# Patient Record
Sex: FEMALE | Race: WHITE | Employment: OTHER | URBAN - METROPOLITAN AREA
[De-identification: names, ages, dates, MRNs, and addresses within clinical notes are randomized per-mention and may not be internally consistent; named-entity substitution may affect disease eponyms.]

---

## 2018-02-27 ENCOUNTER — TRANSFERRED RECORDS (OUTPATIENT)
Dept: HEALTH INFORMATION MANAGEMENT | Facility: CLINIC | Age: 68
End: 2018-02-27

## 2018-03-19 ENCOUNTER — HOSPITAL ENCOUNTER (INPATIENT)
Facility: CLINIC | Age: 68
LOS: 2 days | Discharge: HOME | DRG: 394 | End: 2018-03-21
Attending: EMERGENCY MEDICINE | Admitting: INTERNAL MEDICINE
Payer: MEDICARE

## 2018-03-19 ENCOUNTER — APPOINTMENT (OUTPATIENT)
Dept: CT IMAGING | Facility: CLINIC | Age: 68
DRG: 394 | End: 2018-03-19
Attending: EMERGENCY MEDICINE
Payer: MEDICARE

## 2018-03-19 DIAGNOSIS — I10 BENIGN ESSENTIAL HYPERTENSION: Primary | ICD-10-CM

## 2018-03-19 DIAGNOSIS — K62.5 BRIGHT RED BLOOD PER RECTUM: ICD-10-CM

## 2018-03-19 DIAGNOSIS — K52.9 COLITIS: ICD-10-CM

## 2018-03-19 PROBLEM — K92.1 HEMATOCHEZIA: Status: ACTIVE | Noted: 2018-03-19

## 2018-03-19 LAB
ABO + RH BLD: NORMAL
ABO + RH BLD: NORMAL
ALBUMIN SERPL-MCNC: 4.1 G/DL (ref 3.4–5)
ALP SERPL-CCNC: 146 U/L (ref 40–150)
ALT SERPL W P-5'-P-CCNC: 48 U/L (ref 0–50)
ANION GAP SERPL CALCULATED.3IONS-SCNC: 12 MMOL/L (ref 3–14)
AST SERPL W P-5'-P-CCNC: 44 U/L (ref 0–45)
BASOPHILS # BLD AUTO: 0 10E9/L (ref 0–0.2)
BASOPHILS NFR BLD AUTO: 0.1 %
BILIRUB SERPL-MCNC: 0.7 MG/DL (ref 0.2–1.3)
BLD GP AB SCN SERPL QL: NORMAL
BLOOD BANK CMNT PATIENT-IMP: NORMAL
BUN SERPL-MCNC: 28 MG/DL (ref 7–30)
C COLI+JEJUNI+LARI FUSA STL QL NAA+PROBE: NOT DETECTED
C DIFF TOX B STL QL: NEGATIVE
CALCIUM SERPL-MCNC: 9 MG/DL (ref 8.5–10.1)
CHLORIDE SERPL-SCNC: 100 MMOL/L (ref 94–109)
CO2 SERPL-SCNC: 26 MMOL/L (ref 20–32)
CREAT SERPL-MCNC: 0.9 MG/DL (ref 0.52–1.04)
DIFFERENTIAL METHOD BLD: ABNORMAL
EC STX1 GENE STL QL NAA+PROBE: NOT DETECTED
EC STX2 GENE STL QL NAA+PROBE: NOT DETECTED
ENTERIC PATHOGEN COMMENT: NORMAL
EOSINOPHIL # BLD AUTO: 0 10E9/L (ref 0–0.7)
EOSINOPHIL NFR BLD AUTO: 0 %
ERYTHROCYTE [DISTWIDTH] IN BLOOD BY AUTOMATED COUNT: 12.4 % (ref 10–15)
GFR SERPL CREATININE-BSD FRML MDRD: 62 ML/MIN/1.7M2
GLUCOSE SERPL-MCNC: 159 MG/DL (ref 70–99)
HCT VFR BLD AUTO: 44.3 % (ref 35–47)
HGB BLD-MCNC: 12.9 G/DL (ref 11.7–15.7)
HGB BLD-MCNC: 13.7 G/DL (ref 11.7–15.7)
HGB BLD-MCNC: 15.1 G/DL (ref 11.7–15.7)
IMM GRANULOCYTES # BLD: 0.1 10E9/L (ref 0–0.4)
IMM GRANULOCYTES NFR BLD: 0.4 %
LACTATE BLD-SCNC: 0.8 MMOL/L (ref 0.7–2)
LACTATE BLD-SCNC: 2.2 MMOL/L (ref 0.7–2)
LIPASE SERPL-CCNC: 133 U/L (ref 73–393)
LYMPHOCYTES # BLD AUTO: 1.6 10E9/L (ref 0.8–5.3)
LYMPHOCYTES NFR BLD AUTO: 7.2 %
MCH RBC QN AUTO: 30.7 PG (ref 26.5–33)
MCHC RBC AUTO-ENTMCNC: 34.1 G/DL (ref 31.5–36.5)
MCV RBC AUTO: 90 FL (ref 78–100)
MONOCYTES # BLD AUTO: 1.5 10E9/L (ref 0–1.3)
MONOCYTES NFR BLD AUTO: 6.6 %
NEUTROPHILS # BLD AUTO: 19.5 10E9/L (ref 1.6–8.3)
NEUTROPHILS NFR BLD AUTO: 85.7 %
NOROV GI+II ORF1-ORF2 JNC STL QL NAA+PR: NOT DETECTED
NRBC # BLD AUTO: 0 10*3/UL
NRBC BLD AUTO-RTO: 0 /100
PLATELET # BLD AUTO: 260 10E9/L (ref 150–450)
POTASSIUM SERPL-SCNC: 3.2 MMOL/L (ref 3.4–5.3)
POTASSIUM SERPL-SCNC: 4 MMOL/L (ref 3.4–5.3)
PROT SERPL-MCNC: 7.3 G/DL (ref 6.8–8.8)
RBC # BLD AUTO: 4.92 10E12/L (ref 3.8–5.2)
RVA NSP5 STL QL NAA+PROBE: NOT DETECTED
SALMONELLA SP RPOD STL QL NAA+PROBE: NOT DETECTED
SHIGELLA SP+EIEC IPAH STL QL NAA+PROBE: NOT DETECTED
SODIUM SERPL-SCNC: 138 MMOL/L (ref 133–144)
SPECIMEN EXP DATE BLD: NORMAL
SPECIMEN SOURCE: NORMAL
V CHOL+PARA RFBL+TRKH+TNAA STL QL NAA+PR: NOT DETECTED
WBC # BLD AUTO: 22.7 10E9/L (ref 4–11)
Y ENTERO RECN STL QL NAA+PROBE: NOT DETECTED

## 2018-03-19 PROCEDURE — 74176 CT ABD & PELVIS W/O CONTRAST: CPT

## 2018-03-19 PROCEDURE — 12000000 ZZH R&B MED SURG/OB

## 2018-03-19 PROCEDURE — 96361 HYDRATE IV INFUSION ADD-ON: CPT

## 2018-03-19 PROCEDURE — 36415 COLL VENOUS BLD VENIPUNCTURE: CPT | Performed by: PHYSICIAN ASSISTANT

## 2018-03-19 PROCEDURE — 25000128 H RX IP 250 OP 636: Performed by: EMERGENCY MEDICINE

## 2018-03-19 PROCEDURE — 86901 BLOOD TYPING SEROLOGIC RH(D): CPT | Performed by: EMERGENCY MEDICINE

## 2018-03-19 PROCEDURE — 25000132 ZZH RX MED GY IP 250 OP 250 PS 637: Mod: GY | Performed by: EMERGENCY MEDICINE

## 2018-03-19 PROCEDURE — 25000132 ZZH RX MED GY IP 250 OP 250 PS 637: Mod: GY | Performed by: PHYSICIAN ASSISTANT

## 2018-03-19 PROCEDURE — A9270 NON-COVERED ITEM OR SERVICE: HCPCS | Mod: GY | Performed by: EMERGENCY MEDICINE

## 2018-03-19 PROCEDURE — 85018 HEMOGLOBIN: CPT | Performed by: PHYSICIAN ASSISTANT

## 2018-03-19 PROCEDURE — 25000125 ZZHC RX 250: Performed by: EMERGENCY MEDICINE

## 2018-03-19 PROCEDURE — 84132 ASSAY OF SERUM POTASSIUM: CPT | Performed by: PHYSICIAN ASSISTANT

## 2018-03-19 PROCEDURE — 86900 BLOOD TYPING SEROLOGIC ABO: CPT | Performed by: EMERGENCY MEDICINE

## 2018-03-19 PROCEDURE — 83690 ASSAY OF LIPASE: CPT | Performed by: EMERGENCY MEDICINE

## 2018-03-19 PROCEDURE — 87506 IADNA-DNA/RNA PROBE TQ 6-11: CPT | Performed by: PHYSICIAN ASSISTANT

## 2018-03-19 PROCEDURE — 83605 ASSAY OF LACTIC ACID: CPT | Performed by: PHYSICIAN ASSISTANT

## 2018-03-19 PROCEDURE — 80053 COMPREHEN METABOLIC PANEL: CPT | Performed by: EMERGENCY MEDICINE

## 2018-03-19 PROCEDURE — 25000128 H RX IP 250 OP 636: Performed by: PHYSICIAN ASSISTANT

## 2018-03-19 PROCEDURE — 25000132 ZZH RX MED GY IP 250 OP 250 PS 637: Mod: GY | Performed by: INTERNAL MEDICINE

## 2018-03-19 PROCEDURE — 86850 RBC ANTIBODY SCREEN: CPT | Performed by: EMERGENCY MEDICINE

## 2018-03-19 PROCEDURE — 87493 C DIFF AMPLIFIED PROBE: CPT | Performed by: PHYSICIAN ASSISTANT

## 2018-03-19 PROCEDURE — 99223 1ST HOSP IP/OBS HIGH 75: CPT | Mod: AI | Performed by: INTERNAL MEDICINE

## 2018-03-19 PROCEDURE — 85025 COMPLETE CBC W/AUTO DIFF WBC: CPT | Performed by: EMERGENCY MEDICINE

## 2018-03-19 PROCEDURE — A9270 NON-COVERED ITEM OR SERVICE: HCPCS | Mod: GY | Performed by: PHYSICIAN ASSISTANT

## 2018-03-19 PROCEDURE — 99207 ZZC APP CREDIT; MD BILLING SHARED VISIT: CPT | Performed by: PHYSICIAN ASSISTANT

## 2018-03-19 PROCEDURE — 25000125 ZZHC RX 250: Performed by: PHYSICIAN ASSISTANT

## 2018-03-19 PROCEDURE — 99285 EMERGENCY DEPT VISIT HI MDM: CPT | Mod: 25

## 2018-03-19 PROCEDURE — 96365 THER/PROPH/DIAG IV INF INIT: CPT

## 2018-03-19 PROCEDURE — 83605 ASSAY OF LACTIC ACID: CPT | Performed by: EMERGENCY MEDICINE

## 2018-03-19 RX ORDER — NALOXONE HYDROCHLORIDE 0.4 MG/ML
.1-.4 INJECTION, SOLUTION INTRAMUSCULAR; INTRAVENOUS; SUBCUTANEOUS
Status: DISCONTINUED | OUTPATIENT
Start: 2018-03-19 | End: 2018-03-21 | Stop reason: HOSPADM

## 2018-03-19 RX ORDER — ACETAMINOPHEN 650 MG/1
650 SUPPOSITORY RECTAL EVERY 4 HOURS PRN
Status: DISCONTINUED | OUTPATIENT
Start: 2018-03-19 | End: 2018-03-21 | Stop reason: HOSPADM

## 2018-03-19 RX ORDER — OXYCODONE AND ACETAMINOPHEN 5; 325 MG/1; MG/1
1-2 TABLET ORAL EVERY 4 HOURS PRN
Status: DISCONTINUED | OUTPATIENT
Start: 2018-03-19 | End: 2018-03-21 | Stop reason: HOSPADM

## 2018-03-19 RX ORDER — POTASSIUM CHLORIDE 1.5 G/1.58G
20-40 POWDER, FOR SOLUTION ORAL
Status: DISCONTINUED | OUTPATIENT
Start: 2018-03-19 | End: 2018-03-21 | Stop reason: HOSPADM

## 2018-03-19 RX ORDER — LISINOPRIL 10 MG/1
10 TABLET ORAL DAILY
Status: DISCONTINUED | OUTPATIENT
Start: 2018-03-19 | End: 2018-03-21 | Stop reason: HOSPADM

## 2018-03-19 RX ORDER — CYCLOBENZAPRINE HCL 5 MG
10 TABLET ORAL AT BEDTIME
Status: DISCONTINUED | OUTPATIENT
Start: 2018-03-19 | End: 2018-03-21 | Stop reason: HOSPADM

## 2018-03-19 RX ORDER — LOPERAMIDE HCL 2 MG
2 CAPSULE ORAL 4 TIMES DAILY PRN
COMMUNITY

## 2018-03-19 RX ORDER — ONDANSETRON 4 MG/1
4 TABLET, ORALLY DISINTEGRATING ORAL EVERY 6 HOURS PRN
Status: DISCONTINUED | OUTPATIENT
Start: 2018-03-19 | End: 2018-03-21 | Stop reason: HOSPADM

## 2018-03-19 RX ORDER — ONDANSETRON 2 MG/ML
4 INJECTION INTRAMUSCULAR; INTRAVENOUS EVERY 6 HOURS PRN
Status: DISCONTINUED | OUTPATIENT
Start: 2018-03-19 | End: 2018-03-21 | Stop reason: HOSPADM

## 2018-03-19 RX ORDER — POTASSIUM CL/LIDO/0.9 % NACL 10MEQ/0.1L
10 INTRAVENOUS SOLUTION, PIGGYBACK (ML) INTRAVENOUS
Status: DISCONTINUED | OUTPATIENT
Start: 2018-03-19 | End: 2018-03-21 | Stop reason: HOSPADM

## 2018-03-19 RX ORDER — ONDANSETRON 4 MG/1
4 TABLET, ORALLY DISINTEGRATING ORAL ONCE
Status: COMPLETED | OUTPATIENT
Start: 2018-03-19 | End: 2018-03-19

## 2018-03-19 RX ORDER — SODIUM CHLORIDE 9 MG/ML
INJECTION, SOLUTION INTRAVENOUS CONTINUOUS
Status: DISCONTINUED | OUTPATIENT
Start: 2018-03-19 | End: 2018-03-21

## 2018-03-19 RX ORDER — SIMVASTATIN 20 MG
40 TABLET ORAL AT BEDTIME
Status: DISCONTINUED | OUTPATIENT
Start: 2018-03-19 | End: 2018-03-21 | Stop reason: HOSPADM

## 2018-03-19 RX ORDER — CIPROFLOXACIN 2 MG/ML
400 INJECTION, SOLUTION INTRAVENOUS EVERY 12 HOURS
Status: DISCONTINUED | OUTPATIENT
Start: 2018-03-19 | End: 2018-03-21

## 2018-03-19 RX ORDER — POTASSIUM CHLORIDE 29.8 MG/ML
20 INJECTION INTRAVENOUS
Status: DISCONTINUED | OUTPATIENT
Start: 2018-03-19 | End: 2018-03-21 | Stop reason: HOSPADM

## 2018-03-19 RX ORDER — PROCHLORPERAZINE 25 MG
12.5 SUPPOSITORY, RECTAL RECTAL EVERY 12 HOURS PRN
Status: DISCONTINUED | OUTPATIENT
Start: 2018-03-19 | End: 2018-03-21 | Stop reason: HOSPADM

## 2018-03-19 RX ORDER — NYSTATIN 100000 [USP'U]/G
POWDER TOPICAL 2 TIMES DAILY
COMMUNITY
End: 2019-04-10

## 2018-03-19 RX ORDER — TEMAZEPAM 15 MG/1
15 CAPSULE ORAL
Status: DISCONTINUED | OUTPATIENT
Start: 2018-03-19 | End: 2018-03-19

## 2018-03-19 RX ORDER — POTASSIUM CHLORIDE 1500 MG/1
20-40 TABLET, EXTENDED RELEASE ORAL
Status: DISCONTINUED | OUTPATIENT
Start: 2018-03-19 | End: 2018-03-21 | Stop reason: HOSPADM

## 2018-03-19 RX ORDER — OXYCODONE AND ACETAMINOPHEN 5; 325 MG/1; MG/1
1 TABLET ORAL EVERY EVENING
Status: ON HOLD | COMMUNITY
End: 2019-03-19

## 2018-03-19 RX ORDER — LATANOPROST 50 UG/ML
1 SOLUTION/ DROPS OPHTHALMIC AT BEDTIME
COMMUNITY

## 2018-03-19 RX ORDER — PROCHLORPERAZINE MALEATE 5 MG
5 TABLET ORAL EVERY 6 HOURS PRN
Status: DISCONTINUED | OUTPATIENT
Start: 2018-03-19 | End: 2018-03-21 | Stop reason: HOSPADM

## 2018-03-19 RX ORDER — CIPROFLOXACIN 500 MG/1
500 TABLET, FILM COATED ORAL ONCE
Status: COMPLETED | OUTPATIENT
Start: 2018-03-19 | End: 2018-03-19

## 2018-03-19 RX ORDER — SODIUM CHLORIDE 9 MG/ML
1000 INJECTION, SOLUTION INTRAVENOUS CONTINUOUS
Status: DISCONTINUED | OUTPATIENT
Start: 2018-03-19 | End: 2018-03-19

## 2018-03-19 RX ORDER — LATANOPROST 50 UG/ML
1 SOLUTION/ DROPS OPHTHALMIC AT BEDTIME
Status: DISCONTINUED | OUTPATIENT
Start: 2018-03-19 | End: 2018-03-21 | Stop reason: HOSPADM

## 2018-03-19 RX ORDER — TEMAZEPAM 7.5 MG/1
7.5 CAPSULE ORAL
Status: DISCONTINUED | OUTPATIENT
Start: 2018-03-19 | End: 2018-03-21 | Stop reason: HOSPADM

## 2018-03-19 RX ORDER — OXYCODONE AND ACETAMINOPHEN 5; 325 MG/1; MG/1
0.5 TABLET ORAL EVERY MORNING
Status: ON HOLD | COMMUNITY
End: 2019-03-19

## 2018-03-19 RX ORDER — POTASSIUM CHLORIDE 7.45 MG/ML
10 INJECTION INTRAVENOUS
Status: DISCONTINUED | OUTPATIENT
Start: 2018-03-19 | End: 2018-03-21 | Stop reason: HOSPADM

## 2018-03-19 RX ORDER — ACETAMINOPHEN 325 MG/1
650 TABLET ORAL EVERY 4 HOURS PRN
Status: DISCONTINUED | OUTPATIENT
Start: 2018-03-19 | End: 2018-03-21 | Stop reason: HOSPADM

## 2018-03-19 RX ADMIN — METRONIDAZOLE 500 MG: 500 INJECTION, SOLUTION INTRAVENOUS at 09:01

## 2018-03-19 RX ADMIN — LISINOPRIL 10 MG: 10 TABLET ORAL at 12:05

## 2018-03-19 RX ADMIN — CYCLOBENZAPRINE HYDROCHLORIDE 10 MG: 5 TABLET, FILM COATED ORAL at 21:51

## 2018-03-19 RX ADMIN — OXYCODONE HYDROCHLORIDE AND ACETAMINOPHEN 2 TABLET: 5; 325 TABLET ORAL at 15:09

## 2018-03-19 RX ADMIN — SIMVASTATIN 40 MG: 20 TABLET, FILM COATED ORAL at 21:52

## 2018-03-19 RX ADMIN — TEMAZEPAM 7.5 MG: 7.5 CAPSULE ORAL at 22:49

## 2018-03-19 RX ADMIN — METRONIDAZOLE 500 MG: 500 INJECTION, SOLUTION INTRAVENOUS at 15:10

## 2018-03-19 RX ADMIN — POTASSIUM CHLORIDE 20 MEQ: 1500 TABLET, EXTENDED RELEASE ORAL at 17:58

## 2018-03-19 RX ADMIN — CIPROFLOXACIN HYDROCHLORIDE 500 MG: 500 TABLET, FILM COATED ORAL at 08:58

## 2018-03-19 RX ADMIN — SODIUM CHLORIDE: 9 INJECTION, SOLUTION INTRAVENOUS at 17:23

## 2018-03-19 RX ADMIN — SODIUM CHLORIDE 1000 ML: 9 INJECTION, SOLUTION INTRAVENOUS at 09:30

## 2018-03-19 RX ADMIN — CIPROFLOXACIN 400 MG: 2 INJECTION, SOLUTION INTRAVENOUS at 19:47

## 2018-03-19 RX ADMIN — METRONIDAZOLE 500 MG: 500 INJECTION, SOLUTION INTRAVENOUS at 21:50

## 2018-03-19 RX ADMIN — POTASSIUM CHLORIDE 40 MEQ: 1500 TABLET, EXTENDED RELEASE ORAL at 15:09

## 2018-03-19 RX ADMIN — SODIUM CHLORIDE 1000 ML: 9 INJECTION, SOLUTION INTRAVENOUS at 07:00

## 2018-03-19 RX ADMIN — ONDANSETRON 4 MG: 4 TABLET, ORALLY DISINTEGRATING ORAL at 09:29

## 2018-03-19 RX ADMIN — LATANOPROST 1 DROP: 50 SOLUTION OPHTHALMIC at 22:49

## 2018-03-19 ASSESSMENT — ACTIVITIES OF DAILY LIVING (ADL)
COGNITION: 0 - NO COGNITION ISSUES REPORTED
TRANSFERRING: 0-->INDEPENDENT
TRANSFERRING: 1 - ASSISTIVE EQUIPMENT
RETIRED_COMMUNICATION: 0-->UNDERSTANDS/COMMUNICATES WITHOUT DIFFICULTY
RETIRED_EATING: 0-->INDEPENDENT
CHANGE_IN_FUNCTIONAL_STATUS_SINCE_ONSET_OF_CURRENT_ILLNESS/INJURY: YES
SWALLOWING: 0 - SWALLOWS FOODS/LIQUIDS WITHOUT DIFFICULTY
AMBULATION: 0-->INDEPENDENT
TOILETING: 1 - ASSISTIVE EQUIPMENT
COMMUNICATION: 0 - UNDERSTANDS/COMMUNICATES WITHOUT DIFFICULTY
DRESS: 0-->INDEPENDENT
BATHING: 0-->INDEPENDENT
FALL_HISTORY_WITHIN_LAST_SIX_MONTHS: NO
TOILETING: 0-->INDEPENDENT
WHICH_OF_THE_ABOVE_FUNCTIONAL_RISKS_HAD_A_RECENT_ONSET_OR_CHANGE?: TOILETING
AMBULATION: 1 - ASSISTIVE EQUIPMENT
SWALLOWING: 0-->SWALLOWS FOODS/LIQUIDS WITHOUT DIFFICULTY

## 2018-03-19 ASSESSMENT — ENCOUNTER SYMPTOMS
SHORTNESS OF BREATH: 0
ABDOMINAL PAIN: 1
DIARRHEA: 1
CONSTITUTIONAL NEGATIVE: 1
NAUSEA: 1
VOMITING: 0
BLOOD IN STOOL: 1
NEUROLOGICAL NEGATIVE: 1

## 2018-03-19 NOTE — ED PROVIDER NOTES
"  History     Chief Complaint:  Rectal bleeding     HPI   Vanessa Huffman is a 67 year old female with a history of chronic diarrhea on loperamide who presents for evaluation of rectal bleeding. The patient has been feeling well recently until 2230 last night when she had a \"terrible\" bowel movement containing primarily bright red blood. She reports frequent persistent episodes of emmanuel bright red blood per rectum every 10-15 minutes since that time, associated with essentially diffuse abdominal pain and nausea. No melena or dark tarry stool. She has not been straining to defecate recently. This differs from her chronic non-bloody diarrhea for which she takes loperamide 15mg daily. She did have a small amount of post-op bleeding after her most recent colonoscopy about 5 years ago, which was otherwise unremarkable, but otherwise has no history of rectal bleeding. No chest pain, dyspnea, palpitations, lightheadedness, vomiting, or fevers. No recent travel, abnormal PO intake, or antibiotic use.She is not on any chronic anticoagulation.     Of note, she was seen in clinic one week ago at which time she had labs drawn including BMP and LFTs which were all unremarkable aside from mildly elevated alk phos at 157.      Allergies:  No known drug allergies    Medications:    lisinopril  simvastatin  HCTZ  Imitrex  Flexeril  temazepam  percocet  loperamide    Past Medical History:    obesity  arthritis  hypertension   migraine  chronic diarrhea  vitamin D deficiency   glaucoma  depressive disorder  hyperlipidemia     Past Surgical History:    gastric bypass  appendectomy  cholecystectomy  GET-BSO    Family History:    CAD    Social History:  The patient denies tobacco, alcohol, or drug use.          Review of Systems   Constitutional: Negative.    Respiratory: Negative for shortness of breath.    Cardiovascular: Negative for chest pain.   Gastrointestinal: Positive for abdominal pain, blood in stool, diarrhea (chronic) " "and nausea. Negative for vomiting.   Neurological: Negative.    All other systems reviewed and are negative.      Physical Exam     Patient Vitals for the past 24 hrs:   BP Temp Temp src Pulse Heart Rate Resp SpO2 Height Weight   03/19/18 0858 - - - - - - 100 % - -   03/19/18 0857 127/61 - - - - - - - -   03/19/18 0807 135/69 - - 65 - 16 99 % - -   03/19/18 0700 119/75 - - - - - 100 % - -   03/19/18 0645 137/89 - - - - - 99 % - -   03/19/18 0632 131/84 97.9  F (36.6  C) Oral 79 79 16 99 % 1.6 m (5' 3\") 95.3 kg (210 lb)        Physical Exam  General: Appears well-developed and well-nourished.   Head: No signs of trauma.   CV: Normal rate and regular rhythm.    Resp: Effort normal and breath sounds normal. No respiratory distress.   GI: Soft. There is diffuse tenderness, worse on left side.  No rebound or guarding.  Normal bowel sounds.  No CVA tenderness.  Rectal:  No hemorrhoids.  No melena on rectal exam  MSK: Normal range of motion. no edema. No Calf tenderness.  Neuro: The patient is alert and oriented.  Strength in upper/lower extremities normal and symmetrical.   Sensation normal. Speech normal.  GCS 15  Skin: Skin is warm and dry. No rash noted.   Psych: normal mood and affect. behavior is normal.       Emergency Department Course   Imaging:  Radiographic findings were communicated with the patient and family who voiced understanding of the findings.  CT abdomen/pelvis non-contrast, stone protocol:   1. Mild wall thickening of the distal transverse colon through the distal sigmoid colon. Additionally, there is mild haziness within the fat about the thick-walled colon. These findings likely relate to colitis that is most likely infectious or inflammatory or less likely ischemic in etiology.  2. A trace amount of free fluid in the pelvis. Results per Radiology.      Laboratory:  Laboratory findings were communicated with the patient and family who voiced understanding of the findings.  CBC w/diff: WBC 22.7 (H), " ANC 19.5 (H), abs mono 1.5 (H), o/w WNL (Hgb 15.1, Plt 260)  CMP: K 3.2 (L), glu 159 (H), o/w WNL (Cr 0.90)  Lipase: 133 (WNL)  Blood type and screen: A positive, antibody negative     Lactic acid (at 0823): 2.2 (H)     Interventions:  0700: Normal Saline 0.9% 1L, IV   0858: ciprofloxacin 500mg, PO  0901: Metronidazole 500mg, IV    Emergency Department Course:  Past medical records, nursing notes, and vitals reviewed.   0645: I performed an exam of the patient as documented above.   Care Everywhere obtained for [facility] and records reviewed.    Peripheral IV access established. Blood drawn and sent. The above imaging study and labs were obtained. Results above.  The patient was given the above interventions with improvement.    0835: I rechecked patient. Findings and plan explained to the patient and  who consents to admission.     0848: Discussed the patient with Dr. Zelaya of the Hospitalist Service, who will admit the patient for further monitoring, evaluation, and treatment.      Impression & Plan    CMS Diagnoses: Lactate is greater than 1.9 due to colitis, at this time there is no sign of severe sepsis or septic shock.  Does not meet SIRS criteria.     Medical Decision Making:  Vanessa Huffman is a 67 year old female who presents due to abdominal pain and bright red blood per rectum.  Symptoms began last night and having frequent bowel movements every 10-20 minutes.  On my evaluation, she did have some diffuse tenderness.  Blood work was obtained that did show an elevated white blood cell count, but her hemoglobin was normal.  CT scan did show signs of acute colitis but no signs of perforation, diverticulitis, or other acute process.  Patient continued to have fairly frequent episodes of blood per rectum in the ER and continued to have cramping and discomfort.  She was admitted to the Hospitalist service for continued monitoring and treatment.  Her lactic acid level did come back mildly elevated and with  the elevated white blood cell count and CT's findings, I did initiate antibiotics.  Patient does not meet sepsis criteria as she does not have SIRS criteria.         Diagnosis:    ICD-10-CM    1. Colitis K52.9    2. Bright red blood per rectum K62.5        Disposition:   Admission     Scribe Disclosure:  I, Ashwin Scherer, am serving as a scribe at 6:37 AM on 3/19/2018 to document services personally performed by Shayan Quintana MD  based on my observations and the provider's statements to me.    Ashwin Scherer  3/19/2018   EMERGENCY DEPARTMENT      Shayan Quintana MD  03/19/18 1300

## 2018-03-19 NOTE — CONSULTS
GASTROENTEROLOGY CONSULTATION      Vanessa Huffman  [address]  [facility] [address]  67 year old female     Admission Date/Time: 3/19/2018  Primary Care Provider: Emily Najera  Referring / Attending Physician:  CATA Bell     We were asked to see the patient in consultation by CATA Bell for evaluation of bloody diarrhea and abdominal pain.        HPI:  Vanessa Huffman is a 67 year old female with PMH of chronic diarrhea on loperamide (15 tablets a day), HTN, hyperlipidemia, arthritis and surgical history of gastric bypass, cholecystectomy, GET BSO. She reports she had a gastric bypass about 30 years ago but she isn't sure when. She has had loose stools since that surgery. She reports she had sudden onset of abdominal pain and frequent loose, bloody stools late last night. Stools occurred every 20 minutes but has slowed down recently. She had mild nausea but no vomiting. She describes the pain as an aching across upper abdomen that will radiate down the sides of the abdomen with cramps immediately before a stool. She denies fevers and chills. Her grandson may of had a GI bug last week but she is not sure. She denies other sick contacts. She denies NSAID use.    She had attempted colonoscopy in 2013 that was aborted due to pain. She then had normal appearing colon on CT colonography in Sept 2013.       PAST MEDICAL HISTORY:  Patient Active Problem List    Diagnosis Date Noted     Hematochezia 03/19/2018     Priority: Medium          ROS: A comprehensive ten point review of systems was negative aside from those in mentioned in the HPI.       MEDICATIONS:   Prior to Admission medications    Medication Sig Start Date End Date Taking? Authorizing Provider   TRAMADOL HCL PO Take 50 mg by mouth 3 times daily as needed for moderate to severe pain   Yes Unknown, Entered By History   LISINOPRIL PO Take 10 mg by mouth daily   Yes Unknown, Entered By History   SIMVASTATIN PO Take 40 mg by mouth At Bedtime   Yes  "Unknown, Entered By History   nystatin (MYCOSTATIN) 735322 UNIT/GM POWD Apply topically 2 times daily as needed (affected area)   Yes Unknown, Entered By History   HYDROCHLOROTHIAZIDE PO Take 25 mg by mouth daily   Yes Unknown, Entered By History   CYCLOBENZAPRINE HCL PO Take 10 mg by mouth At Bedtime   Yes Unknown, Entered By History   TEMAZEPAM PO Take 15 mg by mouth nightly as needed for sleep   Yes Unknown, Entered By History   loperamide (IMODIUM) 2 MG capsule Take 2 mg by mouth 4 times daily as needed for diarrhea   Yes Unknown, Entered By History   latanoprost (XALATAN) 0.005 % ophthalmic solution Place 1 drop into both eyes At Bedtime   Yes Unknown, Entered By History   SUMATRIPTAN SUCCINATE PO Take 50 mg by mouth every 2 hours as needed for migraine (max of 200mg q 24hr)   Yes Unknown, Entered By History   oxyCODONE-acetaminophen (PERCOCET) 5-325 MG per tablet Take 1 tablet by mouth every morning   Yes Unknown, Entered By History   oxyCODONE-acetaminophen (PERCOCET) 5-325 MG per tablet Take 0.5 tablets by mouth every evening   Yes Unknown, Entered By History        ALLERGIES:   Allergies   Allergen Reactions     Contrast Dye Shortness Of Breath     Codeine      Zolpidem Other (See Comments)     Patient reports sleep walking.     Diatrizoate Itching     itchy throat that makes her want to cough        SOCIAL HISTORY:  Denies tobacco use and alcohol use.     FAMILY HISTORY:  She denies known family history of GI conditions including IBD, colon polyps or cancer     PHYSICAL EXAM:     /51  Pulse 66  Temp 97.5  F (36.4  C) (Oral)  Resp 16  Ht 1.6 m (5' 3\")  Wt 94.1 kg (207 lb 7.3 oz)  SpO2 100%  BMI 36.75 kg/m2     PHYSICAL EXAM:  GENERAL: No acute distress  SKIN: no suspicious lesions, rashes, jaundice, or spider angiomas  HEAD: Normocephalic. Atraumatic.  NECK: Neck supple. No adenopathy.   EYES: No scleral icterus  ENT: ENT exam normal, no neck nodes or sinus tenderness  RESPIRATORY: Good " transmission. CTA bilaterally.   CARDIOVASCULAR: RRR  GASTROINTESTINAL: +BS, soft, mild TTP across upper abdomen but no rebound or guarding  JOINT/EXTREMITIES:  no gross deformities noted, normal muscle tone  NEURO: CN 2-12 grossly intact, no focal deficits  PSYCH: Normal affect              ADDITIONAL COMMENTS:   I reviewed the patient's new clinical lab test results.   Recent Labs   Lab Test  03/19/18   0645   WBC  22.7*   HGB  15.1   MCV  90   PLT  260     Recent Labs   Lab Test  03/19/18   0645   POTASSIUM  3.2*   CHLORIDE  100   CO2  26   BUN  28   ANIONGAP  12     Recent Labs   Lab Test  03/19/18   0645   ALBUMIN  4.1   BILITOTAL  0.7   ALT  48   AST  44   LIPASE  133        IMAGING / ENDOSCOPY   CT ABDOMEN AND PELVIS WITHOUT CONTRAST March 19, 2018 7:20 AM      HISTORY: Epigastric pain. Bright red blood per rectum.     COMPARISON: None.     TECHNIQUE: Without intravenous contrast, helical sections were  acquired from the top of the diaphragm through the pubic symphysis.  Coronal reconstructions were generated. Radiation dose for this scan  was reduced using automated exposure control, adjustment of the mA  and/or kV according to the patient's size, or iterative reconstruction  technique.     FINDINGS:      Abdomen: The liver, spleen, pancreas, adrenal glands and kidneys are  unremarkable to the limits of a noncontrast CT scan. The gallbladder  is not visualized. Prior gastric surgery. No enlarged lymph nodes or  free fluid in the upper abdomen.     Scan through the lower chest is unremarkable.     Pelvis: The small and large bowel are normal in caliber. Mild wall  thickening of the distal transverse colon through the distal sigmoid  colon. Slight haziness is present within the fat about the  thick-walled colon. The appendix is not visualized. No bowel wall  thickening, pneumatosis or free intraperitoneal gas. The uterus is not  visualized. No enlarged lymph nodes in the pelvis. A trace amount of  free fluid  in the pelvis.         IMPRESSION:   1. Mild wall thickening of the distal transverse colon through the  distal sigmoid colon. Additionally, there is mild haziness within the  fat about the thick-walled colon. These findings likely relate to  colitis that is infectious or inflammatory or less likely ischemic in  etiology.  2. A trace amount of free fluid in the pelvis.       CONSULTATION ASSESSMENT AND PLAN:    Vanessa Huffman is a 67 year old with PMH of chronic diarrhea managed with daily loperamide since her gastric bypass (~30 years ago) who is admitted with acute abdominal pain and bloody diarrhea. CT without contrast shows wall thickening in distal transverse through distal sigmoid colon. Labs remarkable for elevated lactic acid and WBC. Findings are concerning for ischemic colitis vs infectious. Cipro/flagyl has been ordered on admission orders. She is afebrile and blood pressure is stable.  -Await infectious stool study results  -Agree with antibiotics given WBC and lactic acid  -If above is negative then would proceed with colonoscopy. This would require MAC sedation given history of aborted colonoscopy 2/2 pain. This would need to be delayed if significant worsening concerning for severe colitis.  -Avoid anti-diarrheals      I discussed the patient plan with Dr. Holliday. Thank you for asking us to participate in the care of this patient.    Archana Roca PA-C  [facility]    GI Attending Note:  Chart reviewed, patient interviewed and examined.  She required 1 Percocet tab today due to severe pain, but is now starting to feel better.  Several passages of bloody stool today, but frequency is decreasing.  Tenderness is now most in the lower left side of the abdomen.  PE: VSS, afebrile.  Abd: soft, mild lower abdominal tenderness without rebound tenderness nor guarding.  +bs, no masses/organomegaly.  Labs and imaging as noted above.  A/P: symptoms, signs, labs and imaging most in favor of an  ischemic colitis.  Will await stool test results for infection, then plan for evaluating the colon--either flex sig first, if stool tests are negative, then with complete colonoscopy, most likely as an outpatient.  Agree with evaluation and management plans per Archana IVY's note.    Margaret Holliday MD  [facility]  Office: [phone]  Cell:  [phone]  Monday through Thursday 8am to 5pm, Friday 8am to 4pm

## 2018-03-19 NOTE — IP AVS SNAPSHOT
[facility] [address] [facility]    [address] [place] [address]    Phone:  [phone]                                       After Visit Summary   3/19/2018    Vanessa Huffman    MRN: [id number]           After Visit Summary Signature Page     I have received my discharge instructions, and my questions have been answered. I have discussed any challenges I see with this plan with the nurse or doctor.    ..........................................................................................................................................  Patient/Patient Representative Signature      ..........................................................................................................................................  Patient Representative Print Name and Relationship to Patient    ..................................................               ................................................  Date                                            Time    ..........................................................................................................................................  Reviewed by Signature/Title    ...................................................              ..............................................  Date                                                            Time

## 2018-03-19 NOTE — PHARMACY-ADMISSION MEDICATION HISTORY
Admission medication history interview status for the 3/19/2018  admission is complete. See EPIC admission navigator for prior to admission medications     Medication history source reliability:Good    Actions taken by pharmacist (provider contacted, etc):None     Additional medication history information not noted on PTA med list :takes pain meds and sleeper regularly for arthritis and insomnia. Chronic diarrhea due to stomach stapling    Medication reconciliation/reorder completed by provider prior to medication history? No    Time spent in this activity: 20 minutes    Prior to Admission medications    Medication Sig Last Dose Taking? Auth Provider   TRAMADOL HCL PO Take 50 mg by mouth 3 times daily as needed for moderate to severe pain 3/18/2018 at Unknown time Yes Unknown, Entered By History   LISINOPRIL PO Take 10 mg by mouth daily 3/18/2018 at Unknown time Yes Unknown, Entered By History   SIMVASTATIN PO Take 40 mg by mouth At Bedtime 3/18/2018 at Unknown time Yes Unknown, Entered By History   nystatin (MYCOSTATIN) 898279 UNIT/GM POWD Apply topically 2 times daily as needed (affected area) Past Month at Unknown time Yes Unknown, Entered By History   HYDROCHLOROTHIAZIDE PO Take 25 mg by mouth daily 3/18/2018 at Unknown time Yes Unknown, Entered By History   CYCLOBENZAPRINE HCL PO Take 10 mg by mouth At Bedtime 3/18/2018 at Unknown time Yes Unknown, Entered By History   TEMAZEPAM PO Take 15 mg by mouth nightly as needed for sleep 3/18/2018 at Unknown time Yes Unknown, Entered By History   loperamide (IMODIUM) 2 MG capsule Take 2 mg by mouth 4 times daily as needed for diarrhea 3/18/2018 at Unknown time Yes Unknown, Entered By History   latanoprost (XALATAN) 0.005 % ophthalmic solution Place 1 drop into both eyes At Bedtime 3/18/2018 at Unknown time Yes Unknown, Entered By History   SUMATRIPTAN SUCCINATE PO Take 50 mg by mouth every 2 hours as needed for migraine (max of 200mg q 24hr) Past Month at Unknown time  Yes Unknown, Entered By History   oxyCODONE-acetaminophen (PERCOCET) 5-325 MG per tablet Take 1 tablet by mouth every morning 3/18/2018 at Unknown time Yes Unknown, Entered By History   oxyCODONE-acetaminophen (PERCOCET) 5-325 MG per tablet Take 0.5 tablets by mouth every evening 3/18/2018 at Unknown time Yes Unknown, Entered By History

## 2018-03-19 NOTE — ED NOTES
"[facility]  ED Nurse Handoff Report    ED Chief complaint: Rectal Bleeding (patient reports rectasl bleeding intermittently since last evening)      ED Diagnosis:   Final diagnoses:   None       Code Status: Full Code    Allergies:   Allergies   Allergen Reactions     Contrast Dye Shortness Of Breath     Codeine        Activity level - Baseline/Home:  Independent    Activity Level - Current:   Independent     Needed?: No    Isolation: No  Infection: Not Applicable    Bariatric?: No    Vital Signs:   Vitals:    03/19/18 0632 03/19/18 0645 03/19/18 0700 03/19/18 0807   BP: 131/84 137/89 119/75 135/69   Pulse: 79   65   Resp: 16   16   Temp: 97.9  F (36.6  C)      TempSrc: Oral      SpO2: 99% 99% 100% 99%   Weight: 95.3 kg (210 lb)      Height: 1.6 m (5' 3\")          Cardiac Rhythm: ,        Pain level: 0-10 Pain Scale: 8    Is this patient confused?: No     Patient Report: Initial Complaint: Rectal bleeding  Focused Assessment: VSS on R/A. Pt reported to ED with  after repeated small amounts of rectal bleeding starting 3/18 late evening. Pt also has abdominal pain that is worse leading up to each BM. Respiratory, Cardiac and Neuro exams WNL; GI exam exhibits lower abdominal pain (worse immediately prior to BM), bright red stool several (10+) times through night, small amounts each time. Plan to admit for concerns of Colitis 2/2 CT read.  Tests Performed: labs, lactic acid, CT abd/pelvis  Abnormal Results: WBC 22.7, ANC 19.5, Lactic acid 2.2, K+ 3.2,   Treatments provided: 1 L NS bolus complete, 1 L NS bolus infusing    Family Comments:  Buck at bedside    OBS brochure/video discussed/provided to patient: N/A    ED Medications:   Medications   0.9% sodium chloride BOLUS (1,000 mLs Intravenous New Bag 3/19/18 0700)     Followed by   sodium chloride 0.9% infusion (not administered)   0.9% sodium chloride BOLUS (not administered)       Drips infusing?:  Yes, NS bolus    For " the majority of the shift this patient was Green.   Interventions performed were NA.    Severe Sepsis OR Septic Shock Diagnosis Present:   Yes    Per the ED Provider, Time Zero for severe sepsis or septic shock is:  0830     3 Hour Severe Sepsis Bundle Completion:  1. Initial Lactic Acid Result:   Recent Labs   Lab Test  03/19/18   0823   LACT  2.2*     2. Blood Cultures before Antibiotics: No  3. Broad Spectrum Antibiotics Administered: 500 mg Cipro PO given     Anti-infectives (Future)    Start     Dose/Rate Route Frequency Ordered Stop    03/19/18 0850  metroNIDAZOLE (FLAGYL) infusion 500 mg      500 mg  100 mL/hr over 60 Minutes Intravenous ONCE 03/19/18 0850          4. 1000 ml of IV fluids have been given so far      6 Hour Severe Sepsis Bundle Completion:    1. Repeat Lactic Acid Level: Not drawn  2. Patient currently on Vasopressors =  No      ED NURSE PHONE NUMBER: [phone]

## 2018-03-19 NOTE — PROGRESS NOTES
RECEIVING UNIT ED HANDOFF REVIEW    ED Nurse Handoff Report was reviewed by: Richard Galvan on March 19, 2018 at 9:10 AM

## 2018-03-19 NOTE — H&P
Admitted:     03/19/2018      PRIMARY CARE PROVIDER:  Emily Najera NP      CHIEF COMPLAINT:  Diarrhea with rectal bleeding.        History obtained from the patient and chart review.      HISTORY OF PRESENT ILLNESS:  Vanessa Huffman is a 67-year-old female with past medical history of chronic diarrhea, previous gastric bypass, hypertension and arthritis who presented to the Emergency Department today for evaluation of rectal bleeding.  She has chronic diarrhea stemming from previous gastric bypass in the late 1980s.  She takes 15 mg of Imodium daily.  She was in her normal state of health until last evening when she had sudden onset of significant urgency.  She had multiple bouts of diarrhea with bright red blood.  The episodes persisted overnight.  She has some mild abdominal pain associated with occasional nausea but no emesis.  No fevers, occasional chills.  When her symptoms persisted this morning she elected to present to the Emergency Department.      In the Emergency Department she was evaluated by Dr. Quintana.  Vitals were stable upon arrival.  Laboratory evaluation revealed mild hypokalemia at 3.2, lactic acid 2.2 and WBC of 22.7.  CT of the abdomen was obtained which showed mild wall thickening of the distal transverse colon throughout the distal sigmoid colon, findings likely consistent with colitis, infectious versus inflammatory.  She was initiated on antibiotics and request for admission was made.      Presently the patient is evaluated in the Emergency Department.  Again she denies any recent fevers or chills.  No one else is sick at home.  No recent abnormal foods or eating out.  No recent antibiotics.  She does not work in health care.  She does have a history of arthritis followed by Rheumatology, but is not on any prednisone or any immunomodulators.  She did receive steroid injections in bilateral hips last week.  She had a colonoscopy approximately 5 years ago and was told to come back in 10  years.  No personal or family history of inflammatory bowel disease.      PAST MEDICAL HISTORY:   1.  Chronic diarrhea.   2.  Hypertension.   3.  Dyslipidemia.   4.  Status post gastric bypass surgery.    5.  Depression.   6.  Obesity.   7.  Arthritis.      PRIOR TO ADMISSION MEDICATIONS:  **  Prior to Admission medications    Medication Sig Last Dose Taking? Auth Provider   TRAMADOL HCL PO Take 50 mg by mouth 3 times daily as needed for moderate to severe pain 3/18/2018 at Unknown time Yes Unknown, Entered By History   LISINOPRIL PO Take 10 mg by mouth daily 3/18/2018 at Unknown time Yes Unknown, Entered By History   SIMVASTATIN PO Take 40 mg by mouth At Bedtime 3/18/2018 at Unknown time Yes Unknown, Entered By History   nystatin (MYCOSTATIN) 056921 UNIT/GM POWD Apply topically 2 times daily as needed (affected area) Past Month at Unknown time Yes Unknown, Entered By History   HYDROCHLOROTHIAZIDE PO Take 25 mg by mouth daily 3/18/2018 at Unknown time Yes Unknown, Entered By History   CYCLOBENZAPRINE HCL PO Take 10 mg by mouth At Bedtime 3/18/2018 at Unknown time Yes Unknown, Entered By History   TEMAZEPAM PO Take 15 mg by mouth nightly as needed for sleep 3/18/2018 at Unknown time Yes Unknown, Entered By History   loperamide (IMODIUM) 2 MG capsule Take 2 mg by mouth 4 times daily as needed for diarrhea 3/18/2018 at Unknown time Yes Unknown, Entered By History   latanoprost (XALATAN) 0.005 % ophthalmic solution Place 1 drop into both eyes At Bedtime 3/18/2018 at Unknown time Yes Unknown, Entered By History   SUMATRIPTAN SUCCINATE PO Take 50 mg by mouth every 2 hours as needed for migraine (max of 200mg q 24hr) Past Month at Unknown time Yes Unknown, Entered By History   oxyCODONE-acetaminophen (PERCOCET) 5-325 MG per tablet Take 1 tablet by mouth every morning 3/18/2018 at Unknown time Yes Unknown, Entered By History   oxyCODONE-acetaminophen (PERCOCET) 5-325 MG per tablet Take 0.5 tablets by mouth every evening  3/18/2018 at Unknown time Yes Unknown, Entered By History          ALLERGIES:     1.  CONTRAST DYE.   2.  CODEINE.      PAST SURGICAL HISTORY:     1.  Appendectomy.   2.  Splenectomy.   3.  Cataract.   4.  Cholecystectomy.   5.  Gastric bypass.   6.  Total abdominal hysterectomy.      FAMILY HISTORY:  Both parents had heart disease.  Sister has diabetes.      SOCIAL HISTORY:  She is a nonsmoker.  Drinks alcohol twice.  She is .  She is a retired .      REVIEW OF SYSTEMS:  A 10-point review of systems was completed.  Pertinent positives as noted and all other systems negative.      PHYSICAL EXAMINATION:   GENERAL:  Vanessa is a very pleasant, well-developed, well-nourished 67-year-old female who is lying in bed.   VITAL SIGNS:  Blood pressure is 127/61, pulse 65, O2 saturation 100%, temperature 97.9.   HEENT:  Normocephalic, atraumatic.  Eyes:  Pupils equal, round, react to light.  Oropharynx:  Moist mucous membranes.   NECK:  Supple.  No adenopathy.  No thyromegaly.   CARDIOVASCULAR:  Regular rate and rhythm.   PULMONARY:  Normal effort.  Lungs are clear to auscultation anteriorly.   ABDOMEN:  Obese.  Nondistended.  Normal bowel sounds.  Nontender.   EXTREMITIES:  Moves all 4 extremities.  Dorsalis pedis pulses palpated bilaterally.  Lower extremities without edema.   NEUROLOGIC:  Alert and oriented.  Cranial nerves II-XII grossly intact.      LABORATORY EVALUATION:  Labs reviewed in Epic.      IMAGING:  Radiology read of CT reviewed.      ASSESSMENT:  Vanessa Huffman is a 67-year-old female with past medical history of chronic diarrhea, previous gastric bypass and essential hypertension who presented to the Emergency Department with diarrhea and rectal bleeding.  She is being admitted for further evaluation.   1.  Diarrhea and rectal bleeding, likely secondary to colitis.  Symptoms started yesterday.  She is afebrile with normal vital signs.  Does have significant leukocytosis on exam.  No previous  history of inflammatory bowel disease.  She will be admitted under inpatient status.  She has already been initiated on ciprofloxacin and Flagyl in the Emergency Department which will be continued.  Clostridium difficile and stool cultures will be obtained.  We will consult Gastroenterology for further recommendations.  Follow fever curve and daily CBC.   2.  Elevated lactic acid.  Suspect likely secondary to acute colitis.  She received 2 L of fluid in the Emergency Department.  Will repeat lactic acid.   3.  Hypokalemia.  Suspect likely secondary to diarrhea in the setting of diuretic.  Hold hydrochlorothiazide.  Replace per protocol.  Repeat BMP in the morning.   4.  Essential hypertension.  Prior to admission regimen includes lisinopril 10 mg daily and hydrochlorothiazide 25 mg daily.  Will hold hydrochlorothiazide.  Continue lisinopril with parameters in place.   5.  Dyslipidemia.  Continue prior to admission statin.   6.  Arthritis.  She takes Percocet prior to admission.  Percocet and IV Dilaudid will be available as needed for abdominal pain.   7.  Chronic diarrhea.  Hold Imodium pending GI evaluation and stool cultures.   8.  Deep venous thrombosis prophylaxis.  PCDs.      CODE STATUS:  FULL CODE.      This patient was discussed with Dr. Candelario Morales of the Hospitalist Service who independently interviewed and examined the patient.  He is in agreement with the above plan.         CANDELARIO MORALES MD       As dictated by SUKUMAR MAYS PA-C            D: 2018   T: 2018   MT: YULIA      Name:     MASSIMO FLOWERS   MRN:      [id number]        Account:      [id number]   :      1950        Admitted:     2018                   Document: [id number]

## 2018-03-19 NOTE — IP AVS SNAPSHOT
MRN:[id number]                      After Visit Summary   3/19/2018    Vanessa Huffman    MRN: [id number]           Thank you!     Thank you for choosing [facility] for your care. Our goal is always to provide you with excellent care. Hearing back from our patients is one way we can continue to improve our services. Please take a few minutes to complete the written survey that you may receive in the mail after you visit with us. Thank you!        Patient Information     Date Of Birth          1950        Designated Caregiver       Most Recent Value    Caregiver    Will someone help with your care after discharge? 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  For non-urgent questions about your medical care, please call your primary care provider or clinic, [phone]  For questions related to your surgery, please call your surgery clinic        Attending Provider     Provider Specialty    Shayan Quintana MD Emergency Medicine    Zelaya, Candelario Ceja MD Internal Medicine       Primary Care Provider Office Phone # Fax #    Emily Najera [phone]      After Care Instructions     Activity       Your activity upon discharge: activity as tolerated            Diet       Follow this diet upon discharge: Orders Placed This Encounter      Advance Diet as Tolerated: Regular Diet Adult                  Follow-up Appointments     Follow-up and recommended labs and tests        Follow up with primary care provider, Emily Najera, as scheduled on  "Tuesday 3/27/18 at 3:45pm, to evaluate medication change and for hospital follow- up.  No follow up labs or test are needed.    [facility] in 6 weeks for colonoscopy, call their office to arrange with Dr. Holliday if you do not receive a call from them within a week.  You will need to follow up on biopsy results of flexible sigmoidoscopy done on 3/20/18 with them.  [facility])  Office: [phone]                  Your next 10 appointments already scheduled     Apr 06, 2018   Procedure with Kalyani King MD   [facility] (--)    [address]  [facility] [address] [phone]              Pending Results     Date and Time Order Name Status Description    3/20/2018 1406 Surgical pathology exam In process             Statement of Approval     Ordered          03/21/18 0952  I have reviewed and agree with all the recommendations and orders detailed in this document.  EFFECTIVE NOW     Approved and electronically signed by:  Candelario Zelaya MD             Admission Information     Date & Time Provider Department Dept. Phone    3/19/2018 Candelario Zelaya MD [facility] Unit [phone]      Your Vitals Were     Blood Pressure Pulse Temperature Respirations Height Weight    106/46 (BP Location: Left arm) 79 98.4  F (36.9  C) (Oral) 16 1.6 m (5' 3\") 94.1 kg (207 lb 7.3 oz)    Pulse Oximetry BMI (Body Mass Index)                96% 36.75 kg/m2          [employer] Information     [employer] lets you send messages to your doctor, view your test results, renew your prescriptions, schedule appointments and more. To sign up, go to [email or web address] . Click on \"Log in\" on the left side of the screen, which will take you to the Welcome page. Then click on \"Sign up Now\" on the right side of the page.     You will be asked to enter the access code listed below, as well as some personal information. Please follow the " directions to create your username and password.     Your access code is: [email or web address]  Expires: 2018  9:43 AM     Your access code will  in 90 days. If you need help or a new code, please call your [place] clinic or [phone].        Care EveryWhere ID     This is your Care EveryWhere ID. This could be used by other organizations to access your [place] medical records  [id number]        Equal Access to Services     ROSITA GALICIA : Hadii aad ku hadasho Soomaali, waaxda luqadaha, qaybta kaalmada adeegyada, waxay idiin hayaan abe millerandreskarlo laalise . So [facility] [phone].    ATENCIÓN: Si habla español, tiene a pierre disposición servicios gratuitos de asistencia lingüística. Llame al [phone].    We comply with applicable federal civil rights laws and [place] laws. We do not discriminate on the basis of race, color, national origin, age, disability, sex, sexual orientation, or gender identity.               Review of your medicines      START taking        Dose / Directions    amLODIPine 2.5 MG tablet   Commonly known as:  NORVASC   Used for:  Benign essential hypertension        Dose:  2.5 mg   Take 1 tablet (2.5 mg) by mouth daily   Quantity:  30 tablet   Refills:  1       MEDICATION INSTRUCTION   Used for:  Benign essential hypertension        Hold Lisinopril and Norvasc if your SBP is <110 and DBP<70   Quantity:  1 each   Refills:  0         CONTINUE these medicines which have NOT CHANGED        Dose / Directions    CYCLOBENZAPRINE HCL PO        Dose:  10 mg   Take 10 mg by mouth At Bedtime   Refills:  0       latanoprost 0.005 % ophthalmic solution   Commonly known as:  XALATAN        Dose:  1 drop   Place 1 drop into both eyes At Bedtime   Refills:  0       LISINOPRIL PO        Dose:  10 mg   Take 10 mg by mouth daily   Refills:  0       loperamide 2 MG capsule   Commonly known as:  IMODIUM        Dose:  2 mg   Take 2 mg by mouth 4 times daily as needed for diarrhea   Refills:  0       nystatin  295908 UNIT/GM Powd   Commonly known as:  MYCOSTATIN        Apply topically 2 times daily as needed (affected area)   Refills:  0       * oxyCODONE-acetaminophen 5-325 MG per tablet   Commonly known as:  PERCOCET        Dose:  1 tablet   Take 1 tablet by mouth every morning   Refills:  0       * oxyCODONE-acetaminophen 5-325 MG per tablet   Commonly known as:  PERCOCET        Dose:  0.5 tablet   Take 0.5 tablets by mouth every evening   Refills:  0       SIMVASTATIN PO        Dose:  40 mg   Take 40 mg by mouth At Bedtime   Refills:  0       SUMATRIPTAN SUCCINATE PO        Dose:  50 mg   Take 50 mg by mouth every 2 hours as needed for migraine (max of 200mg q 24hr)   Refills:  0       TEMAZEPAM PO        Dose:  15 mg   Take 15 mg by mouth nightly as needed for sleep   Refills:  0       TRAMADOL HCL PO        Dose:  50 mg   Take 50 mg by mouth 3 times daily as needed for moderate to severe pain   Refills:  0       * Notice:  This list has 2 medication(s) that are the same as other medications prescribed for you. Read the directions carefully, and ask your doctor or other care provider to review them with you.      STOP taking     HYDROCHLOROTHIAZIDE PO                Where to get your medicines      These medications were sent to [facility] [address] [facility] [address] [facility] [address]    Hours:  24-hours Phone:  [phone]     amLODIPine 2.5 MG tablet         Some of these will need a paper prescription and others can be bought over the counter. Ask your nurse if you have questions.     Bring a paper prescription for each of these medications     MEDICATION INSTRUCTION                Protect others around you: Learn how to safely use, store and throw away your medicines at www.disposemymeds.org.        Information about OPIOIDS     PRESCRIPTION OPIOIDS: WHAT YOU NEED TO KNOW    Prescription opioids can be used to help relieve moderate to  severe pain and are often prescribed following a surgery or injury, or for certain health conditions. These medications can be an important part of treatment but also come with serious risks. It is important to work with your health care provider to make sure you are getting the safest, most effective care.    WHAT ARE THE RISKS AND SIDE EFFECTS OF OPIOID USE?  Prescription opioids carry serious risks of addiction and overdose, especially with prolonged use. An opioid overdose, often marked by slowed breathing can cause sudden death. The use of prescription opioids can have a number of side effects as well, even when taken as directed:      Tolerance - meaning you might need to take more of a medication for the same pain relief    Physical dependence - meaning you have symptoms of withdrawal when a medication is stopped    Increased sensitivity to pain    Constipation    Nausea, vomiting, and dry mouth    Sleepiness and dizziness    Confusion    Depression    Low levels of testosterone that can result in lower sex drive, energy, and strength    Itching and sweating    RISKS ARE GREATER WITH:    History of drug misuse, substance use disorder, or overdose    Mental health conditions (such as depression or anxiety)    Sleep apnea    Older age (65 years or older)    Pregnancy    Avoid alcohol while taking prescription opioids.   Also, unless specifically advised by your health care provider, medications to avoid include:    Benzodiazepines (such as Xanax or Valium)    Muscle relaxants (such as Soma or Flexeril)    Hypnotics (such as Ambien or Lunesta)    Other prescription opioids    KNOW YOUR OPTIONS:  Talk to your health care provider about ways to manage your pain that do not involve prescription opioids. Some of these options may actually work better and have fewer risks and side effects:    Pain relievers such as acetaminophen, ibuprofen, and naproxen    Some medications that are also used for depression or  seizures    Physical therapy and exercise    Cognitive behavioral therapy, a psychological, goal-directed approach, in which patients learn how to modify physical, behavioral, and emotional triggers of pain and stress    IF YOU ARE PRESCRIBED OPIOIDS FOR PAIN:    Never take opioids in greater amounts or more often than prescribed    Follow up with your primary health care provider and work together to create a plan on how to manage your pain.    Talk about ways to help manage your pain that do not involve prescription opioids    Talk about all concerns and side effects    Help prevent misuse and abuse    Never sell or share prescription opioids    Never use another person's prescription opioids    Store prescription opioids in a secure place and out of reach of others (this may include visitors, children, friends, and family)    Visit www.cdc.gov/drugoverdose to learn about risks of opioid abuse and overdose    If you believe you may be struggling with addiction, tell your health care provider and ask for guidance or call [facility]'s National Helpline at 4-173-008-HELP    LEARN MORE / www.cdc.gov/drugoverdose/prescribing/guideline.html    Safely dispose of unused prescription opioids: Find your local drug take-back programs and more information about the importance of safe disposal at www.doseofreality.[place].gov             Medication List: This is a list of all your medications and when to take them. Check marks below indicate your daily home schedule. Keep this list as a reference.      Medications           Morning Afternoon Evening Bedtime As Needed    amLODIPine 2.5 MG tablet   Commonly known as:  NORVASC   Take 1 tablet (2.5 mg) by mouth daily                                CYCLOBENZAPRINE HCL PO   Take 10 mg by mouth At Bedtime   Last time this was given:  10 mg on 3/20/2018 10:04 PM                                latanoprost 0.005 % ophthalmic solution   Commonly known as:  XALATAN   Place 1 drop into both eyes At  Bedtime   Last time this was given:  1 drop on 3/20/2018 10:05 PM                                LISINOPRIL PO   Take 10 mg by mouth daily   Last time this was given:  10 mg on 3/21/2018  9:24 AM                                loperamide 2 MG capsule   Commonly known as:  IMODIUM   Take 2 mg by mouth 4 times daily as needed for diarrhea                                MEDICATION INSTRUCTION   Hold Lisinopril and Norvasc if your SBP is <110 and DBP<70                                nystatin 554180 UNIT/GM Powd   Commonly known as:  MYCOSTATIN   Apply topically 2 times daily as needed (affected area)                                * oxyCODONE-acetaminophen 5-325 MG per tablet   Commonly known as:  PERCOCET   Take 1 tablet by mouth every morning   Last time this was given:  2 tablets on 3/21/2018  3:44 AM                                * oxyCODONE-acetaminophen 5-325 MG per tablet   Commonly known as:  PERCOCET   Take 0.5 tablets by mouth every evening   Last time this was given:  2 tablets on 3/21/2018  3:44 AM                                SIMVASTATIN PO   Take 40 mg by mouth At Bedtime   Last time this was given:  40 mg on 3/20/2018 10:05 PM                                SUMATRIPTAN SUCCINATE PO   Take 50 mg by mouth every 2 hours as needed for migraine (max of 200mg q 24hr)                                TEMAZEPAM PO   Take 15 mg by mouth nightly as needed for sleep   Last time this was given:  7.5 mg on 3/20/2018 10:04 PM                                TRAMADOL HCL PO   Take 50 mg by mouth 3 times daily as needed for moderate to severe pain                                * Notice:  This list has 2 medication(s) that are the same as other medications prescribed for you. Read the directions carefully, and ask your doctor or other care provider to review them with you.              More Information        Noninfectious Gastroenteritis (Ages 6 Years to Adult)    Gastroenteritis can cause nausea, vomiting, diarrhea,  and abdominal cramping. This may occur as a result of food sensitivity, inflammation of your gastrointestinal tract, medicines, stress, or other causes not related to infection. Your symptoms will usually last from 1 to 3 days, but can last longer. Antibiotics are not effective, but simple home treatment will be helpful.  Home care  Medicine    You may use acetaminophen or NSAID medicines like ibuprofen or naproxen to control fever, unless another medicine is prescribed. (Note: If you have chronic liver or kidney disease, or ever had a stomach ulcer or gastrointestinalI bleeding, talk with your healthcare provider before using these medicines.) Aspirin should never be used in anyone under 18 years of age who is ill with a fever. It may cause severe liver damage. Don't increase your NSAID medicines if you are already taking these medicines for another condition (like arthritis). Don't use NSAIDS if you are on aspirin (such as for heart disease, or after a stroke).    If medicines for diarrhea or vomiting are prescribed, take only as directed.  General care and preventing spread of the illness    If symptoms are severe, rest at home for the next 24 hours or until you feel better.    Hand washing with soap and water is the best way to prevent the spread of infection. Wash your hands after touching anyone who is sick.    Wash your hands after using the toilet and before meals. Clean the toilet after each use.    Caffeine, tobacco, and alcohol can make your diarrhea, cramping, and pain worse.  Diet    Water and clear liquids are important so you do not get dehydrated. Drink a small amount at a time.    Do not force yourself to eat, especially if you have cramps, vomiting, or diarrhea. When you finally decide to start eating, do not eat large amounts at a time, even if you are hungry.    If you eat, avoid fatty, greasy, spicy, or fried foods.    Do not eat dairy products if you have diarrhea; they can make the diarrhea  worse.  During the first 24 hours (the first full day), follow the diet below:    Beverages: Water, clear liquids, soft drinks without caffeine, like ginger ale; mineral water (plain or flavored); decaffeinated tea and coffee.    Soups: Clear broth, consommé, and bouillon Sports drinks aren't a good choice because they have too much sugar and not enough electrolytes. In this case, commercially available products called oral rehydration solutions are best.    Desserts: Plain gelatin, popsicles, and fruit juice bars.  During the next 24 hours (the second day), you may add the following to the above if you have improved. If not, continue what you did the first day:    Hot cereal, plain toast, bread, rolls, crackers    Plain noodles, rice, mashed potatoes, chicken noodle or rice soup    Unsweetened canned fruit (avoid pineapple), bananas    Limit caffeine and chocolate. No spices or seasonings except salt.  During the next 24 hours    Gradually resume a normal diet, as you feel better and your symptoms improve.    If at any time your symptoms start getting worse, go back to clear liquids until you feel better.  Food preparation    If you have diarrhea, you should not prepare food for others. When you  prepare food for yourself, wash your hands before and after.    Wash your hands after using cutting boards, countertops, and knives that have been in contact with raw food.    Keep uncooked meats away from cooked and ready-to-eat foods.  Follow-up care  Follow up with your healthcare provider if you are not improving over the next 2 to 3 days, or as advised. If a stool (diarrhea) sample was taken, call for the results as directed.  When to seek medical care  Call your healthcare provider right away if any of these occur:     Increasing abdominal pain or constant lower right abdominal pain    Continued vomiting (unable to keep liquids down)    Frequent diarrhea (more than 5 times a day)    Blood in vomit or stool (black or  red color)    Inability to tolerate solid food after a few days.    Dark urine, reduced urine output    Weakness, dizziness    Drowsiness    Fever of 100.4 F (38.0 C) or higher, or as directed by your healthcare provider    New rash  Call 911  Call 911 if any of these occur:    Trouble breathing    Chest pain    Confusion    Severe drowsiness or trouble awakening    Seizure    Stiff neck  Date Last Reviewed: 11/16/2015 2000-2017 [employer]. [address] [place], [place] [id number]. All rights reserved. This information is not intended as a substitute for professional medical care. Always follow your healthcare professional's instructions.

## 2018-03-19 NOTE — PLAN OF CARE
Problem: Patient Care Overview  Goal: Plan of Care/Patient Progress Review  Outcome: No Change  New admit.  GIB.  VSS on r/a. 4 BRB stools.  q8 hgb 13.7 now.  Type and cross done.  R/o c-diff, enteric iso.  SBA to bathroom.  Lactic 0.8 now.  K+ replacement initiated, one dose to follow and K+ labs to be drawn with next hgb at 2200.  Percocet for pain x1.

## 2018-03-20 LAB
ANION GAP SERPL CALCULATED.3IONS-SCNC: 3 MMOL/L (ref 3–14)
BASOPHILS # BLD AUTO: 0 10E9/L (ref 0–0.2)
BASOPHILS NFR BLD AUTO: 0.1 %
BUN SERPL-MCNC: 9 MG/DL (ref 7–30)
CALCIUM SERPL-MCNC: 8 MG/DL (ref 8.5–10.1)
CHLORIDE SERPL-SCNC: 110 MMOL/L (ref 94–109)
CO2 SERPL-SCNC: 28 MMOL/L (ref 20–32)
CREAT SERPL-MCNC: 0.68 MG/DL (ref 0.52–1.04)
DIFFERENTIAL METHOD BLD: ABNORMAL
EOSINOPHIL # BLD AUTO: 0.1 10E9/L (ref 0–0.7)
EOSINOPHIL NFR BLD AUTO: 0.3 %
ERYTHROCYTE [DISTWIDTH] IN BLOOD BY AUTOMATED COUNT: 13 % (ref 10–15)
FLEXIBLE SIGMOIDOSCOPY: NORMAL
GFR SERPL CREATININE-BSD FRML MDRD: 87 ML/MIN/1.7M2
GLUCOSE SERPL-MCNC: 108 MG/DL (ref 70–99)
HCT VFR BLD AUTO: 36.4 % (ref 35–47)
HGB BLD-MCNC: 12 G/DL (ref 11.7–15.7)
HGB BLD-MCNC: 12.1 G/DL (ref 11.7–15.7)
HGB BLD-MCNC: 12.8 G/DL (ref 11.7–15.7)
IMM GRANULOCYTES # BLD: 0 10E9/L (ref 0–0.4)
IMM GRANULOCYTES NFR BLD: 0.2 %
LYMPHOCYTES # BLD AUTO: 2.7 10E9/L (ref 0.8–5.3)
LYMPHOCYTES NFR BLD AUTO: 15.3 %
MCH RBC QN AUTO: 30.5 PG (ref 26.5–33)
MCHC RBC AUTO-ENTMCNC: 33 G/DL (ref 31.5–36.5)
MCV RBC AUTO: 92 FL (ref 78–100)
MONOCYTES # BLD AUTO: 1 10E9/L (ref 0–1.3)
MONOCYTES NFR BLD AUTO: 5.8 %
NEUTROPHILS # BLD AUTO: 13.7 10E9/L (ref 1.6–8.3)
NEUTROPHILS NFR BLD AUTO: 78.3 %
NRBC # BLD AUTO: 0 10*3/UL
NRBC BLD AUTO-RTO: 0 /100
PLATELET # BLD AUTO: 196 10E9/L (ref 150–450)
POTASSIUM SERPL-SCNC: 4 MMOL/L (ref 3.4–5.3)
RBC # BLD AUTO: 3.94 10E12/L (ref 3.8–5.2)
SODIUM SERPL-SCNC: 141 MMOL/L (ref 133–144)
WBC # BLD AUTO: 17.5 10E9/L (ref 4–11)

## 2018-03-20 PROCEDURE — 25000132 ZZH RX MED GY IP 250 OP 250 PS 637: Mod: GY | Performed by: PHYSICIAN ASSISTANT

## 2018-03-20 PROCEDURE — 0DBN8ZX EXCISION OF SIGMOID COLON, VIA NATURAL OR ARTIFICIAL OPENING ENDOSCOPIC, DIAGNOSTIC: ICD-10-PCS | Performed by: INTERNAL MEDICINE

## 2018-03-20 PROCEDURE — 85018 HEMOGLOBIN: CPT | Performed by: INTERNAL MEDICINE

## 2018-03-20 PROCEDURE — 25000128 H RX IP 250 OP 636: Performed by: PHYSICIAN ASSISTANT

## 2018-03-20 PROCEDURE — 12000000 ZZH R&B MED SURG/OB

## 2018-03-20 PROCEDURE — 25000125 ZZHC RX 250: Performed by: PHYSICIAN ASSISTANT

## 2018-03-20 PROCEDURE — 85025 COMPLETE CBC W/AUTO DIFF WBC: CPT | Performed by: PHYSICIAN ASSISTANT

## 2018-03-20 PROCEDURE — 99232 SBSQ HOSP IP/OBS MODERATE 35: CPT | Performed by: INTERNAL MEDICINE

## 2018-03-20 PROCEDURE — 88305 TISSUE EXAM BY PATHOLOGIST: CPT | Mod: 26 | Performed by: INTERNAL MEDICINE

## 2018-03-20 PROCEDURE — 36415 COLL VENOUS BLD VENIPUNCTURE: CPT | Performed by: INTERNAL MEDICINE

## 2018-03-20 PROCEDURE — 25000128 H RX IP 250 OP 636: Performed by: INTERNAL MEDICINE

## 2018-03-20 PROCEDURE — G0500 MOD SEDAT ENDO SERVICE >5YRS: HCPCS | Performed by: INTERNAL MEDICINE

## 2018-03-20 PROCEDURE — 88305 TISSUE EXAM BY PATHOLOGIST: CPT | Performed by: INTERNAL MEDICINE

## 2018-03-20 PROCEDURE — A9270 NON-COVERED ITEM OR SERVICE: HCPCS | Mod: GY | Performed by: PHYSICIAN ASSISTANT

## 2018-03-20 PROCEDURE — 45331 SIGMOIDOSCOPY AND BIOPSY: CPT | Performed by: INTERNAL MEDICINE

## 2018-03-20 PROCEDURE — 80048 BASIC METABOLIC PNL TOTAL CA: CPT | Performed by: PHYSICIAN ASSISTANT

## 2018-03-20 PROCEDURE — 36415 COLL VENOUS BLD VENIPUNCTURE: CPT | Performed by: PHYSICIAN ASSISTANT

## 2018-03-20 PROCEDURE — 25000132 ZZH RX MED GY IP 250 OP 250 PS 637: Mod: GY | Performed by: INTERNAL MEDICINE

## 2018-03-20 RX ORDER — FENTANYL CITRATE 50 UG/ML
INJECTION, SOLUTION INTRAMUSCULAR; INTRAVENOUS PRN
Status: DISCONTINUED | OUTPATIENT
Start: 2018-03-20 | End: 2018-03-20 | Stop reason: HOSPADM

## 2018-03-20 RX ORDER — LIDOCAINE 40 MG/G
CREAM TOPICAL
Status: DISCONTINUED | OUTPATIENT
Start: 2018-03-20 | End: 2018-03-20 | Stop reason: HOSPADM

## 2018-03-20 RX ADMIN — METRONIDAZOLE 500 MG: 500 INJECTION, SOLUTION INTRAVENOUS at 10:08

## 2018-03-20 RX ADMIN — METRONIDAZOLE 500 MG: 500 INJECTION, SOLUTION INTRAVENOUS at 22:05

## 2018-03-20 RX ADMIN — SODIUM CHLORIDE: 9 INJECTION, SOLUTION INTRAVENOUS at 06:37

## 2018-03-20 RX ADMIN — LISINOPRIL 10 MG: 10 TABLET ORAL at 08:16

## 2018-03-20 RX ADMIN — SIMVASTATIN 40 MG: 20 TABLET, FILM COATED ORAL at 22:05

## 2018-03-20 RX ADMIN — METRONIDAZOLE 500 MG: 500 INJECTION, SOLUTION INTRAVENOUS at 15:13

## 2018-03-20 RX ADMIN — LATANOPROST 1 DROP: 50 SOLUTION OPHTHALMIC at 22:05

## 2018-03-20 RX ADMIN — CIPROFLOXACIN 400 MG: 2 INJECTION, SOLUTION INTRAVENOUS at 08:17

## 2018-03-20 RX ADMIN — OXYCODONE HYDROCHLORIDE AND ACETAMINOPHEN 1 TABLET: 5; 325 TABLET ORAL at 02:28

## 2018-03-20 RX ADMIN — METRONIDAZOLE 500 MG: 500 INJECTION, SOLUTION INTRAVENOUS at 02:28

## 2018-03-20 RX ADMIN — TEMAZEPAM 7.5 MG: 7.5 CAPSULE ORAL at 22:04

## 2018-03-20 RX ADMIN — SODIUM PHOSPHATE, DIBASIC AND SODIUM PHOSPHATE, MONOBASIC 1 ENEMA: 7; 19 ENEMA RECTAL at 12:39

## 2018-03-20 RX ADMIN — OXYCODONE HYDROCHLORIDE AND ACETAMINOPHEN 1 TABLET: 5; 325 TABLET ORAL at 22:15

## 2018-03-20 RX ADMIN — CIPROFLOXACIN 400 MG: 2 INJECTION, SOLUTION INTRAVENOUS at 20:09

## 2018-03-20 RX ADMIN — OXYCODONE HYDROCHLORIDE AND ACETAMINOPHEN 1 TABLET: 5; 325 TABLET ORAL at 15:09

## 2018-03-20 RX ADMIN — CYCLOBENZAPRINE HYDROCHLORIDE 10 MG: 5 TABLET, FILM COATED ORAL at 22:04

## 2018-03-20 NOTE — PLAN OF CARE
Problem: Patient Care Overview  Goal: Plan of Care/Patient Progress Review  Outcome: No Change  Pt A&O x4. Tolerating clear liquid diet, adequate PO intake. IVF  ml/h continuous. VSS on RA, c/o generalized pain 7/10. Gave percocet 1x this shift. Continuing to have loose/watery, bloody stools throughout night. Independent to BR. Possible colonoscopy today/tomorrow. WBC 17.5, ANC 13.7.  D/C pending progress. Will continue to monitor.

## 2018-03-20 NOTE — PLAN OF CARE
Problem: Patient Care Overview  Goal: Plan of Care/Patient Progress Review  Outcome: No Change  A&OX4, VSS on RA, BP soft. Denies pain, n&V. Continues to have red bloody stoolX3. On Q8 Hgb. Last Hgb stable at 12.9. C.diff came back negative with the rest of enteric bacterial and viral panel negative. Off Enteric Iso this shift. Up independent and continues to be on clear liquid. IVF infusing at 100mL/hr with IV abx. Colonoscopy possible tomorrow. GI following. K+ recheck normal at 4.0. Continue to monitor.

## 2018-03-20 NOTE — PROGRESS NOTES
Brief GI Flex sig note--see complete Provation report--IV Fentanyl and Versed given.    Findings:  Suspected mild ischemic colitis starting in the rectum and proceeding into the sigmoid colon. Biopsies were obtained.  Stopped in distal sigmoid area due to patient discomfort.    Plan:  Supportive care.  Advance diet as tolerated. Start with clears now.  Discharge when stools clearing of blood, manageable pain.  Colonoscopy with MAC sedation to be scheduled by our office in about 6 weeks time.    Margaret Holliday MD  [facility]  Office: [phone]  Cell:  [phone]  Monday through Thursday 8am to 5pm, Friday 8am to 4pm

## 2018-03-20 NOTE — PLAN OF CARE
"Problem: Patient Care Overview  Goal: Plan of Care/Patient Progress Review  Outcome: Improving  VSS on r/a.  Denies pain.  hgb 12 now.  Endo \"no active bleed sites, ischemic colitis.\".  Small BRB bowel movements continue.  Endo recommends full scope be done outpatient.  IVF infusing.  Antibiotics given.  WBC trending down, 17.5 now. Independent in room.  Possible d/c tomorrow.    Problem: Infection, Risk/Actual (Adult)  Goal: Identify Related Risk Factors and Signs and Symptoms  Related risk factors and signs and symptoms are identified upon initiation of Human Response Clinical Practice Guideline (CPG).   Outcome: Improving  WBC improving to 17.5 now.  IV antibiotics continued.      "

## 2018-03-20 NOTE — PROGRESS NOTES
[facility]  Hospitalist Progress Note  Date of Service (when I saw the patient): 03/20/2018    Candelario Zelaya MD  [facility]  Pager [phone]    Assessment & Plan   67-year-old female with past medical history of chronic diarrhea, previous gastric bypass and essential hypertension who presented to the Emergency Department with diarrhea and rectal bleeding.  She is being admitted for further evaluation.     1.  Diarrhea and rectal bleeding, likely secondary to colitis.     - this is looking less likely to be infectious, C diff toxin is negative and enteric pathogen test negative, nonetheless, I will keep IV Cipro and Flagyl going for now as this is not 100% rule out of infectious etiology  - would be unusual presentation for IBD, but may need biopsy for further testing  - I anticipate she will need colonoscopy soon as she is still bleeding, she has been on clear liquid diet up to this point, will see what [facility] wants to do today  - follow HGB, is stable but slowly drifting down, 15.1->13.7->12.1 this AM  - Lactate normalized, WBC is down from 22k to 17k, fever curve remains normal, probably not infection but continue emperic abx for now  - keep IVFs going  - monitor q8 HGB, conditional orders in for <8  - some mild abdominal pain at night controlled well with percocet, no change to this today    2.  Elevated lactic acid.    - this was likely due to volume loss 2/2 frequent watery stools  - normal now, only order if symptomatic    3.  Hypokalemia.    - Suspect likely secondary to diarrhea in the setting of diuretic.  Hold hydrochlorothiazide.    - Replace per protocol.    - Repeat BMP in the morning.     4.  Essential hypertension.    - Holding diuretic as per above  - stable on ACEI alone    5.  Dyslipidemia.    - Continue prior to admission statin.     6.  Arthritis.    - She takes Percocet prior to admission.  Percocet and IV Dilaudid will be available as needed for  abdominal pain.     7.  Chronic diarrhea.    - Hold Imodium pending GI evaluation and stool cultures.     DVT Prophylaxis: Holding 2/2 GI Bleed  Code Status: Full Code    Disposition: Expected discharge in 1-2 days once etiology and treatment plan for GI bleed established.    Candelario Zelaya    Interval History   Patient reports lower abdominal discomfort last night but resolved with percocet.  Still having BRBPR although patient thinks the frequency may have decreased somewhat.  No other new complaints.  Denies any mucus in her stool.      -Data reviewed today: I reviewed all new labs and imaging results over the last 24 hours. I personally reviewed no images or EKG's today.    Physical Exam   Temp: 98.2  F (36.8  C) Temp src: Oral BP: 108/62 Pulse: 66 Heart Rate: 74 Resp: 16 SpO2: 98 % O2 Device: None (Room air)    Vitals:    03/19/18 0632 03/19/18 1033   Weight: 95.3 kg (210 lb) 94.1 kg (207 lb 7.3 oz)     Vital Signs with Ranges  Temp:  [97.5  F (36.4  C)-98.2  F (36.8  C)] 98.2  F (36.8  C)  Pulse:  [66] 66  Heart Rate:  [74-76] 74  Resp:  [16] 16  BP: ()/(48-62) 108/62  SpO2:  [95 %-100 %] 98 %  I/O last 3 completed shifts:  In: 1620 [I.V.:1620]  Out: 1400 [Urine:1400]    Constitutional: NAD, afebrile, A+Ox4  Respiratory: CTA B, normal WOB  Cardiovascular: RRR, no murmur  GI: S, NT, ND, normal BS  Skin/Integumen: Pale, dry, warm, no edema      Medications     sodium chloride 100 mL/hr at 03/20/18 0637       cyclobenzaprine (FLEXERIL) tablet 10 mg  10 mg Oral At Bedtime     latanoprost  1 drop Both Eyes At Bedtime     lisinopril (PRINIVIL/ZESTRIL) tablet 10 mg  10 mg Oral Daily     simvastatin (ZOCOR) tablet 40 mg  40 mg Oral At Bedtime     ciprofloxacin  400 mg Intravenous Q12H     metroNIDAZOLE  500 mg Intravenous Q6H       Data     Recent Labs  Lab 03/20/18  0607 03/19/18  2141 03/19/18  1417 03/19/18  0645   WBC 17.5*  --   --  22.7*   HGB 12.0 12.9 13.7 15.1   MCV 92  --   --  90     --    --  260     --   --  138   POTASSIUM 4.0 4.0  --  3.2*   CHLORIDE 110*  --   --  100   CO2 28  --   --  26   BUN 9  --   --  28   CR 0.68  --   --  0.90   ANIONGAP 3  --   --  12   DANNA 8.0*  --   --  9.0   *  --   --  159*   ALBUMIN  --   --   --  4.1   PROTTOTAL  --   --   --  7.3   BILITOTAL  --   --   --  0.7   ALKPHOS  --   --   --  146   ALT  --   --   --  48   AST  --   --   --  44   LIPASE  --   --   --  133       No results found for this or any previous visit (from the past 24 hour(s)).

## 2018-03-20 NOTE — PROGRESS NOTES
[facility] Note    Pain better but bloody diarrhea continues  Etiology unclear. Infectious studies negative  Differential still includes infection vs ischemia vs IBD  Will plan for flex sig today for further evaluation     Kevin Mckeon PA-C  [facility]  [phone] Cell ([phone])  [phone] Office (after 1300)

## 2018-03-21 VITALS
SYSTOLIC BLOOD PRESSURE: 106 MMHG | HEIGHT: 63 IN | TEMPERATURE: 98.4 F | RESPIRATION RATE: 16 BRPM | HEART RATE: 79 BPM | BODY MASS INDEX: 36.76 KG/M2 | WEIGHT: 207.45 LBS | DIASTOLIC BLOOD PRESSURE: 46 MMHG | OXYGEN SATURATION: 96 %

## 2018-03-21 LAB
ANION GAP SERPL CALCULATED.3IONS-SCNC: 9 MMOL/L (ref 3–14)
BUN SERPL-MCNC: 11 MG/DL (ref 7–30)
CALCIUM SERPL-MCNC: 8 MG/DL (ref 8.5–10.1)
CHLORIDE SERPL-SCNC: 107 MMOL/L (ref 94–109)
CO2 SERPL-SCNC: 23 MMOL/L (ref 20–32)
COPATH REPORT: NORMAL
CREAT SERPL-MCNC: 0.7 MG/DL (ref 0.52–1.04)
ERYTHROCYTE [DISTWIDTH] IN BLOOD BY AUTOMATED COUNT: 12.9 % (ref 10–15)
GFR SERPL CREATININE-BSD FRML MDRD: 84 ML/MIN/1.7M2
GLUCOSE SERPL-MCNC: 124 MG/DL (ref 70–99)
HCT VFR BLD AUTO: 37.6 % (ref 35–47)
HGB BLD-MCNC: 12.5 G/DL (ref 11.7–15.7)
MCH RBC QN AUTO: 30.7 PG (ref 26.5–33)
MCHC RBC AUTO-ENTMCNC: 33.2 G/DL (ref 31.5–36.5)
MCV RBC AUTO: 92 FL (ref 78–100)
PLATELET # BLD AUTO: 193 10E9/L (ref 150–450)
POTASSIUM SERPL-SCNC: 3.3 MMOL/L (ref 3.4–5.3)
RBC # BLD AUTO: 4.07 10E12/L (ref 3.8–5.2)
SODIUM SERPL-SCNC: 139 MMOL/L (ref 133–144)
WBC # BLD AUTO: 12.9 10E9/L (ref 4–11)

## 2018-03-21 PROCEDURE — 80048 BASIC METABOLIC PNL TOTAL CA: CPT | Performed by: INTERNAL MEDICINE

## 2018-03-21 PROCEDURE — 25000132 ZZH RX MED GY IP 250 OP 250 PS 637: Mod: GY | Performed by: PHYSICIAN ASSISTANT

## 2018-03-21 PROCEDURE — 36415 COLL VENOUS BLD VENIPUNCTURE: CPT | Performed by: INTERNAL MEDICINE

## 2018-03-21 PROCEDURE — A9270 NON-COVERED ITEM OR SERVICE: HCPCS | Mod: GY | Performed by: PHYSICIAN ASSISTANT

## 2018-03-21 PROCEDURE — 25000128 H RX IP 250 OP 636: Performed by: PHYSICIAN ASSISTANT

## 2018-03-21 PROCEDURE — 85027 COMPLETE CBC AUTOMATED: CPT | Performed by: INTERNAL MEDICINE

## 2018-03-21 PROCEDURE — 25000128 H RX IP 250 OP 636: Performed by: INTERNAL MEDICINE

## 2018-03-21 PROCEDURE — 99238 HOSP IP/OBS DSCHRG MGMT 30/<: CPT | Performed by: INTERNAL MEDICINE

## 2018-03-21 PROCEDURE — 25000125 ZZHC RX 250: Performed by: PHYSICIAN ASSISTANT

## 2018-03-21 RX ORDER — AMLODIPINE BESYLATE 2.5 MG/1
2.5 TABLET ORAL DAILY
Qty: 30 TABLET | Refills: 1 | Status: SHIPPED | OUTPATIENT
Start: 2018-03-21 | End: 2019-04-11

## 2018-03-21 RX ADMIN — OXYCODONE HYDROCHLORIDE AND ACETAMINOPHEN 2 TABLET: 5; 325 TABLET ORAL at 03:44

## 2018-03-21 RX ADMIN — POTASSIUM CHLORIDE 40 MEQ: 1500 TABLET, EXTENDED RELEASE ORAL at 09:49

## 2018-03-21 RX ADMIN — METRONIDAZOLE 500 MG: 500 INJECTION, SOLUTION INTRAVENOUS at 03:46

## 2018-03-21 RX ADMIN — POTASSIUM CHLORIDE 20 MEQ: 1500 TABLET, EXTENDED RELEASE ORAL at 12:01

## 2018-03-21 RX ADMIN — SODIUM CHLORIDE: 9 INJECTION, SOLUTION INTRAVENOUS at 02:00

## 2018-03-21 RX ADMIN — CIPROFLOXACIN 400 MG: 2 INJECTION, SOLUTION INTRAVENOUS at 09:25

## 2018-03-21 RX ADMIN — LISINOPRIL 10 MG: 10 TABLET ORAL at 09:24

## 2018-03-21 NOTE — PLAN OF CARE
Problem: Patient Care Overview  Goal: Plan of Care/Patient Progress Review  Outcome: No Change  A&OX4, VSS on RA. C/o pain in the back at the end of the shift, received 5 mg of percocet for pain control. Flex sig completed earlier today. Continue to have bloody watery stool but less bloody than yesterday X4. On Q8 Hbg with most recent stable at 12.1. IVF rate changed to 75mL/hr and on IV abx as well. Up independently. Tolerating regular diet this shift. D/c pending clearance of blood in the stool and adequate pain control. GI following. Biopsy pending. Continue to monitor.

## 2018-03-21 NOTE — PROGRESS NOTES
"GASTROENTEROLOGY PROGRESS NOTE     SUBJECTIVE: Feeling much better today. Denies pain. Stools slowing down. Tolerating diet.      OBJECTIVE:   /46 (BP Location: Left arm)  Pulse 79  Temp 98.4  F (36.9  C) (Oral)  Resp 16  Ht 1.6 m (5' 3\")  Wt 94.1 kg (207 lb 7.3 oz)  SpO2 96%  BMI 36.75 kg/m2   Temp (24hrs), Av.2  F (36.8  C), Min:98.1  F (36.7  C), Max:98.4  F (36.9  C)     Patient Vitals for the past 72 hrs:   Weight   18 1033 94.1 kg (207 lb 7.3 oz)   18 0632 95.3 kg (210 lb)      Intake/Output Summary (Last 24 hours) at 18 1050  Last data filed at 18 0952   Gross per 24 hour   Intake             2191 ml   Output             1250 ml   Net              941 ml      PHYSICAL EXAM   Constitutional: Healthy, up in chair, no acute distress  Abdomen: Soft, non-tender, non-distended, normally active bowel sounds. No masses or hepatosplenomegaly appreciated. No guarding or rebound tenderness.    Additional Comments:   ROS, FH, SH: See initial GI consult for details.     I have reviewed the patient's new clinical lab results:   Recent Labs   Lab Test  18   0905  18   2200  18   1545  18   0607   18   0645   WBC  12.9*   --    --   17.5*   --   22.7*   HGB  12.5  12.1  12.8  12.0   < >  15.1   MCV  92   --    --   92   --   90   PLT  193   --    --   196   --   260    < > = values in this interval not displayed.      Recent Labs   Lab Test  18   0905  18   0607  18   2141  18   0645   NA  139  141   --   138   POTASSIUM  3.3*  4.0  4.0  3.2*   CHLORIDE  107  110*   --   100   CO2  23  28   --   26   BUN  11  9   --   28   CR  0.70  0.68   --   0.90   ANIONGAP  9  3   --   12   DANNA  8.0*  8.0*   --   9.0      Recent Labs   Lab Test  18   0645   ALBUMIN  4.1   BILITOTAL  0.7   ALT  48   AST  44   ALKPHOS  146   LIPASE  133     3/20/18 Flex sig (Holliday)  Findings:        The perianal and digital rectal examinations were " normal.        A scattered area of mildly erythematous mucosa was found in the rectum        and in the sigmoid colon. Biopsies were taken with a cold forceps for        histology.      3/19/18 CT abdomen and pelvis  IMPRESSION:   1. Mild wall thickening of the distal transverse colon through the  distal sigmoid colon. Additionally, there is mild haziness within the  fat about the thick-walled colon. These findings likely relate to  colitis that is infectious or inflammatory or less likely ischemic in  etiology.  2. A trace amount of free fluid in the pelvis.    Assessment:  67 year old female presenting with bloody diarrhea and abdominal pain. Infectious studies negative. Flex yesterday visually most consistent with ischemic colitis. Symptoms improving    Plan:    -Diet as tolerated  -Our office will call with biopsy results  -Plan for full colonoscopy in 6-8 weeks to evaluate diarrhea  -Follow up in office afterwards. Our office will arrange both appointments  -Agree with stopping antibiotics and plan for discharge today  -No further GI recommendations at this time. Will sign off. Please call with any questions.    Kevin Mckeon PA-C  [facility]  Cell:  [phone] Monday through Friday [id number]  Office: [phone]

## 2018-03-21 NOTE — PROGRESS NOTES
Care Coordinator met with pt concerning discharging home today.  A follow-up appointment has been scheduled with her PCP on 3/27/18 and [facility] will be calling pt to schedule a colonoscopy.  Pt will be having carpel tunnel surgery on 4/6/18 and asked if this should be cancelled.  She will discuss with her PCP next week.  Pt's spouse will be providing transportation home at 1:00pm.  No other needs have been identified in conversing with pt.

## 2018-03-21 NOTE — DISCHARGE SUMMARY
Admit Date:     03/19/2018   Discharge Date:           PRIMARY CARE PHYSICIAN:  Emily Najera NP      CODE STATUS:  FULL CODE.        DATE OF ADMISSION:  03/19/2018      DATE OF DISCHARGE:   03/21/2018      REASON FOR ADMISSION:  Bright red blood per rectum.      DISCHARGE DIAGNOSIS:  Nonocclusive mesenteric ischemia.      OTHER CHRONIC DIAGNOSES:  Hypertension, dyslipidemia, osteoarthritis, chronic diarrhea.      DISPOSITION:  Discharge to home.      FOLLOWUP INSTRUCTIONS:  Call Dr. Holliday's [facility] to arrange for a full colonoscopy in 6 weeks' time and also need to followup on biopsy results from your flexible sigmoidoscopy on 03/20/21018.  See your primary care provider within 7 days to discuss medication change.      CONSULTANTS:  [facility], Margaret Holliday MD      DISCHARGE MEDICATIONS:   New medications on discharge:  Norvasc 2.5 p.o. daily, hold if systolic blood pressure less than 110, systolic blood pressure less than 70.      Home medications to discontinue on discharge:  Hydrochlorothiazide 25 mg daily.      Home medications to continue unchanged:   Tramadol 50 mg t.i.d. p.r.n., lisinopril 10 mg daily, simvastatin 40 mg at bedtime, cyclobenzaprine 10 mg at bedtime, temazepam 15 mg nightly as needed, Imodium 2 mg 4 times daily as needed, Xalatan ophthalmic solution at bedtime, sumatriptan 50 mg q. 2 h. p.r.n. migraines, Percocet 1 tab every morning and half tab every evening.      PERTINENT HOSPITAL WORKUP:  Flexible sigmoidoscopy on 03/20/2018; findings:  Suspected mild ischemic colitis starting in the rectum and proceeding into the sigmoid colon.  Biopsies obtained, stopped in the distal sigmoid area due to patient's discomfort.  Laboratory workup:  Intake hemoglobin 15.1.  Discharge hemoglobin 12.5.  Admission lactic acid 2.2.  Discharge lactic acid 0.8.      PHYSICAL EXAMINATION ON DAY OF DISCHARGE:   VITAL SIGNS:  Blood pressure 132/81, heart rate 72, respiratory rate 16, O2  saturation 96% on room air, temperature is 98.4 oral.   GENERAL:  Well-nourished appearing 67-year-old female in no apparent distress, alert and oriented x 4.   HEENT:  Normocephalic, atraumatic.  No oropharyngeal erythema.  No cervical lymphadenopathy.  No thyromegaly.   RESPIRATORY:  Lungs clear to auscultation bilaterally.  Normal work of breathing.  No wheezes, rales or rhonchi.   CARDIOVASCULAR:  Regular rate and rhythm.  No murmurs, rubs or gallops.   ABDOMEN:  Normal bowel sounds.  Abdomen is soft, nontender, nondistended.  No hepatosplenomegaly or mass palpable.   EXTREMITIES:  No clubbing, cyanosis or edema.   NEUROLOGIC:  Cranial nerves II-XII are intact.  There is 5/5 strength noted in all 4 extremities. Sensation intact diffusely.  Normal coordination by bilateral finger-to-nose test.      HOSPITAL COURSE:  Mrs. Huffman is an extremely pleasant 67-year-old female.  She has a past medical history of hypertension, depression, osteoarthritis and she also had history of gastric bypass surgery in the past.  Since then she has had chronic diarrhea and she takes Imodium every day to control diarrhea, but she still winds up having a few episodes of large volume watery diarrhea.  The patient decided to come in for evaluation because she started seeing a large quantity of bright red blood in her stool.  They did a CT scan on her abdomen and it showed colitis in the transverse through the sigmoid colon, but no diverticulum, nothing to suggest diverticulitis.  There is also noted some fat stranding around the sigmoid colon.  We did an infectious workup with Clostridium difficile toxin and enteric pathogens.  That all came back negative.  She got 2 days of Flagyl and Cipro IV.  We also kept hydrated with normal saline over that 2-day period.  White blood cell count was elevated at 22.7 which prompted us to think maybe it was an infection at first.  It looks like that was pretty much ruled out.  I got [facility] on  board.  They did a flexible sigmoidoscopy, took a couple biopsies and said it had the appearance of ischemic colitis.  It is very doubtful this is an embolic ischemia given her presentation, low lactate numbers and stabilization with just fluids.  It was more likely a nonocclusive mesenteric ischemia.  I suspect she probably get hypotensive from time to time given large volume losses from diarrhea.  She is also on a diuretic, hydrochlorothiazide, for blood pressure.  We stopped the hydrochlorothiazide.  I have put her on the lowest dose possible of Norvasc; a new medication.  I gave her parameters.  I also put parameters on her lisinopril to.  I educated her about blood pressure surveillance and adequate hydration.  GI said discharge her once the bleeding has resolved.  As of today, day 3, no more blood is noted per stooling.  She had a bowel movement this morning and she is also tolerating advance to a regular diet.  She will have a follow up colonoscopy in 6 weeks with Dr. Holliday.  She will follow up biopsy results of them.         CHRISTIANO MORALES MD             D: 2018   T: 2018   MT: YULIA      Name:     MASSIMO FLOWERS   MRN:      [id number]        Account:        [id number]   :      1950           Admit Date:     2018                                  Discharge Date:       Document: [id number]       cc: Emily Najera NP

## 2018-03-21 NOTE — PLAN OF CARE
Problem: Patient Care Overview  Goal: Plan of Care/Patient Progress Review  Outcome: Adequate for Discharge Date Met: 03/21/18  A&O x4, up independently.  Regular diet- good appetite.  VSS on room air, denies pain, nauseam, or SOB. Voiding adequately.  BM x1 with minimal evidence of blood. IV removed. Discharge paperwork reviewed and discussed with patient. Plan to d/c from floor at 1300 with .     Addendum: K replaced (level of 3.3), recheck will be performed with PMD at her follow up on 3/27/17

## 2018-03-21 NOTE — PLAN OF CARE
Problem: Patient Care Overview  Goal: Plan of Care/Patient Progress Review  Outcome: No Change  A&O x4, up independently in her room.  Regular diet.  VSS on room air.  PIV with IVF @75.  IV abx.  Back pain managed with Percocet x1.  No bm overnight.  No signs of bleeding noted. Voiding adequately.  Nursing will continue to monitor.

## 2018-04-06 ENCOUNTER — ANESTHESIA EVENT (OUTPATIENT)
Dept: SURGERY | Facility: CLINIC | Age: 68
End: 2018-04-06
Payer: MEDICARE

## 2018-04-06 ENCOUNTER — ANESTHESIA (OUTPATIENT)
Dept: SURGERY | Facility: CLINIC | Age: 68
End: 2018-04-06
Payer: MEDICARE

## 2018-04-06 ENCOUNTER — SURGERY (OUTPATIENT)
Age: 68
End: 2018-04-06

## 2018-04-06 ENCOUNTER — HOSPITAL ENCOUNTER (OUTPATIENT)
Facility: CLINIC | Age: 68
Discharge: HOME | End: 2018-04-06
Attending: ORTHOPAEDIC SURGERY | Admitting: ORTHOPAEDIC SURGERY
Payer: MEDICARE

## 2018-04-06 VITALS
SYSTOLIC BLOOD PRESSURE: 115 MMHG | TEMPERATURE: 98.8 F | RESPIRATION RATE: 16 BRPM | WEIGHT: 208 LBS | BODY MASS INDEX: 36.85 KG/M2 | OXYGEN SATURATION: 95 % | DIASTOLIC BLOOD PRESSURE: 64 MMHG

## 2018-04-06 DIAGNOSIS — M18.12 PRIMARY OSTEOARTHRITIS OF FIRST CARPOMETACARPAL JOINT OF LEFT HAND: Primary | ICD-10-CM

## 2018-04-06 PROCEDURE — 27210794 ZZH OR GENERAL SUPPLY STERILE: Performed by: ORTHOPAEDIC SURGERY

## 2018-04-06 PROCEDURE — 71000027 ZZH RECOVERY PHASE 2 EACH 15 MINS: Performed by: ORTHOPAEDIC SURGERY

## 2018-04-06 PROCEDURE — 37000008 ZZH ANESTHESIA TECHNICAL FEE, 1ST 30 MIN: Performed by: ORTHOPAEDIC SURGERY

## 2018-04-06 PROCEDURE — C1762 CONN TISS, HUMAN(INC FASCIA): HCPCS | Performed by: ORTHOPAEDIC SURGERY

## 2018-04-06 PROCEDURE — 40000170 ZZH STATISTIC PRE-PROCEDURE ASSESSMENT II: Performed by: ORTHOPAEDIC SURGERY

## 2018-04-06 PROCEDURE — 25000128 H RX IP 250 OP 636: Performed by: NURSE ANESTHETIST, CERTIFIED REGISTERED

## 2018-04-06 PROCEDURE — 37000009 ZZH ANESTHESIA TECHNICAL FEE, EACH ADDTL 15 MIN: Performed by: ORTHOPAEDIC SURGERY

## 2018-04-06 PROCEDURE — 27210995 ZZH RX 272: Performed by: ORTHOPAEDIC SURGERY

## 2018-04-06 PROCEDURE — 36000058 ZZH SURGERY LEVEL 3 EA 15 ADDTL MIN: Performed by: ORTHOPAEDIC SURGERY

## 2018-04-06 PROCEDURE — 71000013 ZZH RECOVERY PHASE 1 LEVEL 1 EA ADDTL HR: Performed by: ORTHOPAEDIC SURGERY

## 2018-04-06 PROCEDURE — 25000125 ZZHC RX 250: Performed by: ORTHOPAEDIC SURGERY

## 2018-04-06 PROCEDURE — 71000012 ZZH RECOVERY PHASE 1 LEVEL 1 FIRST HR: Performed by: ORTHOPAEDIC SURGERY

## 2018-04-06 PROCEDURE — 25000128 H RX IP 250 OP 636: Performed by: PHYSICIAN ASSISTANT

## 2018-04-06 PROCEDURE — 25000125 ZZHC RX 250: Performed by: NURSE ANESTHETIST, CERTIFIED REGISTERED

## 2018-04-06 PROCEDURE — 36000056 ZZH SURGERY LEVEL 3 1ST 30 MIN: Performed by: ORTHOPAEDIC SURGERY

## 2018-04-06 PROCEDURE — 25000128 H RX IP 250 OP 636: Performed by: ANESTHESIOLOGY

## 2018-04-06 DEVICE — GRAFT FASCIA LATA MEDIUM: Type: IMPLANTABLE DEVICE | Site: THUMB | Status: FUNCTIONAL

## 2018-04-06 RX ORDER — BUPIVACAINE HYDROCHLORIDE AND EPINEPHRINE 5; 5 MG/ML; UG/ML
INJECTION, SOLUTION PERINEURAL PRN
Status: DISCONTINUED | OUTPATIENT
Start: 2018-04-06 | End: 2018-04-06 | Stop reason: HOSPADM

## 2018-04-06 RX ORDER — SODIUM CHLORIDE, SODIUM LACTATE, POTASSIUM CHLORIDE, CALCIUM CHLORIDE 600; 310; 30; 20 MG/100ML; MG/100ML; MG/100ML; MG/100ML
INJECTION, SOLUTION INTRAVENOUS CONTINUOUS
Status: DISCONTINUED | OUTPATIENT
Start: 2018-04-06 | End: 2018-04-06 | Stop reason: HOSPADM

## 2018-04-06 RX ORDER — FENTANYL CITRATE 50 UG/ML
25-50 INJECTION, SOLUTION INTRAMUSCULAR; INTRAVENOUS EVERY 5 MIN PRN
Status: DISCONTINUED | OUTPATIENT
Start: 2018-04-06 | End: 2018-04-06 | Stop reason: HOSPADM

## 2018-04-06 RX ORDER — ONDANSETRON 2 MG/ML
INJECTION INTRAMUSCULAR; INTRAVENOUS PRN
Status: DISCONTINUED | OUTPATIENT
Start: 2018-04-06 | End: 2018-04-06

## 2018-04-06 RX ORDER — MEPERIDINE HYDROCHLORIDE 25 MG/ML
12.5 INJECTION INTRAMUSCULAR; INTRAVENOUS; SUBCUTANEOUS
Status: DISCONTINUED | OUTPATIENT
Start: 2018-04-06 | End: 2018-04-06 | Stop reason: HOSPADM

## 2018-04-06 RX ORDER — HYDROMORPHONE HYDROCHLORIDE 1 MG/ML
.3-.5 INJECTION, SOLUTION INTRAMUSCULAR; INTRAVENOUS; SUBCUTANEOUS EVERY 10 MIN PRN
Status: DISCONTINUED | OUTPATIENT
Start: 2018-04-06 | End: 2018-04-06 | Stop reason: HOSPADM

## 2018-04-06 RX ORDER — MAGNESIUM HYDROXIDE 1200 MG/15ML
LIQUID ORAL PRN
Status: DISCONTINUED | OUTPATIENT
Start: 2018-04-06 | End: 2018-04-06 | Stop reason: HOSPADM

## 2018-04-06 RX ORDER — CEFAZOLIN SODIUM 1 G
1 VIAL (EA) INJECTION SEE ADMIN INSTRUCTIONS
Status: DISCONTINUED | OUTPATIENT
Start: 2018-04-06 | End: 2018-04-06 | Stop reason: HOSPADM

## 2018-04-06 RX ORDER — EPHEDRINE SULFATE 50 MG/ML
INJECTION, SOLUTION INTRAMUSCULAR; INTRAVENOUS; SUBCUTANEOUS PRN
Status: DISCONTINUED | OUTPATIENT
Start: 2018-04-06 | End: 2018-04-06

## 2018-04-06 RX ORDER — LIDOCAINE HYDROCHLORIDE 20 MG/ML
INJECTION, SOLUTION INFILTRATION; PERINEURAL PRN
Status: DISCONTINUED | OUTPATIENT
Start: 2018-04-06 | End: 2018-04-06

## 2018-04-06 RX ORDER — OXYCODONE AND ACETAMINOPHEN 5; 325 MG/1; MG/1
TABLET ORAL
Qty: 50 TABLET | Refills: 0 | Status: ON HOLD | OUTPATIENT
Start: 2018-04-06 | End: 2019-03-19

## 2018-04-06 RX ORDER — CEFAZOLIN SODIUM 2 G/100ML
2 INJECTION, SOLUTION INTRAVENOUS
Status: COMPLETED | OUTPATIENT
Start: 2018-04-06 | End: 2018-04-06

## 2018-04-06 RX ORDER — HYDROXYZINE HYDROCHLORIDE 10 MG/1
TABLET, FILM COATED ORAL
Qty: 40 TABLET | Refills: 0 | Status: SHIPPED | OUTPATIENT
Start: 2018-04-06 | End: 2019-03-29

## 2018-04-06 RX ORDER — DEXAMETHASONE SODIUM PHOSPHATE 4 MG/ML
INJECTION, SOLUTION INTRA-ARTICULAR; INTRALESIONAL; INTRAMUSCULAR; INTRAVENOUS; SOFT TISSUE PRN
Status: DISCONTINUED | OUTPATIENT
Start: 2018-04-06 | End: 2018-04-06

## 2018-04-06 RX ORDER — PROPOFOL 10 MG/ML
INJECTION, EMULSION INTRAVENOUS PRN
Status: DISCONTINUED | OUTPATIENT
Start: 2018-04-06 | End: 2018-04-06

## 2018-04-06 RX ORDER — PROPOFOL 10 MG/ML
INJECTION, EMULSION INTRAVENOUS CONTINUOUS PRN
Status: DISCONTINUED | OUTPATIENT
Start: 2018-04-06 | End: 2018-04-06

## 2018-04-06 RX ORDER — FENTANYL CITRATE 50 UG/ML
25-50 INJECTION, SOLUTION INTRAMUSCULAR; INTRAVENOUS
Status: DISCONTINUED | OUTPATIENT
Start: 2018-04-06 | End: 2018-04-06 | Stop reason: HOSPADM

## 2018-04-06 RX ORDER — ONDANSETRON 2 MG/ML
4 INJECTION INTRAMUSCULAR; INTRAVENOUS EVERY 30 MIN PRN
Status: DISCONTINUED | OUTPATIENT
Start: 2018-04-06 | End: 2018-04-06 | Stop reason: HOSPADM

## 2018-04-06 RX ORDER — ONDANSETRON 4 MG/1
4 TABLET, ORALLY DISINTEGRATING ORAL EVERY 30 MIN PRN
Status: DISCONTINUED | OUTPATIENT
Start: 2018-04-06 | End: 2018-04-06 | Stop reason: HOSPADM

## 2018-04-06 RX ORDER — NALOXONE HYDROCHLORIDE 0.4 MG/ML
.1-.4 INJECTION, SOLUTION INTRAMUSCULAR; INTRAVENOUS; SUBCUTANEOUS
Status: DISCONTINUED | OUTPATIENT
Start: 2018-04-06 | End: 2018-04-06 | Stop reason: HOSPADM

## 2018-04-06 RX ORDER — SODIUM CHLORIDE, SODIUM LACTATE, POTASSIUM CHLORIDE, CALCIUM CHLORIDE 600; 310; 30; 20 MG/100ML; MG/100ML; MG/100ML; MG/100ML
INJECTION, SOLUTION INTRAVENOUS CONTINUOUS PRN
Status: DISCONTINUED | OUTPATIENT
Start: 2018-04-06 | End: 2018-04-06

## 2018-04-06 RX ORDER — FENTANYL CITRATE 50 UG/ML
INJECTION, SOLUTION INTRAMUSCULAR; INTRAVENOUS PRN
Status: DISCONTINUED | OUTPATIENT
Start: 2018-04-06 | End: 2018-04-06

## 2018-04-06 RX ADMIN — PHENYLEPHRINE HYDROCHLORIDE 100 MCG: 10 INJECTION, SOLUTION INTRAMUSCULAR; INTRAVENOUS; SUBCUTANEOUS at 12:30

## 2018-04-06 RX ADMIN — Medication 5 MG: at 12:43

## 2018-04-06 RX ADMIN — Medication 5 MG: at 13:17

## 2018-04-06 RX ADMIN — SODIUM CHLORIDE, POTASSIUM CHLORIDE, SODIUM LACTATE AND CALCIUM CHLORIDE: 600; 310; 30; 20 INJECTION, SOLUTION INTRAVENOUS at 11:50

## 2018-04-06 RX ADMIN — CEFAZOLIN SODIUM 2 G: 2 INJECTION, SOLUTION INTRAVENOUS at 12:22

## 2018-04-06 RX ADMIN — PHENYLEPHRINE HYDROCHLORIDE 100 MCG: 10 INJECTION, SOLUTION INTRAMUSCULAR; INTRAVENOUS; SUBCUTANEOUS at 13:17

## 2018-04-06 RX ADMIN — FENTANYL CITRATE 50 MCG: 50 INJECTION, SOLUTION INTRAMUSCULAR; INTRAVENOUS at 12:21

## 2018-04-06 RX ADMIN — PROPOFOL 100 MCG/KG/MIN: 10 INJECTION, EMULSION INTRAVENOUS at 12:21

## 2018-04-06 RX ADMIN — PHENYLEPHRINE HYDROCHLORIDE 100 MCG: 10 INJECTION, SOLUTION INTRAMUSCULAR; INTRAVENOUS; SUBCUTANEOUS at 12:28

## 2018-04-06 RX ADMIN — DEXAMETHASONE SODIUM PHOSPHATE 4 MG: 4 INJECTION, SOLUTION INTRA-ARTICULAR; INTRALESIONAL; INTRAMUSCULAR; INTRAVENOUS; SOFT TISSUE at 13:26

## 2018-04-06 RX ADMIN — Medication 5 MG: at 13:21

## 2018-04-06 RX ADMIN — SODIUM CHLORIDE 100 ML: 900 IRRIGANT IRRIGATION at 13:36

## 2018-04-06 RX ADMIN — PHENYLEPHRINE HYDROCHLORIDE 100 MCG: 10 INJECTION, SOLUTION INTRAMUSCULAR; INTRAVENOUS; SUBCUTANEOUS at 12:33

## 2018-04-06 RX ADMIN — BUPIVACAINE HYDROCHLORIDE AND EPINEPHRINE BITARTRATE 7 ML: 5; .005 INJECTION, SOLUTION PERINEURAL at 13:35

## 2018-04-06 RX ADMIN — ONDANSETRON 4 MG: 2 INJECTION INTRAMUSCULAR; INTRAVENOUS at 13:30

## 2018-04-06 RX ADMIN — MIDAZOLAM 2 MG: 1 INJECTION INTRAMUSCULAR; INTRAVENOUS at 12:21

## 2018-04-06 RX ADMIN — PROPOFOL 150 MG: 10 INJECTION, EMULSION INTRAVENOUS at 12:21

## 2018-04-06 RX ADMIN — ROPIVACAINE HYDROCHLORIDE 30 ML: 5 INJECTION, SOLUTION EPIDURAL; INFILTRATION; PERINEURAL at 11:23

## 2018-04-06 RX ADMIN — Medication 5 MG: at 13:04

## 2018-04-06 RX ADMIN — PHENYLEPHRINE HYDROCHLORIDE 100 MCG: 10 INJECTION, SOLUTION INTRAMUSCULAR; INTRAVENOUS; SUBCUTANEOUS at 13:10

## 2018-04-06 RX ADMIN — MIDAZOLAM 2 MG: 1 INJECTION INTRAMUSCULAR; INTRAVENOUS at 11:02

## 2018-04-06 RX ADMIN — PHENYLEPHRINE HYDROCHLORIDE 100 MCG: 10 INJECTION, SOLUTION INTRAMUSCULAR; INTRAVENOUS; SUBCUTANEOUS at 13:07

## 2018-04-06 RX ADMIN — SODIUM CHLORIDE, POTASSIUM CHLORIDE, SODIUM LACTATE AND CALCIUM CHLORIDE: 600; 310; 30; 20 INJECTION, SOLUTION INTRAVENOUS at 13:05

## 2018-04-06 RX ADMIN — Medication 5 MG: at 13:10

## 2018-04-06 RX ADMIN — Medication 5 MG: at 13:01

## 2018-04-06 RX ADMIN — LIDOCAINE HYDROCHLORIDE 100 MG: 20 INJECTION, SOLUTION INFILTRATION; PERINEURAL at 12:21

## 2018-04-06 RX ADMIN — FENTANYL CITRATE 25 MCG: 50 INJECTION, SOLUTION INTRAMUSCULAR; INTRAVENOUS at 12:52

## 2018-04-06 NOTE — ANESTHESIA CARE TRANSFER NOTE
Patient: Vanessa Huffman    Procedure(s):  LEFT  THUMB CARPOMETACARPAL EXCISIONAL ARTHROPLASTY WITH FASCIA CLARE GRAFT  - Wound Class: I-Clean    Diagnosis: ARTHRITIS   Diagnosis Additional Information: No value filed.    Anesthesia Type:   General, Periph. Nerve Block for postop pain     Note:  Airway :Face Mask  Patient transferred to:PACU  Comments: 1340:  To par awake, dentition unchanged from pre-op.Handoff Report: Identifed the Patient, Identified the Reponsible Provider, Reviewed the pertinent medical history, Discussed the surgical course, Reviewed Intra-OP anesthesia mangement and issues during anesthesia, Set expectations for post-procedure period and Allowed opportunity for questions and acknowledgement of understanding      Vitals: (Last set prior to Anesthesia Care Transfer)    CRNA VITALS  4/6/2018 1308 - 4/6/2018 1339      4/6/2018             Resp Rate (set): 10                Electronically Signed By: LUIS ANTONIO Rodas CRNA  April 6, 2018  1:39 PM

## 2018-04-06 NOTE — IP AVS SNAPSHOT
MRN:[id number]                      After Visit Summary   4/6/2018    Vanessa Huffman    MRN: [id number]           Thank you!     Thank you for choosing [facility] for your care. Our goal is always to provide you with excellent care. Hearing back from our patients is one way we can continue to improve our services. Please take a few minutes to complete the written survey that you may receive in the mail after you visit with us. Thank you!        Patient Information     Date Of Birth          1950        About your hospital stay     You were admitted on:  April 6, 2018 You last received care in the:  [facility]    You were discharged on:  April 6, 2018       Who to Call     For medical emergencies, please call 911.  For non-urgent questions about your medical care, please call your primary care provider or clinic, [phone]  For questions related to your surgery, please call your surgery clinic        Attending Provider     Provider Kalyani Eller MD Orthopedics       Primary Care Provider Office Phone # Fax #    Emily Najera [phone]      After Care Instructions     Discharge Instructions       CMC Excisional Arthroplasty Post-Operative Instructions  [facility]  Nikole King M.D.    PAIN  Prior to surgery, you received a nerve block to keep you comfortable during your procedure and to reduce your initial post-operative pain. Typically, the nerve block lasts for 12 to 18 hours. The nerve block will feel as though it's wearing off gradually. However, the pain can come on very suddenly and intensely. Be prepared for this by regularly taking the pain medication that was prescribed for you once you start to feel the sensation return to your arm.  Keep your arm elevated above your heart (above your head is even better) for the first 24 to 48 hours following surgery to reduce the swelling and pain. If your hand  swells and is very painful, you can remove the Ace bandage, cut the gauze along the little finger to loosen the bandage, and then rewrap the Ace bandage.  You might notice an increase in the swelling of your fingers on your third post-operative day. This is normal. The swelling and bruising seep out from under the surgical dressing, becoming trapped in your fingers. Adjust the Ace wrap as described above. Elevate your arm and move your fingers, and the swelling and bruising will resolve.    BANDAGE  Leave your bandage on and keep it clean and dry until you are seen in the clinic. If you take a shower, cover it with plastic wrap or a plastic bag.    SLING  If you receive a sling for your arm, you can wear it until you get home. After that, you should avoid using the sling, otherwise, you can develop neck and shoulder stiffness and discomfort.    ACTIVITIES  After your surgery, begin moving your fingers, but do not move your thumb. You may use your hand for light activities and driving while wearing the bandage. You may also return to work for light duty. Do not do any heavy lifting, pushing, or pulling with your hand.    FOLLOW-UP  You will need to come back for a checkup 10 to 14 days after your surgery. Call [phone] as soon as possible to make your appointment to see Magda Plasencia PA-C. You can be seen at [facility] ([facility] [address] [place] [address], 2nd floor) on Tuesday or Thursday. Magda Plasencia, my physician assistant, will examine your incision and take out your stitches.    Call Magda Plasencia at [phone] between 8:30 a.m. and 5:00 p.m. Monday through Friday if you have:  A fever (101 F or higher)  Redness, swelling, or draining at the surgical site  Nausea, vomiting, or a rash from your medicine(s)  Any questions, problems, or concerns    For emergencies after 5:00 p.m. and on weekends, call the on-call physician at [phone].                  Further instructions from your care team        Same Day Surgery Discharge Instructions for  Sedation and General Anesthesia       It's not unusual to feel dizzy, light-headed or faint for up to 24 hours after surgery or while taking pain medication.  If you have these symptoms: sit for a few minutes before standing and have someone assist you when you get up to walk or use the bathroom.      You should rest and relax for the next 24 hours. We recommend you make arrangements to have an adult stay with you for at least 24 hours after your discharge.  Avoid hazardous and strenuous activity.      DO NOT DRIVE any vehicle or operate mechanical equipment for 24 hours following the end of your surgery.  Even though you may feel normal, your reactions may be affected by the medication you have received.      Do not drink alcoholic beverages for 24 hours following surgery.       Slowly progress to your regular diet as you feel able. It's not unusual to feel nauseated and/or vomit after receiving anesthesia.  If you develop these symptoms, drink clear liquids (apple juice, ginger ale, broth, 7-up, etc. ) until you feel better.  If your nausea and vomiting persists for 24 hours, please notify your surgeon.        All narcotic pain medications, along with inactivity and anesthesia, can cause constipation. Drinking plenty of liquids and increasing fiber intake will help.      For any questions of a medical nature, call your surgeon.      Do not make important decisions for 24 hours.      If you had general anesthesia, you may have a sore throat for a couple of days related to the breathing tube used during surgery.  You may use Cepacol lozenges to help with this discomfort.  If it worsens or if you develop a fever, contact your surgeon.       If you feel your pain is not well managed with the pain medications prescribed by your surgeon, please contact your surgeon's office to let them know so they can address your concerns.         [facility]      DISCHARGE  "INSTRUCTIONS FOLLOWING   REGIONAL BLOCK ANESTHESIA      Numbness or lack of feeling in the arm/leg that was operated may last up to 24 hours.  The average time is usually 10-15 hours.  You may not be able to lift or move the arm or leg where the operation was by itself during that time.  Long-acting local anesthetic medicines were used to give you long-lasting pain relief.    Wear a sling until your arm is completely \"awake\"    Avoid bumping your arm, leg or foot while it is numb    Avoid extremes of hot or cold while it is numb    Remain quiet and restful the day of surgery.  Resume normal activities gradually over the next day or so as advise by your surgeon.    Do not drive or operate  Any machinery until your extremity is full  \"awake\"    You will have a tingling and prickly sensation in your arm/leg as the feeling begins to return; you can also expect some discomfort. The amount of discomfort is unpredictable, but if you have more pain that can be controlled with the pain medication you received, you should contact your surgeon.  Start to take your pain pills as soon as you start to feel any discomfort or pain.  We strongly recommend starting your pain medication at bedtime if you haven't already done so.  This is in the event that the block wears off while you are asleep.                    **If you have questions or concerns about your procedure,  call Dr. King at [phone]**    Pending Results     No orders found from 4/4/2018 to 4/7/2018.            Admission Information     Date & Time Provider Department Dept. Phone    4/6/2018 Kalyani King MD [facility] Same Day Surgery [phone]      Your Vitals Were     Blood Pressure Temperature Respirations Weight Pulse Oximetry BMI (Body Mass Index)    106/58 96.2  F (35.7  C) 16 94.3 kg (208 lb) 98% 36.85 kg/m2      MyChart Information     t-Art lets you send messages to your doctor, view your test results, renew your " "prescriptions, schedule appointments and more. To sign up, go to [email or web address] . Click on \"Log in\" on the left side of the screen, which will take you to the Welcome page. Then click on \"Sign up Now\" on the right side of the page.     You will be asked to enter the access code listed below, as well as some personal information. Please follow the directions to create your username and password.     Your access code is: [email or web address]  Expires: 2018  9:43 AM     Your access code will  in 90 days. If you need help or a new code, please call your [facility] clinic or [phone].        Care EveryWhere ID     This is your Care EveryWhere ID. This could be used by other organizations to access your [facility] medical records  [id number]        Equal Access to Services     ROSITA GALICIA : Malia Marni, otf hunt, rigo salazar, kalli snyder . So [facility] [phone].    ATENCIÓN: Si habla español, tiene a pierre disposición servicios gratuitos de asistencia lingüística. Anna al [phone].    We comply with applicable federal civil rights laws and [place] laws. We do not discriminate on the basis of race, color, national origin, age, disability, sex, sexual orientation, or gender identity.               Review of your medicines      START taking        Dose / Directions    hydrOXYzine 10 MG tablet   Commonly known as:  ATARAX   Used for:  Primary osteoarthritis of first carpometacarpal joint of left hand        Take 1 10-mg tablet every 6 hours as needed for muscle relaxation and to supplement your pain medication.   Quantity:  40 tablet   Refills:  0         CONTINUE these medicines which may have CHANGED, or have new prescriptions. If we are uncertain of the size of tablets/capsules you have at home, strength may be listed as something that might have changed.        Dose / Directions    * oxyCODONE-acetaminophen 5-325 MG per tablet   Commonly " known as:  PERCOCET   This may have changed:  Another medication with the same name was added. Make sure you understand how and when to take each.        Dose:  1 tablet   Take 1 tablet by mouth every morning   Refills:  0       * oxyCODONE-acetaminophen 5-325 MG per tablet   Commonly known as:  PERCOCET   This may have changed:  Another medication with the same name was added. Make sure you understand how and when to take each.        Dose:  0.5 tablet   Take 0.5 tablets by mouth every evening   Refills:  0       * oxyCODONE-acetaminophen 5-325 MG per tablet   Commonly known as:  PERCOCET   This may have changed:  You were already taking a medication with the same name, and this prescription was added. Make sure you understand how and when to take each.   Used for:  Primary osteoarthritis of first carpometacarpal joint of left hand        Take 1-2 tablets every 4-6 hours for pain if needed.   Quantity:  50 tablet   Refills:  0       * Notice:  This list has 3 medication(s) that are the same as other medications prescribed for you. Read the directions carefully, and ask your doctor or other care provider to review them with you.      CONTINUE these medicines which have NOT CHANGED        Dose / Directions    amLODIPine 2.5 MG tablet   Commonly known as:  NORVASC   Used for:  Benign essential hypertension        Dose:  2.5 mg   Take 1 tablet (2.5 mg) by mouth daily   Quantity:  30 tablet   Refills:  1       CYCLOBENZAPRINE HCL PO        Dose:  10 mg   Take 10 mg by mouth At Bedtime   Refills:  0       latanoprost 0.005 % ophthalmic solution   Commonly known as:  XALATAN        Dose:  1 drop   Place 1 drop into both eyes At Bedtime   Refills:  0       LISINOPRIL PO        Dose:  10 mg   Take 10 mg by mouth daily   Refills:  0       loperamide 2 MG capsule   Commonly known as:  IMODIUM        Dose:  2 mg   Take 2 mg by mouth 4 times daily as needed for diarrhea   Refills:  0       MEDICATION INSTRUCTION   Used for:   Benign essential hypertension        Hold Lisinopril and Norvasc if your SBP is <110 and DBP<70   Quantity:  1 each   Refills:  0       nystatin 860433 UNIT/GM Powd   Commonly known as:  MYCOSTATIN        Apply topically 2 times daily as needed (affected area)   Refills:  0       SIMVASTATIN PO        Dose:  40 mg   Take 40 mg by mouth At Bedtime   Refills:  0       SUMATRIPTAN SUCCINATE PO        Dose:  50 mg   Take 50 mg by mouth every 2 hours as needed for migraine (max of 200mg q 24hr)   Refills:  0       TEMAZEPAM PO        Dose:  15 mg   Take 15 mg by mouth nightly as needed for sleep   Refills:  0       TRAMADOL HCL PO        Dose:  50 mg   Take 50 mg by mouth 3 times daily as needed for moderate to severe pain   Refills:  0            Where to get your medicines      These medications were sent to [facility] - [address]  [address], [place] [address]     Phone:  [phone]     hydrOXYzine 10 MG tablet         Some of these will need a paper prescription and others can be bought over the counter. Ask your nurse if you have questions.     Bring a paper prescription for each of these medications     oxyCODONE-acetaminophen 5-325 MG per tablet                Protect others around you: Learn how to safely use, store and throw away your medicines at www.disposemymeds.org.        Information about OPIOIDS     PRESCRIPTION OPIOIDS: WHAT YOU NEED TO KNOW    Prescription opioids can be used to help relieve moderate to severe pain and are often prescribed following a surgery or injury, or for certain health conditions. These medications can be an important part of treatment but also come with serious risks. It is important to work with your health care provider to make sure you are getting the safest, most effective care.    WHAT ARE THE RISKS AND SIDE EFFECTS OF OPIOID USE?  Prescription opioids carry serious risks of addiction and overdose, especially with prolonged  use. An opioid overdose, often marked by slowed breathing can cause sudden death. The use of prescription opioids can have a number of side effects as well, even when taken as directed:      Tolerance - meaning you might need to take more of a medication for the same pain relief    Physical dependence - meaning you have symptoms of withdrawal when a medication is stopped    Increased sensitivity to pain    Constipation    Nausea, vomiting, and dry mouth    Sleepiness and dizziness    Confusion    Depression    Low levels of testosterone that can result in lower sex drive, energy, and strength    Itching and sweating    RISKS ARE GREATER WITH:    History of drug misuse, substance use disorder, or overdose    Mental health conditions (such as depression or anxiety)    Sleep apnea    Older age (65 years or older)    Pregnancy    Avoid alcohol while taking prescription opioids.   Also, unless specifically advised by your health care provider, medications to avoid include:    Benzodiazepines (such as Xanax or Valium)    Muscle relaxants (such as Soma or Flexeril)    Hypnotics (such as Ambien or Lunesta)    Other prescription opioids    KNOW YOUR OPTIONS:  Talk to your health care provider about ways to manage your pain that do not involve prescription opioids. Some of these options may actually work better and have fewer risks and side effects:    Pain relievers such as acetaminophen, ibuprofen, and naproxen    Some medications that are also used for depression or seizures    Physical therapy and exercise    Cognitive behavioral therapy, a psychological, goal-directed approach, in which patients learn how to modify physical, behavioral, and emotional triggers of pain and stress    IF YOU ARE PRESCRIBED OPIOIDS FOR PAIN:    Never take opioids in greater amounts or more often than prescribed    Follow up with your primary health care provider and work together to create a plan on how to manage your pain.    Talk about ways  to help manage your pain that do not involve prescription opioids    Talk about all concerns and side effects    Help prevent misuse and abuse    Never sell or share prescription opioids    Never use another person's prescription opioids    Store prescription opioids in a secure place and out of reach of others (this may include visitors, children, friends, and family)    Visit www.cdc.gov/drugoverdose to learn about risks of opioid abuse and overdose    If you believe you may be struggling with addiction, tell your health care provider and ask for guidance or call [facility]'s National Helpline at 9-625-619-HELP    LEARN MORE / www.cdc.gov/drugoverdose/prescribing/guideline.html    Safely dispose of unused prescription opioids: Find your local drug take-back programs and more information about the importance of safe disposal at www.doseofreality.mn.gov             Medication List: This is a list of all your medications and when to take them. Check marks below indicate your daily home schedule. Keep this list as a reference.      Medications           Morning Afternoon Evening Bedtime As Needed    amLODIPine 2.5 MG tablet   Commonly known as:  NORVASC   Take 1 tablet (2.5 mg) by mouth daily                                CYCLOBENZAPRINE HCL PO   Take 10 mg by mouth At Bedtime                                hydrOXYzine 10 MG tablet   Commonly known as:  ATARAX   Take 1 10-mg tablet every 6 hours as needed for muscle relaxation and to supplement your pain medication.                                latanoprost 0.005 % ophthalmic solution   Commonly known as:  XALATAN   Place 1 drop into both eyes At Bedtime                                LISINOPRIL PO   Take 10 mg by mouth daily                                loperamide 2 MG capsule   Commonly known as:  IMODIUM   Take 2 mg by mouth 4 times daily as needed for diarrhea                                MEDICATION INSTRUCTION   Hold Lisinopril and Norvasc if your SBP is <110  and DBP<70                                nystatin [id number] UNIT/GM Powd   Commonly known as:  MYCOSTATIN   Apply topically 2 times daily as needed (affected area)                                * oxyCODONE-acetaminophen 5-325 MG per tablet   Commonly known as:  PERCOCET   Take 1 tablet by mouth every morning                                * oxyCODONE-acetaminophen 5-325 MG per tablet   Commonly known as:  PERCOCET   Take 0.5 tablets by mouth every evening                                * oxyCODONE-acetaminophen 5-325 MG per tablet   Commonly known as:  PERCOCET   Take 1-2 tablets every 4-6 hours for pain if needed.                                SIMVASTATIN PO   Take 40 mg by mouth At Bedtime                                SUMATRIPTAN SUCCINATE PO   Take 50 mg by mouth every 2 hours as needed for migraine (max of 200mg q 24hr)                                TEMAZEPAM PO   Take 15 mg by mouth nightly as needed for sleep                                TRAMADOL HCL PO   Take 50 mg by mouth 3 times daily as needed for moderate to severe pain                                * Notice:  This list has 3 medication(s) that are the same as other medications prescribed for you. Read the directions carefully, and ask your doctor or other care provider to review them with you.

## 2018-04-06 NOTE — OP NOTE
SURGEON: FLOYD CRESPO M.D.   ASSISTANT: JOSH SILVA PA-C.     PREOPERATIVE DIAGNOSIS   Left CMC arthritis.     POSTOPERATIVE DIAGNOSIS   Left CMC arthritis. /Chondrocalcinosis    OPERATION  Left CMC excisional arthroplasty with a fascia alessio graft and an APL suspension sling.     ANESTHESIA   Regional./General    TOURNIQUET TIME   48 minutes.     INFORMED CONSENT  Obtained and signed prior to entering the operating room.    SURGICAL SITE SIGNED  Performed and confirmed prior to incision.    TIME OUT  Performed prior to incision in the operating room.    PROPHYLACTIC ANTIBIOTICS  Ancef given prior to inflation of the tourniquet.    DVT INDICATIONS  SCD's were not applied.    PROCEDURE AND FINDINGS   The patient was then brought to the operating room and the arm was prepped and draped in a normal standard manner. A time-out was performed confirming the surgical site and then antibiotics had been given before the tourniquet was inflated.  The arm was exsanguinated and the tourniquet was inflated to 150 mm above systolic pressure.    A longitudinal incision was made over the CMC joint.  This was carried down through the subcutaneous tissue and the superficial branch of the radial nerve was retracted dorsally.  The plane between the APL (abductor pollicis longus) and the EPB was divided longitudinally.  Those tendons were retracted out of the way.  The capsule over the trapezium was divided longitudinally protecting the radial artery branch to the scaphoid.  The trapezium was removed piecemeal with a rongeur.  A cuff of bone was left attached to the thumb abductor pollicis brevis to reduce atrophy in that muscle.  The FCR was protected.  The capsular joint area was then irrigated out copiously.  An APL tendon slip was identified dorsally and resected back into the first dorsal compartment, which was released.  An APL suspension sling was created with the APL tendon woven directly through the FCR.  It was looped  back around itself with the EPB and sewn onto itself with 3-0 Ethibond suture to secure it.  This suspended the thumb nicely, kept the patient from having an abduction deformity at the base of the MP joint.  Then fascia alessio graft, which had been soaked and cleaned, was prepared.  A 1 cm wide strip was cut into the graft leaving it attached distally.  A curved snap was then placed beneath the FCR tendon and grabbed this 1-cm strip underneath the FCR.  The remainder of the graft was folded over the top of this strip, rolled onto itself, and it was sewn several times with a 3-0 Ethibond in order to make sure that the graft would not unravel.  The 3-0 Ethibond was attached to the sling at the FCR sheath and tied onto itself.  The graft was sewn into the CMC joint where the APL sling was sewn onto itself and the thumb was brought through a range of motion to make sure that there was adequate space and no impingement.  The patient s thumb had excellent range of motion without tension.  The capsule was closed with 3-0 undyed Vicryl and 3-0 Ethibond.  The first dorsal compartment was closed with 3-0 undyed Vicryl.  Marcaine with epinephrine was injected.  The tourniquet was let down at the end of the case and all bleeding was controlled.  The skin over the CMC joint was closed with the 4-0 Monocryl.    Cc: my office

## 2018-04-06 NOTE — ANESTHESIA POSTPROCEDURE EVALUATION
Patient: Vanessa Huffman    Procedure(s):  LEFT  THUMB CARPOMETACARPAL EXCISIONAL ARTHROPLASTY WITH FASCIA CLARE GRAFT  - Wound Class: I-Clean    Diagnosis:ARTHRITIS   Diagnosis Additional Information: No value filed.    Anesthesia Type:  General, Periph. Nerve Block for postop pain    Note:  Anesthesia Post Evaluation    Patient location during evaluation: PACU  Patient participation: Able to fully participate in evaluation  Level of consciousness: awake  Pain management: adequate  Airway patency: patent  Cardiovascular status: acceptable  Respiratory status: acceptable  Hydration status: acceptable  PONV: none     Anesthetic complications: None          Last vitals:  Vitals:    04/06/18 1415 04/06/18 1430 04/06/18 1445   BP: (!) 88/43 90/56 97/56   Resp: 12 12 11   Temp: 36.6  C (97.9  F) 37  C (98.6  F) 37.1  C (98.8  F)   SpO2: (!) 89% 94% 93%         Electronically Signed By: Shayan Lou MD  April 6, 2018  3:12 PM

## 2018-04-06 NOTE — IP AVS SNAPSHOT
[facility]    [address]    [place] [address]    Phone:  [phone]    Fax:  [phone]                                       After Visit Summary   4/6/2018    Vanessa Huffman    MRN: [id number]           After Visit Summary Signature Page     I have received my discharge instructions, and my questions have been answered. I have discussed any challenges I see with this plan with the nurse or doctor.    ..........................................................................................................................................  Patient/Patient Representative Signature      ..........................................................................................................................................  Patient Representative Print Name and Relationship to Patient    ..................................................               ................................................  Date                                            Time    ..........................................................................................................................................  Reviewed by Signature/Title    ...................................................              ..............................................  Date                                                            Time

## 2018-04-06 NOTE — ANESTHESIA PROCEDURE NOTES
Procedures  Left brachial plexus block for pain relief at surgeon's request;  After Versed 2mg IV for sedation and with pulse oximeter in place; 25GA; 30cc 1/2% Ropivicaine; 1;200,000 EPI, ultrasound guidance, no paresthesias

## 2018-04-06 NOTE — DISCHARGE INSTRUCTIONS
Same Day Surgery Discharge Instructions for  Sedation and General Anesthesia       It's not unusual to feel dizzy, light-headed or faint for up to 24 hours after surgery or while taking pain medication.  If you have these symptoms: sit for a few minutes before standing and have someone assist you when you get up to walk or use the bathroom.      You should rest and relax for the next 24 hours. We recommend you make arrangements to have an adult stay with you for at least 24 hours after your discharge.  Avoid hazardous and strenuous activity.      DO NOT DRIVE any vehicle or operate mechanical equipment for 24 hours following the end of your surgery.  Even though you may feel normal, your reactions may be affected by the medication you have received.      Do not drink alcoholic beverages for 24 hours following surgery.       Slowly progress to your regular diet as you feel able. It's not unusual to feel nauseated and/or vomit after receiving anesthesia.  If you develop these symptoms, drink clear liquids (apple juice, ginger ale, broth, 7-up, etc. ) until you feel better.  If your nausea and vomiting persists for 24 hours, please notify your surgeon.        All narcotic pain medications, along with inactivity and anesthesia, can cause constipation. Drinking plenty of liquids and increasing fiber intake will help.      For any questions of a medical nature, call your surgeon.      Do not make important decisions for 24 hours.      If you had general anesthesia, you may have a sore throat for a couple of days related to the breathing tube used during surgery.  You may use Cepacol lozenges to help with this discomfort.  If it worsens or if you develop a fever, contact your surgeon.       If you feel your pain is not well managed with the pain medications prescribed by your surgeon, please contact your surgeon's office to let them know so they can address your concerns.         [facility]      DISCHARGE  "INSTRUCTIONS FOLLOWING   REGIONAL BLOCK ANESTHESIA      Numbness or lack of feeling in the arm/leg that was operated may last up to 24 hours.  The average time is usually 10-15 hours.  You may not be able to lift or move the arm or leg where the operation was by itself during that time.  Long-acting local anesthetic medicines were used to give you long-lasting pain relief.    Wear a sling until your arm is completely \"awake\"    Avoid bumping your arm, leg or foot while it is numb    Avoid extremes of hot or cold while it is numb    Remain quiet and restful the day of surgery.  Resume normal activities gradually over the next day or so as advise by your surgeon.    Do not drive or operate  Any machinery until your extremity is full  \"awake\"    You will have a tingling and prickly sensation in your arm/leg as the feeling begins to return; you can also expect some discomfort. The amount of discomfort is unpredictable, but if you have more pain that can be controlled with the pain medication you received, you should contact your surgeon.  Start to take your pain pills as soon as you start to feel any discomfort or pain.  We strongly recommend starting your pain medication at bedtime if you haven't already done so.  This is in the event that the block wears off while you are asleep.                    **If you have questions or concerns about your procedure,  call Dr. King at [phone]**  "

## 2018-04-06 NOTE — ANESTHESIA PREPROCEDURE EVALUATION
Anesthesia Evaluation     . Pt has had prior anesthetic. Type: General    No history of anesthetic complications          ROS/MED HX    ENT/Pulmonary:  - neg pulmonary ROS     Neurologic:       Cardiovascular:     (+) hypertension----. : . . . :. .       METS/Exercise Tolerance:     Hematologic:         Musculoskeletal:   (+) arthritis, , , -       GI/Hepatic:  - neg GI/hepatic ROS       Renal/Genitourinary:         Endo:     (+) Obesity, .      Psychiatric:     (+) psychiatric history depression      Infectious Disease:         Malignancy:         Other:                     Physical Exam  Normal systems: cardiovascular, pulmonary and dental    Airway   Mallampati: II  TM distance: >3 FB  Neck ROM: full    Dental     Cardiovascular   Rhythm and rate: regular and normal      Pulmonary    breath sounds clear to auscultation                    Anesthesia Plan      History & Physical Review  History and physical reviewed and following examination; no interval change.    ASA Status:  2 .    NPO Status:  > 8 hours    Plan for General and Periph. Nerve Block for postop pain with Propofol induction. Maintenance will be TIVA.    PONV prophylaxis:  Ondansetron (or other 5HT-3) and Dexamethasone or Solumedrol       Postoperative Care  Postoperative pain management:  IV analgesics and Oral pain medications.      Consents                          .

## 2018-04-06 NOTE — BRIEF OP NOTE
[facility] Brief Operative Note    Pre-operative diagnosis: ARTHRITIS    Post-operative diagnosis chondrocalcinosis   Procedure: Procedure(s):  LEFT  THUMB CARPOMETACARPAL EXCISIONAL ARTHROPLASTY WITH FASCIA CLARE GRAFT  - Wound Class: I-Clean   Surgeon(s): Surgeon(s) and Role:     * Kalyani King MD - Primary     * Magda Plasencia PA-C - Assisting   Estimated blood loss: 5 mL    Specimens: * No specimens in log *   Findings: As above

## 2019-03-01 ENCOUNTER — HOSPITAL ENCOUNTER (INPATIENT)
Facility: CLINIC | Age: 69
LOS: 18 days | Discharge: NURSING, REHAB OR LONG-TERM CARE | DRG: 871 | End: 2019-03-19
Attending: EMERGENCY MEDICINE | Admitting: INTERNAL MEDICINE
Payer: MEDICARE

## 2019-03-01 ENCOUNTER — APPOINTMENT (OUTPATIENT)
Dept: ULTRASOUND IMAGING | Facility: CLINIC | Age: 69
DRG: 871 | End: 2019-03-01
Attending: EMERGENCY MEDICINE
Payer: MEDICARE

## 2019-03-01 ENCOUNTER — APPOINTMENT (OUTPATIENT)
Dept: CT IMAGING | Facility: CLINIC | Age: 69
DRG: 871 | End: 2019-03-01
Attending: EMERGENCY MEDICINE
Payer: MEDICARE

## 2019-03-01 ENCOUNTER — APPOINTMENT (OUTPATIENT)
Dept: CT IMAGING | Facility: CLINIC | Age: 69
DRG: 871 | End: 2019-03-01
Attending: INTERNAL MEDICINE
Payer: MEDICARE

## 2019-03-01 DIAGNOSIS — K29.01 GASTROINTESTINAL HEMORRHAGE ASSOCIATED WITH ACUTE GASTRITIS: ICD-10-CM

## 2019-03-01 DIAGNOSIS — R19.7 DIARRHEA, UNSPECIFIED TYPE: ICD-10-CM

## 2019-03-01 DIAGNOSIS — R51.9 NONINTRACTABLE HEADACHE, UNSPECIFIED CHRONICITY PATTERN, UNSPECIFIED HEADACHE TYPE: ICD-10-CM

## 2019-03-01 DIAGNOSIS — A41.9 SEVERE SEPSIS (H): ICD-10-CM

## 2019-03-01 DIAGNOSIS — R65.20 SEVERE SEPSIS (H): ICD-10-CM

## 2019-03-01 DIAGNOSIS — R94.5 ABNORMAL RESULTS OF LIVER FUNCTION STUDIES: ICD-10-CM

## 2019-03-01 DIAGNOSIS — R93.2 ABNORMAL LIVER ULTRASOUND: ICD-10-CM

## 2019-03-01 DIAGNOSIS — D69.6 THROMBOCYTOPENIA (H): Primary | ICD-10-CM

## 2019-03-01 DIAGNOSIS — K75.0 LIVER ABSCESS: ICD-10-CM

## 2019-03-01 DIAGNOSIS — N17.9 AKI (ACUTE KIDNEY INJURY) (H): ICD-10-CM

## 2019-03-01 DIAGNOSIS — N39.0 URINARY TRACT INFECTION WITHOUT HEMATURIA, SITE UNSPECIFIED: ICD-10-CM

## 2019-03-01 LAB
ALBUMIN SERPL-MCNC: 2.4 G/DL (ref 3.4–5)
ALBUMIN UR-MCNC: 100 MG/DL
ALP SERPL-CCNC: 410 U/L (ref 40–150)
ALT SERPL W P-5'-P-CCNC: 213 U/L (ref 0–50)
AMORPH CRY #/AREA URNS HPF: ABNORMAL /HPF
ANION GAP SERPL CALCULATED.3IONS-SCNC: 13 MMOL/L (ref 3–14)
APAP SERPL-MCNC: <2 MG/L (ref 10–20)
APPEARANCE CSF: CLEAR
APPEARANCE UR: ABNORMAL
AST SERPL W P-5'-P-CCNC: 417 U/L (ref 0–45)
BACTERIA #/AREA URNS HPF: ABNORMAL /HPF
BASOPHILS # BLD AUTO: 0 10E9/L (ref 0–0.2)
BASOPHILS NFR BLD AUTO: 0.1 %
BILIRUB DIRECT SERPL-MCNC: 4 MG/DL (ref 0–0.2)
BILIRUB SERPL-MCNC: 5 MG/DL (ref 0.2–1.3)
BILIRUB UR QL STRIP: ABNORMAL
BUN SERPL-MCNC: 56 MG/DL (ref 7–30)
CALCIUM SERPL-MCNC: 8.8 MG/DL (ref 8.5–10.1)
CHLORIDE SERPL-SCNC: 97 MMOL/L (ref 94–109)
CO2 BLDCOV-SCNC: 22 MMOL/L (ref 21–28)
CO2 BLDCOV-SCNC: 23 MMOL/L (ref 21–28)
CO2 SERPL-SCNC: 21 MMOL/L (ref 20–32)
COLOR CSF: COLORLESS
COLOR UR AUTO: ABNORMAL
CREAT SERPL-MCNC: 4.27 MG/DL (ref 0.52–1.04)
CRP SERPL-MCNC: 485 MG/L (ref 0–8)
DIFFERENTIAL METHOD BLD: ABNORMAL
EOSINOPHIL # BLD AUTO: 0 10E9/L (ref 0–0.7)
EOSINOPHIL NFR BLD AUTO: 0.1 %
ERYTHROCYTE [DISTWIDTH] IN BLOOD BY AUTOMATED COUNT: 13 % (ref 10–15)
ERYTHROCYTE [SEDIMENTATION RATE] IN BLOOD BY WESTERGREN METHOD: 79 MM/H (ref 0–30)
GFR SERPL CREATININE-BSD FRML MDRD: 10 ML/MIN/{1.73_M2}
GLUCOSE CSF-MCNC: 57 MG/DL (ref 40–70)
GLUCOSE SERPL-MCNC: 106 MG/DL (ref 70–99)
GLUCOSE UR STRIP-MCNC: 70 MG/DL
GRAM STN SPEC: NORMAL
GRAN CASTS #/AREA URNS LPF: 59 /LPF
HCT VFR BLD AUTO: 37.8 % (ref 35–47)
HGB BLD-MCNC: 13.1 G/DL (ref 11.7–15.7)
HGB UR QL STRIP: ABNORMAL
HYALINE CASTS #/AREA URNS LPF: 12 /LPF (ref 0–2)
IMM GRANULOCYTES # BLD: 0.2 10E9/L (ref 0–0.4)
IMM GRANULOCYTES NFR BLD: 1 %
KETONES UR STRIP-MCNC: NEGATIVE MG/DL
LACTATE BLD-SCNC: 2.7 MMOL/L (ref 0.7–2.1)
LACTATE BLD-SCNC: 3.1 MMOL/L (ref 0.7–2)
LACTATE BLD-SCNC: 3.7 MMOL/L (ref 0.7–2.1)
LEUKOCYTE ESTERASE UR QL STRIP: NEGATIVE
LYMPHOCYTES # BLD AUTO: 0.8 10E9/L (ref 0.8–5.3)
LYMPHOCYTES NFR BLD AUTO: 5.2 %
Lab: NORMAL
MCH RBC QN AUTO: 30.5 PG (ref 26.5–33)
MCHC RBC AUTO-ENTMCNC: 34.7 G/DL (ref 31.5–36.5)
MCV RBC AUTO: 88 FL (ref 78–100)
MONOCYTES # BLD AUTO: 0.8 10E9/L (ref 0–1.3)
MONOCYTES NFR BLD AUTO: 5.4 %
MUCOUS THREADS #/AREA URNS LPF: PRESENT /LPF
NEUTROPHILS # BLD AUTO: 13.7 10E9/L (ref 1.6–8.3)
NEUTROPHILS NFR BLD AUTO: 88.2 %
NITRATE UR QL: NEGATIVE
NRBC # BLD AUTO: 0 10*3/UL
NRBC BLD AUTO-RTO: 0 /100
PCO2 BLDV: 43 MM HG (ref 40–50)
PCO2 BLDV: 46 MM HG (ref 40–50)
PH BLDV: 7.31 PH (ref 7.32–7.43)
PH BLDV: 7.31 PH (ref 7.32–7.43)
PH UR STRIP: 5.5 PH (ref 5–7)
PLATELET # BLD AUTO: 177 10E9/L (ref 150–450)
PO2 BLDV: 26 MM HG (ref 25–47)
PO2 BLDV: 28 MM HG (ref 25–47)
POTASSIUM SERPL-SCNC: 3.5 MMOL/L (ref 3.4–5.3)
PROCALCITONIN SERPL-MCNC: 60.96 NG/ML
PROT CSF-MCNC: 33 MG/DL (ref 15–60)
PROT SERPL-MCNC: 6.9 G/DL (ref 6.8–8.8)
RBC # BLD AUTO: 4.3 10E12/L (ref 3.8–5.2)
RBC # CSF MANUAL: 1 /UL (ref 0–2)
RBC #/AREA URNS AUTO: 8 /HPF (ref 0–2)
SAO2 % BLDV FROM PO2: 41 %
SAO2 % BLDV FROM PO2: 47 %
SODIUM SERPL-SCNC: 131 MMOL/L (ref 133–144)
SOURCE: ABNORMAL
SP GR UR STRIP: 1.02 (ref 1–1.03)
SPECIMEN SOURCE: NORMAL
TUBE # CSF: 4 #
UROBILINOGEN UR STRIP-MCNC: 4 MG/DL (ref 0–2)
WBC # BLD AUTO: 15.5 10E9/L (ref 4–11)
WBC # CSF MANUAL: 1 /UL (ref 0–5)
WBC #/AREA URNS AUTO: 16 /HPF (ref 0–5)

## 2019-03-01 PROCEDURE — 83605 ASSAY OF LACTIC ACID: CPT

## 2019-03-01 PROCEDURE — 62270 DX LMBR SPI PNXR: CPT

## 2019-03-01 PROCEDURE — 70450 CT HEAD/BRAIN W/O DYE: CPT

## 2019-03-01 PROCEDURE — 81001 URINALYSIS AUTO W/SCOPE: CPT | Performed by: EMERGENCY MEDICINE

## 2019-03-01 PROCEDURE — 12000000 ZZH R&B MED SURG/OB

## 2019-03-01 PROCEDURE — 80076 HEPATIC FUNCTION PANEL: CPT | Performed by: EMERGENCY MEDICINE

## 2019-03-01 PROCEDURE — 87077 CULTURE AEROBIC IDENTIFY: CPT | Performed by: EMERGENCY MEDICINE

## 2019-03-01 PROCEDURE — 25000132 ZZH RX MED GY IP 250 OP 250 PS 637: Mod: GY | Performed by: EMERGENCY MEDICINE

## 2019-03-01 PROCEDURE — 87086 URINE CULTURE/COLONY COUNT: CPT | Performed by: EMERGENCY MEDICINE

## 2019-03-01 PROCEDURE — 96367 TX/PROPH/DG ADDL SEQ IV INF: CPT

## 2019-03-01 PROCEDURE — 82945 GLUCOSE OTHER FLUID: CPT | Performed by: EMERGENCY MEDICINE

## 2019-03-01 PROCEDURE — 76705 ECHO EXAM OF ABDOMEN: CPT

## 2019-03-01 PROCEDURE — 80329 ANALGESICS NON-OPIOID 1 OR 2: CPT | Performed by: EMERGENCY MEDICINE

## 2019-03-01 PROCEDURE — 87040 BLOOD CULTURE FOR BACTERIA: CPT | Performed by: EMERGENCY MEDICINE

## 2019-03-01 PROCEDURE — 36415 COLL VENOUS BLD VENIPUNCTURE: CPT | Performed by: EMERGENCY MEDICINE

## 2019-03-01 PROCEDURE — 25800030 ZZH RX IP 258 OP 636: Performed by: INTERNAL MEDICINE

## 2019-03-01 PROCEDURE — 25000128 H RX IP 250 OP 636: Performed by: EMERGENCY MEDICINE

## 2019-03-01 PROCEDURE — 89050 BODY FLUID CELL COUNT: CPT | Performed by: EMERGENCY MEDICINE

## 2019-03-01 PROCEDURE — 87186 SC STD MICRODIL/AGAR DIL: CPT | Performed by: EMERGENCY MEDICINE

## 2019-03-01 PROCEDURE — 84145 PROCALCITONIN (PCT): CPT | Performed by: INTERNAL MEDICINE

## 2019-03-01 PROCEDURE — 84157 ASSAY OF PROTEIN OTHER: CPT | Performed by: EMERGENCY MEDICINE

## 2019-03-01 PROCEDURE — 85652 RBC SED RATE AUTOMATED: CPT | Performed by: EMERGENCY MEDICINE

## 2019-03-01 PROCEDURE — 87205 SMEAR GRAM STAIN: CPT | Performed by: EMERGENCY MEDICINE

## 2019-03-01 PROCEDURE — 87800 DETECT AGNT MULT DNA DIREC: CPT | Performed by: EMERGENCY MEDICINE

## 2019-03-01 PROCEDURE — 25000132 ZZH RX MED GY IP 250 OP 250 PS 637: Mod: GY | Performed by: INTERNAL MEDICINE

## 2019-03-01 PROCEDURE — 80048 BASIC METABOLIC PNL TOTAL CA: CPT | Performed by: EMERGENCY MEDICINE

## 2019-03-01 PROCEDURE — 82803 BLOOD GASES ANY COMBINATION: CPT

## 2019-03-01 PROCEDURE — 36415 COLL VENOUS BLD VENIPUNCTURE: CPT | Performed by: INTERNAL MEDICINE

## 2019-03-01 PROCEDURE — 25000128 H RX IP 250 OP 636: Performed by: INTERNAL MEDICINE

## 2019-03-01 PROCEDURE — 96375 TX/PRO/DX INJ NEW DRUG ADDON: CPT

## 2019-03-01 PROCEDURE — A9270 NON-COVERED ITEM OR SERVICE: HCPCS | Mod: GY | Performed by: EMERGENCY MEDICINE

## 2019-03-01 PROCEDURE — 87070 CULTURE OTHR SPECIMN AEROBIC: CPT | Performed by: EMERGENCY MEDICINE

## 2019-03-01 PROCEDURE — 99223 1ST HOSP IP/OBS HIGH 75: CPT | Mod: AI | Performed by: INTERNAL MEDICINE

## 2019-03-01 PROCEDURE — 83605 ASSAY OF LACTIC ACID: CPT | Performed by: EMERGENCY MEDICINE

## 2019-03-01 PROCEDURE — 009U3ZX DRAINAGE OF SPINAL CANAL, PERCUTANEOUS APPROACH, DIAGNOSTIC: ICD-10-PCS | Performed by: EMERGENCY MEDICINE

## 2019-03-01 PROCEDURE — 96361 HYDRATE IV INFUSION ADD-ON: CPT

## 2019-03-01 PROCEDURE — 87015 SPECIMEN INFECT AGNT CONCNTJ: CPT | Performed by: EMERGENCY MEDICINE

## 2019-03-01 PROCEDURE — 85025 COMPLETE CBC W/AUTO DIFF WBC: CPT | Performed by: EMERGENCY MEDICINE

## 2019-03-01 PROCEDURE — 86140 C-REACTIVE PROTEIN: CPT | Performed by: EMERGENCY MEDICINE

## 2019-03-01 PROCEDURE — 99207 ZZC MOONLIGHTING INDICATOR: CPT | Performed by: INTERNAL MEDICINE

## 2019-03-01 PROCEDURE — A9270 NON-COVERED ITEM OR SERVICE: HCPCS | Mod: GY | Performed by: INTERNAL MEDICINE

## 2019-03-01 PROCEDURE — 99285 EMERGENCY DEPT VISIT HI MDM: CPT | Mod: 25

## 2019-03-01 PROCEDURE — 87040 BLOOD CULTURE FOR BACTERIA: CPT | Performed by: INTERNAL MEDICINE

## 2019-03-01 PROCEDURE — 74176 CT ABD & PELVIS W/O CONTRAST: CPT

## 2019-03-01 PROCEDURE — 25800030 ZZH RX IP 258 OP 636: Performed by: EMERGENCY MEDICINE

## 2019-03-01 PROCEDURE — 96365 THER/PROPH/DIAG IV INF INIT: CPT

## 2019-03-01 RX ORDER — HEPARIN SODIUM 5000 [USP'U]/.5ML
5000 INJECTION, SOLUTION INTRAVENOUS; SUBCUTANEOUS EVERY 12 HOURS
Status: DISCONTINUED | OUTPATIENT
Start: 2019-03-01 | End: 2019-03-03

## 2019-03-01 RX ORDER — POTASSIUM CL/LIDO/0.9 % NACL 10MEQ/0.1L
10 INTRAVENOUS SOLUTION, PIGGYBACK (ML) INTRAVENOUS
Status: DISCONTINUED | OUTPATIENT
Start: 2019-03-01 | End: 2019-03-01

## 2019-03-01 RX ORDER — TEMAZEPAM 15 MG/1
15 CAPSULE ORAL
Status: DISCONTINUED | OUTPATIENT
Start: 2019-03-01 | End: 2019-03-16 | Stop reason: DRUGHIGH

## 2019-03-01 RX ORDER — CEFTRIAXONE 2 G/1
2 INJECTION, POWDER, FOR SOLUTION INTRAMUSCULAR; INTRAVENOUS ONCE
Status: COMPLETED | OUTPATIENT
Start: 2019-03-01 | End: 2019-03-01

## 2019-03-01 RX ORDER — POTASSIUM CHLORIDE 1.5 G/1.58G
20-40 POWDER, FOR SOLUTION ORAL
Status: DISCONTINUED | OUTPATIENT
Start: 2019-03-01 | End: 2019-03-01

## 2019-03-01 RX ORDER — MORPHINE SULFATE 2 MG/ML
1 INJECTION, SOLUTION INTRAMUSCULAR; INTRAVENOUS
Status: DISCONTINUED | OUTPATIENT
Start: 2019-03-01 | End: 2019-03-01 | Stop reason: DRUGHIGH

## 2019-03-01 RX ORDER — SODIUM CHLORIDE, SODIUM LACTATE, POTASSIUM CHLORIDE, CALCIUM CHLORIDE 600; 310; 30; 20 MG/100ML; MG/100ML; MG/100ML; MG/100ML
1000 INJECTION, SOLUTION INTRAVENOUS CONTINUOUS
Status: DISCONTINUED | OUTPATIENT
Start: 2019-03-01 | End: 2019-03-01

## 2019-03-01 RX ORDER — POTASSIUM CHLORIDE 29.8 MG/ML
20 INJECTION INTRAVENOUS
Status: DISCONTINUED | OUTPATIENT
Start: 2019-03-01 | End: 2019-03-01

## 2019-03-01 RX ORDER — METOCLOPRAMIDE HYDROCHLORIDE 5 MG/ML
5 INJECTION INTRAMUSCULAR; INTRAVENOUS ONCE
Status: COMPLETED | OUTPATIENT
Start: 2019-03-01 | End: 2019-03-01

## 2019-03-01 RX ORDER — ONDANSETRON 2 MG/ML
4 INJECTION INTRAMUSCULAR; INTRAVENOUS EVERY 6 HOURS PRN
Status: DISCONTINUED | OUTPATIENT
Start: 2019-03-01 | End: 2019-03-19 | Stop reason: HOSPADM

## 2019-03-01 RX ORDER — MAGNESIUM SULFATE HEPTAHYDRATE 40 MG/ML
4 INJECTION, SOLUTION INTRAVENOUS EVERY 4 HOURS PRN
Status: DISCONTINUED | OUTPATIENT
Start: 2019-03-01 | End: 2019-03-01

## 2019-03-01 RX ORDER — PIPERACILLIN SODIUM, TAZOBACTAM SODIUM 2; .25 G/10ML; G/10ML
2.25 INJECTION, POWDER, LYOPHILIZED, FOR SOLUTION INTRAVENOUS EVERY 6 HOURS
Status: DISCONTINUED | OUTPATIENT
Start: 2019-03-01 | End: 2019-03-03

## 2019-03-01 RX ORDER — ACETAMINOPHEN 325 MG/1
975 TABLET ORAL ONCE
Status: COMPLETED | OUTPATIENT
Start: 2019-03-01 | End: 2019-03-01

## 2019-03-01 RX ORDER — ONDANSETRON 4 MG/1
4 TABLET, ORALLY DISINTEGRATING ORAL EVERY 6 HOURS PRN
Status: DISCONTINUED | OUTPATIENT
Start: 2019-03-01 | End: 2019-03-19 | Stop reason: HOSPADM

## 2019-03-01 RX ORDER — NALOXONE HYDROCHLORIDE 0.4 MG/ML
.1-.4 INJECTION, SOLUTION INTRAMUSCULAR; INTRAVENOUS; SUBCUTANEOUS
Status: DISCONTINUED | OUTPATIENT
Start: 2019-03-01 | End: 2019-03-19 | Stop reason: HOSPADM

## 2019-03-01 RX ORDER — SODIUM CHLORIDE 9 MG/ML
INJECTION, SOLUTION INTRAVENOUS CONTINUOUS
Status: DISCONTINUED | OUTPATIENT
Start: 2019-03-01 | End: 2019-03-01

## 2019-03-01 RX ORDER — POTASSIUM CHLORIDE 1500 MG/1
20-40 TABLET, EXTENDED RELEASE ORAL
Status: DISCONTINUED | OUTPATIENT
Start: 2019-03-01 | End: 2019-03-01

## 2019-03-01 RX ORDER — LATANOPROST 50 UG/ML
1 SOLUTION/ DROPS OPHTHALMIC AT BEDTIME
Status: DISCONTINUED | OUTPATIENT
Start: 2019-03-01 | End: 2019-03-19 | Stop reason: HOSPADM

## 2019-03-01 RX ORDER — OXYCODONE HYDROCHLORIDE 5 MG/1
5 TABLET ORAL
Status: DISCONTINUED | OUTPATIENT
Start: 2019-03-01 | End: 2019-03-19 | Stop reason: HOSPADM

## 2019-03-01 RX ORDER — MORPHINE SULFATE 2 MG/ML
0.5 INJECTION, SOLUTION INTRAMUSCULAR; INTRAVENOUS
Status: DISCONTINUED | OUTPATIENT
Start: 2019-03-01 | End: 2019-03-02

## 2019-03-01 RX ORDER — POTASSIUM CHLORIDE 7.45 MG/ML
10 INJECTION INTRAVENOUS
Status: DISCONTINUED | OUTPATIENT
Start: 2019-03-01 | End: 2019-03-01

## 2019-03-01 RX ORDER — HYDROXYZINE HYDROCHLORIDE 10 MG/1
10 TABLET, FILM COATED ORAL EVERY 6 HOURS PRN
Status: DISCONTINUED | OUTPATIENT
Start: 2019-03-01 | End: 2019-03-19 | Stop reason: HOSPADM

## 2019-03-01 RX ORDER — DEXAMETHASONE SODIUM PHOSPHATE 10 MG/ML
10 INJECTION, SOLUTION INTRAMUSCULAR; INTRAVENOUS ONCE
Status: COMPLETED | OUTPATIENT
Start: 2019-03-01 | End: 2019-03-01

## 2019-03-01 RX ORDER — SODIUM CHLORIDE, SODIUM LACTATE, POTASSIUM CHLORIDE, CALCIUM CHLORIDE 600; 310; 30; 20 MG/100ML; MG/100ML; MG/100ML; MG/100ML
INJECTION, SOLUTION INTRAVENOUS CONTINUOUS
Status: DISCONTINUED | OUTPATIENT
Start: 2019-03-01 | End: 2019-03-03

## 2019-03-01 RX ADMIN — SODIUM CHLORIDE 1000 ML: 9 INJECTION, SOLUTION INTRAVENOUS at 12:14

## 2019-03-01 RX ADMIN — SODIUM CHLORIDE, POTASSIUM CHLORIDE, SODIUM LACTATE AND CALCIUM CHLORIDE 1000 ML: 600; 310; 30; 20 INJECTION, SOLUTION INTRAVENOUS at 18:30

## 2019-03-01 RX ADMIN — SODIUM CHLORIDE, POTASSIUM CHLORIDE, SODIUM LACTATE AND CALCIUM CHLORIDE 1000 ML: 600; 310; 30; 20 INJECTION, SOLUTION INTRAVENOUS at 15:20

## 2019-03-01 RX ADMIN — METOCLOPRAMIDE 5 MG: 5 INJECTION, SOLUTION INTRAMUSCULAR; INTRAVENOUS at 17:01

## 2019-03-01 RX ADMIN — ACETAMINOPHEN 975 MG: 325 TABLET, FILM COATED ORAL at 12:19

## 2019-03-01 RX ADMIN — TEMAZEPAM 15 MG: 15 CAPSULE ORAL at 22:27

## 2019-03-01 RX ADMIN — SODIUM CHLORIDE, POTASSIUM CHLORIDE, SODIUM LACTATE AND CALCIUM CHLORIDE 1000 ML: 600; 310; 30; 20 INJECTION, SOLUTION INTRAVENOUS at 13:21

## 2019-03-01 RX ADMIN — PIPERACILLIN AND TAZOBACTAM 2.25 G: 2; .25 INJECTION, POWDER, FOR SOLUTION INTRAVENOUS at 23:06

## 2019-03-01 RX ADMIN — VANCOMYCIN HYDROCHLORIDE 1500 MG: 5 INJECTION, POWDER, LYOPHILIZED, FOR SOLUTION INTRAVENOUS at 20:06

## 2019-03-01 RX ADMIN — SODIUM CHLORIDE, POTASSIUM CHLORIDE, SODIUM LACTATE AND CALCIUM CHLORIDE: 600; 310; 30; 20 INJECTION, SOLUTION INTRAVENOUS at 20:17

## 2019-03-01 RX ADMIN — CEFTRIAXONE SODIUM 2 G: 2 INJECTION, POWDER, FOR SOLUTION INTRAMUSCULAR; INTRAVENOUS at 13:43

## 2019-03-01 RX ADMIN — SODIUM CHLORIDE, POTASSIUM CHLORIDE, SODIUM LACTATE AND CALCIUM CHLORIDE 1000 ML: 600; 310; 30; 20 INJECTION, SOLUTION INTRAVENOUS at 14:23

## 2019-03-01 RX ADMIN — SODIUM CHLORIDE 1000 ML: 9 INJECTION, SOLUTION INTRAVENOUS at 11:30

## 2019-03-01 RX ADMIN — PROCHLORPERAZINE EDISYLATE 5 MG: 5 INJECTION INTRAMUSCULAR; INTRAVENOUS at 12:19

## 2019-03-01 RX ADMIN — DEXAMETHASONE SODIUM PHOSPHATE 10 MG: 10 INJECTION, SOLUTION INTRAMUSCULAR; INTRAVENOUS at 14:24

## 2019-03-01 RX ADMIN — OXYCODONE HYDROCHLORIDE 5 MG: 5 TABLET ORAL at 22:27

## 2019-03-01 RX ADMIN — MAGNESIUM SULFATE HEPTAHYDRATE 1 G: 500 INJECTION, SOLUTION INTRAMUSCULAR; INTRAVENOUS at 12:19

## 2019-03-01 RX ADMIN — HEPARIN SODIUM 5000 UNITS: 5000 INJECTION, SOLUTION INTRAVENOUS; SUBCUTANEOUS at 20:04

## 2019-03-01 ASSESSMENT — ENCOUNTER SYMPTOMS
CONFUSION: 0
VOMITING: 1
HEADACHES: 1
FEVER: 0
DIARRHEA: 0

## 2019-03-01 ASSESSMENT — ACTIVITIES OF DAILY LIVING (ADL): ADLS_ACUITY_SCORE: 11

## 2019-03-01 ASSESSMENT — MIFFLIN-ST. JEOR: SCORE: 1516

## 2019-03-01 NOTE — ED PROVIDER NOTES
History     Chief Complaint:  Headache    HPI   Vanessa Huffman is a 68 year old female with a history of hypertension, who presents with her  to the emergency department with concern for a headache. The patient reports that she was at her primary care office earlier today for a headache which she has had for six days, and was instructed to present here. She states her headache was a gradual onset, and she feels weak, and she has no other concerns besides her baseline back and neck pain. The , however, reports that she has been vomiting and unable to take PO over the last several days though she did drink some water this morning. They deny diarrhea , confusion, fevers, or known sick contacts.       Allergies:  Contrast dye  Codeine  Zolpidem  Diatrizoate    Medications:    Tramadol  Temazepam  Sumatriptan  Simvastatin  Imodium  Lisinopril  Xalatan  Atarax  Amlodipine  Cyclobenzaprine    Past Medical History:    Arthritis  Depression  HTN  Obesity    Past Surgical History:    Orthopedic  Hysterectomy  Gastric bypass  Cholecystectomy  Appendectomy  Arthroplasty, thumb    Family History:    CAD   DM    Social History:  The patient denies tobacco or alcohol use.      Review of Systems   Constitutional: Negative for fever.   Gastrointestinal: Positive for vomiting. Negative for diarrhea.   Neurological: Positive for headaches.   Psychiatric/Behavioral: Negative for confusion.   All other systems reviewed and are negative.    Physical Exam     Patient Vitals for the past 24 hrs:   BP Temp Temp src Pulse SpO2   03/01/19 1545 122/80 -- -- 96 96 %   03/01/19 1500 106/61 -- -- 90 96 %   03/01/19 1400 91/53 -- -- 66 99 %   03/01/19 1345 98/60 -- -- 86 --   03/01/19 1324 (!) 75/41 -- -- 86 --   03/01/19 1237 (!) 80/49 -- -- 78 97 %   03/01/19 1236 -- -- -- 80 --   03/01/19 1230 (!) 67/37 -- -- 97 98 %   03/01/19 1215 (!) 86/43 -- -- 93 100 %   03/01/19 1145 93/77 97.7  F (36.5  C) Temporal 92 99 %   03/01/19  1140 -- -- -- -- 98 %   03/01/19 1139 -- -- -- -- 96 %   03/01/19 1138 -- -- -- -- 98 %   03/01/19 1137 (!) 69/50 -- -- 97 98 %   03/01/19 1136 -- -- -- -- 95 %   03/01/19 1135 -- -- -- -- 97 %   03/01/19 1134 -- -- -- -- 98 %   03/01/19 1133 -- -- -- -- 97 %   03/01/19 1132 -- -- -- -- 99 %   03/01/19 1131 -- -- -- -- 93 %   03/01/19 1130 (!) 83/41 -- -- 104 93 %   03/01/19 1129 -- -- -- -- 94 %   03/01/19 1128 -- -- -- -- 94 %   03/01/19 1127 -- -- -- -- 95 %   03/01/19 1126 -- -- -- -- 95 %   03/01/19 1125 -- -- -- -- 95 %   03/01/19 1124 -- -- -- -- 94 %   03/01/19 1123 -- -- -- -- 94 %   03/01/19 1122 -- -- -- -- 94 %   03/01/19 1121 -- -- -- -- 94 %   03/01/19 1120 -- -- -- -- 94 %   03/01/19 1119 -- -- -- -- 95 %   03/01/19 1118 -- -- -- -- 95 %   03/01/19 1117 -- -- -- -- 96 %   03/01/19 1116 -- -- -- -- 96 %   03/01/19 1115 (!) 75/51 -- -- 97 94 %         Physical Exam    General: Alert, interactive in moderate distress  Head:  Scalp is atraumatic  Eyes:  The pupils are equal, round, and reactive to light    EOM's intact    No scleral icterus  ENT:      Nose:  The external nose is normal  Ears:  External ears are normal  Mouth/Throat: The oropharynx is normal    Mucus membranes are dry      Neck:  Normal range of motion.      There is no rigidity.    Trachea is in the midline         CV:  Regular rate and rhythm    No murmur   Resp:  Breath sounds are clear bilaterally    Non-labored, no retractions or accessory muscle use      GI:  Abdomen is soft, no distension, no tenderness.       MS:  Normal strength in all 4 extremities  Skin:  Warm and dry, No rash or lesions noted.  Neuro: Strength 5/5 x4.  Sensation intact  In all 4 extremities.       Cranial nerves 2-12 grossly intact.    GCS: 15  Psych:  Awake. Alert.  Normal affect.      Appropriate interactions.    Emergency Department Course     Imaging:  Radiographic findings were communicated with the patient who voiced understanding of the findings.    CT  Head without contrast:   Normal CT scan of the head.  as per radiology.    US Abdomen, RUQ:  1. Irregularly shaped area of increased echogenicity in the right lobe  of the liver, indeterminate but possibly fatty infiltration. Recommend  further evaluation to rule out a hepatic mass and liver MRI would be  helpful for further evaluation.  2. Common bile duct 1.2 cm in diameter may be normal in this  postcholecystectomy patient. as per radiology.       Laboratory:    CBC: WBC: 15.5, HGB: 13.1, PLT: 177  BMP: Glucose 106, Sodium: 131, Urea nitrogen: 56, GFR estimate: 10, o/w WNL (Creatinine: 4.27)  Hepatic panel: Bilirubin direct: 4.0, Bilirubin total: 5.0, Albumin: 2.4, ALKPHOS: 410, ALT: 213, AST: 417  1322 ISTAT Gases lactate venous: pH venous: 7.31, Lactic acid: 2.7  1520 ISTAT Gases lactate venous: pH venous: 7.31, Lactic acid: 3.7    1548 Lactic acid: 3.1      CRP: 485  Sed rate: 79    Gram stain: Negative  Protein total CSF: 33  Cell count CSF: All WNL  Glucose CSF: 57    CSF culture: PENDING  Urine culture: PENDING  Blood cultures X2: PENDING    Procedures:           Lumbar Puncture Procedure Note:      Time: 1:28 PM  Performed by: Leonel Boucher MD  Authorized by: Leonel Boucher MD    Indications: headache    Consent given by: Patient who states understanding of the procedure being performed after discussing the risks, benefits and alternatives.    Prior to the start of the procedure and with procedural staff participation, I verbally confirmed the patient s identity using two indicators, relevant allergies, that the procedure was appropriate and matched the consent or emergent situation, and that the correct equipment/implants were available. Immediately prior to starting the procedure I conducted the Time Out with the procedural staff and re-confirmed the patient s name, procedure, and site/side. (The Joint Commission universal protocol was followed.) Yes    Under sterile conditions the patient was  positioned Sitting, bent forward. Betadine solution and sterile drapes were utilized.  Local anesthetic at the site: 2 ml of lidocaine 1% without epinephrine from the LP tray  A 22 G spinal needle was inserted at the L 4-5 interspace.  A total of 4mL of clear and colorless spinal fluid was obtained and sent to the laboratory.   After the needle was removed, a bandaid and pressure were applied and the patient was instructed to stay horizontal until the results were back.    Complications:  None    Patient tolerance: Patient tolerated the procedure well with no immediate complications.    Interventions:    1130 NS 1L IV  1214 NS 1L IV  1219 Tylenol 975 mg Oral   Compazine 5 mg IV   Magnesium sulfate 1 g IV  1321 Lactated ringers 1 L IV  1343 Rocephin 2 g IV  1423 Lactated ringers 1 L IV  1424 Decadron 10 mg IV  1520 Lactated Ringers 1 L IV     Emergency Department Course:  Nursing notes and vitals reviewed. (1143) I performed an exam of the patient as documented above.     IV inserted. Medicine administered as documented above. Blood drawn. This was sent to the lab for further testing, results above.    The patient was sent for a head CT while in the emergency department, findings above.     (1324) I rechecked the patient and discussed the results of her workup thus far.     (1403) Lumbar puncture as noted above.     (1708)  I consulted with Dr. Werner of the hospitalist services. They are in agreement to accept the patient for admission.    Findings and plan explained to the Patient and spouse who consents to admission. Discussed the patient with Dr. Fernandez, who will admit the patient [place] [facility] bed for further monitoring, evaluation, and treatment.    Impression & Plan    CMS Diagnoses:   The patient has signs of Severe Sepsis as evidenced by:    1. 2 SIRS criteria, AND  2. Suspected infection, AND   3. Organ dysfunction: Lactic Acid > 1.9, ARF with Cr >2 due to infection and Bilirubin > 2 due to infection    Time  severe sepsis diagnosis confirmed = 1322 as this was the time when Lactate resulted, and the level was > 1.9      3 Hour Severe Sepsis Bundle Completion:  1. Initial Lactic Acid Result:   Recent Labs   Lab Test 03/01/19  1548 03/01/19  1520 03/01/19  1322   LACT 3.1* 3.7* 2.7*     2. Blood Cultures before Antibiotics: Yes  3. Broad Spectrum Antibiotics Administered: Yes, Ceftriaxone 2g IV  4. 4500 ml of IV fluids.  Patient weight not recorded    Severe Sepsis reassessment:  1. Repeat Lactic Acid Level: 3.1  2. MAP>65 after initial IVF bolus, will continue to monitor fluid status and vital signs  I attest to having performed a repeat sepsis exam and assessment of perfusion at 1700 and the results demonstrate improved perfusion.      Medical Decision Making:    Vanessa Huffman was seen and evaluated. The above workup and resuscitative mesasures were undertaken. Findings were consistent with acute kidney injury and acute abnormality of her liver function tests. She does have an abnormal ultrasound and this will require further evaluation, likely liver MRI. She received copious amounts of IV fluids in the ED with improvement of her blood pressure. She does have findings consistent with a UTI and she received broad spectrum antibiotics with ceftriaxone for this. Her headache improved during her time in the emergency department as well. I spoke with Dr. Werner from the hospitalist services who is in agreement with [place] [facility] [place] and [place].       Diagnosis:    ICD-10-CM    1. ELISE (acute kidney injury) (H) N17.9    2. Severe sepsis (H) A41.9     R65.20    3. Urinary tract infection without hematuria, site unspecified N39.0    4. Nonintractable headache, unspecified chronicity pattern, unspecified headache type R51    5. Abnormal results of liver function studies R94.5        Disposition:  Admitted [place] [facility] [place] under care of Dr. Werner.     Scribe Disclosure:  I, Crow Boyd, am serving  as a scribe on 3/1/2019 at 11:42 AM to personally document services performed by Leonel Boucher,* based on my observations and the provider's statements to me.     Crow Boyd  3/1/2019    EMERGENCY DEPARTMENT       Leonel Boucher MD  03/01/19 [id number]

## 2019-03-01 NOTE — LETTER
Transition Communication Hand-off for Care Transitions to Next Level of Care Provider    Name: Vanessa Huffman  : 1950  MRN #: [id number]  Primary Care Provider: Emily Najera     Primary Clinic: [facility] [address]  [facility] [address]     Reason for Hospitalization:  Abnormal results of liver function studies [R94.5]  ELISE (acute kidney injury) (H) [N17.9]  Abnormal liver ultrasound [R93.2]  Severe sepsis (H) [A41.9, R65.20]  Urinary tract infection without hematuria, site unspecified [N39.0]  Nonintractable headache, unspecified chronicity pattern, unspecified headache type [R51]  Admit Date/Time: 3/1/2019 11:11 AM  Discharge Date: 3/19/2019    Payor Source: Payor: MEDICARE / Plan: MEDICARE / Product Type: Medicare /     Readmission Assessment Measure (JACKY) Risk Score/category: Elevated             Reason for Communication Hand-off Referral: Multiple providers/specialties  Other Elevated JACKY, Discharging to TCU    Discharge Plan:       Concern for non-adherence with plan of care:   Y/N N  Discharge Needs Assessment:  Needs      Most Recent Value   Equipment Currently Used at Home  none   Transitional Care  [facility] [phone]   PAS Number  [id number]          Already enrolled in Tele-monitoring program and name of program:  N/A  Follow-up specialty is recommended: Yes- infectious disease    Follow-up plan:  No future appointments.    Any outstanding tests or procedures:        Referrals     Future Labs/Procedures    Occupational Therapy Adult Consult     Comments:    Evaluate and treat as clinically indicated.    Reason:  Deconditioning and lymphedema wrap    Physical Therapy Adult Consult     Comments:    Evaluate and treat as clinically indicated.    Reason:  Deconditioning and lymphedema wrap            Key Recommendations:    Patient admitted with liver abscess.  BILL drain was placed and culture positive for positive for Klebsiella.  ID recommended IV  Ceftriaxone for weeks, Repeat CT scan in 2 weeks & FOLLOW UP with me in the clinic in 4 weeks.   PT recommended TCU and patient was discharged to [facility] TCU.    Thank you for following this patient once she discharges from TCU,  Rukhsana Fabian    AVS/Discharge Summary is the source of truth; this is a helpful guide for improved communication of patient story

## 2019-03-01 NOTE — ED NOTES
"[facility]  ED Nurse Handoff Report    ED Chief complaint: Headache (Was at clinic, sent here for further evaluation of migraine x5 days)      ED Diagnosis:   Final diagnoses:   ELISE (acute kidney injury) (H)   Severe sepsis (H)   Urinary tract infection without hematuria, site unspecified   Nonintractable headache, unspecified chronicity pattern, unspecified headache type   Abnormal results of liver function studies   Abnormal liver ultrasound       Code Status: Full Code    Allergies:   Allergies   Allergen Reactions     Contrast Dye Shortness Of Breath     Codeine      Zolpidem Other (See Comments)     Patient reports sleep walking.     Diatrizoate Itching     itchy throat that makes her want to cough       Activity level - Baseline/Home:  Independent    Activity Level - Current:   Stand with Assist     Needed?: No    Isolation: No  Infection: Not Applicable  Bariatric?: No    Vital Signs:   Vitals:    03/01/19 1400 03/01/19 1500 03/01/19 1545 03/01/19 1700   BP: 91/53 106/61 122/80 92/55   Pulse: 66 90 96 90   Temp:       TempSrc:       SpO2: 99% 96% 96% 99%       Cardiac Rhythm: ,        Pain level:      Is this patient confused?: No   Does this patient have a guardian?  No         If yes, is there guardianship documents in the Epic \"Code/ACP\" activity?  No         Guardian Notified?  N/A  [place] - Suicide Severity Rating Scale Completed?  Yes  If yes, what color did the patient score?  White    Patient Report: Initial Complaint: Headache  Focused Assessment: This patient presents from the walk in clinic.  She states she was seen for a migraine.  Initial pressure found to be in the 70s systolic.  Upon further assessment, the patient has also been vomiting and unable to eat or drink for 5 days.  The patient is very dry appearing, dry cracked lips. 6 second cap refill and poor skin turgor.  IV access established, and fluid resuscitation began.  BP began to improve after the 3rd liter. "  Currently the 5th liter is running.  BPs have been 100s.  Lab work is significant for acute kidney failure.  All of the patients liver enzymes are also elevated.  Currently the patient appears improved. She is more alert.  Writer cathed the patient but only yielded 10ml of dark urine.  LP also done due to headache and elevated WBC.    Tests Performed: labs, imaging, LP  Abnormal Results:   Labs Ordered and Resulted from Time of ED Arrival Up to the Time of Departure from the ED   BASIC METABOLIC PANEL - Abnormal; Notable for the following components:       Result Value    Sodium 131 (*)     Glucose 106 (*)     Urea Nitrogen 56 (*)     Creatinine 4.27 (*)     GFR Estimate 10 (*)     GFR Estimate If Black 12 (*)     All other components within normal limits   CBC WITH PLATELETS DIFFERENTIAL - Abnormal; Notable for the following components:    WBC 15.5 (*)     Absolute Neutrophil 13.7 (*)     All other components within normal limits   CRP INFLAMMATION - Abnormal; Notable for the following components:    CRP Inflammation 485.0 (*)     All other components within normal limits   ERYTHROCYTE SEDIMENTATION RATE AUTO - Abnormal; Notable for the following components:    Sed Rate 79 (*)     All other components within normal limits   UA MACROSCOPIC WITH REFLEX TO MICRO AND CULTURE - Abnormal; Notable for the following components:    Glucose Urine 70 (*)     Bilirubin Urine Moderate (*)     Blood Urine Moderate (*)     Protein Albumin Urine 100 (*)     Urobilinogen mg/dL 4.0 (*)     RBC Urine 8 (*)     WBC Urine 16 (*)     Bacteria Urine Few (*)     Mucous Urine Present (*)     Hyaline Casts 12 (*)     Granular Casts 59 (*)     Amorphous Crystals Many (*)     All other components within normal limits   HEPATIC PANEL - Abnormal; Notable for the following components:    Bilirubin Direct 4.0 (*)     Bilirubin Total 5.0 (*)     Albumin 2.4 (*)     Alkaline Phosphatase 410 (*)      (*)      (*)     All other  components within normal limits   LACTIC ACID WHOLE BLOOD - Abnormal; Notable for the following components:    Lactic Acid 3.1 (*)     All other components within normal limits   ISTAT  GASES LACTATE MICHELLE POCT - Abnormal; Notable for the following components:    Ph Venous 7.31 (*)     Lactic Acid 2.7 (*)     All other components within normal limits   ISTAT  GASES LACTATE MICHELLE POCT - Abnormal; Notable for the following components:    Ph Venous 7.31 (*)     Lactic Acid 3.7 (*)     All other components within normal limits   ACETAMINOPHEN LEVEL   PERIPHERAL IV CATHETER   ISTAT GAS OR ELECTROLYTE NURSING POCT   ISTAT GAS OR ELECTROLYTE NURSING POCT   BLOOD CULTURE   BLOOD CULTURE   CELL COUNT WITH DIFFERENTIAL CSF   GLUCOSE CSF   PROTEIN TOTAL CSF   CSF CULTURE AEROBIC BACTERIAL   GRAM STAIN   URINE CULTURE AEROBIC BACTERIAL       Treatments provided: Fluids (on liter 5 now) migraine medications    Family Comments:  at [facility] brochure/video discussed/provided to patient/family: N/A              Name of person given brochure if not patient:               Relationship to patient:     ED Medications:   Medications   0.9% sodium chloride BOLUS (0 mLs Intravenous Stopped 3/1/19 1323)     Followed by   0.9% sodium chloride BOLUS (0 mLs Intravenous Stopped 3/1/19 1214)     Followed by   sodium chloride 0.9% infusion ( Intravenous Not Given 3/1/19 1417)   lactated ringers BOLUS 1,000 mL (0 mLs Intravenous Stopped 3/1/19 1520)     Followed by   lactated ringers BOLUS 1,000 mL (0 mLs Intravenous Stopped 3/1/19 1423)     Followed by   lactated ringers infusion (1,000 mLs Intravenous New Bag 3/1/19 1520)   magnesium sulfate 1 g in sodium chloride 0.9 % 100 mL intermittent infusion (0 g Intravenous Stopped 3/1/19 1323)   acetaminophen (TYLENOL) tablet 975 mg (975 mg Oral Given 3/1/19 1219)   prochlorperazine (COMPAZINE) injection 5 mg (5 mg Intravenous Given 3/1/19 1219)   dexamethasone PF (DECADRON) injection 10 mg  (10 mg Intravenous Given 3/1/19 1424)   cefTRIAXone (ROCEPHIN) 2 g vial to attach to  ml bag for ADULTS or NS 50 ml bag for PEDS (0 g Intravenous Stopped 3/1/19 1417)   metoclopramide (REGLAN) injection 5 mg (5 mg Intravenous Given 3/1/19 1701)       Drips infusing?:  Yes    For the majority of the shift this patient was Green.   Interventions performed were .    Severe Sepsis OR Septic Shock Diagnosis Present:   Yes    Per the ED Provider, Time Zero for severe sepsis or septic shock is:  1322    3 Hour Severe Sepsis Bundle Completion:  1. Initial Lactic Acid Result:   Recent Labs   Lab Test 03/01/19  1548 03/01/19  1520 03/01/19  1322   LACT 3.1* 3.7* 2.7*     2. Blood Cultures before Antibiotics: Yes  3. Broad Spectrum Antibiotics Administered:     Anti-infectives (From now, onward)    None        4. 4,300 ml of IV fluids have been given so far      6 Hour Severe Sepsis Bundle Completion:    1. Repeat Lactic Acid Level: 3.1  2. Patient currently on Vasopressors =  No    To be done/followed up on inpatient unit:      ED NURSE PHONE NUMBER: [phone]

## 2019-03-01 NOTE — PHARMACY-ADMISSION MEDICATION HISTORY
Admission medication history interview status for the 3/1/2019  admission is complete. See EPIC admission navigator for prior to admission medications     Medication history source reliability:Good    Actions taken by pharmacist (provider contacted, etc):None     Additional medication history information not noted on PTA med list : spoke with patient    Medication reconciliation/reorder completed by provider prior to medication history? No    Time spent in this activity: 15 min    Prior to Admission medications    Medication Sig Last Dose Taking? Auth Provider   amLODIPine (NORVASC) 2.5 MG tablet Take 1 tablet (2.5 mg) by mouth daily 3/1/2019 at Unknown time Yes Candelario Zelaya MD   CYCLOBENZAPRINE HCL PO Take 10 mg by mouth At Bedtime 2/28/2019 at Unknown time Yes Unknown, Entered By History   hydrOXYzine (ATARAX) 10 MG tablet Take 1 10-mg tablet every 6 hours as needed for muscle relaxation and to supplement your pain medication. prn Yes Magda Plasencia PA-C   latanoprost (XALATAN) 0.005 % ophthalmic solution Place 1 drop into both eyes At Bedtime 2/28/2019 at Unknown time Yes Unknown, Entered By History   LISINOPRIL PO Take 10 mg by mouth daily 3/1/2019 at Unknown time Yes Unknown, Entered By History   loperamide (IMODIUM) 2 MG capsule Take 2 mg by mouth 4 times daily as needed for diarrhea prn Yes Unknown, Entered By History   nystatin (MYCOSTATIN) 090729 UNIT/GM POWD Apply topically 2 times daily as needed (affected area) prn Yes Unknown, Entered By History   oxyCODONE-acetaminophen (PERCOCET) 5-325 MG per tablet Take 1-2 tablets every 4-6 hours for pain if needed. prn Yes Magda Plasencia PA-C   oxyCODONE-acetaminophen (PERCOCET) 5-325 MG per tablet Take 0.5 tablets by mouth every morning  3/1/2019 at Unknown time Yes Unknown, Entered By History   oxyCODONE-acetaminophen (PERCOCET) 5-325 MG per tablet Take 1 tablet by mouth every evening  2/28/2019 at Unknown time Yes Unknown, Entered By  History   SIMVASTATIN PO Take 40 mg by mouth At Bedtime 2/28/2019 at Unknown time Yes Unknown, Entered By History   SUMATRIPTAN SUCCINATE PO Take 50 mg by mouth every 2 hours as needed for migraine (max of 200mg q 24hr) Past Week at Unknown time Yes Unknown, Entered By History   TEMAZEPAM PO Take 15 mg by mouth nightly as needed for sleep prn Yes Unknown, Entered By History   TRAMADOL HCL PO Take 50 mg by mouth 3 times daily as needed for moderate to severe pain prn Yes Unknown, Entered By History   MEDICATION INSTRUCTION Hold Lisinopril and Norvasc if your SBP is <110 and DBP<70   Candelario Zelaya MD Tiffany M. Reinitz, PharmD

## 2019-03-02 ENCOUNTER — APPOINTMENT (OUTPATIENT)
Dept: MRI IMAGING | Facility: CLINIC | Age: 69
DRG: 871 | End: 2019-03-02
Attending: INTERNAL MEDICINE
Payer: MEDICARE

## 2019-03-02 ENCOUNTER — APPOINTMENT (OUTPATIENT)
Dept: ULTRASOUND IMAGING | Facility: CLINIC | Age: 69
DRG: 871 | End: 2019-03-02
Attending: INTERNAL MEDICINE
Payer: MEDICARE

## 2019-03-02 LAB
ALBUMIN SERPL-MCNC: 2.1 G/DL (ref 3.4–5)
ALP SERPL-CCNC: 397 U/L (ref 40–150)
ALT SERPL W P-5'-P-CCNC: 258 U/L (ref 0–50)
ANION GAP SERPL CALCULATED.3IONS-SCNC: 13 MMOL/L (ref 3–14)
AST SERPL W P-5'-P-CCNC: 611 U/L (ref 0–45)
BACTERIA SPEC CULT: NO GROWTH
BASOPHILS # BLD AUTO: 0 10E9/L (ref 0–0.2)
BASOPHILS NFR BLD AUTO: 0 %
BILIRUB SERPL-MCNC: 4 MG/DL (ref 0.2–1.3)
BUN SERPL-MCNC: 58 MG/DL (ref 7–30)
CALCIUM SERPL-MCNC: 8.3 MG/DL (ref 8.5–10.1)
CHLORIDE SERPL-SCNC: 102 MMOL/L (ref 94–109)
CHLORIDE UR-SCNC: 26 MMOL/L
CO2 SERPL-SCNC: 18 MMOL/L (ref 20–32)
CREAT SERPL-MCNC: 3.96 MG/DL (ref 0.52–1.04)
DIFFERENTIAL METHOD BLD: ABNORMAL
EOSINOPHIL # BLD AUTO: 0 10E9/L (ref 0–0.7)
EOSINOPHIL NFR BLD AUTO: 0 %
ERYTHROCYTE [DISTWIDTH] IN BLOOD BY AUTOMATED COUNT: 13 % (ref 10–15)
GFR SERPL CREATININE-BSD FRML MDRD: 11 ML/MIN/{1.73_M2}
GLUCOSE SERPL-MCNC: 110 MG/DL (ref 70–99)
HCT VFR BLD AUTO: 38.5 % (ref 35–47)
HGB BLD-MCNC: 13.3 G/DL (ref 11.7–15.7)
LACTATE BLD-SCNC: 1.5 MMOL/L (ref 0.7–2)
LYMPHOCYTES # BLD AUTO: 0.6 10E9/L (ref 0.8–5.3)
LYMPHOCYTES NFR BLD AUTO: 4 %
MCH RBC QN AUTO: 29.9 PG (ref 26.5–33)
MCHC RBC AUTO-ENTMCNC: 34.5 G/DL (ref 31.5–36.5)
MCV RBC AUTO: 87 FL (ref 78–100)
MONOCYTES # BLD AUTO: 0.6 10E9/L (ref 0–1.3)
MONOCYTES NFR BLD AUTO: 4 %
NEUTROPHILS # BLD AUTO: 14.4 10E9/L (ref 1.6–8.3)
NEUTROPHILS NFR BLD AUTO: 92 %
OVALOCYTES BLD QL SMEAR: SLIGHT
PLATELET # BLD AUTO: 123 10E9/L (ref 150–450)
PLATELET # BLD EST: ABNORMAL 10*3/UL
POTASSIUM SERPL-SCNC: 3.7 MMOL/L (ref 3.4–5.3)
POTASSIUM UR-SCNC: 50 MMOL/L
PROT SERPL-MCNC: 5.9 G/DL (ref 6.8–8.8)
RBC # BLD AUTO: 4.45 10E12/L (ref 3.8–5.2)
SODIUM SERPL-SCNC: 133 MMOL/L (ref 133–144)
SODIUM UR-SCNC: 27 MMOL/L
SODIUM UR-SCNC: 35 MMOL/L
SPECIMEN SOURCE: NORMAL
VANCOMYCIN SERPL-MCNC: 16 MG/L
WBC # BLD AUTO: 15.7 10E9/L (ref 4–11)

## 2019-03-02 PROCEDURE — 99232 SBSQ HOSP IP/OBS MODERATE 35: CPT | Performed by: INTERNAL MEDICINE

## 2019-03-02 PROCEDURE — 87040 BLOOD CULTURE FOR BACTERIA: CPT | Performed by: INTERNAL MEDICINE

## 2019-03-02 PROCEDURE — 76705 ECHO EXAM OF ABDOMEN: CPT

## 2019-03-02 PROCEDURE — 85025 COMPLETE CBC W/AUTO DIFF WBC: CPT | Performed by: INTERNAL MEDICINE

## 2019-03-02 PROCEDURE — 36415 COLL VENOUS BLD VENIPUNCTURE: CPT | Performed by: INTERNAL MEDICINE

## 2019-03-02 PROCEDURE — 74181 MRI ABDOMEN W/O CONTRAST: CPT

## 2019-03-02 PROCEDURE — A9270 NON-COVERED ITEM OR SERVICE: HCPCS | Mod: GY | Performed by: INTERNAL MEDICINE

## 2019-03-02 PROCEDURE — 84300 ASSAY OF URINE SODIUM: CPT | Performed by: INTERNAL MEDICINE

## 2019-03-02 PROCEDURE — 25000128 H RX IP 250 OP 636: Performed by: INTERNAL MEDICINE

## 2019-03-02 PROCEDURE — 83605 ASSAY OF LACTIC ACID: CPT | Performed by: INTERNAL MEDICINE

## 2019-03-02 PROCEDURE — 82436 ASSAY OF URINE CHLORIDE: CPT | Performed by: INTERNAL MEDICINE

## 2019-03-02 PROCEDURE — 86709 HEPATITIS A IGM ANTIBODY: CPT | Performed by: INTERNAL MEDICINE

## 2019-03-02 PROCEDURE — 25000131 ZZH RX MED GY IP 250 OP 636 PS 637: Mod: GY | Performed by: INTERNAL MEDICINE

## 2019-03-02 PROCEDURE — 25800030 ZZH RX IP 258 OP 636: Performed by: INTERNAL MEDICINE

## 2019-03-02 PROCEDURE — 84133 ASSAY OF URINE POTASSIUM: CPT | Performed by: INTERNAL MEDICINE

## 2019-03-02 PROCEDURE — 80053 COMPREHEN METABOLIC PANEL: CPT | Performed by: INTERNAL MEDICINE

## 2019-03-02 PROCEDURE — 25000132 ZZH RX MED GY IP 250 OP 250 PS 637: Mod: GY | Performed by: INTERNAL MEDICINE

## 2019-03-02 PROCEDURE — 99207 ZZC CDG-MDM COMPONENT: MEETS LOW - DOWN CODED: CPT | Performed by: INTERNAL MEDICINE

## 2019-03-02 PROCEDURE — G0472 HEP C SCREEN HIGH RISK/OTHER: HCPCS | Performed by: INTERNAL MEDICINE

## 2019-03-02 PROCEDURE — 80202 ASSAY OF VANCOMYCIN: CPT | Performed by: INTERNAL MEDICINE

## 2019-03-02 PROCEDURE — 12000000 ZZH R&B MED SURG/OB

## 2019-03-02 PROCEDURE — 86704 HEP B CORE ANTIBODY TOTAL: CPT | Performed by: INTERNAL MEDICINE

## 2019-03-02 PROCEDURE — G0499 HEPB SCREEN HIGH RISK INDIV: HCPCS | Performed by: INTERNAL MEDICINE

## 2019-03-02 RX ORDER — MORPHINE SULFATE 2 MG/ML
2-4 INJECTION, SOLUTION INTRAMUSCULAR; INTRAVENOUS
Status: DISCONTINUED | OUTPATIENT
Start: 2019-03-02 | End: 2019-03-19 | Stop reason: HOSPADM

## 2019-03-02 RX ADMIN — Medication 1 MG: at 22:24

## 2019-03-02 RX ADMIN — PIPERACILLIN AND TAZOBACTAM 2.25 G: 2; .25 INJECTION, POWDER, FOR SOLUTION INTRAVENOUS at 13:18

## 2019-03-02 RX ADMIN — SODIUM CHLORIDE, POTASSIUM CHLORIDE, SODIUM LACTATE AND CALCIUM CHLORIDE: 600; 310; 30; 20 INJECTION, SOLUTION INTRAVENOUS at 19:40

## 2019-03-02 RX ADMIN — OXYCODONE HYDROCHLORIDE 5 MG: 5 TABLET ORAL at 18:11

## 2019-03-02 RX ADMIN — MORPHINE SULFATE 2 MG: 2 INJECTION, SOLUTION INTRAMUSCULAR; INTRAVENOUS at 13:18

## 2019-03-02 RX ADMIN — PIPERACILLIN AND TAZOBACTAM 2.25 G: 2; .25 INJECTION, POWDER, FOR SOLUTION INTRAVENOUS at 06:14

## 2019-03-02 RX ADMIN — ONDANSETRON 4 MG: 4 TABLET, ORALLY DISINTEGRATING ORAL at 06:24

## 2019-03-02 RX ADMIN — PIPERACILLIN AND TAZOBACTAM 2.25 G: 2; .25 INJECTION, POWDER, FOR SOLUTION INTRAVENOUS at 19:39

## 2019-03-02 RX ADMIN — HEPARIN SODIUM 5000 UNITS: 5000 INJECTION, SOLUTION INTRAVENOUS; SUBCUTANEOUS at 06:14

## 2019-03-02 RX ADMIN — HEPARIN SODIUM 5000 UNITS: 5000 INJECTION, SOLUTION INTRAVENOUS; SUBCUTANEOUS at 19:45

## 2019-03-02 RX ADMIN — LATANOPROST 1 DROP: 50 SOLUTION/ DROPS OPHTHALMIC at 22:25

## 2019-03-02 RX ADMIN — MORPHINE SULFATE 2 MG: 2 INJECTION, SOLUTION INTRAMUSCULAR; INTRAVENOUS at 13:55

## 2019-03-02 RX ADMIN — SODIUM CHLORIDE, POTASSIUM CHLORIDE, SODIUM LACTATE AND CALCIUM CHLORIDE: 600; 310; 30; 20 INJECTION, SOLUTION INTRAVENOUS at 04:56

## 2019-03-02 RX ADMIN — OXYCODONE HYDROCHLORIDE 5 MG: 5 TABLET ORAL at 22:25

## 2019-03-02 RX ADMIN — MORPHINE SULFATE 0.5 MG: 2 INJECTION, SOLUTION INTRAMUSCULAR; INTRAVENOUS at 08:19

## 2019-03-02 RX ADMIN — MORPHINE SULFATE 2 MG: 2 INJECTION, SOLUTION INTRAMUSCULAR; INTRAVENOUS at 11:14

## 2019-03-02 RX ADMIN — OXYCODONE HYDROCHLORIDE 5 MG: 5 TABLET ORAL at 04:54

## 2019-03-02 RX ADMIN — TEMAZEPAM 15 MG: 15 CAPSULE ORAL at 22:24

## 2019-03-02 RX ADMIN — OXYCODONE HYDROCHLORIDE 5 MG: 5 TABLET ORAL at 09:22

## 2019-03-02 ASSESSMENT — ACTIVITIES OF DAILY LIVING (ADL)
ADLS_ACUITY_SCORE: 13
ADLS_ACUITY_SCORE: 14
ADLS_ACUITY_SCORE: 13
ADLS_ACUITY_SCORE: 14

## 2019-03-02 NOTE — PLAN OF CARE
Vital Signs stable, blood pressure stable, soft towards end of shift 90/50s. Telemetry sinus dysrhythmia. Alert and Oriented but lethargic. Lung sounds clear but diminished in the bases. Bowel sounds active and audible. Passing flatus, pt had one soft brown stool. Clear liquid diet. Denies nausea.  Pt has had no urine output the past 4 hours. IV maintenance fluids infusing. CMS intact ex bilateral extremities cool. Up with stand by assist. Pain controlled with Oxycodone. CT done and blood cultures obtained.

## 2019-03-02 NOTE — CONSULTS
GASTROENTEROLOGY CONSULTATION      Vanessa Huffman  68 year old female  Admission Date: 3/1/2019  Care Provider: Emily Najera was asked to see this patient in consultation by Dr. Werner for evaluation of elevated LFTs.    HPI:  Vanessa Huffman is a 68 year old female who began to have severe persistent headaches around 2/26.  She states these did not respond to her usual migraine medication.  She also noted nausea, and non-bloody vomiting when she would try to eat.  She had subjective fevers but did not take her temperature.  She denies any abdominal pain at home, but since coming into the hospital yesterday she began to have RUQ pain.  She describes soft formed BMs without diarrhea, constipation, blood, or color change.    She underwent a colonoscopy in May 2018, 2 small polyps, patchy erythema consistent with healing ischemic colitis.    Surgical history is notable for cholecystectomy as well as gastric bypass surgery.     MEDICAL HISTORY:  Patient Active Problem List    Diagnosis Date Noted     ELISE (acute kidney injury) (H) 03/01/2019     Priority: Medium     Hematochezia 03/19/2018     Priority: Medium     A comprehensive ten point review of systems was performed and was negative aside from dizziness, weakness, urinary hesitency.       :   Prior to Admission Medications   Prescriptions Last Dose Informant Patient Reported? Taking?   CYCLOBENZAPRINE HCL PO 2/28/2019 at Unknown time Self Yes Yes   Sig: Take 10 mg by mouth At Bedtime   LISINOPRIL PO 3/1/2019 at Unknown time Self Yes Yes   Sig: Take 10 mg by mouth daily   MEDICATION INSTRUCTION  Self No No   Sig: Hold Lisinopril and Norvasc if your SBP is <110 and DBP<70   SIMVASTATIN PO 2/28/2019 at Unknown time Self Yes Yes   Sig: Take 40 mg by mouth At Bedtime   SUMATRIPTAN SUCCINATE PO Past Week at Unknown time Self Yes Yes   Sig: Take 50 mg by mouth every 2 hours as needed for migraine (max of 200mg q 24hr)   TEMAZEPAM PO prn Self Yes Yes   Sig: Take 15  mg by mouth nightly as needed for sleep   TRAMADOL HCL PO prn Self Yes Yes   Sig: Take 50 mg by mouth 3 times daily as needed for moderate to severe pain   amLODIPine (NORVASC) 2.5 MG tablet 3/1/2019 at Unknown time Self No Yes   Sig: Take 1 tablet (2.5 mg) by mouth daily   hydrOXYzine (ATARAX) 10 MG tablet prn Self No Yes   Sig: Take 1 10-mg tablet every 6 hours as needed for muscle relaxation and to supplement your pain medication.   latanoprost (XALATAN) 0.005 % ophthalmic solution 2/28/2019 at Unknown time Self Yes Yes   Sig: Place 1 drop into both eyes At Bedtime   loperamide (IMODIUM) 2 MG capsule prn Self Yes Yes   Sig: Take 2 mg by mouth 4 times daily as needed for diarrhea   nystatin (MYCOSTATIN) 092504 UNIT/GM POWD prn Self Yes Yes   Sig: Apply topically 2 times daily as needed (affected area)   oxyCODONE-acetaminophen (PERCOCET) 5-325 MG per tablet 3/1/2019 at Unknown time Self Yes Yes   Sig: Take 0.5 tablets by mouth every morning    oxyCODONE-acetaminophen (PERCOCET) 5-325 MG per tablet 2/28/2019 at Unknown time Self Yes Yes   Sig: Take 1 tablet by mouth every evening    oxyCODONE-acetaminophen (PERCOCET) 5-325 MG per tablet prn Self No Yes   Sig: Take 1-2 tablets every 4-6 hours for pain if needed.      Facility-Administered Medications: None      :   Allergies   Allergen Reactions     Contrast Dye Shortness Of Breath     Codeine      Zolpidem Other (See Comments)     Patient reports sleep walking.     Diatrizoate Itching     itchy throat that makes her want to cough      Social History:  Social History     Tobacco Use     Smoking status: Never Smoker     Smokeless tobacco: Never Used   Substance Use Topics     Alcohol use: Yes     Comment: rare use, social     Drug use: No       Family History:  Father with colon cancer.  No other first degree relative with colon cancer, gastric cancer, crohn's disease, ulcerative colitis, or liver disease.     EXAM:  General Appearance: Alert, oriented x3, no acute  "distress.  /70   Pulse 94   Temp 97.4  F (36.3  C) (Axillary)   Resp 15   Ht 1.626 m (5' 4\")   Wt 99.6 kg (219 lb 9.3 oz)   SpO2 93%   BMI 37.69 kg/m    Eye:  PERRL, sclera anicteric.  ENT: oropharynx clear, no thrush.  CV: RRR.  Resp: CTA bilaterally.  GI: Soft, NABS, mild RUQ tenderness without rebound.  Musculoskeletal: no joint swelling, erythema, or warmth.  Neuro: no asterixis.      Labs and imaging reviewed    Recent Labs   Lab Test 03/02/19  0610 03/01/19  1130 03/21/18  0905   WBC 15.7* 15.5* 12.9*   HGB 13.3 13.1 12.5   MCV 87 88 92   * 177 193     Recent Labs   Lab Test 03/02/19  0610 03/01/19  1130 03/21/18  0905   POTASSIUM 3.7 3.5 3.3*   CHLORIDE 102 97 107   CO2 18* 21 23   BUN 58* 56* 11   ANIONGAP 13 13 9     Recent Labs   Lab Test 03/02/19  0610 03/01/19  1355 03/01/19  1130 03/19/18  0645   ALBUMIN 2.1*  --  2.4* 4.1   BILITOTAL 4.0*  --  5.0* 0.7   *  --  213* 48   *  --  417* 44   PROTEIN  --  100*  --   --    LIPASE  --   --   --  133       ASSESSMENT AND PLAN:  68 year old female with elevated LFTs in the setting of GNR bacteremia, and now with RUQ pain.  Concerning for cholangitis.  Other possible causes of elevated LFTs include vascular, viral, and autoimmune.    -MRCP.  If stones, ERCP will be complicated by previous gastric bypass.  -U/S with dopplers to evaluate liver vasculature.  -Labs for viral hepatitis and autoimmune disease.  -Further recs to follow.    Thank you for this interesting consult, please feel to call me if any questions arise.    Paolo Mooney MD  "

## 2019-03-02 NOTE — PLAN OF CARE
A+O AVSS on room air. LS diminished. BS active, flatus+, BM x1. Unable to void, bladder scanned for 60 ml at 0615. Up w/ 1 to BSC. Tolerating clears. Oxycodone, ice and hot packs for pain w/ no relief

## 2019-03-02 NOTE — PLAN OF CARE
Pt arrived on unit at aprox. 1915. Alert and oriented X4. Vital signs within defined limits. Bolus infusing. Pulses +2, CMS intact. Afebrile. No c/o pain. Tolerating clear liquids.SBA for ambulation. Voiding offered. Pt states no urge

## 2019-03-02 NOTE — PROVIDER NOTIFICATION
Paged Dr. Werner in regards to pt wanting Temazepam, she states that she cannot sleep without this medication.

## 2019-03-02 NOTE — PROGRESS NOTES
[facility]    Hospitalist Progress Note    Assessment & Plan   Vanessa Huffman is a 68 year old female admitted on 3/1/2019. She is a 68 year old with a hx of htn and migraines who presents with ana cristina, elevated liver tests, after 3 days of headaches, nausea and vomiting, and subjective fevers.    sepsis   Gram negative bacteremia  Possible urinary tract infection    Liver mass -? Abscess   - blood pressure stable   -continue antibiotics  -zosyn ,vanco stopped  -urine culture and repeat blood cultures pending   - cholangitis is a differential due to elevated alp     Acute Renal Failure- likely 2/2 hypovolemia.   -continue hydration   -renal function improving well   -no obstruction noted in CT, will get urine sodium levels    liver mass per CT abdomen  elevated liver function test    - patient  Is status post cholecystectomy, ultrasound does not show any cbd stones, liver mass noted in CT  -will get mri abdomen, contrast not ordered due to poor renal function.  -continue antibiotics  -zosyn ordered   DVT Prophylaxis: heparin   Code Status: Full Code     Disposition: Expected discharge in 2-3 days     Regine Nina MD  Text Page   (7am to 6pm)    Interval History   She developed right upper quadrant pain since yesterday, npo for now, has ongoing nausea , daughter near bedside, we discussed about getting further imaging including mri of liver.    -Data reviewed today: I reviewed all new labs and imaging results over the last 24 hours.    Physical Exam   Temp: 97.4  F (36.3  C) Temp src: Axillary BP: 119/71 Pulse: 94 Heart Rate: 92 Resp: 15 SpO2: 93 % O2 Device: None (Room air)    Vitals:    03/01/19 1845   Weight: 99.6 kg (219 lb 9.3 oz)     Vital Signs with Ranges  Temp:  [97.4  F (36.3  C)-97.7  F (36.5  C)] 97.4  F (36.3  C)  Pulse:  [66-98] 94  Heart Rate:  [] 92  Resp:  [12-22] 15  BP: ()/(53-87) 119/71  SpO2:  [92 %-99 %] 93 %  I/O last 3 completed shifts:  In: 1527 [P.O.:420;  I.V.:410; IV Piggyback:697]  Out: 0     Constitutional:Awake, alert, cooperative, no apparent distress  Respiratory: Clear to auscultation bilaterally, no crackles or wheezing  Cardiovascular: Regular rate and rhythm, normal S1 and S2, and no murmur noted  GI: Normal bowel sounds, soft, non-distended, tender right upper quadrant +  Skin/Integumen: No rashes, no cyanosis, no edema  Neuro : moving all 4 extremities, no focal deficit noted     Medications     lactated ringers 100 mL/hr at 03/02/19 0456       heparin  5,000 Units Subcutaneous Q12H     latanoprost  1 drop Both Eyes At Bedtime     piperacillin-tazobactam  2.25 g Intravenous Q6H       Data   Recent Labs   Lab 03/02/19  0610 03/01/19  1130   WBC 15.7* 15.5*   HGB 13.3 13.1   MCV 87 88   * 177    131*   POTASSIUM 3.7 3.5   CHLORIDE 102 97   CO2 18* 21   BUN 58* 56*   CR 3.96* 4.27*   ANIONGAP 13 13   DANNA 8.3* 8.8   * 106*   ALBUMIN 2.1* 2.4*   PROTTOTAL 5.9* 6.9   BILITOTAL 4.0* 5.0*   ALKPHOS 397* 410*   * 213*   * 417*     Recent Labs   Lab 03/02/19  0610 03/01/19  1130   * 106*       Imaging:   Recent Results (from the past 24 hour(s))   US Abdomen Limited (RUQ)    Narrative    RIGHT UPPER QUADRANT ULTRASOUND 3/1/2019 4:46 PM    HISTORY:  abnormal LFTs, s/p cholecystectomy    COMPARISON: Noncontrast CT abdomen pelvis 3/19/2018    FINDINGS:    Pancreas is mostly obscured. Liver 17 cm in length, there is an  irregularly shaped area of increased echogenicity in the right lobe of  the liver which is indeterminate but may represent focal fatty  infiltration. Patient is status post cholecystectomy. 1.2 cm common  bile duct. Normal appearing right kidney 10.4 cm in length.      Impression    IMPRESSION:  1. Irregularly shaped area of increased echogenicity in the right lobe  of the liver, indeterminate but possibly fatty infiltration. Recommend  further evaluation to rule out a hepatic mass and liver MRI would be  helpful  for further evaluation.  2. Common bile duct 1.2 cm in diameter may be normal in this  postcholecystectomy patient.    MONICA HUIZAR MD   CT Abdomen Pelvis w/o Contrast    Narrative    CT ABDOMEN AND PELVIS WITHOUT CONTRAST 3/1/2019 8:37 PM    TECHNIQUE: Images from diaphragm to pubic symphysis noncontrast  technique. Radiation dose for this scan was reduced using automated  exposure control, adjustment of the mA and/or kV according to patient  size, or iterative reconstruction technique.    HISTORY: Nausea, vomiting.    COMPARISON: 3/19/2018 CT abdomen and pelvis    FINDINGS:   Abdomen and Pelvis: There is a round low-attenuation lesion in the  dome of the liver approximately 8 cm in diameter, new since 3/19/2018.  This is indeterminate and MRI is recommended for further evaluation of  liver lesion versus unusual-appearing focal fatty infiltration. The  lung bases are clear.    The spleen, adrenal glands and kidneys appear grossly normal within  the limits of a noncontrast exam. Nonvisualized gallbladder. Pancreas  grossly normal. No periaortic or pelvic adenopathy. Postoperative  gastric surgery changes.    Absent uterus. No free fluid. The appendix is not confidently  identified. No acute-appearing bowel abnormality. No aggressive bone  lesions.      Impression    IMPRESSION: Indeterminate 8 cm hypodense lesion in the upper right  lobe of the liver. Recommend MRI for further evaluation.               MONICA HUIZAR MD

## 2019-03-02 NOTE — H&P
[facility]    History and Physical - Hospitalist Service       Date of Admission:  3/1/2019    Assessment & Plan   Vanessa Huffman is a 68 year old female admitted on 3/1/2019. She is a 68 year old with a hx of htn and migraines who presents with ana cristina, elevated liver tests, after 3 days of headaches, nausea and vomiting, and subjective fevers.     Sepsis with hypotension from unknown cause. Hypotension much improved with IV fluids. Her Procalcitonin is 60 and her CRP is 485. However no clear focus. Her UA is slightly abnormal but she denies dysuria. She has had nausea and vomiting but no pain. Her major complaint is headache but her LP results are unremarkable. She had blood, urine, and CSF cultures which are pending. She did get Ceftriaxone in the ED. I will broaden to vanc/zosyn and further treatment should be tailored.    [place] [facility]  [place]Continue IVF  -Vanc/zosyn  -monitor cultures  -CT abd/pelvis (unfortunately she has a contrast allergy so cannot get contrast)    Nausea and vomiting- could be 2/2 migraine and dehydration. Her  wondered if she could be obstructed although she is now tolerating fluids.   -zofran prn  -IVF  -CT abd/pelvis    Acute Renal Failure- likely 2/2 hypovolemia. UA has elevated WBCs but looks more like it is the result of ana cristina and not the cause.   -check urine lytes  -continue fluids  -monitor closely, if she does not turn around with fluids consult renal    Elevated Liver Tests- could be 2/2 hypovolemia but it is all the LFTs including bili and alk phos. I will consult GI for further evaluation.  -CT abd/pelvis  -will likely need an MRCP but will let GI see her first        Diet: Advance as tolerated  DVT Prophylaxis: Heparin SQ  Henry Catheter: not present  Code Status: Full    Disposition Plan   Expected discharge: 4 - 7 days, recommended to prior living arrangement once workup complete including ANA CRISTINA, ALI, headaches resolved..  Entered: Leslie Werner MD  "03/01/2019, 6:05 PM     The patient's care was discussed with the Patient and Patient's Family.    Leslie Werner MD  [facility]    ______________________________________________________________________    Chief Complaint   Headache    History is obtained from the patient and patient's spouse    History of Present Illness   Vanessa Huffman is a 68 year old female who has a hx of htn and migraines who was sent to the ED after she went to clinic with migraines x 5 days and was found to have SBP in the 70s. She reports she has a hx of migraines and usually gets 1-2 a week. She feels them coming on and will take imitrex. She reports that usually heads them off. However on Tuesday 2/26 she took imitrex and she got a migraine. She waited a few hours and took 2 and then a few hours and took 3. However her headache continued. She also started not being able to keep anything down. She would drink water and vomit it up.     Her  reports she had her stomach stapled and she had chunky vegetable soup on Tuesday. He wonders if a chunk was so large it obstructed her stomach hence her complete inability to tolerate liquids. He reports the same think happened to him.    The both agree she had some subjective fevers and chills but not likely high. She did not take her temperature. Over the week she took imitrex, aleve (naproxen), and pepto bismal. However no tylenol.     After she got to the ED she started getting IV fluids. She has had 4L and has just started urinating. She says her mouth still feels dry. She denies coughing, pain except for in her head. Specifically no abdominal pain. No dysuria. She just has not been urinating.     In the ED she had a CT head which was unremarkable. She got an LP but she only had 1 RBC and 1 WBC. Her LFTs were elevated so she had an abdominal US which showed \"Irregularly shaped area of increased echogenicity in the right lobe of the liver, indeterminate but possibly " "fatty infiltration.\" She has had a cholecystectomy.      Review of Systems    The 10 point Review of Systems is negative other than noted in the HPI or here.     Past Medical History    I have reviewed this patient's medical history and updated it with pertinent information if needed.   Past Medical History:   Diagnosis Date     Arthritis      Depressive disorder      Hypertension      Obesity        Past Surgical History   I have reviewed this patient's surgical history and updated it with pertinent information if needed.  Past Surgical History:   Procedure Laterality Date     APPENDECTOMY       ARTHROPLASTY CARPOMETACARPAL (THUMB JOINT) Left 4/6/2018    Procedure: ARTHROPLASTY CARPOMETACARPAL (THUMB JOINT);  LEFT  THUMB CARPOMETACARPAL EXCISIONAL ARTHROPLASTY WITH FASCIA CLARE GRAFT ;  Surgeon: Kalyani King MD;  Location: [facility]     CHOLECYSTECTOMY       GI SURGERY      gastric bypass     GYN SURGERY      abdominal hysterectomy     ORTHOPEDIC SURGERY      bilateral bunionectomies       Social History   I have reviewed this patient's social history and updated it with pertinent information if needed.  Social History     Tobacco Use     Smoking status: Never Smoker     Smokeless tobacco: Never Used   Substance Use Topics     Alcohol use: Yes     Comment: rare use, social     Drug use: No       Family History   I have reviewed this patient's family history and updated it with pertinent information if needed.   Family History   Problem Relation Age of Onset     Coronary Artery Disease Father      Diabetes Sister      Diabetes Sister      Diabetes Sister        Prior to Admission Medications   Prior to Admission Medications   Prescriptions Last Dose Informant Patient Reported? Taking?   CYCLOBENZAPRINE HCL PO 2/28/2019 at Unknown time Self Yes Yes   Sig: Take 10 mg by mouth At Bedtime   LISINOPRIL PO 3/1/2019 at Unknown time Self Yes Yes   Sig: Take 10 mg by mouth daily   MEDICATION INSTRUCTION  Self No " No   Sig: Hold Lisinopril and Norvasc if your SBP is <110 and DBP<70   SIMVASTATIN PO 2/28/2019 at Unknown time Self Yes Yes   Sig: Take 40 mg by mouth At Bedtime   SUMATRIPTAN SUCCINATE PO Past Week at Unknown time Self Yes Yes   Sig: Take 50 mg by mouth every 2 hours as needed for migraine (max of 200mg q 24hr)   TEMAZEPAM PO prn Self Yes Yes   Sig: Take 15 mg by mouth nightly as needed for sleep   TRAMADOL HCL PO prn Self Yes Yes   Sig: Take 50 mg by mouth 3 times daily as needed for moderate to severe pain   amLODIPine (NORVASC) 2.5 MG tablet 3/1/2019 at Unknown time Self No Yes   Sig: Take 1 tablet (2.5 mg) by mouth daily   hydrOXYzine (ATARAX) 10 MG tablet prn Self No Yes   Sig: Take 1 10-mg tablet every 6 hours as needed for muscle relaxation and to supplement your pain medication.   latanoprost (XALATAN) 0.005 % ophthalmic solution 2/28/2019 at Unknown time Self Yes Yes   Sig: Place 1 drop into both eyes At Bedtime   loperamide (IMODIUM) 2 MG capsule prn Self Yes Yes   Sig: Take 2 mg by mouth 4 times daily as needed for diarrhea   nystatin (MYCOSTATIN) 323033 UNIT/GM POWD prn Self Yes Yes   Sig: Apply topically 2 times daily as needed (affected area)   oxyCODONE-acetaminophen (PERCOCET) 5-325 MG per tablet 3/1/2019 at Unknown time Self Yes Yes   Sig: Take 0.5 tablets by mouth every morning    oxyCODONE-acetaminophen (PERCOCET) 5-325 MG per tablet 2/28/2019 at Unknown time Self Yes Yes   Sig: Take 1 tablet by mouth every evening    oxyCODONE-acetaminophen (PERCOCET) 5-325 MG per tablet prn Self No Yes   Sig: Take 1-2 tablets every 4-6 hours for pain if needed.      Facility-Administered Medications: None     Allergies   Allergies   Allergen Reactions     Contrast Dye Shortness Of Breath     Codeine      Zolpidem Other (See Comments)     Patient reports sleep walking.     Diatrizoate Itching     itchy throat that makes her want to cough       Physical Exam   Vital Signs: Temp: 97.7  F (36.5  C) Temp src:  Temporal BP: 92/55 Pulse: 90     SpO2: 99 %      Weight: 0 lbs 0 oz    Constitutional: awake and cooperative but fatigued and uncomfortable appearing, obese  Eyes: anicteric, tracking, PERRLA  ENT: Normocephalic, without obvious abnormality, atraumatic, mucous membranes moist  Hematologic / Lymphatic: no cervical lymphadenopathy, no bruising  Respiratory: No increased work of breathing, good air exchange, clear to auscultation bilaterally, no crackles or wheezing  Cardiovascular: Regular rate and rhythm, normal S1 and S2, no S3 or S4, and no murmur noted  GI: Normal bowel sounds, soft, non-distended, non-tender, no masses palpated, no hepatosplenomegally  Skin: no redness, warmth, or swelling  Musculoskeletal: no lower extremity pitting edema present  Neurologic: Awake, alert, oriented to name, place and time.   Neuropsychiatric: General: normal, calm and normal eye contact    Data   Data reviewed today: I reviewed all medications, new labs and imaging results over the last 24 hours. I personally reviewed CT head which is unremarkable. No EKG today.     Most Recent 3 CBC's:  Recent Labs   Lab Test 03/01/19  1130 03/21/18  0905 03/20/18  2200  03/20/18  0607   WBC 15.5* 12.9*  --   --  17.5*   HGB 13.1 12.5 12.1   < > 12.0   MCV 88 92  --   --  92    193  --   --  196    < > = values in this interval not displayed.     Most Recent 3 BMP's:  Recent Labs   Lab Test 03/01/19  1130 03/21/18  0905 03/20/18  0607   * 139 141   POTASSIUM 3.5 3.3* 4.0   CHLORIDE 97 107 110*   CO2 21 23 28   BUN 56* 11 9   CR 4.27* 0.70 0.68   ANIONGAP 13 9 3   DANNA 8.8 8.0* 8.0*   * 124* 108*     Most Recent 2 LFT's:  Recent Labs   Lab Test 03/01/19  1130 03/19/18  0645   * 44   * 48   ALKPHOS 410* 146   BILITOTAL 5.0* 0.7     Most Recent Urinalysis:  Recent Labs   Lab Test 03/01/19  1355   COLOR Dark Brown   APPEARANCE Cloudy   URINEGLC 70*   URINEBILI Moderate*   URINEKETONE Negative   SG 1.017   UBLD  Moderate*   URINEPH 5.5   PROTEIN 100*   NITRITE Negative   LEUKEST Negative   RBCU 8*   WBCU 16*     Most Recent ESR & CRP:  Recent Labs   Lab Test 03/01/19  1130   SED 79*   .0*     Procalcitonin 60    Recent Results (from the past 24 hour(s))   CT Head w/o Contrast    Narrative    CT SCAN OF THE HEAD WITHOUT CONTRAST   3/1/2019 1:14 PM     HISTORY: Headache, acute, severe, worst headache of life.    TECHNIQUE:  Axial images of the head and coronal reformations without  IV contrast material.  Radiation dose for this scan was reduced using  automated exposure control, adjustment of the mA and/or kV according  to patient size, or iterative reconstruction technique.    COMPARISON: None.    FINDINGS:  The ventricles are normal in size, shape and configuration.   The brain parenchyma and subarachnoid spaces are normal. There is no  evidence of intracranial hemorrhage, mass, acute infarct or anomaly.     The visualized portions of the sinuses and mastoids appear normal.  There is no evidence of trauma.      Impression    IMPRESSION:   Normal CT scan of the head.     JACQUELINE AGUAYO MD   US Abdomen Limited (RUQ)    Narrative    RIGHT UPPER QUADRANT ULTRASOUND 3/1/2019 4:46 PM    HISTORY:  abnormal LFTs, s/p cholecystectomy    COMPARISON: Noncontrast CT abdomen pelvis 3/19/2018    FINDINGS:    Pancreas is mostly obscured. Liver 17 cm in length, there is an  irregularly shaped area of increased echogenicity in the right lobe of  the liver which is indeterminate but may represent focal fatty  infiltration. Patient is status post cholecystectomy. 1.2 cm common  bile duct. Normal appearing right kidney 10.4 cm in length.      Impression    IMPRESSION:  1. Irregularly shaped area of increased echogenicity in the right lobe  of the liver, indeterminate but possibly fatty infiltration. Recommend  further evaluation to rule out a hepatic mass and liver MRI would be  helpful for further evaluation.  2. Common bile duct 1.2 cm  in diameter may be normal in this  postcholecystectomy patient.    MONICA HUIZAR MD

## 2019-03-02 NOTE — PROVIDER NOTIFICATION
MD Notification    Notified Person: Dr. miranda    Notification Date/Time:3/1/19    Purpose of Notification:critical lab pro calcitonin 60.96    Orders Received: no new orders, continue to monitor

## 2019-03-02 NOTE — PHARMACY-VANCOMYCIN DOSING SERVICE
Pharmacy Vancomycin Initial Note  Date of Service 2019  Patient's  1950  68 year old, female    Indication: Sepsis    Current estimated CrCl = Estimated Creatinine Clearance: 14.7 mL/min (A) (based on SCr of 4.27 mg/dL (H)).    Creatinine for last 3 days  3/1/2019: 11:30 AM Creatinine 4.27 mg/dL, baseline ~0.7 mg/dL    Recent Vancomycin Level(s) for last 3 days  No results found for requested labs within last 72 hours.      Vancomycin IV Administrations (past 72 hours)      No vancomycin orders with administrations in past 72 hours.                Nephrotoxins and other renal medications (From now, onward)    Start     Dose/Rate Route Frequency Ordered Stop    19 193  vancomycin (VANCOCIN) 1,500 mg in sodium chloride 0.9 % 250 mL intermittent infusion      1,500 mg  over 90 Minutes Intravenous ONCE 19 [id number]  piperacillin-tazobactam (ZOSYN) 3.375 g vial to attach to  mL bag     Comments:  Pharmacy can adjust dose based on renal function.    3.375 g  over 1 Hours Intravenous EVERY 6 HOURS 19 190            Contrast Orders - past 72 hours (72h ago, onward)    None                Plan:  1.  Give vancomycin 1500 mg IV once given ELISE.   2.  Goal Trough Level: 15-20 mg/L   3.  Pharmacy will check random level with morning labs.   4. Serum creatinine levels will be ordered daily for the first week of therapy and at least twice weekly for subsequent weeks.    5. [place] method utilized to dose vancomycin therapy: Method 1    Rajwinder Coreas, PharmD

## 2019-03-03 LAB
ALBUMIN SERPL-MCNC: 1.8 G/DL (ref 3.4–5)
ALBUMIN UR-MCNC: 30 MG/DL
ALP SERPL-CCNC: 356 U/L (ref 40–150)
ALT SERPL W P-5'-P-CCNC: 209 U/L (ref 0–50)
AMORPH CRY #/AREA URNS HPF: ABNORMAL /HPF
ANION GAP SERPL CALCULATED.3IONS-SCNC: 12 MMOL/L (ref 3–14)
APPEARANCE UR: ABNORMAL
APTT PPP: 21 SEC (ref 22–37)
AST SERPL W P-5'-P-CCNC: 352 U/L (ref 0–45)
BASOPHILS # BLD AUTO: 0 10E9/L (ref 0–0.2)
BASOPHILS NFR BLD AUTO: 0 %
BILIRUB SERPL-MCNC: 3.6 MG/DL (ref 0.2–1.3)
BILIRUB UR QL STRIP: ABNORMAL
BUN SERPL-MCNC: 72 MG/DL (ref 7–30)
CALCIUM SERPL-MCNC: 8.2 MG/DL (ref 8.5–10.1)
CHLORIDE SERPL-SCNC: 99 MMOL/L (ref 94–109)
CO2 SERPL-SCNC: 21 MMOL/L (ref 20–32)
COLOR UR AUTO: YELLOW
CREAT SERPL-MCNC: 4.43 MG/DL (ref 0.52–1.04)
DIFFERENTIAL METHOD BLD: ABNORMAL
EOSINOPHIL # BLD AUTO: 0 10E9/L (ref 0–0.7)
EOSINOPHIL NFR BLD AUTO: 0 %
EOSINOPHIL SPEC QL WRIGHT STN: NORMAL
ERYTHROCYTE [DISTWIDTH] IN BLOOD BY AUTOMATED COUNT: 13.4 % (ref 10–15)
ERYTHROCYTE [DISTWIDTH] IN BLOOD BY AUTOMATED COUNT: 13.4 % (ref 10–15)
FIBRINOGEN PPP-MCNC: 790 MG/DL (ref 200–420)
GFR SERPL CREATININE-BSD FRML MDRD: 10 ML/MIN/{1.73_M2}
GLUCOSE SERPL-MCNC: 109 MG/DL (ref 70–99)
GLUCOSE UR STRIP-MCNC: NEGATIVE MG/DL
HCT VFR BLD AUTO: 36 % (ref 35–47)
HCT VFR BLD AUTO: 37.9 % (ref 35–47)
HGB BLD-MCNC: 12.7 G/DL (ref 11.7–15.7)
HGB BLD-MCNC: 13.2 G/DL (ref 11.7–15.7)
HGB UR QL STRIP: ABNORMAL
INR PPP: 1.01 (ref 0.86–1.14)
KETONES UR STRIP-MCNC: NEGATIVE MG/DL
LDH SERPL L TO P-CCNC: 378 U/L (ref 81–234)
LDH SERPL L TO P-CCNC: NORMAL U/L (ref 81–234)
LEUKOCYTE ESTERASE UR QL STRIP: ABNORMAL
LYMPHOCYTES # BLD AUTO: 1.3 10E9/L (ref 0.8–5.3)
LYMPHOCYTES NFR BLD AUTO: 7 %
MCH RBC QN AUTO: 30.2 PG (ref 26.5–33)
MCH RBC QN AUTO: 30.6 PG (ref 26.5–33)
MCHC RBC AUTO-ENTMCNC: 34.8 G/DL (ref 31.5–36.5)
MCHC RBC AUTO-ENTMCNC: 35.3 G/DL (ref 31.5–36.5)
MCV RBC AUTO: 87 FL (ref 78–100)
MCV RBC AUTO: 87 FL (ref 78–100)
MONOCYTES # BLD AUTO: 1.4 10E9/L (ref 0–1.3)
MONOCYTES NFR BLD AUTO: 8 %
MYELOCYTES # BLD: 0.2 10E9/L
MYELOCYTES NFR BLD MANUAL: 1 %
NEUTROPHILS # BLD AUTO: 15 10E9/L (ref 1.6–8.3)
NEUTROPHILS NFR BLD AUTO: 84 %
NITRATE UR QL: NEGATIVE
PH UR STRIP: 5.5 PH (ref 5–7)
PLATELET # BLD AUTO: 109 10E9/L (ref 150–450)
PLATELET # BLD AUTO: 87 10E9/L (ref 150–450)
PLATELET # BLD EST: ABNORMAL 10*3/UL
POTASSIUM SERPL-SCNC: 3.7 MMOL/L (ref 3.4–5.3)
PROT SERPL-MCNC: 5.9 G/DL (ref 6.8–8.8)
RBC # BLD AUTO: 4.15 10E12/L (ref 3.8–5.2)
RBC # BLD AUTO: 4.37 10E12/L (ref 3.8–5.2)
RBC #/AREA URNS AUTO: 0 /HPF (ref 0–2)
RBC MORPH BLD: ABNORMAL
RETICS # AUTO: 28 10E9/L (ref 25–95)
RETICS/RBC NFR AUTO: 0.6 % (ref 0.5–2)
SODIUM SERPL-SCNC: 132 MMOL/L (ref 133–144)
SOURCE: ABNORMAL
SP GR UR STRIP: 1.01 (ref 1–1.03)
SPECIMEN SOURCE: NORMAL
SQUAMOUS #/AREA URNS AUTO: 3 /HPF (ref 0–1)
TRANS CELLS #/AREA URNS HPF: 2 /HPF (ref 0–1)
UROBILINOGEN UR STRIP-MCNC: 2 MG/DL (ref 0–2)
WBC # BLD AUTO: 13.6 10E9/L (ref 4–11)
WBC # BLD AUTO: 17.9 10E9/L (ref 4–11)
WBC #/AREA URNS AUTO: 16 /HPF (ref 0–5)

## 2019-03-03 PROCEDURE — 89190 NASAL SMEAR FOR EOSINOPHILS: CPT | Performed by: INTERNAL MEDICINE

## 2019-03-03 PROCEDURE — 85384 FIBRINOGEN ACTIVITY: CPT | Performed by: INTERNAL MEDICINE

## 2019-03-03 PROCEDURE — 81001 URINALYSIS AUTO W/SCOPE: CPT | Performed by: INTERNAL MEDICINE

## 2019-03-03 PROCEDURE — 83010 ASSAY OF HAPTOGLOBIN QUANT: CPT | Performed by: INTERNAL MEDICINE

## 2019-03-03 PROCEDURE — 80053 COMPREHEN METABOLIC PANEL: CPT | Performed by: INTERNAL MEDICINE

## 2019-03-03 PROCEDURE — 25000128 H RX IP 250 OP 636: Performed by: INTERNAL MEDICINE

## 2019-03-03 PROCEDURE — 83516 IMMUNOASSAY NONANTIBODY: CPT | Performed by: INTERNAL MEDICINE

## 2019-03-03 PROCEDURE — 83615 LACTATE (LD) (LDH) ENZYME: CPT | Performed by: INTERNAL MEDICINE

## 2019-03-03 PROCEDURE — 86160 COMPLEMENT ANTIGEN: CPT | Performed by: INTERNAL MEDICINE

## 2019-03-03 PROCEDURE — 36415 COLL VENOUS BLD VENIPUNCTURE: CPT | Performed by: INTERNAL MEDICINE

## 2019-03-03 PROCEDURE — 85610 PROTHROMBIN TIME: CPT | Performed by: INTERNAL MEDICINE

## 2019-03-03 PROCEDURE — 83070 ASSAY OF HEMOSIDERIN QUAL: CPT | Performed by: INTERNAL MEDICINE

## 2019-03-03 PROCEDURE — 85730 THROMBOPLASTIN TIME PARTIAL: CPT | Performed by: INTERNAL MEDICINE

## 2019-03-03 PROCEDURE — 87086 URINE CULTURE/COLONY COUNT: CPT | Performed by: INTERNAL MEDICINE

## 2019-03-03 PROCEDURE — 25800030 ZZH RX IP 258 OP 636: Performed by: INTERNAL MEDICINE

## 2019-03-03 PROCEDURE — 12000000 ZZH R&B MED SURG/OB

## 2019-03-03 PROCEDURE — 85027 COMPLETE CBC AUTOMATED: CPT | Performed by: INTERNAL MEDICINE

## 2019-03-03 PROCEDURE — 86706 HEP B SURFACE ANTIBODY: CPT | Performed by: INTERNAL MEDICINE

## 2019-03-03 PROCEDURE — 85025 COMPLETE CBC W/AUTO DIFF WBC: CPT | Performed by: INTERNAL MEDICINE

## 2019-03-03 PROCEDURE — 40000847 ZZHCL STATISTIC MORPHOLOGY W/INTERP HISTOLOGY TC 85060: Performed by: INTERNAL MEDICINE

## 2019-03-03 PROCEDURE — 82784 ASSAY IGA/IGD/IGG/IGM EACH: CPT | Performed by: INTERNAL MEDICINE

## 2019-03-03 PROCEDURE — 86038 ANTINUCLEAR ANTIBODIES: CPT | Performed by: INTERNAL MEDICINE

## 2019-03-03 PROCEDURE — 99233 SBSQ HOSP IP/OBS HIGH 50: CPT | Performed by: INTERNAL MEDICINE

## 2019-03-03 PROCEDURE — 99223 1ST HOSP IP/OBS HIGH 75: CPT | Performed by: INTERNAL MEDICINE

## 2019-03-03 PROCEDURE — 85060 BLOOD SMEAR INTERPRETATION: CPT | Performed by: INTERNAL MEDICINE

## 2019-03-03 PROCEDURE — 85045 AUTOMATED RETICULOCYTE COUNT: CPT | Performed by: INTERNAL MEDICINE

## 2019-03-03 PROCEDURE — 86708 HEPATITIS A ANTIBODY: CPT | Performed by: INTERNAL MEDICINE

## 2019-03-03 PROCEDURE — 25000132 ZZH RX MED GY IP 250 OP 250 PS 637: Mod: GY | Performed by: INTERNAL MEDICINE

## 2019-03-03 PROCEDURE — A9270 NON-COVERED ITEM OR SERVICE: HCPCS | Mod: GY | Performed by: INTERNAL MEDICINE

## 2019-03-03 RX ORDER — SODIUM CHLORIDE 9 MG/ML
INJECTION, SOLUTION INTRAVENOUS CONTINUOUS
Status: DISCONTINUED | OUTPATIENT
Start: 2019-03-03 | End: 2019-03-09

## 2019-03-03 RX ORDER — CEFTRIAXONE 2 G/1
2 INJECTION, POWDER, FOR SOLUTION INTRAMUSCULAR; INTRAVENOUS EVERY 24 HOURS
Status: DISCONTINUED | OUTPATIENT
Start: 2019-03-04 | End: 2019-03-06

## 2019-03-03 RX ADMIN — CEFEPIME HYDROCHLORIDE 500 MG: 1 INJECTION, POWDER, FOR SOLUTION INTRAMUSCULAR; INTRAVENOUS at 17:20

## 2019-03-03 RX ADMIN — OXYCODONE HYDROCHLORIDE 5 MG: 5 TABLET ORAL at 10:25

## 2019-03-03 RX ADMIN — HEPARIN SODIUM 5000 UNITS: 5000 INJECTION, SOLUTION INTRAVENOUS; SUBCUTANEOUS at 08:30

## 2019-03-03 RX ADMIN — OXYCODONE HYDROCHLORIDE 5 MG: 5 TABLET ORAL at 20:56

## 2019-03-03 RX ADMIN — SODIUM CHLORIDE: 9 INJECTION, SOLUTION INTRAVENOUS at 17:20

## 2019-03-03 RX ADMIN — LATANOPROST 1 DROP: 50 SOLUTION/ DROPS OPHTHALMIC at 20:54

## 2019-03-03 RX ADMIN — PIPERACILLIN AND TAZOBACTAM 2.25 G: 2; .25 INJECTION, POWDER, FOR SOLUTION INTRAVENOUS at 01:03

## 2019-03-03 RX ADMIN — Medication 1 MG: at 20:55

## 2019-03-03 RX ADMIN — SODIUM CHLORIDE, POTASSIUM CHLORIDE, SODIUM LACTATE AND CALCIUM CHLORIDE: 600; 310; 30; 20 INJECTION, SOLUTION INTRAVENOUS at 06:49

## 2019-03-03 RX ADMIN — TEMAZEPAM 15 MG: 15 CAPSULE ORAL at 20:55

## 2019-03-03 RX ADMIN — PIPERACILLIN AND TAZOBACTAM 2.25 G: 2; .25 INJECTION, POWDER, FOR SOLUTION INTRAVENOUS at 08:30

## 2019-03-03 ASSESSMENT — ACTIVITIES OF DAILY LIVING (ADL)
ADLS_ACUITY_SCORE: 14
ADLS_ACUITY_SCORE: 14
ADLS_ACUITY_SCORE: 13
ADLS_ACUITY_SCORE: 13
ADLS_ACUITY_SCORE: 14
ADLS_ACUITY_SCORE: 13

## 2019-03-03 NOTE — CONSULTS
[facility]    Hematology / Oncology Consultation     Date of Admission:  3/1/2019  Date of Consult (When I saw the patient): 03/03/19    Assessment & Plan   Vanessa Huffman is a 68 year old female who was admitted on 3/1/2019. I was asked to see the patient for moderate thrombocytopenia.    1. Mild to moderate thrombocytopenia  2. Acute renal failure - new  3. Liver failure with elevated transaminases, alkaline phosphatase and conjugated bilirubin  4. Large 10 cm liver mass concerning for infection or malignancy    Vanesas presented to the ED with 5 days of persistent headaches which did not resolve with her migraine medication.  She admitted to having nausea in the preceding few days.  She was noted to be in acute renal failure with a creatinine of 4 and had elevated liver enzymes including transaminases, alkaline phosphatase and conjugated/direct hyperbilirubinemia.  Her previous labs are from year ago and revealed completely normal kidney and liver functions.  She had markedly elevated pro-calcitonin at 60, CRP at 485.  Her blood cultures from admission have revealed Klebsiella pneumonia on several cultures.     Her CBC at admission revealed neutrophilia with ANC of 13.7 K.  Her hemoglobin was normal at 13.1 and a platelet count of 177K.  Her platelet count since then has declined every day to 123,000 yesterday and 87,000 today.  The platelet count repeated at 2:48 this afternoon is stable at 109K.    I have reviewed actual peripheral smear from yesterday (her smear from today has not been prepared as yet).  I do not see any increased schistocytes on the peripheral smear.  There are a number of echinocytes (eb cells) with occasional acanthocytes, target cells, spherocytes.  Peripheral smear is consistent with findings suggestive of acute renal failure and liver failure.  Clearly she does not have TTP.  Her hemoglobin has been stable since admission and robust at 13.3 g/dL.  Her LDH is only  marginally elevated which is unrelated to hemolysis.  I have requested for haptoglobin and urine hemosiderin both of which are pending at this time.  Besides the peripheral smear was not consistent with microangiopathy hemolysis.    Her son present in the room had several questions for me.  We extensively reviewed thrombocytopenia. I reviewed a number of causes of thrombocytopenia including allergic reaction to new medications, infections, alcohol abuse, liver disease with hypersplenism, deficiencies of vitamin B12/folate, rheumatologic disorders like SLE, RA, primary hematologic disorder and consumptive coagulopathy as in DIC/TTP.  Often this could be spurious thrombocytopenia with an erroneous measurement of platelet due to platelet clumping because of EDTA anticoagulant. We can confirm this by using an alternate anticoagulant like heparin and checking his peripheral smear. Most of the above circumstances do not apply for her.   The list of medications that can cause thrombocytopenia is long and virtually most have been associated with low PLT. We reviewed the most common and serious offending agent- heparin. She has received only 1 dose of heparin.  Further heparin has been discontinued.         Plan:   1. CBC with differential daily, peripheral smear, reticulocyte count, LDH, uric acid, serum haptoglobin, and urine hemosiderin  2. Await for work up of liver mass and possible biopsy  3. Treatment of Klebsiella pneumonia bacteremia as per primary team and GI    Appreciate the consult and will continue to follow.  I reviewed her care with Dr. Nina.    Over 60 min spent with more than 50% time spent in counseling and coordinating care.      Chi Saini MD    Code Status    Full Code    Reason for Consult   Reason for consult: I was asked by Dr. Nina to evaluate this patient for thrombocytopenia    Primary Care Physician   Emily Najera    Chief Complaint   Headaches    History is obtained from the  patient    History of Present Illness   Vanessa Huffman is a 68 year old female who presents with Persistent headaches for 5 days    Vanessa notes that she had persistent headaches for about 5days.  She has had migraines in the past but this 1 was much worse and did not improve over time.  Her sister finally insisted her to get checked in the ED.  She presented to the ED when was noted to be in acute renal failure as noted above.  She also had elevation of her transaminases, alkaline phosphatase, bilirubin.  Her blood cultures have been positive for Klebsiella pneumonia.  She has been started on antibiotics.  She is feeling a whole lot better now.  She admits to having nausea prior to this admission however this is been better since admission.  She had previous gastric bypass and has chronic diarrhea.    Past Medical History   I have reviewed this patient's medical history and updated it with pertinent information if needed.   Past Medical History:   Diagnosis Date     Arthritis      Depressive disorder      Hypertension      Obesity        Past Surgical History   I have reviewed this patient's surgical history and updated it with pertinent information if needed.  Past Surgical History:   Procedure Laterality Date     APPENDECTOMY       ARTHROPLASTY CARPOMETACARPAL (THUMB JOINT) Left 4/6/2018    Procedure: ARTHROPLASTY CARPOMETACARPAL (THUMB JOINT);  LEFT  THUMB CARPOMETACARPAL EXCISIONAL ARTHROPLASTY WITH FASCIA CLARE GRAFT ;  Surgeon: Kalyani King MD;  Location: [facility]     CHOLECYSTECTOMY       GI SURGERY      gastric bypass     GYN SURGERY      abdominal hysterectomy     ORTHOPEDIC SURGERY      bilateral bunionectomies       Prior to Admission Medications   Prior to Admission Medications   Prescriptions Last Dose Informant Patient Reported? Taking?   CYCLOBENZAPRINE HCL PO 2/28/2019 at Unknown time Self Yes Yes   Sig: Take 10 mg by mouth At Bedtime   LISINOPRIL PO 3/1/2019 at Unknown time Self Yes  Yes   Sig: Take 10 mg by mouth daily   MEDICATION INSTRUCTION  Self No No   Sig: Hold Lisinopril and Norvasc if your SBP is <110 and DBP<70   SIMVASTATIN PO 2/28/2019 at Unknown time Self Yes Yes   Sig: Take 40 mg by mouth At Bedtime   SUMATRIPTAN SUCCINATE PO Past Week at Unknown time Self Yes Yes   Sig: Take 50 mg by mouth every 2 hours as needed for migraine (max of 200mg q 24hr)   TEMAZEPAM PO prn Self Yes Yes   Sig: Take 15 mg by mouth nightly as needed for sleep   TRAMADOL HCL PO prn Self Yes Yes   Sig: Take 50 mg by mouth 3 times daily as needed for moderate to severe pain   amLODIPine (NORVASC) 2.5 MG tablet 3/1/2019 at Unknown time Self No Yes   Sig: Take 1 tablet (2.5 mg) by mouth daily   hydrOXYzine (ATARAX) 10 MG tablet prn Self No Yes   Sig: Take 1 10-mg tablet every 6 hours as needed for muscle relaxation and to supplement your pain medication.   latanoprost (XALATAN) 0.005 % ophthalmic solution 2/28/2019 at Unknown time Self Yes Yes   Sig: Place 1 drop into both eyes At Bedtime   loperamide (IMODIUM) 2 MG capsule prn Self Yes Yes   Sig: Take 2 mg by mouth 4 times daily as needed for diarrhea   nystatin (MYCOSTATIN) 775043 UNIT/GM POWD prn Self Yes Yes   Sig: Apply topically 2 times daily as needed (affected area)   oxyCODONE-acetaminophen (PERCOCET) 5-325 MG per tablet 3/1/2019 at Unknown time Self Yes Yes   Sig: Take 0.5 tablets by mouth every morning    oxyCODONE-acetaminophen (PERCOCET) 5-325 MG per tablet 2/28/2019 at Unknown time Self Yes Yes   Sig: Take 1 tablet by mouth every evening    oxyCODONE-acetaminophen (PERCOCET) 5-325 MG per tablet prn Self No Yes   Sig: Take 1-2 tablets every 4-6 hours for pain if needed.      Facility-Administered Medications: None     Allergies   Allergies   Allergen Reactions     Contrast Dye Shortness Of Breath     Codeine      Zolpidem Other (See Comments)     Patient reports sleep walking.     Diatrizoate Itching     itchy throat that makes her want to cough        Social History   I have reviewed this patient's social history and updated it with pertinent information if needed. Vanessa Huffman  reports that  has never smoked. she has never used smokeless tobacco. She reports that she drinks alcohol. She reports that she does not use drugs.    Family History   I have reviewed this patient's family history and updated it with pertinent information if needed.   Family History   Problem Relation Age of Onset     Coronary Artery Disease Father      Diabetes Sister      Diabetes Sister      Diabetes Sister        Review of Systems   The 10 point Review of Systems is negative other than noted in the HPI or here.     Physical Exam   Temp: 97.6  F (36.4  C) Temp src: Oral BP: 98/58 Pulse: 96 Heart Rate: 87 Resp: 16 SpO2: 96 % O2 Device: None (Room air)    Vital Signs with Ranges  Temp:  [97.5  F (36.4  C)-97.7  F (36.5  C)] 97.6  F (36.4  C)  Pulse:  [] 96  Heart Rate:  [] 87  Resp:  [15-16] 16  BP: ()/(58-71) 98/58  SpO2:  [94 %-100 %] 96 %  219 lbs 9.25 oz    GENERAL/CONSTITUTIONAL: No acute distress.  EYES: Pupils are equal, round, and react to light and accommodation. Extraocular movements intact.  Minimal scleral icterus.  ENT/MOUTH: Neck supple. Oropharynx clear, no mucositis.  LYMPH: No anterior cervical, poste no rior cervical, supraclavicular, axillary or inguinal adenopathy.   RESPIRATORY: Clear to auscultation bilaterally. No crackles or wheezing.   CARDIOVASCULAR: Regular rate and rhythm without murmurs, gallops, or rubs.  GASTROINTESTINAL: No hepatosplenomegaly, masses. Tenderness in RUQ. The patient has normal bowel sounds. No guarding.  No distention.  : Deferred  MUSCULOSKELETAL: Warm and well-perfused, no cyanosis, clubbing, or edema.  NEUROLOGIC: Cranial nerves II-XII are intact. Alert, oriented, answers questions appropriately. No focal neurologic deficit  INTEGUMENTARY: No rashes or jaundice.  GAIT: Not assessed      Data   Recent Labs    Lab Test 03/03/19  1055 03/02/19  0610 03/01/19  1130 03/21/18  0905 03/20/18  0607   * 133 131* 139 141   POTASSIUM 3.7 3.7 3.5 3.3* 4.0   CHLORIDE 99 102 97 107 110*   CO2 21 18* 21 23 28   ANIONGAP 12 13 13 9 3   BUN 72* 58* 56* 11 9   CR 4.43* 3.96* 4.27* 0.70 0.68   * 110* 106* 124* 108*   DANNA 8.2* 8.3* 8.8 8.0* 8.0*     No results for input(s): MAG, PHOS in the last 68226 hours.  Recent Labs   Lab Test 03/03/19  1055 03/02/19  0610 03/01/19  1130 03/21/18  0905 03/20/18  2200  03/20/18  0607  03/19/18  0645   WBC 13.6* 15.7* 15.5* 12.9*  --   --  17.5*  --  22.7*   HGB 12.7 13.3 13.1 12.5 12.1   < > 12.0   < > 15.1   PLT 87* 123* 177 193  --   --  196  --  260   MCV 87 87 88 92  --   --  92  --  90   NEUTROPHIL  --  92.0 88.2  --   --   --  78.3  --  85.7    < > = values in this interval not displayed.     Recent Labs   Lab Test 03/03/19  1055 03/02/19  0610 03/01/19  1130   BILITOTAL 3.6* 4.0* 5.0*   ALKPHOS 356* 397* 410*   * 258* 213*   * 611* 417*   ALBUMIN 1.8* 2.1* 2.4*     No results found for: TSH  No results for input(s): CEA in the last 50829 hours.  Results for orders placed or performed during the hospital encounter of 03/01/19   CT Head w/o Contrast    Narrative    CT SCAN OF THE HEAD WITHOUT CONTRAST   3/1/2019 1:14 PM     HISTORY: Headache, acute, severe, worst headache of life.    TECHNIQUE:  Axial images of the head and coronal reformations without  IV contrast material.  Radiation dose for this scan was reduced using  automated exposure control, adjustment of the mA and/or kV according  to patient size, or iterative reconstruction technique.    COMPARISON: None.    FINDINGS:  The ventricles are normal in size, shape and configuration.   The brain parenchyma and subarachnoid spaces are normal. There is no  evidence of intracranial hemorrhage, mass, acute infarct or anomaly.     The visualized portions of the sinuses and mastoids appear normal.  There is no evidence  of trauma.      Impression    IMPRESSION:   Normal CT scan of the head.     JACQUELINE AGUAYO MD   US Abdomen Limited (RUQ)    Narrative    RIGHT UPPER QUADRANT ULTRASOUND 3/1/2019 4:46 PM    HISTORY:  abnormal LFTs, s/p cholecystectomy    COMPARISON: Noncontrast CT abdomen pelvis 3/19/2018    FINDINGS:    Pancreas is mostly obscured. Liver 17 cm in length, there is an  irregularly shaped area of increased echogenicity in the right lobe of  the liver which is indeterminate but may represent focal fatty  infiltration. Patient is status post cholecystectomy. 1.2 cm common  bile duct. Normal appearing right kidney 10.4 cm in length.      Impression    IMPRESSION:  1. Irregularly shaped area of increased echogenicity in the right lobe  of the liver, indeterminate but possibly fatty infiltration. Recommend  further evaluation to rule out a hepatic mass and liver MRI would be  helpful for further evaluation.  2. Common bile duct 1.2 cm in diameter may be normal in this  postcholecystectomy patient.    MONICA HUIZAR MD   CT Abdomen Pelvis w/o Contrast    Narrative    CT ABDOMEN AND PELVIS WITHOUT CONTRAST 3/1/2019 8:37 PM    TECHNIQUE: Images from diaphragm to pubic symphysis noncontrast  technique. Radiation dose for this scan was reduced using automated  exposure control, adjustment of the mA and/or kV according to patient  size, or iterative reconstruction technique.    HISTORY: Nausea, vomiting.    COMPARISON: 3/19/2018 CT abdomen and pelvis    FINDINGS:   Abdomen and Pelvis: There is a round low-attenuation lesion in the  dome of the liver approximately 8 cm in diameter, new since 3/19/2018.  This is indeterminate and MRI is recommended for further evaluation of  liver lesion versus unusual-appearing focal fatty infiltration. The  lung bases are clear.    The spleen, adrenal glands and kidneys appear grossly normal within  the limits of a noncontrast exam. Nonvisualized gallbladder. Pancreas  grossly normal. No  periaortic or pelvic adenopathy. Postoperative  gastric surgery changes.    Absent uterus. No free fluid. The appendix is not confidently  identified. No acute-appearing bowel abnormality. No aggressive bone  lesions.      Impression    IMPRESSION: Indeterminate 8 cm hypodense lesion in the upper right  lobe of the liver. Recommend MRI for further evaluation.               MONICA HUIZAR MD   MR Abdomen w/o Contrast    Narrative    MR ABDOMEN WITHOUT CONTRAST  3/2/2019 5:34 PM     HISTORY: Liver mass. Elevated liver enzymes.    TECHNIQUE: Multisequence, multiplanar imaging of the abdomen was  performed without IV gadolinium contrast. MRCP imaging was also  performed.    COMPARISON: CT of the abdomen and pelvis performed 3/1/2019.    FINDINGS: A mass lesion in the posterior segment of the right hepatic  lobe superiorly measures 10.3 x 7 cm, and demonstrates heterogeneous  T2 hyperintensity and heterogeneous T1 hypointensity, with no  associated loss of signal on the out of phase images. Evaluation of  this liver lesion is quite limited due to the lack of IV contrast. No  evidence for fatty infiltration of the liver.     The gallbladder is not seen, consistent with history of prior  cholecystectomy. The common duct is dilated, measuring up to 1.8 cm in  diameter. There is mild intrahepatic biliary dilatation. No biliary  filling defects to suggest choledocholithiasis. No evidence for  pancreatic ductal dilatation.     The spleen, pancreas, adrenal glands, and kidneys have unremarkable  noncontrast appearances. Visualized loops of small bowel and colon are  of normal caliber. No enlarged lymph nodes are identified in the  abdomen. A small amount of ascites about the liver is new since  3/1/2019. A small right pleural effusion is also new since the  previous exam.       Impression    IMPRESSION:   1. Intra and extrahepatic biliary dilatation could be related to the  patient's age and postcholecystectomy state. No other  cause for  biliary dilatation is identified.  2. Indeterminate 10.3 cm right hepatic lesion. Malignancy cannot be  excluded from this appearance. Evaluation of this lesion is limited by  lack of IV contrast.  3. A small amount of ascites in the right upper quadrant and a small  right pleural effusion are new since the previous exam.    CHRISTIANO PISANO MD   US Abdomen Limited w Abd/Pelvis Duplex Complete    Narrative    HEPATIC DOPPLER ULTRASOUND    PROCEDURE DATE:  3/2/2019 4:03 PM     CLINICAL HISTORY/INDICATION:   elevated LFTs, assess liver vasculature    COMPARISON:   MR abdomen 3/2/2019    TECHNIQUE:   Grayscale, color and spectral ultrasound of the liver.    FINDINGS:  The portal venous system is patent with normal antegrade flow.    The hepatic artery is patent with normal arterial spectral waveform.    The visualized hepatic veins are patent with antegrade flow. The right  hepatic vein is not well seen. Visualized portions of the intrahepatic  IVC are unremarkable.         Impression    IMPRESSION:   Lack of visualization of the right hepatic vein, the hepatic vessels  are otherwise unremarkable.    VIOLET SANTIZO MD

## 2019-03-03 NOTE — PLAN OF CARE
A&O, VSS, Lung sounds diminished, Bowel sounds active, + flatus. Voiding small amount, dark minesh color. Complains of URQ pain. Ambulates standby assist. Tolerating clear liquid diet. Pain controlled by oxycodone.

## 2019-03-03 NOTE — PLAN OF CARE
Patient is alert and oriented X4. Vital signs within defined limits. Tolerating clear liquids. Stand by assist for mobility. Pain controlled with morphine and po oxycodone. Notable increase in pain this am from last evening. Pt states pain medication now effective. Patient now states that headache is main concern. Pt states she takes imodium 6-8 times per day at home and would like to discuss alternative options if possible. LS clear, BS active, passing flatus. Minimal urine output, urine dark/odorous. MRI and ultrasound both completed today. Will continue to monitor.

## 2019-03-03 NOTE — PROGRESS NOTES
[facility]    Hospitalist Progress Note    Assessment & Plan   Vanessa Huffman is a 68 year old female admitted on 3/1/2019. She is a 68 year old with a hx of htn and migraines who presents with ana cristina, elevated liver tests, after 3 days of headaches, nausea and vomiting, and subjective fevers.    sepsis   Gram negative bacteremia- klebsiella   Urine culture negative   Liver mass -? Abscess   - blood pressure stable   -continue antibiotics  -started on zosyn 3/1 ,vanco 3/1 stopped after 2 doses ,will stop zosyn and start on cefepime 3/3.  -urine culture and repeat blood cultures negative , esr and c reactive protein  Is very high ,procal was positive as well.  -bacteremia with klebsiella , source ? Liver abscess, CT abdomen does not show any other concerning areas  - cholangitis is a differential due to elevated alp , she has a large liver mass possibly causing ductal obstruction as well   -mri liver didn't give any further info on the mass due to lack of contrast   -GI following , next step -biopsy? Hepatitis panel pending   -will request oncology and infectious disease input     Acute Renal Failure- likely 2/2 hypovolemia.   Thrombocytopenia   When patient  Was admitted on 3/1 her renal function had gone from 0.7 creatinine to 4+, it was assumed due to sepsis, prerenal etc and the renal function had shown improvement 3/2 with hydration, urine sodium was 35 with hydration , 3/3 renal function worsening and urine culture negative while Urine Analysis  Shows blood moderate .  - renal function worsening with associated thrombocytopenia, her mental status is stable   -she was on heparin, stopped and HIT ab  send   -will request nephrology consult, ordered  fibrin products,pt/inr , urine eosinophils ,peripheral smear and LDH  C3/c4 compliment levels,chelsea pending .  -she had received 2 doses of vanco following admission, will stop zosyn for now and place on cefepime.   liver mass per CT abdomen  elevated  liver function test    - patient  Is status post cholecystectomy, ultrasound does not show any cbd stones, liver mass noted in CT  -mri abdomen shows a liver mass 10.3 cm ,requested oncology input   -liver fucntion test  Elevated but trending down, ALP is high .  -cbd is 1.8 cm, status post cholecystectomy in the past  Gastric bypass h/o    DVT Prophylaxis: heparin stopped due to low platelets, will use scds for now   Code Status: Full Code     Disposition: Expected discharge in 2-3 days     Regine Nina MD  Text Page   (7am to 6pm)    Interval History   Right upper quadrant pain better ,  hesitant to ask for pain medications, daughter near bedside, I updated her with info on available labs.    -Data reviewed today: I reviewed all new labs and imaging results over the last 24 hours.    Physical Exam   Temp: 97.6  F (36.4  C) Temp src: Oral BP: 98/58 Pulse: 96 Heart Rate: 87 Resp: 16 SpO2: 96 % O2 Device: None (Room air)    Vitals:    03/01/19 1845   Weight: 99.6 kg (219 lb 9.3 oz)     Vital Signs with Ranges  Temp:  [97.5  F (36.4  C)-97.7  F (36.5  C)] 97.6  F (36.4  C)  Pulse:  [] 96  Heart Rate:  [] 87  Resp:  [15-16] 16  BP: ()/(58-71) 98/58  SpO2:  [94 %-100 %] 96 %  I/O last 3 completed shifts:  In: 3432 [P.O.:710; I.V.:2722]  Out: 1100 [Urine:1100]    Constitutional:Awake, alert, cooperative, no apparent distress  Respiratory: Clear to auscultation bilaterally, no crackles or wheezing  Cardiovascular: Regular rate and rhythm, normal S1 and S2, and no murmur noted  GI: Normal bowel sounds, soft, non-distended, tender right upper quadrant +  Skin/Integumen: No rashes, no cyanosis, no edema  Neuro : moving all 4 extremities, no focal deficit noted     Medications     sodium chloride         latanoprost  1 drop Both Eyes At Bedtime     piperacillin-tazobactam  2.25 g Intravenous Q6H       Data   Recent Labs   Lab 03/03/19  1055 03/02/19  0610 03/01/19  1130   WBC 13.6* 15.7* 15.5*   HGB 12.7  13.3 13.1   MCV 87 87 88   PLT 87* 123* 177   * 133 131*   POTASSIUM 3.7 3.7 3.5   CHLORIDE 99 102 97   CO2 21 18* 21   BUN 72* 58* 56*   CR 4.43* 3.96* 4.27*   ANIONGAP 12 13 13   DANNA 8.2* 8.3* 8.8   * 110* 106*   ALBUMIN 1.8* 2.1* 2.4*   PROTTOTAL 5.9* 5.9* 6.9   BILITOTAL 3.6* 4.0* 5.0*   ALKPHOS 356* 397* 410*   * 258* 213*   * 611* 417*     Recent Labs   Lab 03/03/19  1055 03/02/19  0610 03/01/19  1130   * 110* 106*       Imaging:   Recent Results (from the past 24 hour(s))   US Abdomen Limited w Abd/Pelvis Duplex Complete    Narrative    HEPATIC DOPPLER ULTRASOUND    PROCEDURE DATE:  3/2/2019 4:03 PM     CLINICAL HISTORY/INDICATION:   elevated LFTs, assess liver vasculature    COMPARISON:   MR abdomen 3/2/2019    TECHNIQUE:   Grayscale, color and spectral ultrasound of the liver.    FINDINGS:  The portal venous system is patent with normal antegrade flow.    The hepatic artery is patent with normal arterial spectral waveform.    The visualized hepatic veins are patent with antegrade flow. The right  hepatic vein is not well seen. Visualized portions of the intrahepatic  IVC are unremarkable.         Impression    IMPRESSION:   Lack of visualization of the right hepatic vein, the hepatic vessels  are otherwise unremarkable.    VIOLET SANTIZO MD   MR Abdomen w/o Contrast    Narrative    MR ABDOMEN WITHOUT CONTRAST  3/2/2019 5:34 PM     HISTORY: Liver mass. Elevated liver enzymes.    TECHNIQUE: Multisequence, multiplanar imaging of the abdomen was  performed without IV gadolinium contrast. MRCP imaging was also  performed.    COMPARISON: CT of the abdomen and pelvis performed 3/1/2019.    FINDINGS: A mass lesion in the posterior segment of the right hepatic  lobe superiorly measures 10.3 x 7 cm, and demonstrates heterogeneous  T2 hyperintensity and heterogeneous T1 hypointensity, with no  associated loss of signal on the out of phase images. Evaluation of  this liver lesion is  quite limited due to the lack of IV contrast. No  evidence for fatty infiltration of the liver.     The gallbladder is not seen, consistent with history of prior  cholecystectomy. The common duct is dilated, measuring up to 1.8 cm in  diameter. There is mild intrahepatic biliary dilatation. No biliary  filling defects to suggest choledocholithiasis. No evidence for  pancreatic ductal dilatation.     The spleen, pancreas, adrenal glands, and kidneys have unremarkable  noncontrast appearances. Visualized loops of small bowel and colon are  of normal caliber. No enlarged lymph nodes are identified in the  abdomen. A small amount of ascites about the liver is new since  3/1/2019. A small right pleural effusion is also new since the  previous exam.       Impression    IMPRESSION:   1. Intra and extrahepatic biliary dilatation could be related to the  patient's age and postcholecystectomy state. No other cause for  biliary dilatation is identified.  2. Indeterminate 10.3 cm right hepatic lesion. Malignancy cannot be  excluded from this appearance. Evaluation of this lesion is limited by  lack of IV contrast.  3. A small amount of ascites in the right upper quadrant and a small  right pleural effusion are new since the previous exam.    CHRISTIANO PISANO MD

## 2019-03-03 NOTE — PROGRESS NOTES
"GASTROENTEROLOGY PROGRESS NOTE     SUBJECTIVE:  She is doing much better today, notes headache is gone and abdominal pain is almost resolved.    GI ROS: Denies nausea, vomiting, diarrhea, constipation, melena, or rectal bleeding.     OBJECTIVE:  /60 (BP Location: Right arm)   Pulse 89   Temp 97.7  F (36.5  C) (Oral)   Resp 16   Ht 1.626 m (5' 4\")   Wt 99.6 kg (219 lb 9.3 oz)   SpO2 96%   BMI 37.69 kg/m    Temp (24hrs), Av.6  F (36.4  C), Min:97.5  F (36.4  C), Max:97.7  F (36.5  C)    Patient Vitals for the past 72 hrs:   Weight   19 1845 99.6 kg (219 lb 9.3 oz)       Intake/Output Summary (Last 24 hours) at 3/3/2019 1742  Last data filed at 3/3/2019 1722  Gross per 24 hour   Intake 3552 ml   Output 1325 ml   Net 2227 ml        General Appearance: alert, oriented x3, no acute distress.  Eyes: PERRL, sclera anicteric.  GI: Soft, NABS, mild RUQ tenderness without rebound.  Neuro: no asterixis.      Labs:     Recent Labs   Lab Test 19  1439 19  1055 19  0610   WBC 17.9* 13.6* 15.7*   HGB 13.2 12.7 13.3   MCV 87 87 87   * 87* 123*   INR 1.01  --   --      Recent Labs   Lab Test 19  1055 19  0610 19  1130   POTASSIUM 3.7 3.7 3.5   CHLORIDE 99 102 97   CO2 21 18* 21   BUN 72* 58* 56*   ANIONGAP 12 13 13     Recent Labs   Lab Test 19  1055 19  0610 19  1355 19  1130 18  0645   ALBUMIN 1.8* 2.1*  --  2.4* 4.1   BILITOTAL 3.6* 4.0*  --  5.0* 0.7   * 258*  --  213* 48   * 611*  --  417* 44   PROTEIN  --   --  100*  --   --    LIPASE  --   --   --   --  133           Assessment and Plan: 68 year old female with elevated LFTs in the setting of Klebsiella  bacteremia and RUQ pain.  MRCP showed biliary dilation but no evidence of obstruction.  Large liver mass unable to be characterized due to lack of contrast, mass vs abscess.    Given bacteremia, favor abscess.  Other possible causes of elevated LFTs include viral and " autoimmune.     -Await ID consult, discuss drainage of possible liver abscess.  -Labs for viral hepatitis and autoimmune disease pending.  -Will follow.      Paolo Mooney MD

## 2019-03-04 ENCOUNTER — APPOINTMENT (OUTPATIENT)
Dept: CT IMAGING | Facility: CLINIC | Age: 69
DRG: 871 | End: 2019-03-04
Attending: INTERNAL MEDICINE
Payer: MEDICARE

## 2019-03-04 LAB
ALBUMIN SERPL-MCNC: 1.9 G/DL (ref 3.4–5)
ALP SERPL-CCNC: 433 U/L (ref 40–150)
ALT SERPL W P-5'-P-CCNC: 170 U/L (ref 0–50)
ANION GAP SERPL CALCULATED.3IONS-SCNC: 11 MMOL/L (ref 3–14)
AST SERPL W P-5'-P-CCNC: 247 U/L (ref 0–45)
BACTERIA SPEC CULT: ABNORMAL
BACTERIA SPEC CULT: NORMAL
BASOPHILS # BLD AUTO: 0 10E9/L (ref 0–0.2)
BASOPHILS NFR BLD AUTO: 0 %
BILIRUB SERPL-MCNC: 3.9 MG/DL (ref 0.2–1.3)
BUN SERPL-MCNC: 78 MG/DL (ref 7–30)
CALCIUM SERPL-MCNC: 8.7 MG/DL (ref 8.5–10.1)
CHLORIDE SERPL-SCNC: 101 MMOL/L (ref 94–109)
CO2 SERPL-SCNC: 22 MMOL/L (ref 20–32)
COPATH REPORT: NORMAL
CREAT SERPL-MCNC: 4.7 MG/DL (ref 0.52–1.04)
D DIMER PPP FEU-MCNC: 10.8 UG/ML FEU (ref 0–0.5)
DIFFERENTIAL METHOD BLD: ABNORMAL
EOSINOPHIL # BLD AUTO: 0 10E9/L (ref 0–0.7)
EOSINOPHIL NFR BLD AUTO: 0 %
ERYTHROCYTE [DISTWIDTH] IN BLOOD BY AUTOMATED COUNT: 13.8 % (ref 10–15)
GFR SERPL CREATININE-BSD FRML MDRD: 9 ML/MIN/{1.73_M2}
GLUCOSE SERPL-MCNC: 98 MG/DL (ref 70–99)
GRAM STN SPEC: NORMAL
HAV IGG SER QL IA: NONREACTIVE
HAV IGM SERPL QL IA: NONREACTIVE
HBV CORE AB SERPL QL IA: NONREACTIVE
HBV SURFACE AB SERPL IA-ACNC: 0 M[IU]/ML
HBV SURFACE AG SERPL QL IA: NONREACTIVE
HCT VFR BLD AUTO: 39.3 % (ref 35–47)
HCV AB SERPL QL IA: NONREACTIVE
HGB BLD-MCNC: 13.9 G/DL (ref 11.7–15.7)
LYMPHOCYTES # BLD AUTO: 2 10E9/L (ref 0.8–5.3)
LYMPHOCYTES NFR BLD AUTO: 9 %
Lab: ABNORMAL
Lab: NORMAL
MCH RBC QN AUTO: 30.3 PG (ref 26.5–33)
MCHC RBC AUTO-ENTMCNC: 35.4 G/DL (ref 31.5–36.5)
MCV RBC AUTO: 86 FL (ref 78–100)
METAMYELOCYTES # BLD: 0.7 10E9/L
METAMYELOCYTES NFR BLD MANUAL: 3 %
MITOCHONDRIA M2 IGG SER-ACNC: 1 U/ML
MONOCYTES # BLD AUTO: 1.1 10E9/L (ref 0–1.3)
MONOCYTES NFR BLD AUTO: 5 %
MYELOCYTES # BLD: 0.2 10E9/L
MYELOCYTES NFR BLD MANUAL: 1 %
NEUTROPHILS # BLD AUTO: 18.1 10E9/L (ref 1.6–8.3)
NEUTROPHILS NFR BLD AUTO: 82 %
PF4 HEPARIN CMPLX AB SER QL: NEGATIVE
PLATELET # BLD AUTO: 101 10E9/L (ref 150–450)
PLATELET # BLD EST: ABNORMAL 10*3/UL
POTASSIUM SERPL-SCNC: 3.9 MMOL/L (ref 3.4–5.3)
PROT SERPL-MCNC: 6.2 G/DL (ref 6.8–8.8)
RBC # BLD AUTO: 4.59 10E12/L (ref 3.8–5.2)
RBC MORPH BLD: ABNORMAL
SODIUM SERPL-SCNC: 134 MMOL/L (ref 133–144)
SPECIMEN SOURCE: ABNORMAL
SPECIMEN SOURCE: NORMAL
SPECIMEN SOURCE: NORMAL
WBC # BLD AUTO: 22.1 10E9/L (ref 4–11)

## 2019-03-04 PROCEDURE — 87075 CULTR BACTERIA EXCEPT BLOOD: CPT | Performed by: INTERNAL MEDICINE

## 2019-03-04 PROCEDURE — 85025 COMPLETE CBC W/AUTO DIFF WBC: CPT | Performed by: INTERNAL MEDICINE

## 2019-03-04 PROCEDURE — 87070 CULTURE OTHR SPECIMN AEROBIC: CPT | Performed by: INTERNAL MEDICINE

## 2019-03-04 PROCEDURE — 88312 SPECIAL STAINS GROUP 1: CPT | Mod: 26 | Performed by: INTERNAL MEDICINE

## 2019-03-04 PROCEDURE — 88173 CYTOPATH EVAL FNA REPORT: CPT | Mod: 26 | Performed by: INTERNAL MEDICINE

## 2019-03-04 PROCEDURE — 88342 IMHCHEM/IMCYTCHM 1ST ANTB: CPT | Performed by: INTERNAL MEDICINE

## 2019-03-04 PROCEDURE — 88342 IMHCHEM/IMCYTCHM 1ST ANTB: CPT | Mod: 26 | Performed by: INTERNAL MEDICINE

## 2019-03-04 PROCEDURE — 88161 CYTOPATH SMEAR OTHER SOURCE: CPT | Mod: 26,XU | Performed by: INTERNAL MEDICINE

## 2019-03-04 PROCEDURE — 86022 PLATELET ANTIBODIES: CPT | Performed by: INTERNAL MEDICINE

## 2019-03-04 PROCEDURE — 36415 COLL VENOUS BLD VENIPUNCTURE: CPT | Performed by: INTERNAL MEDICINE

## 2019-03-04 PROCEDURE — 85379 FIBRIN DEGRADATION QUANT: CPT | Performed by: INTERNAL MEDICINE

## 2019-03-04 PROCEDURE — A9270 NON-COVERED ITEM OR SERVICE: HCPCS | Mod: GY | Performed by: INTERNAL MEDICINE

## 2019-03-04 PROCEDURE — 88307 TISSUE EXAM BY PATHOLOGIST: CPT | Performed by: INTERNAL MEDICINE

## 2019-03-04 PROCEDURE — 25000128 H RX IP 250 OP 636: Performed by: INTERNAL MEDICINE

## 2019-03-04 PROCEDURE — 87015 SPECIMEN INFECT AGNT CONCNTJ: CPT | Performed by: INTERNAL MEDICINE

## 2019-03-04 PROCEDURE — 25000132 ZZH RX MED GY IP 250 OP 250 PS 637: Mod: GY | Performed by: INTERNAL MEDICINE

## 2019-03-04 PROCEDURE — 88342 IMHCHEM/IMCYTCHM 1ST ANTB: CPT | Mod: 26 | Performed by: PATHOLOGY

## 2019-03-04 PROCEDURE — 25000128 H RX IP 250 OP 636: Performed by: RADIOLOGY

## 2019-03-04 PROCEDURE — 88173 CYTOPATH EVAL FNA REPORT: CPT | Performed by: INTERNAL MEDICINE

## 2019-03-04 PROCEDURE — 99153 MOD SED SAME PHYS/QHP EA: CPT

## 2019-03-04 PROCEDURE — 25800030 ZZH RX IP 258 OP 636: Performed by: INTERNAL MEDICINE

## 2019-03-04 PROCEDURE — 40000863 ZZH STATISTIC RADIOLOGY XRAY, US, CT, MAR, NM

## 2019-03-04 PROCEDURE — 87077 CULTURE AEROBIC IDENTIFY: CPT | Performed by: INTERNAL MEDICINE

## 2019-03-04 PROCEDURE — 99232 SBSQ HOSP IP/OBS MODERATE 35: CPT | Performed by: INTERNAL MEDICINE

## 2019-03-04 PROCEDURE — 88172 CYTP DX EVAL FNA 1ST EA SITE: CPT | Performed by: INTERNAL MEDICINE

## 2019-03-04 PROCEDURE — 87186 SC STD MICRODIL/AGAR DIL: CPT | Performed by: INTERNAL MEDICINE

## 2019-03-04 PROCEDURE — 99233 SBSQ HOSP IP/OBS HIGH 50: CPT | Performed by: INTERNAL MEDICINE

## 2019-03-04 PROCEDURE — 87176 TISSUE HOMOGENIZATION CULTR: CPT | Performed by: INTERNAL MEDICINE

## 2019-03-04 PROCEDURE — 12000000 ZZH R&B MED SURG/OB

## 2019-03-04 PROCEDURE — 25000125 ZZHC RX 250: Performed by: INTERNAL MEDICINE

## 2019-03-04 PROCEDURE — 88341 IMHCHEM/IMCYTCHM EA ADD ANTB: CPT | Mod: 26 | Performed by: INTERNAL MEDICINE

## 2019-03-04 PROCEDURE — 88161 CYTOPATH SMEAR OTHER SOURCE: CPT | Performed by: INTERNAL MEDICINE

## 2019-03-04 PROCEDURE — 99152 MOD SED SAME PHYS/QHP 5/>YRS: CPT

## 2019-03-04 PROCEDURE — 88312 SPECIAL STAINS GROUP 1: CPT | Performed by: INTERNAL MEDICINE

## 2019-03-04 PROCEDURE — 88172 CYTP DX EVAL FNA 1ST EA SITE: CPT | Mod: 26 | Performed by: INTERNAL MEDICINE

## 2019-03-04 PROCEDURE — 88307 TISSUE EXAM BY PATHOLOGIST: CPT | Mod: 26 | Performed by: INTERNAL MEDICINE

## 2019-03-04 PROCEDURE — 77012 CT SCAN FOR NEEDLE BIOPSY: CPT

## 2019-03-04 PROCEDURE — 87102 FUNGUS ISOLATION CULTURE: CPT | Performed by: INTERNAL MEDICINE

## 2019-03-04 PROCEDURE — 80053 COMPREHEN METABOLIC PANEL: CPT | Performed by: INTERNAL MEDICINE

## 2019-03-04 PROCEDURE — 87205 SMEAR GRAM STAIN: CPT | Performed by: INTERNAL MEDICINE

## 2019-03-04 RX ORDER — FENTANYL CITRATE 50 UG/ML
25-50 INJECTION, SOLUTION INTRAMUSCULAR; INTRAVENOUS EVERY 5 MIN PRN
Status: DISCONTINUED | OUTPATIENT
Start: 2019-03-04 | End: 2019-03-04

## 2019-03-04 RX ORDER — LIDOCAINE HYDROCHLORIDE 10 MG/ML
10 INJECTION, SOLUTION EPIDURAL; INFILTRATION; INTRACAUDAL; PERINEURAL ONCE
Status: COMPLETED | OUTPATIENT
Start: 2019-03-04 | End: 2019-03-04

## 2019-03-04 RX ORDER — NALOXONE HYDROCHLORIDE 0.4 MG/ML
.1-.4 INJECTION, SOLUTION INTRAMUSCULAR; INTRAVENOUS; SUBCUTANEOUS
Status: DISCONTINUED | OUTPATIENT
Start: 2019-03-04 | End: 2019-03-04

## 2019-03-04 RX ORDER — FLUMAZENIL 0.1 MG/ML
0.2 INJECTION, SOLUTION INTRAVENOUS
Status: DISCONTINUED | OUTPATIENT
Start: 2019-03-04 | End: 2019-03-04

## 2019-03-04 RX ORDER — NICOTINE POLACRILEX 4 MG
15-30 LOZENGE BUCCAL
Status: DISCONTINUED | OUTPATIENT
Start: 2019-03-04 | End: 2019-03-18

## 2019-03-04 RX ORDER — DEXTROSE MONOHYDRATE 25 G/50ML
25-50 INJECTION, SOLUTION INTRAVENOUS
Status: DISCONTINUED | OUTPATIENT
Start: 2019-03-04 | End: 2019-03-18

## 2019-03-04 RX ADMIN — TEMAZEPAM 15 MG: 15 CAPSULE ORAL at 21:10

## 2019-03-04 RX ADMIN — OXYCODONE HYDROCHLORIDE 5 MG: 5 TABLET ORAL at 11:11

## 2019-03-04 RX ADMIN — MIDAZOLAM 1 MG: 1 INJECTION INTRAMUSCULAR; INTRAVENOUS at 13:55

## 2019-03-04 RX ADMIN — SODIUM CHLORIDE: 9 INJECTION, SOLUTION INTRAVENOUS at 20:53

## 2019-03-04 RX ADMIN — FENTANYL CITRATE 50 MCG: 50 INJECTION, SOLUTION INTRAMUSCULAR; INTRAVENOUS at 13:50

## 2019-03-04 RX ADMIN — MIDAZOLAM 1 MG: 1 INJECTION INTRAMUSCULAR; INTRAVENOUS at 13:50

## 2019-03-04 RX ADMIN — SODIUM CHLORIDE: 9 INJECTION, SOLUTION INTRAVENOUS at 06:34

## 2019-03-04 RX ADMIN — OXYCODONE HYDROCHLORIDE 5 MG: 5 TABLET ORAL at 03:02

## 2019-03-04 RX ADMIN — LIDOCAINE HYDROCHLORIDE 10 ML: 10 INJECTION, SOLUTION EPIDURAL; INFILTRATION; INTRACAUDAL; PERINEURAL at 14:01

## 2019-03-04 RX ADMIN — OXYCODONE HYDROCHLORIDE 5 MG: 5 TABLET ORAL at 21:10

## 2019-03-04 RX ADMIN — LATANOPROST 1 DROP: 50 SOLUTION/ DROPS OPHTHALMIC at 20:54

## 2019-03-04 RX ADMIN — CEFTRIAXONE SODIUM 2 G: 2 INJECTION, POWDER, FOR SOLUTION INTRAMUSCULAR; INTRAVENOUS at 11:01

## 2019-03-04 ASSESSMENT — ACTIVITIES OF DAILY LIVING (ADL)
ADLS_ACUITY_SCORE: 13

## 2019-03-04 NOTE — PLAN OF CARE
Patient is alert and oriented X4. Lena signs within defined limits. Tolerating full liquids. SBA with mobility. LS diminished in bases. BS active, passing flatus, +BM. Minimal UOP, MD aware, creatinine remain elevated. Pulses +2, CMS intact. Several teams following. Infectious disease to see patient. Per MD likely have biopsy in 1-2 days.

## 2019-03-04 NOTE — PLAN OF CARE
A&O. VSS. Lung sounds diminished at bases. Bowel sounds hypoactive, gas+. Pt voided moderate amount late this morning, appeared concentrated. IV fluids at 75ml/hr. IS encouraged. Oxycodone for pain x1. Pt went down for biopsy procedure, returned denying pain. Band aid in place with small amount of drainage.

## 2019-03-04 NOTE — CONSULTS
Infectious Diseases Consultation  March 3, 2019  Vanessa Huffman MRN# [id number]   Age: 68 year old YOB: 1950   Date of Admission: 3/1/2019     Reason for consult: I was asked to see this patient for evaluation of Klebsiella pneumoniae bacteremia          Requesting physician               Past Medical History:  Past Medical History:   Diagnosis Date     Arthritis      Depressive disorder      Hypertension      Obesity        Past Surgical History:  Past Surgical History:   Procedure Laterality Date     APPENDECTOMY       ARTHROPLASTY CARPOMETACARPAL (THUMB JOINT) Left 4/6/2018    Procedure: ARTHROPLASTY CARPOMETACARPAL (THUMB JOINT);  LEFT  THUMB CARPOMETACARPAL EXCISIONAL ARTHROPLASTY WITH FASCIA CLARE GRAFT ;  Surgeon: Kalyani King MD;  Location: [facility]     CHOLECYSTECTOMY       GI SURGERY      gastric bypass     GYN SURGERY      abdominal hysterectomy     ORTHOPEDIC SURGERY      bilateral bunionectomies       History of Present Illness:  68 year old female who has a hx of htn and migraines who was sent to the ED after she went to clinic with migraines x 5 days and was found to have SBP in the 70s. She reports she has a hx of migraines and usually gets 1-2 a week. She feels them coming on and will take imitrex. She reports that usually heads them off. However on Tuesday 2/26 she took imitrex and she got a migraine. She waited a few hours and took 2 and then a few hours and took 3. However her headache continued. She also started not being able to keep anything down. She would drink water and vomit it up.      Her  reports she had her stomach stapled and she had chunky vegetable soup on Tuesday. He wonders if a chunk was so large it obstructed her stomach hence her complete inability to tolerate liquids. He reports the same think happened to him.     The both agree she had some subjective fevers and chills but not likely high. She did not take her temperature. Over the week she  "took imitrex, aleve (naproxen), and pepto bismal. However no tylenol.      After she got to the ED she started getting IV fluids. She has had 4L and has just started urinating. She says her mouth still feels dry. She denies coughing, pain except for in her head. Specifically no abdominal pain. No dysuria. She just has not been urinating.      In the ED she had a CT head which was unremarkable. She got an LP but she only had 1 RBC and 1 WBC. Her LFTs were elevated so she had an abdominal US which showed \"Irregularly shaped area of increased echogenicity in the right lobe of the liver, indeterminate but possibly fatty infiltration.\" She has had a cholecystectomy    Had onset RUQ pain on 3/2, no h/o fever chills but noted night sweats and felt hot during night, never checked temp  HA went away today, feels a lot better  On admit could not keep anything down, was vomiting    Hurts in RUQ to take deep breath or roll over or cough  No diarrhea  No UTI sxs    14 point ROS o/w negative    Antibiotics:  Cefepime 500 mg iv q 24 hours    Social History: , lives in [place],   Social History     Tobacco Use     Smoking status: Never Smoker     Smokeless tobacco: Never Used   Substance Use Topics     Alcohol use: Yes     Comment: rare use, social        Family History:  Family History   Problem Relation Age of Onset     Coronary Artery Disease Father      Diabetes Sister      Diabetes Sister      Diabetes Sister       father colon cancer and etoh abuse  Allergies:  This patient is allergic to is allergic to contrast dye; codeine; zolpidem; and diatrizoate.     Physical Exam:  Vitals were reviewed  Blood pressure 100/60, pulse 89, temperature 97.7  F (36.5  C), temperature source Oral, resp. rate 16, height 1.626 m (5' 4\"), weight 99.6 kg (219 lb 9.3 oz), SpO2 96 %.  GEN: alert, non toxic, slow responses, O x 3  HEENT: conj ok  LUNG: CTA  COR: RRR, no murmur  ABDOMEN: soft, tender in RUQ, ND, + BS  R flank  Tender  No " CVAT  EXT: no stigmata IE  No edema  No rash    Data:  Alk phos 410      TB 5  CBC  Recent Labs   Lab 03/03/19  1439 03/03/19  1055 03/02/19  0610 03/01/19  1130   WBC 17.9* 13.6* 15.7* 15.5*   HGB 13.2 12.7 13.3 13.1   * 87* 123* 177       BMP  Recent Labs   Lab 03/03/19  1055 03/02/19  0610 03/01/19  1130   * 133 131*   POTASSIUM 3.7 3.7 3.5   CHLORIDE 99 102 97   DANNA 8.2* 8.3* 8.8   CO2 21 18* 21   BUN 72* 58* 56*   CR 4.43* 3.96* 4.27*   * 110* 106*       CULTURES  Recent Labs   Lab 03/02/19  1043 03/01/19  2148 03/01/19  2145 03/01/19  1409 03/01/19  1355 03/01/19  1333 03/01/19  1319   CULT No growth after 23 hours No growth after 2 days No growth after 2 days Culture negative monitoring continues No growth Cultured on the 1st day of incubation:  Probable  Klebsiella pneumoniae  Susceptibility testing done on previous specimen  *  Critical Value/Significant Value, preliminary result only, called to and read back by  Adonis Monk RN  03/02/19 AA    Culture in progress Cultured on the 1st day of incubation:  Probable  Klebsiella pneumoniae  Preliminary identification and susceptibility testing performed on mixed culture.  Repeat   identification and susceptibility testing in progress, await final report.  *  Critical Value/Significant Value, preliminary result only, called to and read back by  Adonis Monk RN  03/02/19 AA    Culture in progress  (Note)  POSITIVE for ENTEROBACTER SPECIES by [employer] multiplex nucleic acid  test. Final identification and antimicrobial susceptibility testing  will be verified by standard methods.    Specimen tested with Verigene multiplex, gram-negative blood culture  nucleic acid test for the following targets: Acinetobacter sp.,  Citrobacter sp., Enterobacter sp., Proteus sp., E. coli, K.  pneumoniae/oxytoca, P. aeruginosa, and the following resistance  markers: CTXM, KPC, NDM, VIM, IMP and OXA.    Critical  Value/Significant Value called to and read back by  Leslie Huertas RN at 0829 3.2.19. hd     BC 3/1 K.pneumoniae  BC 3/2 NG   Attestation:  I have reviewed today's vital signs, notes, medications, labs and imaging.    CT ABD-Personally reviewed-  FINDINGS:   Abdomen and Pelvis: There is a round low-attenuation lesion in the  dome of the liver approximately 8 cm in diameter, new since 3/19/2018.  This is indeterminate and MRI is recommended for further evaluation of  liver lesion versus unusual-appearing focal fatty infiltration. The  lung bases are clear.     The spleen, adrenal glands and kidneys appear grossly normal within  the limits of a noncontrast exam. Nonvisualized gallbladder. Pancreas  grossly normal. No periaortic or pelvic adenopathy. Postoperative  gastric surgery changes.     Absent uterus. No free fluid. The appendix is not confidently  identified. No acute-appearing bowel abnormality. No aggressive bone  lesions.                                                                      IMPRESSION: Indeterminate 8 cm hypodense lesion in the upper right  lobe of the liver. Recommend MRI for further evaluation.    Mri 3/2-  FINDINGS: A mass lesion in the posterior segment of the right hepatic  lobe superiorly measures 10.3 x 7 cm, and demonstrates heterogeneous  T2 hyperintensity and heterogeneous T1 hypointensity, with no  associated loss of signal on the out of phase images. Evaluation of  this liver lesion is quite limited due to the lack of IV contrast. No  evidence for fatty infiltration of the liver.      The gallbladder is not seen, consistent with history of prior  cholecystectomy. The common duct is dilated, measuring up to 1.8 cm in  diameter. There is mild intrahepatic biliary dilatation. No biliary  filling defects to suggest choledocholithiasis. No evidence for  pancreatic ductal dilatation.      The spleen, pancreas, adrenal glands, and kidneys have unremarkable  noncontrast appearances.  Visualized loops of small bowel and colon are  of normal caliber. No enlarged lymph nodes are identified in the  abdomen. A small amount of ascites about the liver is new since  3/1/2019. A small right pleural effusion is also new since the  previous exam.                                                           IMPRESSION:   1. Intra and extrahepatic biliary dilatation could be related to the  patient's age and postcholecystectomy state. No other cause for  biliary dilatation is identified.  2. Indeterminate 10.3 cm right hepatic lesion. Malignancy cannot be  excluded from this appearance. Evaluation of this lesion is limited by  lack of IV contrast.  3. A small amount of ascites in the right upper quadrant and a small  right pleural effusion are new since the previous exam.    ASSESSMENT:  1. KLEBSIELLA PNEUMONIAE BACTEREMIA-? Enteric source v cholangitis,   2. LIVER MASS/DENSITY-probably hepatic abscess, favor liver abscess  3. PROBABLE HEPATIC ABSCESS  4. ABNORMAL LFTS-C/W Liver abscess, ? Tumor, ? Cholangitis  5. HA, MIGRAINE-?  Triggered by bacteremia    REC  1. Change cefepime to ceftriaxone 2 g iv q 24 hours  2. Liver mass aspiration/bx-if pus drain  3. F/u MICs, LFTS  4. If liver abscess, will need extended antibiotic course  D/w     Thanks for asking me to see her!    VIET DAY M.D.  O:[phone]   B:[phone]

## 2019-03-04 NOTE — PROGRESS NOTES
"GASTROENTEROLOGY PROGRESS NOTE     SUBJECTIVE: Thankful headache has resolved. Continues to have severe RUQ pain with certain movements/deep breaths.     GI ROS: Denies nausea, vomiting, diarrhea, constipation, melena, or rectal bleeding.     OBJECTIVE:  /79 (BP Location: Left arm)   Pulse 108   Temp 97.4  F (36.3  C) (Oral)   Resp 16   Ht 1.626 m (5' 4\")   Wt 99.6 kg (219 lb 9.3 oz)   SpO2 95%   BMI 37.69 kg/m    Temp (24hrs), Av.6  F (36.4  C), Min:97.5  F (36.4  C), Max:97.7  F (36.5  C)    Patient Vitals for the past 72 hrs:   Weight   19 1845 99.6 kg (219 lb 9.3 oz)       Intake/Output Summary (Last 24 hours) at 3/3/2019 1742  Last data filed at 3/3/2019 1722  Gross per 24 hour   Intake 3552 ml   Output 1325 ml   Net 2227 ml        General Appearance: alert, oriented x3, no acute distress.  Eyes: PERRL, sclera anicteric.  GI: Soft, NABS, mild RUQ tenderness without rebound.  Neuro: no asterixis.      Labs:     Recent Labs   Lab Test 19  0849 19  1439 19  1055   WBC 22.1* 17.9* 13.6*   HGB 13.9 13.2 12.7   MCV 86 87 87   * 109* 87*   INR  --  1.01  --      Recent Labs   Lab Test 19  0806 19  1055 19  0610   POTASSIUM 3.9 3.7 3.7   CHLORIDE 101 99 102   CO2 22 21 18*   BUN 78* 72* 58*   ANIONGAP 11 12 13     Recent Labs   Lab Test 19  0806 19  1925 19  1055 19  0610 19  1355  18  0645   ALBUMIN 1.9*  --  1.8* 2.1*  --    < > 4.1   BILITOTAL 3.9*  --  3.6* 4.0*  --    < > 0.7   *  --  209* 258*  --    < > 48   *  --  352* 611*  --    < > 44   PROTEIN  --  30*  --   --  100*  --   --    LIPASE  --   --   --   --   --   --  133    < > = values in this interval not displayed.           Assessment and Plan: 68 year old female with elevated LFTs in the setting of Klebsiella  bacteremia and RUQ pain.  MRCP showed biliary dilation but no evidence of obstruction.  Large liver mass unable to be characterized " due to lack of contrast, mass vs abscess.  Given bacteremia, favor abscess.  Other possible causes of elevated LFTs include viral and autoimmune.     -ID consulted, drainage/bx of liver mass planned, abx per ID   -Labs for viral hepatitis and autoimmune disease pending (Hep A/B, AMA, JUSTUS, immunoglobulins)   -GI following       Geovanna Thompson CNP  [facility]  Cell: [phone]

## 2019-03-04 NOTE — PROGRESS NOTES
Service Date: 03/04/2019      SUBJECTIVE:  Mrs. Huffman is a 68-year-old female who has not been feeling well for the last 6 days.  She was brought to the emergency room because of weakness, headache and back pain.  In the ER, she had multiple investigations done on 03/01/2019.     -CBC revealed leukocytosis.  No anemia or thrombocytopenia.     -CMP revealed elevated liver function test, creatinine and CRP.     -Cultures have come back positive for Klebsiella pneumoniae.      Because of elevated liver function tests, she had multiple imaging studies done.  CT abdomen and pelvis revealed indeterminate 8 cm hypodense lesion in the right lobe of the liver.  MRI abdomen reveals a 10.3 cm right hepatic lesion and intra and extrahepatic biliary dilatation.      Hematology was consulted because of thrombocytopenia.  In the hospital, her platelet decreased to 87.  Multiple investigations were done.  Peripheral blood smear does not reveal any schistocytes.  Viral studies are all negative.  HIT is negative.      Patient is on IV antibiotics for possible liver abscess.  CT-guided biopsy/aspiration of the liver mass is planned for today.      I met with the patient.  Multiple family members were there.  Patient feels weak.  She has some discomfort in the abdomen.  She is not having any high-grade fever or chills.      PHYSICAL EXAMINATION:   GENERAL:  She is alert, oriented x 3.   VITAL SIGNS:  Reviewed.    Rest of the systems not examined.      LABORATORY DATA:  Reviewed.      ASSESSMENT:    1. A 68-year-old female with liver mass.  It could be malignancy or abscess.  Biopsy/aspiration planned for today.   2.  Thrombocytopenia secondary to sepsis.    3.  Klebsiella pneumoniae sepsis.      PLAN:   1.  I had a long discussion with the patient and family members.  Labs and scans were reviewed.  Pictures of CT scan and MRIs showed to them.  Discussed regarding right liver mass.  This could be an abscess or malignancy.   Biopsy/aspiration planned for today.   2.  Discussed regarding thrombocytopenia.  This will go along with sepsis.  There is no TTP.  We will monitor CBC.  I expect her platelet count to improve as the sepsis improves.   3.  Family had multiple questions, which were all answered.  Depending on the liver biopsy report, we will decide regarding further management.      TOTAL FACE-TO-FACE TIME SPENT:  30 minutes, more than 50% of the time spent in counseling and coordination of care.         MARINO COLLINS MD             D: 2019   T: 2019   MT: TERRY      Name:     MASSIMO FLOWERS   MRN:      [id number]        Account:      [id number]   :      1950           Service Date: 2019      Document: [id number]

## 2019-03-04 NOTE — PROGRESS NOTES
[facility]    Hospitalist Progress Note    Assessment & Plan   Vanessa Huffman is a 68 year old female admitted on 3/1/2019. She is a 68 year old with a hx of htn and migraines who presents with ana cristina, elevated liver tests, after 3 days of headaches, nausea and vomiting, and subjective fevers.    sepsis   Gram negative bacteremia- klebsiella   Urine culture negative   Liver mass -? Abscess   - blood pressure stable   -continue antibiotics  -started on zosyn 3/1 ,vanco 3/1 stopped after 2 doses ,will stop zosyn and start on cefepime 3/3. Antibiotics  Currently at rocephin 2 gm daily , all others stopped .  -urine culture and repeat blood cultures negative , esr and c reactive protein  Is very high ,procal was positive as well.  -bacteremia with klebsiella , source ? Liver abscess, CT abdomen does not show any other concerning areas, plan to do CT guided drainage today, orders placed.  -wbc trending up, off note she had a CT abdomen last year same time and there were no mass noted in the liver than which indicates fast growth and possibility of a infectious cause.  - cholangitis is a differential due to elevated alp , she has a large liver mass possibly causing ductal obstruction as well   -mri liver didn't give any further info on the mass due to lack of contrast   -GI following   -appreciate consultants inputs     Acute Renal Failure- likely 2/2 hypovolemia.   Thrombocytopenia   When patient  Was admitted on 3/1 her renal function had gone from 0.7 creatinine to 4+, it was assumed due to sepsis, prerenal etc and the renal function had shown improvement 3/2 with hydration, urine sodium was 35 with hydration , 3/3 renal function worsening and urine culture negative while Urine Analysis  Shows blood moderate .  - renal function worsening with associated thrombocytopenia, her mental status is stable   -she was on heparin, stopped and HIT ab  send , pending results   -she had received 2 doses of vanco  following admission, will stop zosyn for now and place on cefepime.now on rocephin   -appreciate nephrology input , renal function is worsening , ldh elevated along with ddimer and fibrin , platelet count stable , c3/c4 complement pending, us shows blood but less rbc ,alp still elevated , will repeat liver fucntion test  In am with direct and indirect bili .  - fibrin is high with elevated d dimer , DIC is not a concern as platelets also remain stable. The smear didn't show any sign of microangiopathy /schiztocytes as per hematology.     liver mass per CT abdomen  elevated liver function test    - patient  Is status post cholecystectomy, ultrasound does not show any cbd stones, liver mass noted in CT  -mri abdomen shows a liver mass 10.3 cm ,requested oncology input   -liver fucntion test  Elevated but trending down, ALP is high .  -cbd is 1.8 cm, status post cholecystectomy in the past  -requested IR for liver biopsy   Gastric bypass h/o    DVT Prophylaxis: heparin stopped due to low platelets, will use scds for now   Code Status: Full Code     Disposition: Expected discharge in 2-3 days     Regine Nina MD  Text Page   (7am to 6pm)    Interval History   Pain worse with movement and cough, no new issues, vitals stable, renal function is worse ,on iv fluids.    -Data reviewed today: I reviewed all new labs and imaging results over the last 24 hours.    Physical Exam   Temp: 97.4  F (36.3  C) Temp src: Oral BP: 134/79 Pulse: 108 Heart Rate: 89 Resp: 16 SpO2: 95 % O2 Device: None (Room air)    Vitals:    03/01/19 1845   Weight: 99.6 kg (219 lb 9.3 oz)     Vital Signs with Ranges  Temp:  [97.4  F (36.3  C)-97.7  F (36.5  C)] 97.4  F (36.3  C)  Pulse:  [] 108  Heart Rate:  [] 89  Resp:  [16] 16  BP: ()/(58-79) 134/79  SpO2:  [95 %-96 %] 95 %  I/O last 3 completed shifts:  In: 1417 [P.O.:480; I.V.:937]  Out: 650 [Urine:650]    Constitutional:Awake, alert, cooperative, no apparent  distress  Respiratory: Clear to auscultation bilaterally, no crackles or wheezing  Cardiovascular: Regular rate and rhythm, normal S1 and S2, and no murmur noted  GI: Normal bowel sounds, soft, non-distended, tender right upper quadrant +  Skin/Integumen: No rashes, no cyanosis, no edema  Neuro : moving all 4 extremities, no focal deficit noted     Medications     sodium chloride 75 mL/hr at 03/04/19 0634       cefTRIAXone  2 g Intravenous Q24H     latanoprost  1 drop Both Eyes At Bedtime       Data   Recent Labs   Lab 03/04/19  0849 03/04/19  0806 03/03/19  1439 03/03/19  1055 03/02/19  0610   WBC 22.1*  --  17.9* 13.6* 15.7*   HGB 13.9  --  13.2 12.7 13.3   MCV 86  --  87 87 87   *  --  109* 87* 123*   INR  --   --  1.01  --   --    NA  --  134  --  132* 133   POTASSIUM  --  3.9  --  3.7 3.7   CHLORIDE  --  101  --  99 102   CO2  --  22  --  21 18*   BUN  --  78*  --  72* 58*   CR  --  4.70*  --  4.43* 3.96*   ANIONGAP  --  11  --  12 13   DANNA  --  8.7  --  8.2* 8.3*   GLC  --  98  --  109* 110*   ALBUMIN  --  1.9*  --  1.8* 2.1*   PROTTOTAL  --  6.2*  --  5.9* 5.9*   BILITOTAL  --  3.9*  --  3.6* 4.0*   ALKPHOS  --  433*  --  356* 397*   ALT  --  170*  --  209* 258*   AST  --  247*  --  352* 611*     Recent Labs   Lab 03/04/19  0806 03/03/19  1055 03/02/19  0610 03/01/19  1130   GLC 98 109* 110* 106*       Imaging:   No results found for this or any previous visit (from the past 24 hour(s)).

## 2019-03-04 NOTE — PROGRESS NOTES
[facility]    Infectious Disease Progress Note    Date of Service (when I saw the patient): 03/04/2019     Assessment & Plan   Vanessa Huffman is a 68 year old female who was admitted on 3/1/2019.     ASSESSMENT:  1. KLEBSIELLA PNEUMONIAE BACTEREMIA-? Enteric source v cholangitis,   2. LIVER MASS/DENSITY-probably hepatic abscess  3. PROBABLE HEPATIC ABSCESS  4. ABNORMAL LFTS-C/W Liver abscess, ? Tumor, ? Cholangitis  5. HA, MIGRAINE-?  Triggered by bacteremia     REC  1. Ceftriaxone 2 g iv q 24 hours  2. Liver mass aspiration/bx-if pus drain  3. F/u MICs, LFTS  4. If liver abscess, will need extended antibiotic course  D/w  present in the room.          Meghan Guadarrama MD    Interval History   WBC up   Afebrile       Physical Exam   Temp: 97.4  F (36.3  C) Temp src: Oral BP: 134/79 Pulse: 108 Heart Rate: 89 Resp: 16 SpO2: 95 % O2 Device: None (Room air)    Vitals:    03/01/19 1845   Weight: 99.6 kg (219 lb 9.3 oz)     Vital Signs with Ranges  Temp:  [97.4  F (36.3  C)-97.7  F (36.5  C)] 97.4  F (36.3  C)  Pulse:  [] 108  Heart Rate:  [] 89  Resp:  [16] 16  BP: ()/(58-79) 134/79  SpO2:  [95 %-96 %] 95 %    Constitutional: Awake, alert, cooperative, no apparent distress  Lungs: Clear to auscultation bilaterally, no crackles or wheezing  Cardiovascular: Regular rate and rhythm, normal S1 and S2, and no murmur noted  Abdomen: Normal bowel sounds, soft, non-distended, non-tender  Skin: No rashes, no cyanosis, no edema  Other:    Medications     sodium chloride 75 mL/hr at 03/04/19 0634       cefTRIAXone  2 g Intravenous Q24H     latanoprost  1 drop Both Eyes At Bedtime       Data   All microbiology laboratory data reviewed.  Recent Labs   Lab Test 03/04/19  0849 03/03/19  1439 03/03/19  1055   WBC 22.1* 17.9* 13.6*   HGB 13.9 13.2 12.7   HCT 39.3 37.9 36.0   MCV 86 87 87   * 109* 87*     Recent Labs   Lab Test 03/04/19  0806 03/03/19  1055 03/02/19  0610   CR 4.70* 4.43*  3.96*     Recent Labs   Lab Test 03/01/19  1130   SED 79*     Recent Labs   Lab Test 03/03/19  1925 03/02/19  1043 03/01/19  2148 03/01/19  2145 03/01/19  1409 03/01/19  1355 03/01/19  1333 03/01/19  1319   CULT Canceled, Test credited  Duplicate request    PENDING No growth after 2 days No growth after 3 days No growth after 3 days Culture negative monitoring continues No growth Cultured on the 1st day of incubation:  Klebsiella pneumoniae  Susceptibility testing done on previous specimen  *  Critical Value/Significant Value, preliminary result only, called to and read back by  Adonis Monk RN  03/02/19 AA    Cultured on the 1st day of incubation:  Strain 2  Klebsiella pneumoniae  Susceptibility testing done on previous specimen  * Cultured on the 1st day of incubation:  Klebsiella pneumoniae  Preliminary identification and susceptibility testing performed on mixed culture.  Repeat   identification and susceptibility testing in progress, await final report.  Susceptibility testing in progress  *  Critical Value/Significant Value, preliminary result only, called to and read back by  Adonis Monk RN  03/02/19 AA    Cultured on the 1st day of incubation:  Strain 2  Klebsiella pneumoniae  Susceptibility testing in progress  *  (Note)  POSITIVE for ENTEROBACTER SPECIES by Appwappigene multiplex nucleic acid  test. Final identification and antimicrobial susceptibility testing  will be verified by standard methods.    Specimen tested with Verigene multiplex, gram-negative blood culture  nucleic acid test for the following targets: Acinetobacter sp.,  Citrobacter sp., Enterobacter sp., Proteus sp., E. coli, K.  pneumoniae/oxytoca, P. aeruginosa, and the following resistance  markers: CTXM, KPC, NDM, VIM, IMP and OXA.    Critical Value/Significant Value called to and read back by  Leslie Huertas RN at 0829 3.2.19. hd

## 2019-03-04 NOTE — PROGRESS NOTES
CT guided liver bx/abscess drain complete.  Several specimens were taken for path.  Small amount of fluid drained and sent to path.   Pt tolerated procedure well, sleepy, denies any new pain from procedure.  VSS at baseline.  Band aid to site CDI.    Transported back to room at 1420, report called to Swapna Dixon.  Sedation for procedure: Versed 2 mg and Fentanyl 50 mcg.

## 2019-03-04 NOTE — PLAN OF CARE
PP A&O X4. VSS on RA. Tolerating full liquids, can have regular diet. C/o pain in right mid back. Took oxy with adequate pain relief. SBA with mobility. LS diminished in bases. BS active, passing flatus. Minimal UOP. Elevated creatinine. CMS +.  ID following, abx changed. Needs lever mass biopsy.

## 2019-03-04 NOTE — CONSULTS
Nephrology Initial Consult  March 4, 2019      Vanessa Huffman MRN:[id number] YOB: 1950  Date of Admission:3/1/2019  Primary care provider: Emily Najera  Requesting physician: Leslie Werner MD    ASSESSMENT AND RECOMMENDATIONS:   1. ELISE -   Likely ATN given clinical course of septic/ hypovolemic  shock, low PO intake, concommittant use of lisinopril , multiple granular casts in urine.   - nonoliguric . Adequately fluid resuscitated.   - continue to hold lisinopril .   - Avoid IV contrast until kidney function improves, unless an emergency . S.Cr rise starting to slow down and hopefully will see signs of recovery soon.   - Dose meds for eGFR of ~10    2 HTN - BP okay   Continue to hold lisinopril     3. Septic shock / Klebsiella bacteremia / ? Liver abscess - management per ID/ Primary team. Planned for possible drainage.    4. Lytes okay . Mild acidosis with ELISE.     Recommendations were communicated to primary team via this note.     Magalys Hernandez MD  [facility] - Nephrology   [phone]      REASON FOR CONSULT: ELISE     HISTORY OF PRESENT ILLNESS:  Vanessa Huffman is a 68 year old  With hx of HTN, migraines who presented to ED on 3/1 with migraines X5 days , nausea , multiple episodes of vomiting, inability to keep food/ fluid down and was found to be profoundly hypotensive ( SBP 70s) and with several lab anormalities including ELISE ( S.Cr 4.2 <- 0.7 a year ago ) , elevated LFTs and total bili, mildly elevated LDH, leukocytosis, thrombocytopenia.     Further workup revealed   Klebseilla pneumoniae bacteremia ( 3/1) , repeat c/s have been negative - enteric source Vs cholangitis?   Unremarkable CT Head and LP   Peripheral smear, hapto and hematology analysis - not c/w TTP   Indeterminate 10.3 cm right hepatic lesion on MR abd -? Liver abscess    Renal work up =      3/21/2018 09:05 3/1/2019 11:30 3/2/2019 06:10 3/3/2019 10:55 3/4/2019 08:06   Creatinine 0.70 4.27 (H) 3.96 (H)  4.43 (H) 4.70 (H)     UA - trace protein , no hematuria , plenty of granular casts.   CT abd / MR = kidneys looks normal .   Resuscitated with 4 L IVF in ER . Non oliguric. Lisinopril on hold.     Reports pain in RUQ with moving, deep breathing. No chills/rigors. NO dyspnea. No new rash or joing pain.     PAST MEDICAL HISTORY:  Reviewed with patient on 03/04/2019     Past Medical History:   Diagnosis Date     Arthritis      Depressive disorder      Hypertension      Obesity        Past Surgical History:   Procedure Laterality Date     APPENDECTOMY       ARTHROPLASTY CARPOMETACARPAL (THUMB JOINT) Left 4/6/2018    Procedure: ARTHROPLASTY CARPOMETACARPAL (THUMB JOINT);  LEFT  THUMB CARPOMETACARPAL EXCISIONAL ARTHROPLASTY WITH FASCIA CLARE GRAFT ;  Surgeon: Kalyani King MD;  Location: [facility]     CHOLECYSTECTOMY       GI SURGERY      gastric bypass     GYN SURGERY      abdominal hysterectomy     ORTHOPEDIC SURGERY      bilateral bunionectomies        MEDICATIONS:  PTA Meds  Prior to Admission medications    Medication Sig Last Dose Taking? Auth Provider   amLODIPine (NORVASC) 2.5 MG tablet Take 1 tablet (2.5 mg) by mouth daily 3/1/2019 at Unknown time Yes Candelario Zelaya MD   CYCLOBENZAPRINE HCL PO Take 10 mg by mouth At Bedtime 2/28/2019 at Unknown time Yes Unknown, Entered By History   hydrOXYzine (ATARAX) 10 MG tablet Take 1 10-mg tablet every 6 hours as needed for muscle relaxation and to supplement your pain medication. prn Yes Magda Plasencia PA-C   latanoprost (XALATAN) 0.005 % ophthalmic solution Place 1 drop into both eyes At Bedtime 2/28/2019 at Unknown time Yes Unknown, Entered By History   LISINOPRIL PO Take 10 mg by mouth daily 3/1/2019 at Unknown time Yes Unknown, Entered By History   loperamide (IMODIUM) 2 MG capsule Take 2 mg by mouth 4 times daily as needed for diarrhea prn Yes Unknown, Entered By History   nystatin (MYCOSTATIN) 620122 UNIT/GM POWD Apply topically 2 times daily  as needed (affected area) prn Yes Unknown, Entered By History   oxyCODONE-acetaminophen (PERCOCET) 5-325 MG per tablet Take 1-2 tablets every 4-6 hours for pain if needed. prn Yes Magda Plasencia PA-C   oxyCODONE-acetaminophen (PERCOCET) 5-325 MG per tablet Take 0.5 tablets by mouth every morning  3/1/2019 at Unknown time Yes Unknown, Entered By History   oxyCODONE-acetaminophen (PERCOCET) 5-325 MG per tablet Take 1 tablet by mouth every evening  2/28/2019 at Unknown time Yes Unknown, Entered By History   SIMVASTATIN PO Take 40 mg by mouth At Bedtime 2/28/2019 at Unknown time Yes Unknown, Entered By History   SUMATRIPTAN SUCCINATE PO Take 50 mg by mouth every 2 hours as needed for migraine (max of 200mg q 24hr) Past Week at Unknown time Yes Unknown, Entered By History   TEMAZEPAM PO Take 15 mg by mouth nightly as needed for sleep prn Yes Unknown, Entered By History   TRAMADOL HCL PO Take 50 mg by mouth 3 times daily as needed for moderate to severe pain prn Yes Unknown, Entered By History   MEDICATION INSTRUCTION Hold Lisinopril and Norvasc if your SBP is <110 and DBP<70   Candelario Zelaya MD      Current Meds    cefTRIAXone  2 g Intravenous Q24H     latanoprost  1 drop Both Eyes At Bedtime     Infusion Meds    sodium chloride 75 mL/hr at 03/04/19 0634       ALLERGIES:    Allergies   Allergen Reactions     Contrast Dye Shortness Of Breath     Codeine      Zolpidem Other (See Comments)     Patient reports sleep walking.     Diatrizoate Itching     itchy throat that makes her want to cough       REVIEW OF SYSTEMS:  A comprehensive of systems was negative except as noted above.    SOCIAL HISTORY:   Social History     Socioeconomic History     Marital status:      Spouse name: Not on file     Number of children: Not on file     Years of education: Not on file     Highest education level: Not on file   Occupational History     Not on file   Social Needs     Financial resource strain: Not on file      "Food insecurity:     Worry: Not on file     Inability: Not on file     Transportation needs:     Medical: Not on file     Non-medical: Not on file   Tobacco Use     Smoking status: Never Smoker     Smokeless tobacco: Never Used   Substance and Sexual Activity     Alcohol use: Yes     Comment: rare use, social     Drug use: No     Sexual activity: Not on file   Lifestyle     Physical activity:     Days per week: Not on file     Minutes per session: Not on file     Stress: Not on file   Relationships     Social connections:     Talks on phone: Not on file     Gets together: Not on file     Attends Hoahaoism service: Not on file     Active member of club or organization: Not on file     Attends meetings of clubs or organizations: Not on file     Relationship status: Not on file     Intimate partner violence:     Fear of current or ex partner: Not on file     Emotionally abused: Not on file     Physically abused: Not on file     Forced sexual activity: Not on file   Other Topics Concern     Parent/sibling w/ CABG, MI or angioplasty before 65F 55M? Not Asked   Social History Narrative     Not on file         FAMILY MEDICAL HISTORY:   Family History   Problem Relation Age of Onset     Coronary Artery Disease Father      Diabetes Sister      Diabetes Sister      Diabetes Sister        PHYSICAL EXAM:   Temp  Av.6  F (36.4  C)  Min: 97.4  F (36.3  C)  Max: 97.7  F (36.5  C)      Pulse  Av.1  Min: 66  Max: 118 Resp  Av.7  Min: 12  Max: 22  SpO2  Av %  Min: 92 %  Max: 100 %       /79 (BP Location: Left arm)   Pulse 108   Temp 97.4  F (36.3  C) (Oral)   Resp 16   Ht 1.626 m (5' 4\")   Wt 99.6 kg (219 lb 9.3 oz)   SpO2 95%   BMI 37.69 kg/m     Date 19 07 - 19 0659   Shift 7204-1866 4425-9072 4401-7072 24 Hour Total   INTAKE   P.O. 240   240   Shift Total(mL/kg) 240(2.41)   240(2.41)   OUTPUT   Shift Total(mL/kg)       Weight (kg) 99.6 99.6 99.6 99.6      Admit Weight: 99.6 kg (219 lb " 9.3 oz)     GENERAL APPEARANCE: no distress,  awake  EYES: no scleral icterus, pupils equal  HENT: NC/AT,  mouth  without ulcers or lesions  Lymphatics: no cervical or supraclavicular LAD  Endo: no moon facies, no goiter  Pulmonary: lungs clear to auscultation with equal breath sounds bilaterally, no clubbing  CV: regular rhythm, normal rate, no rub   - JVP -   - Edema-  GI: soft, mild tenderness in RUQ  MS: no evidence of inflammation in joints, no muscle tenderness  : no carias  SKIN: no rash, warm, dry, no cyanosis  NEURO: face symmetric, no asterixis     LABS:   CMP  Recent Labs   Lab 03/04/19  0806 03/03/19  1055 03/02/19  0610 03/01/19  1130    132* 133 131*   POTASSIUM 3.9 3.7 3.7 3.5   CHLORIDE 101 99 102 97   CO2 22 21 18* 21   ANIONGAP 11 12 13 13   GLC 98 109* 110* 106*   BUN 78* 72* 58* 56*   CR 4.70* 4.43* 3.96* 4.27*   GFRESTIMATED 9* 10* 11* 10*   GFRESTBLACK 10* 11* 13* 12*   DANNA 8.7 8.2* 8.3* 8.8   PROTTOTAL 6.2* 5.9* 5.9* 6.9   ALBUMIN 1.9* 1.8* 2.1* 2.4*   BILITOTAL 3.9* 3.6* 4.0* 5.0*   ALKPHOS 433* 356* 397* 410*   * 352* 611* 417*   * 209* 258* 213*     CBC  Recent Labs   Lab 03/04/19  0849 03/03/19  1439 03/03/19  1055 03/02/19  0610   HGB 13.9 13.2 12.7 13.3   WBC 22.1* 17.9* 13.6* 15.7*   RBC 4.59 4.37 4.15 4.45   HCT 39.3 37.9 36.0 38.5   MCV 86 87 87 87   MCH 30.3 30.2 30.6 29.9   MCHC 35.4 34.8 35.3 34.5   RDW 13.8 13.4 13.4 13.0   * 109* 87* 123*     INR  Recent Labs   Lab 03/03/19  1439   INR 1.01   PTT 21*     ABGNo lab results found in last 7 days.   URINE STUDIES  Recent Labs   Lab Test 03/03/19  1925 03/01/19  1355   COLOR Yellow Dark Brown   APPEARANCE Slightly Cloudy Cloudy   URINEGLC Negative 70*   URINEBILI Small* Moderate*   URINEKETONE Negative Negative   SG 1.013 1.017   UBLD Trace* Moderate*   URINEPH 5.5 5.5   PROTEIN 30* 100*   NITRITE Negative Negative   LEUKEST Small* Negative   RBCU 0 8*   WBCU 16* 16*       IMAGING:  Personally reviewed CT  scan images. B/l kidneys look WNL. Hypodensity in liver as above.     Magalys Hernandez MD

## 2019-03-04 NOTE — PROGRESS NOTES
RADIOLOGY PROCEDURE NOTE  Patient name: Vanessa Huffman  MRN: [id number]  : 1950    Pre-procedure diagnosis: Abscess vs mass  Post-procedure diagnosis: Same    Procedure Date/Time: 2019  2:17 PM  Procedure: Biopsy.  Estimated blood loss: None  Specimen(s) collected with description: Core biopsy samples.  The patient tolerated the procedure well with no immediate complications.  I determined this patient to be an appropriate candidate for the planned sedation and procedure and reassessed the patient IMMEDIATELY PRIOR to sedation and procedure.    See imaging dictation for procedural details and findings.    Provider name: Shayan Esparza  Assistant(s):None

## 2019-03-05 LAB
ALBUMIN SERPL-MCNC: 1.6 G/DL (ref 3.4–5)
ALP SERPL-CCNC: 460 U/L (ref 40–150)
ALT SERPL W P-5'-P-CCNC: 125 U/L (ref 0–50)
ANA SER QL IF: NEGATIVE
ANION GAP SERPL CALCULATED.3IONS-SCNC: 13 MMOL/L (ref 3–14)
AST SERPL W P-5'-P-CCNC: 207 U/L (ref 0–45)
BACTERIA SPEC CULT: ABNORMAL
BACTERIA SPEC CULT: NORMAL
BILIRUB DIRECT SERPL-MCNC: 4 MG/DL (ref 0–0.2)
BILIRUB SERPL-MCNC: 5.5 MG/DL (ref 0.2–1.3)
BUN SERPL-MCNC: 82 MG/DL (ref 7–30)
C3 SERPL-MCNC: 138 MG/DL (ref 76–169)
C4 SERPL-MCNC: 33 MG/DL (ref 15–50)
CALCIUM SERPL-MCNC: 8.1 MG/DL (ref 8.5–10.1)
CHLORIDE SERPL-SCNC: 102 MMOL/L (ref 94–109)
CO2 SERPL-SCNC: 18 MMOL/L (ref 20–32)
CREAT SERPL-MCNC: 4.61 MG/DL (ref 0.52–1.04)
ERYTHROCYTE [DISTWIDTH] IN BLOOD BY AUTOMATED COUNT: 14 % (ref 10–15)
GFR SERPL CREATININE-BSD FRML MDRD: 9 ML/MIN/{1.73_M2}
GLUCOSE SERPL-MCNC: 83 MG/DL (ref 70–99)
HAPTOGLOB SERPL-MCNC: 446 MG/DL (ref 35–175)
HCT VFR BLD AUTO: 34.7 % (ref 35–47)
HEMOSIDERIN UR QL MICRO: NEGATIVE
HGB BLD-MCNC: 12.2 G/DL (ref 11.7–15.7)
IGA SERPL-MCNC: 113 MG/DL (ref 70–380)
IGG SERPL-MCNC: 504 MG/DL (ref 695–1620)
IGM SERPL-MCNC: 104 MG/DL (ref 60–265)
LACTATE BLD-SCNC: 1 MMOL/L (ref 0.7–2)
Lab: ABNORMAL
Lab: NORMAL
MCH RBC QN AUTO: 30.1 PG (ref 26.5–33)
MCHC RBC AUTO-ENTMCNC: 35.2 G/DL (ref 31.5–36.5)
MCV RBC AUTO: 86 FL (ref 78–100)
PHOSPHATE SERPL-MCNC: 3.3 MG/DL (ref 2.5–4.5)
PLATELET # BLD AUTO: 99 10E9/L (ref 150–450)
POTASSIUM SERPL-SCNC: 3.6 MMOL/L (ref 3.4–5.3)
PROT SERPL-MCNC: 5.5 G/DL (ref 6.8–8.8)
RBC # BLD AUTO: 4.05 10E12/L (ref 3.8–5.2)
SODIUM SERPL-SCNC: 133 MMOL/L (ref 133–144)
SPECIMEN SOURCE: ABNORMAL
SPECIMEN SOURCE: NORMAL
WBC # BLD AUTO: 22.1 10E9/L (ref 4–11)

## 2019-03-05 PROCEDURE — 25000128 H RX IP 250 OP 636: Performed by: INTERNAL MEDICINE

## 2019-03-05 PROCEDURE — 99232 SBSQ HOSP IP/OBS MODERATE 35: CPT | Performed by: INTERNAL MEDICINE

## 2019-03-05 PROCEDURE — 99207 ZZC CDG-MDM COMPONENT: MEETS LOW - DOWN CODED: CPT | Performed by: INTERNAL MEDICINE

## 2019-03-05 PROCEDURE — 80053 COMPREHEN METABOLIC PANEL: CPT | Performed by: INTERNAL MEDICINE

## 2019-03-05 PROCEDURE — 84100 ASSAY OF PHOSPHORUS: CPT | Performed by: INTERNAL MEDICINE

## 2019-03-05 PROCEDURE — 25800030 ZZH RX IP 258 OP 636: Performed by: INTERNAL MEDICINE

## 2019-03-05 PROCEDURE — A9270 NON-COVERED ITEM OR SERVICE: HCPCS | Mod: GY | Performed by: INTERNAL MEDICINE

## 2019-03-05 PROCEDURE — 36415 COLL VENOUS BLD VENIPUNCTURE: CPT | Performed by: INTERNAL MEDICINE

## 2019-03-05 PROCEDURE — 12000000 ZZH R&B MED SURG/OB

## 2019-03-05 PROCEDURE — 85027 COMPLETE CBC AUTOMATED: CPT | Performed by: INTERNAL MEDICINE

## 2019-03-05 PROCEDURE — 25000132 ZZH RX MED GY IP 250 OP 250 PS 637: Mod: GY | Performed by: INTERNAL MEDICINE

## 2019-03-05 PROCEDURE — 83605 ASSAY OF LACTIC ACID: CPT | Performed by: INTERNAL MEDICINE

## 2019-03-05 PROCEDURE — 82248 BILIRUBIN DIRECT: CPT | Performed by: INTERNAL MEDICINE

## 2019-03-05 RX ADMIN — SODIUM CHLORIDE: 9 INJECTION, SOLUTION INTRAVENOUS at 09:04

## 2019-03-05 RX ADMIN — LATANOPROST 1 DROP: 50 SOLUTION/ DROPS OPHTHALMIC at 22:16

## 2019-03-05 RX ADMIN — CEFTRIAXONE SODIUM 2 G: 2 INJECTION, POWDER, FOR SOLUTION INTRAMUSCULAR; INTRAVENOUS at 09:03

## 2019-03-05 RX ADMIN — OXYCODONE HYDROCHLORIDE 5 MG: 5 TABLET ORAL at 23:39

## 2019-03-05 RX ADMIN — Medication 1 MG: at 23:39

## 2019-03-05 RX ADMIN — OXYCODONE HYDROCHLORIDE 5 MG: 5 TABLET ORAL at 17:32

## 2019-03-05 ASSESSMENT — ACTIVITIES OF DAILY LIVING (ADL)
ADLS_ACUITY_SCORE: 13
ADLS_ACUITY_SCORE: 14
ADLS_ACUITY_SCORE: 13
ADLS_ACUITY_SCORE: 14
ADLS_ACUITY_SCORE: 13
ADLS_ACUITY_SCORE: 13

## 2019-03-05 NOTE — PLAN OF CARE
VSS.  Pt denies pain.  Pt c/o generalized weakness and fatigue.  Up to the chair x 2 this shift and pt ambulating to the bathroom with staff.  A/O x 4 and pleasant.  Lungs diminished in the (R) lower lobe.  Liver bx site is C/D/I with a band aid.

## 2019-03-05 NOTE — PLAN OF CARE
Alert/oriented. Minimal pain from liver mass biopsy. R flank area band aid intact, small amt drainage. IV .9 NACL 75cc/hr.  Tolerating reg diet well. Up with one assist to void. 350cc dark minesh urine output this shift.

## 2019-03-05 NOTE — PROGRESS NOTES
Nephrology Initial Consult  March 4, 2019        ASSESSMENT AND RECOMMENDATIONS:   1. ELISE -   Likely ATN given clinical course of septic/ hypovolemic  shock, low PO intake, concommittant use of lisinopril , multiple granular casts in urine.   - nonoliguric . Adequately fluid resuscitated.   - continue to hold lisinopril .   - Avoid IV contrast until kidney function improves, unless an emergency . S.Cr plateaued and slightly better today .   - Dose meds for eGFR of ~10  -decrease IVF to 50 ml/ hr . Starting to get volume up.  May discontinue if taking decent PO intake.     2 HTN - BP okay   Continue to hold lisinopril     3. Septic shock / Klebsiella bacteremia / ? Liver abscess - management per ID/ Primary team. Awaiting bx results  4. Lytes okay . Mild acidosis with ELISE.     Recommendations were communicated to primary team via this note.     Magalys Hernandez MD  [facility] - Nephrology   [phone]      Interval hx  S/p CT guide liver mass bx yesterday . Results pending.   Afebrile. WBC count stable  UOP 800cc. S.Cr plateaued and starting to trend down.   Reports feeling tired. Has a bit of pain in RUQ with breathing, post liver Bx.     PAST MEDICAL HISTORY:  Reviewed with patient on 03/05/2019     Past Medical History:   Diagnosis Date     Arthritis      Depressive disorder      Hypertension      Obesity        Past Surgical History:   Procedure Laterality Date     APPENDECTOMY       ARTHROPLASTY CARPOMETACARPAL (THUMB JOINT) Left 4/6/2018    Procedure: ARTHROPLASTY CARPOMETACARPAL (THUMB JOINT);  LEFT  THUMB CARPOMETACARPAL EXCISIONAL ARTHROPLASTY WITH FASCIA CLARE GRAFT ;  Surgeon: Kalyani King MD;  Location: [facility]     CHOLECYSTECTOMY       GI SURGERY      gastric bypass     GYN SURGERY      abdominal hysterectomy     ORTHOPEDIC SURGERY      bilateral bunionectomies        MEDICATIONS:  PTA Meds  Prior to Admission medications    Medication Sig Last Dose Taking? Auth Provider    amLODIPine (NORVASC) 2.5 MG tablet Take 1 tablet (2.5 mg) by mouth daily 3/1/2019 at Unknown time Yes Candelario Zelaya MD   CYCLOBENZAPRINE HCL PO Take 10 mg by mouth At Bedtime 2/28/2019 at Unknown time Yes Unknown, Entered By History   hydrOXYzine (ATARAX) 10 MG tablet Take 1 10-mg tablet every 6 hours as needed for muscle relaxation and to supplement your pain medication. prn Yes Magda Plasencia PA-C   latanoprost (XALATAN) 0.005 % ophthalmic solution Place 1 drop into both eyes At Bedtime 2/28/2019 at Unknown time Yes Unknown, Entered By History   LISINOPRIL PO Take 10 mg by mouth daily 3/1/2019 at Unknown time Yes Unknown, Entered By History   loperamide (IMODIUM) 2 MG capsule Take 2 mg by mouth 4 times daily as needed for diarrhea prn Yes Unknown, Entered By History   nystatin (MYCOSTATIN) 218742 UNIT/GM POWD Apply topically 2 times daily as needed (affected area) prn Yes Unknown, Entered By History   oxyCODONE-acetaminophen (PERCOCET) 5-325 MG per tablet Take 1-2 tablets every 4-6 hours for pain if needed. prn Yes Magda Plasencia PA-C   oxyCODONE-acetaminophen (PERCOCET) 5-325 MG per tablet Take 0.5 tablets by mouth every morning  3/1/2019 at Unknown time Yes Unknown, Entered By History   oxyCODONE-acetaminophen (PERCOCET) 5-325 MG per tablet Take 1 tablet by mouth every evening  2/28/2019 at Unknown time Yes Unknown, Entered By History   SIMVASTATIN PO Take 40 mg by mouth At Bedtime 2/28/2019 at Unknown time Yes Unknown, Entered By History   SUMATRIPTAN SUCCINATE PO Take 50 mg by mouth every 2 hours as needed for migraine (max of 200mg q 24hr) Past Week at Unknown time Yes Unknown, Entered By History   TEMAZEPAM PO Take 15 mg by mouth nightly as needed for sleep prn Yes Unknown, Entered By History   TRAMADOL HCL PO Take 50 mg by mouth 3 times daily as needed for moderate to severe pain prn Yes Unknown, Entered By History   MEDICATION INSTRUCTION Hold Lisinopril and Norvasc if your SBP  "is <110 and DBP<70   Candelario Zelaya MD      Current Meds    cefTRIAXone  2 g Intravenous Q24H     latanoprost  1 drop Both Eyes At Bedtime     Infusion Meds    sodium chloride 75 mL/hr at 19 0904       ALLERGIES:    Allergies   Allergen Reactions     Contrast Dye Shortness Of Breath     Codeine      Zolpidem Other (See Comments)     Patient reports sleep walking.     Diatrizoate Itching     itchy throat that makes her want to cough       REVIEW OF SYSTEMS:  A comprehensive of systems was negative except as noted above.      PHYSICAL EXAM:   Temp  Av.6  F (36.4  C)  Min: 97.4  F (36.3  C)  Max: 97.7  F (36.5  C)      Pulse  Av.1  Min: 66  Max: 118 Resp  Av.7  Min: 12  Max: 22  SpO2  Av %  Min: 92 %  Max: 100 %       BP 95/55 (BP Location: Right arm)   Pulse 108   Temp 97.5  F (36.4  C) (Oral)   Resp 18   Ht 1.626 m (5' 4\")   Wt 99.6 kg (219 lb 9.3 oz)   SpO2 95%   BMI 37.69 kg/m     Date 19 0700 - 19 0659   Shift 1107-3458 3785-8172 6251-8405 24 Hour Total   INTAKE   P.O. 240   240   Shift Total(mL/kg) 240(2.41)   240(2.41)   OUTPUT   Shift Total(mL/kg)       Weight (kg) 99.6 99.6 99.6 99.6      Admit Weight: 99.6 kg (219 lb 9.3 oz)     GENERAL APPEARANCE: no distress,  awake  EYES: no scleral icterus, pupils equal  HENT: NC/AT,  mouth  without ulcers or lesions  Lymphatics: no cervical or supraclavicular LAD  Endo: no moon facies, no goiter  Pulmonary: lungs clear to auscultation with equal breath sounds bilaterally, no clubbing  CV: regular rhythm, normal rate, no rub   - JVP -   - Edema +  GI: soft, mild tenderness in RUQ  MS: no evidence of inflammation in joints, no muscle tenderness  : no carias  SKIN: no rash, warm, dry, no cyanosis  NEURO: face symmetric, no asterixis     LABS:   CMP  Recent Labs   Lab 19  0717 19  0806 19  1055 19  0610    134 132* 133   POTASSIUM 3.6 3.9 3.7 3.7   CHLORIDE 102 101 99 102   CO2 18* 22 21 18* "   ANIONGAP 13 11 12 13   GLC 83 98 109* 110*   BUN 82* 78* 72* 58*   CR 4.61* 4.70* 4.43* 3.96*   GFRESTIMATED 9* 9* 10* 11*   GFRESTBLACK 11* 10* 11* 13*   DANNA 8.1* 8.7 8.2* 8.3*   PHOS 3.3  --   --   --    PROTTOTAL 5.5* 6.2* 5.9* 5.9*   ALBUMIN 1.6* 1.9* 1.8* 2.1*   BILITOTAL 5.5* 3.9* 3.6* 4.0*   ALKPHOS 460* 433* 356* 397*   * 247* 352* 611*   * 170* 209* 258*     CBC  Recent Labs   Lab 03/05/19  0717 03/04/19  0849 03/03/19  1439 03/03/19  1055   HGB 12.2 13.9 13.2 12.7   WBC 22.1* 22.1* 17.9* 13.6*   RBC 4.05 4.59 4.37 4.15   HCT 34.7* 39.3 37.9 36.0   MCV 86 86 87 87   MCH 30.1 30.3 30.2 30.6   MCHC 35.2 35.4 34.8 35.3   RDW 14.0 13.8 13.4 13.4   PLT 99* 101* 109* 87*     INR  Recent Labs   Lab 03/03/19  1439   INR 1.01   PTT 21*     ABGNo lab results found in last 7 days.   URINE STUDIES  Recent Labs   Lab Test 03/03/19  1925 03/01/19  1355   COLOR Yellow Dark Brown   APPEARANCE Slightly Cloudy Cloudy   URINEGLC Negative 70*   URINEBILI Small* Moderate*   URINEKETONE Negative Negative   SG 1.013 1.017   UBLD Trace* Moderate*   URINEPH 5.5 5.5   PROTEIN 30* 100*   NITRITE Negative Negative   LEUKEST Small* Negative   RBCU 0 8*   WBCU 16* 16*       IMAGING:  Personally reviewed CT scan images. B/l kidneys look WNL. Hypodensity in liver as above.     Magalys Hernandez MD

## 2019-03-05 NOTE — PROGRESS NOTES
[facility]    Hospitalist Progress Note    Assessment & Plan   Vanessa Huffman is a 68 year old female admitted on 3/1/2019. She is a 68 year old with a hx of htn and migraines who presents with ana cristina, elevated liver tests, after 3 days of headaches, nausea and vomiting, and subjective fevers.    sepsis   Gram negative bacteremia- klebsiella   Urine culture negative   Liver mass -? Abscess pending biopsy results   -continue antibiotics  -started on zosyn 3/1 ,vanco 3/1 stopped after 2 doses ,will stop zosyn and start on cefepime 3/3. Antibiotics  Currently at rocephin 2 gm daily , all others stopped .  -urine culture and repeat blood cultures negative , esr and c reactive protein  Is very high ,procal was positive as well.  -bacteremia with klebsiella , source ? Liver abscess, CT abdomen does not show any other concerning areas, status post CT guided liver biopsy  -wbc continue to be high , off note she had a CT abdomen last year same time and there were no mass noted in the liver than which indicates fast growth and possibility of a infectious cause.  - cholangitis is a differential due to elevated alp , she has a large liver mass possibly causing ductal obstruction as well   -mri liver didn't give any further info on the mass due to lack of contrast   -GI , infectious disease , nephrology and oncology on consult     Acute Renal Failure- likely 2/2 hypovolemia.   Thrombocytopenia   When patient  Was admitted on 3/1 her renal function had gone from 0.7 creatinine to 4+, it was assumed due to sepsis, prerenal etc and the renal function had shown improvement 3/2 with hydration, urine sodium was 35 with hydration , 3/3 renal function worsening and urine culture negative while Urine Analysis  Shows blood moderate .  - renal function high but showing early signs of recovery , there is associated thrombocytopenia, her mental status is stable   -she was on heparin for deep vein thrombosis prophylaxis , it  was stopped and HIT negative    -she had received 2 doses of vanco following admission, had 2 days of zosyn,now both stopped and is on rocephin.  -appreciate nephrology input , renal function elevated but steady , ldh elevated along with ddimer and fibrin , platelet count stable , c3/c4 complement pending, us shows blood but less rbc ,alp still elevated , will repeat liver fucntion test  In am with direct and indirect bili .  - fibrin is high with elevated d dimer , DIC is not a concern as platelets also remain stable. The smear didn't show any sign of microangiopathy /schiztocytes as per hematology.     liver mass per CT abdomen  elevated liver function test    - patient  Is status post cholecystectomy, ultrasound does not show any cbd stones, liver mass noted in CT  -mri abdomen shows a liver mass 10.3 cm ,requested oncology input   -status post liver biopsy 3/4,please monitor the results    Gastric bypass h/o    DVT Prophylaxis: heparin stopped due to low platelets, will use scds for now   Code Status: Full Code     Disposition: Expected discharge in 2-3 days     Regine Nina MD  Text Page   (7am to 6pm)    Interval History   She is resting in a  Chair near to the bed, pain well controlled, tolerating diet ok, no new issue noted, son near bedside, I explained the current plan and labs, all questions answered .  -Data reviewed today: I reviewed all new labs and imaging results over the last 24 hours.    Physical Exam   Temp: 97.5  F (36.4  C) Temp src: Oral BP: 95/55   Heart Rate: 101 Resp: 18 SpO2: 95 % O2 Device: None (Room air) Oxygen Delivery: 3 LPM  Vitals:    03/01/19 1845   Weight: 99.6 kg (219 lb 9.3 oz)     Vital Signs with Ranges  Temp:  [96  F (35.6  C)-98.8  F (37.1  C)] 97.5  F (36.4  C)  Heart Rate:  [] 101  Resp:  [16-18] 18  BP: ()/(46-72) 95/55  SpO2:  [92 %-100 %] 95 %  I/O last 3 completed shifts:  In: 2150 [P.O.:360; I.V.:1790]  Out: 865 [Urine:865]    Constitutional:Awake,  alert, cooperative, no apparent distress  Respiratory: Clear to auscultation bilaterally, no crackles or wheezing  Cardiovascular: Regular rate and rhythm, normal S1 and S2, and no murmur noted  GI: Normal bowel sounds, soft, non-distended, tender right upper quadrant +  Skin/Integumen: No rashes, no cyanosis, no edema  Neuro : moving all 4 extremities, no focal deficit noted     Medications     sodium chloride 75 mL/hr at 03/05/19 0904       cefTRIAXone  2 g Intravenous Q24H     latanoprost  1 drop Both Eyes At Bedtime       Data   Recent Labs   Lab 03/05/19  0717 03/04/19  0849 03/04/19  0806 03/03/19  1439 03/03/19  1055   WBC 22.1* 22.1*  --  17.9* 13.6*   HGB 12.2 13.9  --  13.2 12.7   MCV 86 86  --  87 87   PLT 99* 101*  --  109* 87*   INR  --   --   --  1.01  --      --  134  --  132*   POTASSIUM 3.6  --  3.9  --  3.7   CHLORIDE 102  --  101  --  99   CO2 18*  --  22  --  21   BUN 82*  --  78*  --  72*   CR 4.61*  --  4.70*  --  4.43*   ANIONGAP 13  --  11  --  12   DANNA 8.1*  --  8.7  --  8.2*   GLC 83  --  98  --  109*   ALBUMIN 1.6*  --  1.9*  --  1.8*   PROTTOTAL 5.5*  --  6.2*  --  5.9*   BILITOTAL 5.5*  --  3.9*  --  3.6*   ALKPHOS 460*  --  433*  --  356*   *  --  170*  --  209*   *  --  247*  --  352*     Recent Labs   Lab 03/05/19  0717 03/04/19  0806 03/03/19  1055 03/02/19  0610 03/01/19  1130   GLC 83 98 109* 110* 106*       Imaging:   Recent Results (from the past 24 hour(s))   CT Liver Biopsy    Narrative    CT BIOPSY LIVER PERCUTANEOUS  3/4/2019 2:24 PM    HISTORY: Abdominal pain, fever, abscess suspected; liver mass present,  not sure abscess versus malignancy.    COMPARISON: None.    TECHNIQUE: Volumetric helical acquisition of CT images without  contrast. Radiation dose for this scan was reduced using automated  exposure control, adjustment of the mA and/or kV according to patient  size, or iterative reconstruction technique.    MEDICATIONS:  10 mL Lidocaine 1%, 2 mg  Versed, 50 mcg Fentanyl given  over 25 minutes. RN: John Torres    FINDINGS: After written and oral informed consent was obtained and a  pause for the cause procedure verifying the correct procedure and the  correct patient, the area was prepped and draped in the usual sterile  fashion. The overlying soft tissues were anesthetized with 10 mL of 1%  subcutaneous lidocaine. Utilizing CT guidance, a needle was advanced  into the lesion in the right lobe of liver and aspiration was  attempted yielding no significant fluid. Subsequently six passes were  made into the lesion in the right lobe of the liver with an 18 gauge  biopsy device and the tissue was submitted to pathology. There were no  immediate complications.     CONSCIOUS SEDATION NOTE: After obtaining consent for sedation,  conscious sedation was induced using IV Versed and IV fentanyl.  Patient was monitored by nurse under my direct supervision throughout  the exam. There were no complications of the sedation. 15 minutes  face-to-face time.      Impression    IMPRESSION:   1. Technically successful liver lesion biopsy.   2. Conscious sedation.    VICTOR HUGO CALLOWAY MD

## 2019-03-05 NOTE — PROGRESS NOTES
"GASTROENTEROLOGY PROGRESS NOTE     SUBJECTIVE: Continues to have severe RUQ pain with certain movements/deep breaths. Biopsy completed yesterday. Tired today, states she was up all night urinating. Poor appetite.     GI ROS: Denies nausea, vomiting, diarrhea, constipation, melena, or rectal bleeding.     OBJECTIVE:  BP 95/55 (BP Location: Right arm)   Pulse 108   Temp 97.5  F (36.4  C) (Oral)   Resp 18   Ht 1.626 m (5' 4\")   Wt 99.6 kg (219 lb 9.3 oz)   SpO2 95%   BMI 37.69 kg/m    Temp (24hrs), Av.6  F (36.4  C), Min:97.5  F (36.4  C), Max:97.7  F (36.5  C)    No data found.    Intake/Output Summary (Last 24 hours) at 3/3/2019 1742  Last data filed at 3/3/2019 1722  Gross per 24 hour   Intake 3552 ml   Output 1325 ml   Net 2227 ml        General Appearance: appears slower to respond today, oriented x3, no acute distress. Son at bedside.   Eyes: PERRL, sclera anicteric.   GI: Soft, NABS, mild RUQ tenderness without rebound.       Labs:     Recent Labs   Lab Test 19  0717 19  0849 19  1439   WBC 22.1* 22.1* 17.9*   HGB 12.2 13.9 13.2   MCV 86 86 87   PLT 99* 101* 109*   INR  --   --  1.01     Recent Labs   Lab Test 19  0717 19  0806 19  1055   POTASSIUM 3.6 3.9 3.7   CHLORIDE 102 101 99   CO2 18* 22 21   BUN 82* 78* 72*   ANIONGAP 13 11 12     Recent Labs   Lab Test 19  0717 19  0806 19  1925 19  1055  19  1355  18  0645   ALBUMIN 1.6* 1.9*  --  1.8*   < >  --    < > 4.1   BILITOTAL 5.5* 3.9*  --  3.6*   < >  --    < > 0.7   * 170*  --  209*   < >  --    < > 48   * 247*  --  352*   < >  --    < > 44   PROTEIN  --   --  30*  --   --  100*  --   --    LIPASE  --   --   --   --   --   --   --  133    < > = values in this interval not displayed.           Assessment: 68 year old female with elevated LFTs in the setting of Klebsiella  bacteremia and RUQ pain.  MRCP showed biliary dilation but no evidence of obstruction.  Large " liver mass unable to be characterized due to lack of contrast, mass vs abscess.  Given bacteremia, favor abscess.      3/5 Viral and autoimmune workup negative including: AMA, JUSTUS negative, smooth muscle antibody Hep A/B/C nonreactive.   Total bilirubin 5.5, alk phos 460. Transaminases trending down over the last three days; , .      Plan:   -Trend LFTs  -ID following:         -drainage/bx of liver mass 3/4 - path pending       -abx per ID   -GI following        Geovanna Thompson, CNP  [facility]  Cell: [phone]

## 2019-03-05 NOTE — PLAN OF CARE
A&Ox4, VSS on RA, Denies pain. SBA. Regular diet. Ambulates to BR, voiding adequately. LS clear, BS+.  R flank area band aid CDI. Slept between cares. Waiting for results from liver biopsy. Continue to monitor.

## 2019-03-05 NOTE — PROGRESS NOTES
[facility]    Infectious Disease Progress Note    Date of Service (when I saw the patient): 03/05/2019     Assessment & Plan   Vanessa Huffman is a 68 year old female who was admitted on 3/1/2019.     ASSESSMENT:  1. KLEBSIELLA PNEUMONIAE BACTEREMIA-? Enteric source v cholangitis,   2. LIVER MASS/DENSITY-probably hepatic abscess  3. PROBABLE HEPATIC ABSCESS  4. ABNORMAL LFTS-C/W Liver abscess, ? Tumor, ? Cholangitis  5. HA, MIGRAINE-?  Triggered by bacteremia     REC  1. Ceftriaxone 2 g iv q 24 hours  2. Liver mass aspiration done, solid lesion multiple passes made.   3. F/u MICs, LFTS  4. If liver abscess, will need extended antibiotic course but if path positive for malignancy obviously further work up will follow up on the cultures.            Meghan Guadarrama MD    Interval History   WBC up   Afebrile       Physical Exam   Temp: 97.5  F (36.4  C) Temp src: Oral BP: 95/55   Heart Rate: 101 Resp: 18 SpO2: 95 % O2 Device: None (Room air) Oxygen Delivery: 3 LPM  Vitals:    03/01/19 1845   Weight: 99.6 kg (219 lb 9.3 oz)     Vital Signs with Ranges  Temp:  [96  F (35.6  C)-98.8  F (37.1  C)] 97.5  F (36.4  C)  Heart Rate:  [] 101  Resp:  [16-18] 18  BP: ()/(46-72) 95/55  SpO2:  [92 %-100 %] 95 %    Constitutional: Awake, alert, cooperative, no apparent distress  Lungs: Clear to auscultation bilaterally, no crackles or wheezing  Cardiovascular: Regular rate and rhythm, normal S1 and S2, and no murmur noted  Abdomen: Normal bowel sounds, soft, non-distended, non-tender  Skin: No rashes, no cyanosis, no edema  Other:    Medications     sodium chloride 75 mL/hr at 03/05/19 0904       cefTRIAXone  2 g Intravenous Q24H     latanoprost  1 drop Both Eyes At Bedtime       Data   All microbiology laboratory data reviewed.  Recent Labs   Lab Test 03/05/19  0717 03/04/19  0849 03/03/19  1439   WBC 22.1* 22.1* 17.9*   HGB 12.2 13.9 13.2   HCT 34.7* 39.3 37.9   MCV 86 86 87   PLT 99* 101* 109*     Recent  Labs   Lab Test 03/05/19  0717 03/04/19  0806 03/03/19  1055   CR 4.61* 4.70* 4.43*     Recent Labs   Lab Test 03/01/19  1130   SED 79*     Recent Labs   Lab Test 03/04/19  1410 03/04/19  1355 03/03/19  1925 03/02/19  1043 03/01/19  2148 03/01/19  2145 03/01/19  1409 03/01/19  1355 03/01/19  1333   CULT Culture negative monitoring continues  PENDING Culture negative monitoring continues <10,000 colonies/mL  urogenital shannon  Susceptibility testing not routinely done    Canceled, Test credited  Duplicate request   No growth after 3 days No growth after 4 days No growth after 4 days Culture negative monitoring continues No growth Cultured on the 1st day of incubation:  Klebsiella pneumoniae  Susceptibility testing done on previous specimen  *  Critical Value/Significant Value, preliminary result only, called to and read back by  Adonis Monk RN  03/02/19 AA    Cultured on the 1st day of incubation:  Strain 2  Klebsiella pneumoniae  Susceptibility testing done on previous specimen  *

## 2019-03-06 ENCOUNTER — APPOINTMENT (OUTPATIENT)
Dept: CT IMAGING | Facility: CLINIC | Age: 69
DRG: 871 | End: 2019-03-06
Attending: INTERNAL MEDICINE
Payer: MEDICARE

## 2019-03-06 LAB
ALBUMIN SERPL-MCNC: 1.7 G/DL (ref 3.4–5)
ALP SERPL-CCNC: 626 U/L (ref 40–150)
ALT SERPL W P-5'-P-CCNC: 124 U/L (ref 0–50)
ANION GAP SERPL CALCULATED.3IONS-SCNC: 14 MMOL/L (ref 3–14)
AST SERPL W P-5'-P-CCNC: 229 U/L (ref 0–45)
BACTERIA SPEC CULT: ABNORMAL
BACTERIA SPEC CULT: NO GROWTH
BILIRUB SERPL-MCNC: 7.9 MG/DL (ref 0.2–1.3)
BUN SERPL-MCNC: 86 MG/DL (ref 7–30)
CALCIUM SERPL-MCNC: 8.8 MG/DL (ref 8.5–10.1)
CHLORIDE SERPL-SCNC: 99 MMOL/L (ref 94–109)
CO2 SERPL-SCNC: 20 MMOL/L (ref 20–32)
CREAT SERPL-MCNC: 4.41 MG/DL (ref 0.52–1.04)
ERYTHROCYTE [DISTWIDTH] IN BLOOD BY AUTOMATED COUNT: 14.1 % (ref 10–15)
GFR SERPL CREATININE-BSD FRML MDRD: 10 ML/MIN/{1.73_M2}
GLUCOSE BLDC GLUCOMTR-MCNC: 134 MG/DL (ref 70–99)
GLUCOSE SERPL-MCNC: 94 MG/DL (ref 70–99)
HCT VFR BLD AUTO: 35.5 % (ref 35–47)
HGB BLD-MCNC: 12.3 G/DL (ref 11.7–15.7)
LACTATE BLD-SCNC: 1.6 MMOL/L (ref 0.7–2)
Lab: NORMAL
MCH RBC QN AUTO: 29.8 PG (ref 26.5–33)
MCHC RBC AUTO-ENTMCNC: 34.6 G/DL (ref 31.5–36.5)
MCV RBC AUTO: 86 FL (ref 78–100)
PHOSPHATE SERPL-MCNC: 3.6 MG/DL (ref 2.5–4.5)
PLATELET # BLD AUTO: 114 10E9/L (ref 150–450)
POTASSIUM SERPL-SCNC: 3.8 MMOL/L (ref 3.4–5.3)
PROT SERPL-MCNC: 6 G/DL (ref 6.8–8.8)
RBC # BLD AUTO: 4.13 10E12/L (ref 3.8–5.2)
SODIUM SERPL-SCNC: 133 MMOL/L (ref 133–144)
SPECIMEN SOURCE: ABNORMAL
SPECIMEN SOURCE: NORMAL
WBC # BLD AUTO: 35.7 10E9/L (ref 4–11)

## 2019-03-06 PROCEDURE — 36415 COLL VENOUS BLD VENIPUNCTURE: CPT | Performed by: HOSPITALIST

## 2019-03-06 PROCEDURE — 80053 COMPREHEN METABOLIC PANEL: CPT | Performed by: INTERNAL MEDICINE

## 2019-03-06 PROCEDURE — 85027 COMPLETE CBC AUTOMATED: CPT | Performed by: INTERNAL MEDICINE

## 2019-03-06 PROCEDURE — 36415 COLL VENOUS BLD VENIPUNCTURE: CPT | Performed by: INTERNAL MEDICINE

## 2019-03-06 PROCEDURE — 25000132 ZZH RX MED GY IP 250 OP 250 PS 637: Mod: GY | Performed by: INTERNAL MEDICINE

## 2019-03-06 PROCEDURE — 83605 ASSAY OF LACTIC ACID: CPT | Performed by: INTERNAL MEDICINE

## 2019-03-06 PROCEDURE — 12000000 ZZH R&B MED SURG/OB

## 2019-03-06 PROCEDURE — 25800030 ZZH RX IP 258 OP 636: Performed by: INTERNAL MEDICINE

## 2019-03-06 PROCEDURE — 82140 ASSAY OF AMMONIA: CPT | Performed by: HOSPITALIST

## 2019-03-06 PROCEDURE — 25000128 H RX IP 250 OP 636: Performed by: INTERNAL MEDICINE

## 2019-03-06 PROCEDURE — 99233 SBSQ HOSP IP/OBS HIGH 50: CPT | Performed by: INTERNAL MEDICINE

## 2019-03-06 PROCEDURE — 99232 SBSQ HOSP IP/OBS MODERATE 35: CPT | Performed by: INTERNAL MEDICINE

## 2019-03-06 PROCEDURE — 00000146 ZZHCL STATISTIC GLUCOSE BY METER IP

## 2019-03-06 PROCEDURE — 84100 ASSAY OF PHOSPHORUS: CPT | Performed by: INTERNAL MEDICINE

## 2019-03-06 PROCEDURE — 74176 CT ABD & PELVIS W/O CONTRAST: CPT

## 2019-03-06 PROCEDURE — A9270 NON-COVERED ITEM OR SERVICE: HCPCS | Mod: GY | Performed by: INTERNAL MEDICINE

## 2019-03-06 RX ORDER — PIPERACILLIN SODIUM, TAZOBACTAM SODIUM 2; .25 G/10ML; G/10ML
2.25 INJECTION, POWDER, LYOPHILIZED, FOR SOLUTION INTRAVENOUS EVERY 6 HOURS
Status: DISCONTINUED | OUTPATIENT
Start: 2019-03-06 | End: 2019-03-18 | Stop reason: DRUGHIGH

## 2019-03-06 RX ADMIN — LATANOPROST 1 DROP: 50 SOLUTION/ DROPS OPHTHALMIC at 22:52

## 2019-03-06 RX ADMIN — CEFTRIAXONE SODIUM 2 G: 2 INJECTION, POWDER, FOR SOLUTION INTRAMUSCULAR; INTRAVENOUS at 09:14

## 2019-03-06 RX ADMIN — OXYCODONE HYDROCHLORIDE 5 MG: 5 TABLET ORAL at 16:43

## 2019-03-06 RX ADMIN — OXYCODONE HYDROCHLORIDE 5 MG: 5 TABLET ORAL at 11:18

## 2019-03-06 RX ADMIN — SODIUM CHLORIDE: 9 INJECTION, SOLUTION INTRAVENOUS at 20:11

## 2019-03-06 RX ADMIN — OXYCODONE HYDROCHLORIDE 5 MG: 5 TABLET ORAL at 22:53

## 2019-03-06 RX ADMIN — PIPERACILLIN AND TAZOBACTAM 2.25 G: 2; .25 INJECTION, POWDER, FOR SOLUTION INTRAVENOUS at 17:39

## 2019-03-06 RX ADMIN — PIPERACILLIN AND TAZOBACTAM 2.25 G: 2; .25 INJECTION, POWDER, FOR SOLUTION INTRAVENOUS at 23:30

## 2019-03-06 RX ADMIN — SODIUM CHLORIDE: 9 INJECTION, SOLUTION INTRAVENOUS at 06:21

## 2019-03-06 RX ADMIN — PIPERACILLIN AND TAZOBACTAM 2.25 G: 2; .25 INJECTION, POWDER, FOR SOLUTION INTRAVENOUS at 12:42

## 2019-03-06 ASSESSMENT — ACTIVITIES OF DAILY LIVING (ADL)
ADLS_ACUITY_SCORE: 16
ADLS_ACUITY_SCORE: 15
ADLS_ACUITY_SCORE: 16
ADLS_ACUITY_SCORE: 15

## 2019-03-06 NOTE — PROGRESS NOTES
"GASTROENTEROLOGY PROGRESS NOTE     SUBJECTIVE: Continues to have severe RUQ pain with certain movements/deep breaths. More fatigued today. Son at bedside.     GI ROS: Denies nausea, vomiting, diarrhea, constipation, melena, or rectal bleeding.     OBJECTIVE:  /81 (BP Location: Right arm)   Pulse 107   Temp 98.1  F (36.7  C) (Oral)   Resp 16   Ht 1.626 m (5' 4\")   Wt 99.6 kg (219 lb 9.3 oz)   SpO2 97%   BMI 37.69 kg/m    Temp (24hrs), Av.6  F (36.4  C), Min:97.5  F (36.4  C), Max:97.7  F (36.5  C)    No data found.    Intake/Output Summary (Last 24 hours) at 3/3/2019 1742  Last data filed at 3/3/2019 1722  Gross per 24 hour   Intake 3552 ml   Output 1325 ml   Net 2227 ml        General Appearance: sleepy, oriented x3, no acute distress.  Eyes: PERRL, scleral icterus   GI: Soft, NABS, mild RUQ tenderness without rebound.  +jaundice      Labs:     Recent Labs   Lab Test 19  0821 19  0717 19  0849 19  1439   WBC 35.7* 22.1* 22.1* 17.9*   HGB 12.3 12.2 13.9 13.2   MCV 86 86 86 87   * 99* 101* 109*   INR  --   --   --  1.01     Recent Labs   Lab Test 19  0821 19  0717 19  0806   POTASSIUM 3.8 3.6 3.9   CHLORIDE 99 102 101   CO2 20 18* 22   BUN 86* 82* 78*   ANIONGAP 14 13 11     Recent Labs   Lab Test 19  0821 19  0717 19  0806 19  1925  19  1355  18  0645   ALBUMIN 1.7* 1.6* 1.9*  --    < >  --    < > 4.1   BILITOTAL 7.9* 5.5* 3.9*  --    < >  --    < > 0.7   * 125* 170*  --    < >  --    < > 48   * 207* 247*  --    < >  --    < > 44   PROTEIN  --   --   --  30*  --  100*  --   --    LIPASE  --   --   --   --   --   --   --  133    < > = values in this interval not displayed.     MRCP/MRI    IMPRESSION:   1. Intra and extrahepatic biliary dilatation could be related to the  patient's age and postcholecystectomy state. No other cause for  biliary dilatation is identified.  2. Indeterminate 10.3 cm right " hepatic lesion. Malignancy cannot be excluded from this appearance. Evaluation of this lesion is limited by lack of IV contrast.  3. A small amount of ascites in the right upper quadrant and a small right pleural effusion are new since the previous exam.       Assessment: 68 year old female with elevated LFTs in the setting of Klebsiella  bacteremia and RUQ pain.  MRCP showed biliary dilation but no evidence of obstruction. Large liver mass appears to be consistent with abscess. Viral and autoimmune workup negative including: AMA, JUSTUS negative, smooth muscle antibody Hep A/B/C nonreactive. LFTs and WBC continue to increase. WBC 25, total bili 7.9, alkaline phosphatase 626, , .      Plan:   -Trend LFTs  -ID following:         -drainage/bx of liver mass 3/4 -preliminary path showing inflammation consistent with abscess        -abx per ID   -GI following  -Discussed with Dr. Brooke Thompson, [facility]  Cell: [phone]

## 2019-03-06 NOTE — PLAN OF CARE
Oriented x4, but forgetful. Slow to respond, 'day dreamy'. VSS on RA. LS diminished. Liver biopsy site, bandaid CDI. Pain relieved with oxy and ice. Reports feeling weak. Up with 1 assist. NS @ 50. Plan to monitor Cr, WBC and liver enzymes. Waiting for liver biopsy results also.

## 2019-03-06 NOTE — PROGRESS NOTES
[facility]    Infectious Disease Progress Note    Date of Service (when I saw the patient): 03/06/2019     Assessment & Plan   Vanessa Huffman is a 68 year old female who was admitted on 3/1/2019.     ASSESSMENT:  1. KLEBSIELLA PNEUMONIAE BACTEREMIA-? Enteric source v cholangitis,   2. LIVER MASS/DENSITY-probably hepatic abscess  3. PROBABLE HEPATIC ABSCESS  4. ABNORMAL LFTS-C/W Liver abscess, ? Tumor, ? Cholangitis  5. More lethargic today, CT scan head was done on admission.      REC  1. Ceftriaxone was switched to zosyn after positive for abscesses path and cultures from the liver positive for Klebsiella, continue for now, follow up on the cultures.   2. Liver mass aspiration done, solid lesion multiple passes made but cultures from the tissue with the same klebsiella in the blood, path consistent with abscess   3. F/u MICs, LFTS  4. Repeat CT pending, patient more lethargic, CT head was done on admission, ? MRI, if large abscess might still need drainage IR or surgery.            Meghan Guadarrama MD    Interval History   WBC up   Afebrile   More lethargic   In pain       Physical Exam   Temp: 98.1  F (36.7  C) Temp src: Oral BP: 135/81 Pulse: 107 Heart Rate: 81 Resp: 16 SpO2: 97 % O2 Device: None (Room air)    Vitals:    03/01/19 1845   Weight: 99.6 kg (219 lb 9.3 oz)     Vital Signs with Ranges  Temp:  [93.3  F (34.1  C)-98.1  F (36.7  C)] 98.1  F (36.7  C)  Pulse:  [102-107] 107  Heart Rate:  [] 81  Resp:  [16-18] 16  BP: (119-141)/(67-81) 135/81  SpO2:  [97 %-98 %] 97 %    Constitutional: Awake, alert, cooperative, no apparent distress  Lungs: Clear to auscultation bilaterally, no crackles or wheezing  Cardiovascular: Regular rate and rhythm, normal S1 and S2, and no murmur noted  Abdomen: Normal bowel sounds, soft, non-distended, non-tender  Skin: No rashes, no cyanosis, no edema  Other:    Medications     sodium chloride 50 mL/hr at 03/06/19 0621       latanoprost  1 drop Both Eyes At  Bedtime     piperacillin-tazobactam  2.25 g Intravenous Q6H       Data   All microbiology laboratory data reviewed.  Recent Labs   Lab Test 03/06/19  0821 03/05/19  0717 03/04/19  0849   WBC 35.7* 22.1* 22.1*   HGB 12.3 12.2 13.9   HCT 35.5 34.7* 39.3   MCV 86 86 86   * 99* 101*     Recent Labs   Lab Test 03/06/19  0821 03/05/19  0717 03/04/19  0806   CR 4.41* 4.61* 4.70*     Recent Labs   Lab Test 03/01/19  1130   SED 79*     Recent Labs   Lab Test 03/04/19  1410 03/04/19  1355 03/03/19  1925 03/02/19  1043 03/01/19  2148 03/01/19  2145 03/01/19  1409 03/01/19  1355 03/01/19  1333   CULT Culture negative monitoring continues  Culture negative monitoring continues Culture negative after 2 days  Light growth  Klebsiella pneumoniae  *  Susceptibility testing in progress  Culture negative monitoring continues <10,000 colonies/mL  urogenital shannon  Susceptibility testing not routinely done    Canceled, Test credited  Duplicate request   No growth after 4 days No growth after 5 days No growth after 5 days No growth No growth Cultured on the 1st day of incubation:  Klebsiella pneumoniae  Susceptibility testing done on previous specimen  *  Critical Value/Significant Value, preliminary result only, called to and read back by  Adonis Monk RN  03/02/19 AA    Cultured on the 1st day of incubation:  Strain 2  Klebsiella pneumoniae  Susceptibility testing done on previous specimen  *

## 2019-03-06 NOTE — PROVIDER NOTIFICATION
Text paged MD regarding pt's poor PO intake and increased WCB count.  Also put in a nutiritional consult and spoke with the RD regarding pt's poor PO intake. Awaiting a return response.

## 2019-03-06 NOTE — PLAN OF CARE
VSS, however HR tachy at 107.  Pt appears more lethargic today, arouses when spoken to by this RN.  Pt having abdominal pain, oxycodone administered per MD orders and relief noted.  MD aware and new orders were received and noted.  CT scan done and antibiotics were changed per MD orders.  RD consults and nutritional supplements were started today secondary to the pt's poor PO intake.

## 2019-03-06 NOTE — PROGRESS NOTES
Nephrology Initial Consult  March 4, 2019        ASSESSMENT AND RECOMMENDATIONS:   1. ELISE -   Likely ATN given clinical course of septic/ hypovolemic  shock, low PO intake, concommittant use of lisinopril , multiple granular casts in urine.   - nonoliguric . Adequately fluid resuscitated.   - continue to hold lisinopril .   - Avoid IV contrast until kidney function improves, unless an emergency . S.Cr plateaued and slightly better today .   - Dose meds for eGFR of ~10  -Cr starting to improve. Good UOP     2 HTN - BP okay   Continue to hold lisinopril     3. Septic shock / Klebsiella bacteremia /  Liver abscess - management per ID/ Primary team. Liver Bx shows necrosis and ac inflammation c/w abscess.   4. Lytes okay . Mild acidosis with ELISE.     Recommendations were communicated to primary team via this note.     Magalys Hernandez MD  [facility] - Nephrology   [phone]      Interval hx  Describes significant pain in RUQ. Unable to eat. On 50 ml/ hr of NS  Afebrile. WBC count up to 35 K .   UOP 1050cc. S.Cr plateaued and starting to trend down.   Liver Bx shows necrosis and ac inflammation c/w abscess.     PAST MEDICAL HISTORY:  Reviewed with patient on 03/06/2019     Past Medical History:   Diagnosis Date     Arthritis      Depressive disorder      Hypertension      Obesity        Past Surgical History:   Procedure Laterality Date     APPENDECTOMY       ARTHROPLASTY CARPOMETACARPAL (THUMB JOINT) Left 4/6/2018    Procedure: ARTHROPLASTY CARPOMETACARPAL (THUMB JOINT);  LEFT  THUMB CARPOMETACARPAL EXCISIONAL ARTHROPLASTY WITH FASCIA CLARE GRAFT ;  Surgeon: Kalyani King MD;  Location: [facility]     CHOLECYSTECTOMY       GI SURGERY      gastric bypass     GYN SURGERY      abdominal hysterectomy     ORTHOPEDIC SURGERY      bilateral bunionectomies        MEDICATIONS:  PTA Meds  Prior to Admission medications    Medication Sig Last Dose Taking? Auth Provider   amLODIPine (NORVASC) 2.5 MG tablet Take  1 tablet (2.5 mg) by mouth daily 3/1/2019 at Unknown time Yes Candelario Zelaya MD   CYCLOBENZAPRINE HCL PO Take 10 mg by mouth At Bedtime 2/28/2019 at Unknown time Yes Unknown, Entered By History   hydrOXYzine (ATARAX) 10 MG tablet Take 1 10-mg tablet every 6 hours as needed for muscle relaxation and to supplement your pain medication. prn Yes Magda Plasencia PA-C   latanoprost (XALATAN) 0.005 % ophthalmic solution Place 1 drop into both eyes At Bedtime 2/28/2019 at Unknown time Yes Unknown, Entered By History   LISINOPRIL PO Take 10 mg by mouth daily 3/1/2019 at Unknown time Yes Unknown, Entered By History   loperamide (IMODIUM) 2 MG capsule Take 2 mg by mouth 4 times daily as needed for diarrhea prn Yes Unknown, Entered By History   nystatin (MYCOSTATIN) 024695 UNIT/GM POWD Apply topically 2 times daily as needed (affected area) prn Yes Unknown, Entered By History   oxyCODONE-acetaminophen (PERCOCET) 5-325 MG per tablet Take 1-2 tablets every 4-6 hours for pain if needed. prn Yes Magda Plasencia PA-C   oxyCODONE-acetaminophen (PERCOCET) 5-325 MG per tablet Take 0.5 tablets by mouth every morning  3/1/2019 at Unknown time Yes Unknown, Entered By History   oxyCODONE-acetaminophen (PERCOCET) 5-325 MG per tablet Take 1 tablet by mouth every evening  2/28/2019 at Unknown time Yes Unknown, Entered By History   SIMVASTATIN PO Take 40 mg by mouth At Bedtime 2/28/2019 at Unknown time Yes Unknown, Entered By History   SUMATRIPTAN SUCCINATE PO Take 50 mg by mouth every 2 hours as needed for migraine (max of 200mg q 24hr) Past Week at Unknown time Yes Unknown, Entered By History   TEMAZEPAM PO Take 15 mg by mouth nightly as needed for sleep prn Yes Unknown, Entered By History   TRAMADOL HCL PO Take 50 mg by mouth 3 times daily as needed for moderate to severe pain prn Yes Unknown, Entered By History   MEDICATION INSTRUCTION Hold Lisinopril and Norvasc if your SBP is <110 and DBP<70   Candelario Zelaya,  "MD      Current Meds    cefTRIAXone  2 g Intravenous Q24H     latanoprost  1 drop Both Eyes At Bedtime     Infusion Meds    sodium chloride 50 mL/hr at 19 0621       ALLERGIES:    Allergies   Allergen Reactions     Contrast Dye Shortness Of Breath     Codeine      Zolpidem Other (See Comments)     Patient reports sleep walking.     Diatrizoate Itching     itchy throat that makes her want to cough       REVIEW OF SYSTEMS:  A comprehensive of systems was negative except as noted above.      PHYSICAL EXAM:   Temp  Av.6  F (36.4  C)  Min: 97.4  F (36.3  C)  Max: 97.7  F (36.5  C)      Pulse  Av.1  Min: 66  Max: 118 Resp  Av.7  Min: 12  Max: 22  SpO2  Av %  Min: 92 %  Max: 100 %       /81 (BP Location: Right arm)   Pulse 107   Temp 98.1  F (36.7  C) (Oral)   Resp 16   Ht 1.626 m (5' 4\")   Wt 99.6 kg (219 lb 9.3 oz)   SpO2 97%   BMI 37.69 kg/m     Date 19 07 - 19 0659   Shift 0657-2674 5247-4032 8803-4146 24 Hour Total   INTAKE   P.O. 240   240   Shift Total(mL/kg) 240(2.41)   240(2.41)   OUTPUT   Shift Total(mL/kg)       Weight (kg) 99.6 99.6 99.6 99.6      Admit Weight: 99.6 kg (219 lb 9.3 oz)     GENERAL APPEARANCE: + distress d/ tabd pain ,  awake  EYES: no scleral icterus, pupils equal  HENT: NC/AT,  mouth  without ulcers or lesions  Lymphatics: no cervical or supraclavicular LAD  Endo: no moon facies, no goiter  Pulmonary: lungs clear to auscultation with equal breath sounds bilaterally, no clubbing  CV: regular rhythm, normal rate, no rub   - JVP -   - Edema +  GI: soft, + tenderness in RUQ  MS: no evidence of inflammation in joints, no muscle tenderness  : no carias  SKIN: no rash, warm, dry, no cyanosis  NEURO: face symmetric, no asterixis     LABS:   CMP  Recent Labs   Lab 19  0821 19  0717 19  0806 19  1055    133 134 132*   POTASSIUM 3.8 3.6 3.9 3.7   CHLORIDE 99 102 101 99   CO2 20 18* 22 21   ANIONGAP 14 13 11 12   GLC 94 83 " 98 109*   BUN 86* 82* 78* 72*   CR 4.41* 4.61* 4.70* 4.43*   GFRESTIMATED 10* 9* 9* 10*   GFRESTBLACK 11* 11* 10* 11*   DANNA 8.8 8.1* 8.7 8.2*   PHOS 3.6 3.3  --   --    PROTTOTAL 6.0* 5.5* 6.2* 5.9*   ALBUMIN 1.7* 1.6* 1.9* 1.8*   BILITOTAL 7.9* 5.5* 3.9* 3.6*   ALKPHOS 626* 460* 433* 356*   * 207* 247* 352*   * 125* 170* 209*     CBC  Recent Labs   Lab 03/06/19  0821 03/05/19  0717 03/04/19  0849 03/03/19  1439   HGB 12.3 12.2 13.9 13.2   WBC 35.7* 22.1* 22.1* 17.9*   RBC 4.13 4.05 4.59 4.37   HCT 35.5 34.7* 39.3 37.9   MCV 86 86 86 87   MCH 29.8 30.1 30.3 30.2   MCHC 34.6 35.2 35.4 34.8   RDW 14.1 14.0 13.8 13.4   * 99* 101* 109*     INR  Recent Labs   Lab 03/03/19  1439   INR 1.01   PTT 21*     ABGNo lab results found in last 7 days.   URINE STUDIES  Recent Labs   Lab Test 03/03/19  1925 03/01/19  1355   COLOR Yellow Dark Brown   APPEARANCE Slightly Cloudy Cloudy   URINEGLC Negative 70*   URINEBILI Small* Moderate*   URINEKETONE Negative Negative   SG 1.013 1.017   UBLD Trace* Moderate*   URINEPH 5.5 5.5   PROTEIN 30* 100*   NITRITE Negative Negative   LEUKEST Small* Negative   RBCU 0 8*   WBCU 16* 16*       IMAGING:  Personally reviewed CT scan images. B/l kidneys look WNL. Hypodensity in liver as above.     Magalys Hernandez MD

## 2019-03-06 NOTE — PROGRESS NOTES
Service Date: 2019      SUBJECTIVE:  Mrs. Flowers is a 68-year-old female who has been admitted because of weakness, headache and back pain.  CBC revealed leukocytosis and thrombocytopenia. CMP revealed elevated LFT and creatinine.  Blood cultures are positive for Klebsiella pneumoniae.  CT scan revealed large right hepatic mass.      Patient is currently being treated for Klebsiella pneumoniae sepsis.  She is on IV antibiotics.  CT-guided biopsy of liver mass was done yesterday.  Pathology is pending.  I spoke to the pathologist today.  Preliminary pathology report is liver abscess and no malignancy.      I met with the patient.  She still feels weak.  Headache and back pain are slightly better.  Appetite is not good.  No recurrent vomiting.  She is not having any high-grade fever or chills.      LABORATORY DATA:  Reviewed.      ASSESSMENT:   1.  A 68-year-old female with liver mass.  Most likely this is liver abscess.  Final pathology report pending.   2.  Thrombocytopenia secondary to sepsis.   3.  Leukocytosis secondary to sepsis.   4.  Klebsiella pneumoniae sepsis.      PLAN:   1.  Patient's overall condition is stable.  Labs were all reviewed with her.  Her thrombocytopenia is stable.  Thrombocytopenia is secondary to sepsis.  As sepsis improves, thrombocytopenia will improve.  She has leukocytosis which will improve with improvement of sepsis.   2. Liver biopsy report is pending.  It should be back later today or tomorrow.   3.  She had a few questions, which were all answered.  We will continue to follow her.         MARINO COLLINS MD             D: 2019   T: 2019   MT: MAGY      Name:     MASSIMO FLOWERS   MRN:      [id number]        Account:      [id number]   :      1950           Service Date: 2019      Document: [id number]

## 2019-03-06 NOTE — PROGRESS NOTES
[facility]    Hospitalist Progress Note    Assessment & Plan   Vanessa Huffman is a 68 year old female admitted on 3/1/2019. She is a 68 year old with a hx of htn and migraines who presents with ana cristina, elevated liver tests, after 3 days of headaches, nausea and vomiting, and subjective fevers.    sepsis   Gram negative bacteremia- klebsiella   Urine culture negative   Liver mass Vs Abscess    -continue antibiotics  -started on zosyn 3/1 ,vanco 3/1 stopped after 2 doses ,will stop zosyn and start on cefepime 3/3. Antibiotics  Currently at rocephin 2 gm daily , all others stopped, agree with that, white cell count increase to 35.7, more fatigued and tired today on 3/6/2019, and worsening right upper quadrant pain I will switch her back to IV Zosyn for now to cover for anaerobes and broad-spectrum coverage at this time.  Liver biopsy shows hemorrhagic necrosis  with acute inflammatory changes, no malignant cells identified, will treating her as liver abscess at this time.  -urine culture and repeat blood cultures negative , esr and c reactive protein  Is very high ,procal was positive as well.  -bacteremia with klebsiella , source ? Liver abscess, CT abdomen does not show any other concerning areas, status post CT guided liver biopsy  -wbc continue to be high , off note she had a CT abdomen last year same time and there were no mass noted in the liver than which indicates fast growth and possibility of a infectious cause.  - cholangitis is a differential due to elevated alp , she has a large liver mass possibly causing ductal obstruction as well   -mri liver didn't give any further info on the mass due to lack of contrast   -GI , infectious disease , nephrology and oncology on consult      I will keep her on IV Zosyn while she is in the hospital at this time for at least 3 more days, discussed with infectious disease they agree with that.  Repeat the CT scan of the  chest abdomen and pelvis without  contrast, to rule out any postprocedure complications.  Rule out any pleural effusion or hemorrhage.    Acute Renal Failure- likely 2/2 hypovolemia/sepsis  Thrombocytopenia   When patient  Was admitted on 3/1 her renal function had gone from 0.7 creatinine to 4+, it was assumed due to sepsis, prerenal etc and the renal function had shown improvement 3/2 with hydration, urine sodium was 35 with hydration , 3/3 renal function worsening and urine culture negative while Urine Analysis  Shows blood moderate .  - renal function high but showing early signs of recovery , there is associated thrombocytopenia, her mental status is stable   -she was on heparin for deep vein thrombosis prophylaxis , it was stopped and HIT negative    -she had received 2 doses of vanco following admission, had 2 days of zosyn,now both stopped and is on rocephin, now back on Zosyn  -appreciate nephrology input , renal function elevated but steady , ldh elevated along with ddimer and fibrin , platelet count stable , c3/c4 complement pending, us shows blood but less rbc ,alp still elevated , will repeat liver fucntion test  In am with direct and indirect bili .  - fibrin is high with elevated d dimer , DIC is not a concern as platelets also remain stable. The smear didn't show any sign of microangiopathy /schiztocytes as per hematology.     the patient is in ATN, creatinine now stable and start to trend down.  Continue with IV fluids at this time.  Nephrology is following.  Platelets are stable and improving at this time as well.     liver mass per CT abdomen  elevated liver function test    - patient  Is status post cholecystectomy, ultrasound does not show any cbd stones, liver mass noted in CT  -mri abdomen shows a liver mass 10.3 cm ,requested oncology input   -status post liver biopsy 3/4,please monitor the results    Biopsy is negative for any malignant malignancy at this time but cannot completely rule out malignancy given necrotic  hemorrhagic sample.  If she does not improve with IV antibiotics will need to follow-up with outpatient and may need to repeat biopsy.    Gastric bypass h/o  Stable    DVT Prophylaxis: heparin stopped due to low platelets, will use scds for now   Code Status: Full Code     Disposition: Expected discharge in 2-3 days     Started on IV Zosyn and stop ceftriaxone  Will do CT chest abdomen pelvis to rule out any pleural effusion, any hemorrhage or worsening liver abscess.  Will discuss with IR of the CT scan if that can be drained, but as per discussion with Dr. Maradiaga of oncology and I have been told that lesion is mostly solid.  Repeat blood cultures are negative so far  We will check ammonia level, continue with IV fluids at this time.    Discussed with nephrology, oncology, infectious disease and the nursing staff taking care of the patient today      Ruben Barksdale MD  Text Page   (7am to 6pm)    Interval History   Complain of moderate to severe pain on the right upper abdomen, feeling weak and tired nauseated.  Denies any fever chills chest pain, does hurt when she take deep breath.    Discussed with the bedside nursing staff as well      -Data reviewed today: I reviewed all new labs and imaging results over the last 24 hours.    Physical Exam   Temp: 98.1  F (36.7  C) Temp src: Oral BP: 135/81 Pulse: 107 Heart Rate: 81 Resp: 16 SpO2: 97 % O2 Device: None (Room air)    Vitals:    03/01/19 1845   Weight: 99.6 kg (219 lb 9.3 oz)     Vital Signs with Ranges  Temp:  [97.3  F (36.3  C)-98.1  F (36.7  C)] 98.1  F (36.7  C)  Pulse:  [102-107] 107  Heart Rate:  [] 81  Resp:  [16-18] 16  BP: (119-140)/(67-81) 135/81  SpO2:  [97 %-98 %] 97 %  I/O last 3 completed shifts:  In: 1306 [P.O.:450; I.V.:856]  Out: 1050 [Urine:1050]    Constitutional: Fatigued, cooperative, no apparent distress  Respiratory: Clear to auscultation bilaterally, no crackles or wheezing, decreased air entry in the right base  Cardiovascular:  Regular rate and rhythm, normal S1 and S2, and no murmur noted  GI: Normal bowel sounds, soft, non-distended, tender right upper quadrant +  Skin/Integumen: No rashes, no cyanosis, no edema  Neuro : moving all 4 extremities, no focal deficit noted     Medications     sodium chloride 100 mL/hr at 03/06/19 1245       latanoprost  1 drop Both Eyes At Bedtime     piperacillin-tazobactam  2.25 g Intravenous Q6H       Data   Recent Labs   Lab 03/06/19  0821 03/05/19  0717 03/04/19  0849 03/04/19  0806 03/03/19  1439   WBC 35.7* 22.1* 22.1*  --  17.9*   HGB 12.3 12.2 13.9  --  13.2   MCV 86 86 86  --  87   * 99* 101*  --  109*   INR  --   --   --   --  1.01    133  --  134  --    POTASSIUM 3.8 3.6  --  3.9  --    CHLORIDE 99 102  --  101  --    CO2 20 18*  --  22  --    BUN 86* 82*  --  78*  --    CR 4.41* 4.61*  --  4.70*  --    ANIONGAP 14 13  --  11  --    DANNA 8.8 8.1*  --  8.7  --    GLC 94 83  --  98  --    ALBUMIN 1.7* 1.6*  --  1.9*  --    PROTTOTAL 6.0* 5.5*  --  6.2*  --    BILITOTAL 7.9* 5.5*  --  3.9*  --    ALKPHOS 626* 460*  --  433*  --    * 125*  --  170*  --    * 207*  --  247*  --      Recent Labs   Lab 03/06/19  0821 03/05/19  0717 03/04/19  0806 03/03/19  1055 03/02/19  0610   GLC 94 83 98 109* 110*       Imaging:   No results found for this or any previous visit (from the past 24 hour(s)).

## 2019-03-06 NOTE — CONSULTS
"CLINICAL NUTRITION SERVICES  -  ASSESSMENT NOTE      RECOMMENDATIONS FOR MD/PROVIDER TO ORDER: Consider an appetite stimulant    Future/Additional Recommendations: If oral intake does not improve in the next few days, consider a FT    Malnutrition:   % Weight Loss:  None noted  % Intake:  </= 50% for >/= 5 days (severe malnutrition)  Subcutaneous Fat Loss:  None observed  Muscle Loss:  None observed  Fluid Retention:  None noted    Malnutrition Diagnosis: Patient does not meet two of the above criteria necessary for diagnosing malnutrition        REASON FOR ASSESSMENT  Vanessa Huffman is a 68 year old female seen by Registered Dietitian for Patient/Family Request - Poor PO in take for a little over a week       NUTRITION HISTORY  - Information obtained from patient and her son.  Per son, she was nauseated and had some vomiting for about 3 days prior to admit.  Since admission she has also struggled with nausea and poor appetite.  She was not taking a supplement at home prior to admit.     Current issues include poor appetite, nausea, and pain (headache, abdominal pain, back pain).      CURRENT NUTRITION ORDERS  Diet Order:     Regular + Plus2 shakes between meals     Current Intake/Tolerance:  Patient's son reports that she has really been only taking bites of food at her meals.  Per  - \"she hasn't eaten in over a week\".  Patient tells me that she doesn't really like the Plus2 shakes because of the flavor.  She is willing to try a different flavor.       PHYSICAL FINDINGS  Observed  No nutrition-related physical findings observed  Obtained from Chart/Interdisciplinary Team  None noted    ANTHROPOMETRICS  Height: 5' 4\"  Weight: 99.6 kg (220#)(3/1)  Body mass index is 37.69 kg/m   Weight Status:  Obesity Grade II BMI 35-39.9  IBW: 54.5 kg   % IBW: 183%  Weight History:   Wt Readings from Last 10 Encounters:   03/01/19 99.6 kg (219 lb 9.3 oz)   04/06/18 94.3 kg (208 lb)   03/19/18 94.1 kg (207 lb 7.3 oz)   No " weight loss noted       LABS  BUN 86 (H), Cr 4.41 (H) - ELISE  Elevated LFTs - Likely liver abscess     MEDICATIONS  Medications reviewed      ASSESSED NUTRITION NEEDS PER APPROVED PRACTICE GUIDELINES:    Dosing Weight 66 kg (adjusted)  Estimated Energy Needs: 5799-2153 kcals (25-30 Kcal/Kg)  Justification: obese  Estimated Protein Needs:  grams protein (1.2-1.5 g pro/Kg)  Justification: hypercatabolism with acute illness  Estimated Fluid Needs: 0664-3810 mL (1 mL/Kcal)  Justification: maintenance    MALNUTRITION:  % Weight Loss:  None noted  % Intake:  </= 50% for >/= 5 days (severe malnutrition)  Subcutaneous Fat Loss:  None observed  Muscle Loss:  None observed  Fluid Retention:  None noted    Malnutrition Diagnosis: Patient does not meet two of the above criteria necessary for diagnosing malnutrition    NUTRITION DIAGNOSIS:  Inadequate oral intake related to poor appetite, nausea, and pain as evidenced by minimal intake over the last week (taking bites only)      NUTRITION INTERVENTIONS  Recommendations / Nutrition Prescription  Continue regular diet as tolerated  Continue Plus2 shakes BID between meals (try a different flavor)  Consider an appetite stimulant   If oral intake does not improve in the next few days, consider a FT     Implementation  Nutrition education: Per Provider order if indicated   Medical Food Supplement:  Modified supplement as above     Nutrition Goals  Patient will consume 25-50% of meals TID and supplements BID     MONITORING AND EVALUATION:  Progress towards goals will be monitored and evaluated per protocol and Practice Guidelines    María Ash RD, LD, CNSC   Clinical Dietitian - [facility]

## 2019-03-06 NOTE — PROGRESS NOTES
Mrs. Flowers is a 68-year-old gentleman with a large liver mass.  CT-guided biopsy was done on 2019.  There is no evidence of any malignancy.  Biopsy reveals hemorrhagic necrosis and acute inflammation.  This is suspicious for abscess.  ID is following.  Patient is on antibiotics.      Patient has been having more abdominal pain.  Because of that, CT scan was repeated today.  Her right hepatic mass has increased in size.  There is no abdominal lymphadenopathy or any other mass.  There is nodular consolidation in left lower lobe, likely infectious or inflammatory.  There is also some right lower lobe consolidation.      Patient has abdominal discomfort.  She feels weak.  Appetite is not good.  No high-grade fever.      LABORATORY DATA:  Reviewed.      ASSESSMENT:   1.  A 68-year-old female with large liver mass.  Biopsy does not reveal any malignancy.  This is infectious/abscess.   2.  Leukocytosis secondary to sepsis.   3.  Thrombocytopenia secondary to sepsis.   4.  Klebsiella pneumoniae sepsis.      PLAN:   1.  Biopsy report was discussed.  There is no evidence of malignancy.        At this time, I would recommend that the patient have a followup CT of the abdomen and pelvis in the next few weeks to make sure that this abscess/infection has resolved.  If it does not resolve, I would recommend a repeat liver biopsy to make sure that there is no necrotic malignancy which we are missing.        2.  Patient has leukocytosis and thrombocytopenia.  This is secondary to sepsis.  As sepsis improves, this will improve.      3.  We will see the patient intermittently in the hospital.  Discussed with Dr. Barksdale.         MARINO COLLINS MD             D: 2019   T: 2019   MT: MARIELLA      Name:     MASSIMO FLOWERS   MRN:      [id number]        Account:      [id number]   :      1950           Service Date: 2019      Document: [id number]

## 2019-03-06 NOTE — PLAN OF CARE
A&O x4. VSS on RA. Pt had biopsy of liver done today. Site covered with band aid that remains CDI.  C/o pain to right side x1. Managed with oxycodone. Regular diet, poor appetite. Up A1 with gait belt.  LS dim in bases. BS active, + Flatus. Progressing toward plan of care.

## 2019-03-07 ENCOUNTER — APPOINTMENT (OUTPATIENT)
Dept: ULTRASOUND IMAGING | Facility: CLINIC | Age: 69
DRG: 871 | End: 2019-03-07
Attending: INTERNAL MEDICINE
Payer: MEDICARE

## 2019-03-07 LAB
ALBUMIN SERPL-MCNC: 1.4 G/DL (ref 3.4–5)
ALP SERPL-CCNC: 565 U/L (ref 40–150)
ALT SERPL W P-5'-P-CCNC: 87 U/L (ref 0–50)
AMMONIA PLAS-SCNC: 12 UMOL/L (ref 10–50)
ANION GAP SERPL CALCULATED.3IONS-SCNC: 13 MMOL/L (ref 3–14)
ANISOCYTOSIS BLD QL SMEAR: SLIGHT
AST SERPL W P-5'-P-CCNC: 175 U/L (ref 0–45)
BACTERIA SPEC CULT: NO GROWTH
BACTERIA SPEC CULT: NO GROWTH
BASOPHILS # BLD AUTO: 0 10E9/L (ref 0–0.2)
BASOPHILS NFR BLD AUTO: 0 %
BILIRUB DIRECT SERPL-MCNC: 6.9 MG/DL (ref 0–0.2)
BILIRUB SERPL-MCNC: 7.9 MG/DL (ref 0.2–1.3)
BUN SERPL-MCNC: 91 MG/DL (ref 7–30)
CALCIUM SERPL-MCNC: 8.6 MG/DL (ref 8.5–10.1)
CHLORIDE SERPL-SCNC: 104 MMOL/L (ref 94–109)
CO2 SERPL-SCNC: 18 MMOL/L (ref 20–32)
COPATH REPORT: NORMAL
CREAT SERPL-MCNC: 4.04 MG/DL (ref 0.52–1.04)
DIFFERENTIAL METHOD BLD: ABNORMAL
ELLIPTOCYTES BLD QL SMEAR: SLIGHT
EOSINOPHIL # BLD AUTO: 0 10E9/L (ref 0–0.7)
EOSINOPHIL NFR BLD AUTO: 0 %
ERYTHROCYTE [DISTWIDTH] IN BLOOD BY AUTOMATED COUNT: 13.9 % (ref 10–15)
GFR SERPL CREATININE-BSD FRML MDRD: 11 ML/MIN/{1.73_M2}
GLUCOSE SERPL-MCNC: 111 MG/DL (ref 70–99)
GRAM STN SPEC: NORMAL
GRAM STN SPEC: NORMAL
HCT VFR BLD AUTO: 27.9 % (ref 35–47)
HGB BLD-MCNC: 9.7 G/DL (ref 11.7–15.7)
LACTATE BLD-SCNC: 1 MMOL/L (ref 0.7–2)
LYMPHOCYTES # BLD AUTO: 1.5 10E9/L (ref 0.8–5.3)
LYMPHOCYTES NFR BLD AUTO: 3 %
Lab: NORMAL
Lab: NORMAL
MCH RBC QN AUTO: 29.3 PG (ref 26.5–33)
MCHC RBC AUTO-ENTMCNC: 34.8 G/DL (ref 31.5–36.5)
MCV RBC AUTO: 84 FL (ref 78–100)
METAMYELOCYTES # BLD: 1 10E9/L
METAMYELOCYTES NFR BLD MANUAL: 2 %
MONOCYTES # BLD AUTO: 3.4 10E9/L (ref 0–1.3)
MONOCYTES NFR BLD AUTO: 7 %
MYELOCYTES # BLD: 2 10E9/L
MYELOCYTES NFR BLD MANUAL: 4 %
NEUTROPHILS # BLD AUTO: 41.3 10E9/L (ref 1.6–8.3)
NEUTROPHILS NFR BLD AUTO: 84 %
PHOSPHATE SERPL-MCNC: 3.1 MG/DL (ref 2.5–4.5)
PLATELET # BLD AUTO: 126 10E9/L (ref 150–450)
PLATELET # BLD EST: ABNORMAL 10*3/UL
POTASSIUM SERPL-SCNC: 3.4 MMOL/L (ref 3.4–5.3)
PROT SERPL-MCNC: 5.1 G/DL (ref 6.8–8.8)
RBC # BLD AUTO: 3.31 10E12/L (ref 3.8–5.2)
SODIUM SERPL-SCNC: 135 MMOL/L (ref 133–144)
SPECIMEN SOURCE: NORMAL
WBC # BLD AUTO: 49.2 10E9/L (ref 4–11)

## 2019-03-07 PROCEDURE — 0F9030Z DRAINAGE OF LIVER WITH DRAINAGE DEVICE, PERCUTANEOUS APPROACH: ICD-10-PCS | Performed by: RADIOLOGY

## 2019-03-07 PROCEDURE — 99221 1ST HOSP IP/OBS SF/LOW 40: CPT | Performed by: SURGERY

## 2019-03-07 PROCEDURE — 88173 CYTOPATH EVAL FNA REPORT: CPT | Mod: 26 | Performed by: INTERNAL MEDICINE

## 2019-03-07 PROCEDURE — A9270 NON-COVERED ITEM OR SERVICE: HCPCS | Mod: GY | Performed by: INTERNAL MEDICINE

## 2019-03-07 PROCEDURE — 87205 SMEAR GRAM STAIN: CPT | Performed by: INTERNAL MEDICINE

## 2019-03-07 PROCEDURE — 40000863 ZZH STATISTIC RADIOLOGY XRAY, US, CT, MAR, NM

## 2019-03-07 PROCEDURE — 0FB03ZX EXCISION OF LIVER, PERCUTANEOUS APPROACH, DIAGNOSTIC: ICD-10-PCS | Performed by: RADIOLOGY

## 2019-03-07 PROCEDURE — 84155 ASSAY OF PROTEIN SERUM: CPT | Performed by: INTERNAL MEDICINE

## 2019-03-07 PROCEDURE — 25000125 ZZHC RX 250: Performed by: RADIOLOGY

## 2019-03-07 PROCEDURE — 82247 BILIRUBIN TOTAL: CPT | Performed by: INTERNAL MEDICINE

## 2019-03-07 PROCEDURE — 88173 CYTOPATH EVAL FNA REPORT: CPT | Performed by: INTERNAL MEDICINE

## 2019-03-07 PROCEDURE — 00000155 ZZHCL STATISTIC H-CELL BLOCK W/STAIN: Performed by: INTERNAL MEDICINE

## 2019-03-07 PROCEDURE — 12000000 ZZH R&B MED SURG/OB

## 2019-03-07 PROCEDURE — 88305 TISSUE EXAM BY PATHOLOGIST: CPT | Performed by: INTERNAL MEDICINE

## 2019-03-07 PROCEDURE — 25000132 ZZH RX MED GY IP 250 OP 250 PS 637: Mod: GY | Performed by: INTERNAL MEDICINE

## 2019-03-07 PROCEDURE — 25800030 ZZH RX IP 258 OP 636: Performed by: INTERNAL MEDICINE

## 2019-03-07 PROCEDURE — 83605 ASSAY OF LACTIC ACID: CPT | Performed by: INTERNAL MEDICINE

## 2019-03-07 PROCEDURE — 36415 COLL VENOUS BLD VENIPUNCTURE: CPT | Performed by: INTERNAL MEDICINE

## 2019-03-07 PROCEDURE — 82248 BILIRUBIN DIRECT: CPT | Performed by: INTERNAL MEDICINE

## 2019-03-07 PROCEDURE — 27210219 ZZH KIT SHRLOCK 5FR DBL LUM PWR P

## 2019-03-07 PROCEDURE — 87075 CULTR BACTERIA EXCEPT BLOOD: CPT | Performed by: INTERNAL MEDICINE

## 2019-03-07 PROCEDURE — 80069 RENAL FUNCTION PANEL: CPT | Performed by: INTERNAL MEDICINE

## 2019-03-07 PROCEDURE — 88161 CYTOPATH SMEAR OTHER SOURCE: CPT | Performed by: INTERNAL MEDICINE

## 2019-03-07 PROCEDURE — 88112 CYTOPATH CELL ENHANCE TECH: CPT | Mod: 26 | Performed by: INTERNAL MEDICINE

## 2019-03-07 PROCEDURE — 87077 CULTURE AEROBIC IDENTIFY: CPT | Performed by: INTERNAL MEDICINE

## 2019-03-07 PROCEDURE — 85025 COMPLETE CBC W/AUTO DIFF WBC: CPT | Performed by: INTERNAL MEDICINE

## 2019-03-07 PROCEDURE — 88307 TISSUE EXAM BY PATHOLOGIST: CPT | Performed by: INTERNAL MEDICINE

## 2019-03-07 PROCEDURE — 36569 INSJ PICC 5 YR+ W/O IMAGING: CPT

## 2019-03-07 PROCEDURE — 88161 CYTOPATH SMEAR OTHER SOURCE: CPT | Mod: 26 | Performed by: INTERNAL MEDICINE

## 2019-03-07 PROCEDURE — 25000128 H RX IP 250 OP 636: Performed by: RADIOLOGY

## 2019-03-07 PROCEDURE — 84075 ASSAY ALKALINE PHOSPHATASE: CPT | Performed by: INTERNAL MEDICINE

## 2019-03-07 PROCEDURE — 88307 TISSUE EXAM BY PATHOLOGIST: CPT | Mod: 26 | Performed by: INTERNAL MEDICINE

## 2019-03-07 PROCEDURE — 87102 FUNGUS ISOLATION CULTURE: CPT | Performed by: INTERNAL MEDICINE

## 2019-03-07 PROCEDURE — 99233 SBSQ HOSP IP/OBS HIGH 50: CPT | Performed by: INTERNAL MEDICINE

## 2019-03-07 PROCEDURE — 84450 TRANSFERASE (AST) (SGOT): CPT | Performed by: INTERNAL MEDICINE

## 2019-03-07 PROCEDURE — 88112 CYTOPATH CELL ENHANCE TECH: CPT | Performed by: INTERNAL MEDICINE

## 2019-03-07 PROCEDURE — 87070 CULTURE OTHR SPECIMN AEROBIC: CPT | Performed by: INTERNAL MEDICINE

## 2019-03-07 PROCEDURE — 25000128 H RX IP 250 OP 636: Performed by: INTERNAL MEDICINE

## 2019-03-07 PROCEDURE — C1729 CATH, DRAINAGE: HCPCS

## 2019-03-07 PROCEDURE — 88305 TISSUE EXAM BY PATHOLOGIST: CPT | Mod: 26 | Performed by: INTERNAL MEDICINE

## 2019-03-07 PROCEDURE — 84460 ALANINE AMINO (ALT) (SGPT): CPT | Performed by: INTERNAL MEDICINE

## 2019-03-07 PROCEDURE — 00000102 ZZHCL STATISTIC CYTO WRIGHT STAIN TC: Performed by: INTERNAL MEDICINE

## 2019-03-07 RX ORDER — FLUMAZENIL 0.1 MG/ML
0.2 INJECTION, SOLUTION INTRAVENOUS
Status: DISCONTINUED | OUTPATIENT
Start: 2019-03-07 | End: 2019-03-08

## 2019-03-07 RX ORDER — DEXTROSE MONOHYDRATE 25 G/50ML
25-50 INJECTION, SOLUTION INTRAVENOUS
Status: DISCONTINUED | OUTPATIENT
Start: 2019-03-07 | End: 2019-03-07

## 2019-03-07 RX ORDER — NICOTINE POLACRILEX 4 MG
15-30 LOZENGE BUCCAL
Status: DISCONTINUED | OUTPATIENT
Start: 2019-03-07 | End: 2019-03-07

## 2019-03-07 RX ORDER — NALOXONE HYDROCHLORIDE 0.4 MG/ML
.1-.4 INJECTION, SOLUTION INTRAMUSCULAR; INTRAVENOUS; SUBCUTANEOUS
Status: DISCONTINUED | OUTPATIENT
Start: 2019-03-07 | End: 2019-03-08

## 2019-03-07 RX ORDER — FENTANYL CITRATE 50 UG/ML
25-50 INJECTION, SOLUTION INTRAMUSCULAR; INTRAVENOUS EVERY 5 MIN PRN
Status: DISCONTINUED | OUTPATIENT
Start: 2019-03-07 | End: 2019-03-10

## 2019-03-07 RX ADMIN — OXYCODONE HYDROCHLORIDE 5 MG: 5 TABLET ORAL at 17:49

## 2019-03-07 RX ADMIN — PIPERACILLIN AND TAZOBACTAM 2.25 G: 2; .25 INJECTION, POWDER, FOR SOLUTION INTRAVENOUS at 05:07

## 2019-03-07 RX ADMIN — MIDAZOLAM HYDROCHLORIDE 1 MG: 1 INJECTION, SOLUTION INTRAMUSCULAR; INTRAVENOUS at 10:47

## 2019-03-07 RX ADMIN — OXYCODONE HYDROCHLORIDE 5 MG: 5 TABLET ORAL at 04:03

## 2019-03-07 RX ADMIN — SODIUM CHLORIDE: 9 INJECTION, SOLUTION INTRAVENOUS at 08:22

## 2019-03-07 RX ADMIN — FENTANYL CITRATE 50 MCG: 50 INJECTION, SOLUTION INTRAMUSCULAR; INTRAVENOUS at 10:47

## 2019-03-07 RX ADMIN — LIDOCAINE HYDROCHLORIDE 10 ML: 10 INJECTION, SOLUTION EPIDURAL; INFILTRATION; INTRACAUDAL; PERINEURAL at 10:45

## 2019-03-07 RX ADMIN — SODIUM CHLORIDE: 9 INJECTION, SOLUTION INTRAVENOUS at 23:25

## 2019-03-07 RX ADMIN — ONDANSETRON 4 MG: 2 INJECTION INTRAMUSCULAR; INTRAVENOUS at 17:39

## 2019-03-07 RX ADMIN — PIPERACILLIN AND TAZOBACTAM 2.25 G: 2; .25 INJECTION, POWDER, FOR SOLUTION INTRAVENOUS at 17:39

## 2019-03-07 RX ADMIN — OXYCODONE HYDROCHLORIDE 5 MG: 5 TABLET ORAL at 08:21

## 2019-03-07 RX ADMIN — PIPERACILLIN AND TAZOBACTAM 2.25 G: 2; .25 INJECTION, POWDER, FOR SOLUTION INTRAVENOUS at 11:47

## 2019-03-07 RX ADMIN — PIPERACILLIN AND TAZOBACTAM 2.25 G: 2; .25 INJECTION, POWDER, FOR SOLUTION INTRAVENOUS at 23:14

## 2019-03-07 ASSESSMENT — ACTIVITIES OF DAILY LIVING (ADL)
ADLS_ACUITY_SCORE: 15

## 2019-03-07 NOTE — PROVIDER NOTIFICATION
MD Notification    Notified Person: MD    Notified Person Name: David    Notification Date/Time: 03/07/19 @ 2:54 PM     Notification Interaction: paged MD    Purpose of Notification: Per IV team prior to inserting PICC line need to OK with Nephrology since creatinine is up.    Orders Received:    Comments: Spoke with MD, OK with PICC line

## 2019-03-07 NOTE — PROGRESS NOTES
[facility] staff night - patient seen and examined, discussed with patient and family.    General: awake, but appears ill  Heart: reg  Lungs: clear   Abdomen: hypoactive bowel sounds, soft, moderate tender epi/RUQ, no rebound or guarding. New drain in RUQ  Skin: + jaundice  Neuro: mental status intact    Imp:  1. Liver abscess (vs necrosis of a hepatic tumor) - suspect this is a liver abscess, not improving on ABx, agree with drain placement,.  2. Increased LFTs - suspect related to #1, but could be consistent with biliary obstruction. MRCP initially did show dilated CBD, but within limits for a post-cholecystectomy state and her age. CBD was negative for stone on that exam.   - Discussed with our biliary service, no indication for ERCP at this time.  - History of gastric bypass, would need intra-op approach  3. Increasing WBC - hopefully will improve after drain placement, will review any need for ERCP with our biliary service.  *Overall, would agree with surgery, if not able to get control of this infection would pursue transfer to [facility].    Plan:  1. Continue Abx  2. Monitor clinically after drain placement  3. May need transfer to [facility], if unable to control this infection or if no clinical improvement.    Mark Anthony Cox MD   [facility]  Cell [phone]  After 5 PM, please call [phone]

## 2019-03-07 NOTE — CONSULTS
[facility]  General Surgery Consultation         Candelario Vallecillo MD    Vanessa Huffman MRN# [id number]   YOB: 1950 Age: 68 year old      Date of Admission:  3/1/2019  Date of Consult: 3/7/2019         Assessment and Plan:   68-year-old female with a history of gastric bypass now presents with acute kidney injury, leukocytosis, and liver failure.  Intrahepatic process but is not obviously consistent with simple abscess.  May be more representative of a necrotic tumor or mass, but biopsies have thus far not revealed an obvious diagnosis.  Biopsy was again performed today and a small amount of purulent fluid was aspirated.  Agree with continued medical management.  Although I have nothing to offer this patient from a surgical standpoint, I have been in touch with 1 of my surgical oncology colleagues at the [facility] about any other surgical options which may be available.  Surgical co-morbities include acute kidney disease, previous gastric bypass, sepsis, liver failure.            Code Status:   Full Code         Primary Care Physician:   Emily Najera [phone]         Requesting Physician:      Dr. Barksdale         Chief Complaint:   Liver mass versus abscess    History is obtained from the patient         History of Present Illness:   Vanessa Huffman is a 68 year old female who presented with acute kidney injury and sepsis.  Patient presented to the emergency department with a leukocytosis and was found to have a possible liver abscess.  She was admitted to the hospital and placed on antibiotics.  Was also found to have an acute kidney injury.  Biopsy under CT guidance was attempted and was thought to reveal a semisolid mass.  Pathology only showed necrotic tissue.  She continues to clinically worsen with a increase in her bilirubin, LFTs, and white blood cell count.  We are asked to evaluate for consideration of surgical intervention.           Past Medical History:   Vanessa  NAEL Huffman  has a past medical history of Arthritis, Depressive disorder, Hypertension, and Obesity.         Past Surgical History:   Vanessa Huffman  has a past surgical history that includes Cholecystectomy; appendectomy; orthopedic surgery; GYN surgery; GI surgery; and Arthroplasty carpometacarpal (thumb joint) (Left, 4/6/2018).         Home Medications:     Prior to Admission medications    Medication Sig Last Dose Taking? Auth Provider   amLODIPine (NORVASC) 2.5 MG tablet Take 1 tablet (2.5 mg) by mouth daily 3/1/2019 at Unknown time Yes Candelario Zelaya MD   CYCLOBENZAPRINE HCL PO Take 10 mg by mouth At Bedtime 2/28/2019 at Unknown time Yes Unknown, Entered By History   hydrOXYzine (ATARAX) 10 MG tablet Take 1 10-mg tablet every 6 hours as needed for muscle relaxation and to supplement your pain medication. prn Yes Magda Plasencia PA-C   latanoprost (XALATAN) 0.005 % ophthalmic solution Place 1 drop into both eyes At Bedtime 2/28/2019 at Unknown time Yes Unknown, Entered By History   LISINOPRIL PO Take 10 mg by mouth daily 3/1/2019 at Unknown time Yes Unknown, Entered By History   loperamide (IMODIUM) 2 MG capsule Take 2 mg by mouth 4 times daily as needed for diarrhea prn Yes Unknown, Entered By History   nystatin (MYCOSTATIN) 658676 UNIT/GM POWD Apply topically 2 times daily as needed (affected area) prn Yes Unknown, Entered By History   oxyCODONE-acetaminophen (PERCOCET) 5-325 MG per tablet Take 1-2 tablets every 4-6 hours for pain if needed. prn Yes Magda Plasencia PA-C   oxyCODONE-acetaminophen (PERCOCET) 5-325 MG per tablet Take 0.5 tablets by mouth every morning  3/1/2019 at Unknown time Yes Unknown, Entered By History   oxyCODONE-acetaminophen (PERCOCET) 5-325 MG per tablet Take 1 tablet by mouth every evening  2/28/2019 at Unknown time Yes Unknown, Entered By History   SIMVASTATIN PO Take 40 mg by mouth At Bedtime 2/28/2019 at Unknown time Yes Unknown, Entered By History   SUMATRIPTAN  "SUCCINATE PO Take 50 mg by mouth every 2 hours as needed for migraine (max of 200mg q 24hr) Past Week at Unknown time Yes Unknown, Entered By History   TEMAZEPAM PO Take 15 mg by mouth nightly as needed for sleep prn Yes Unknown, Entered By History   TRAMADOL HCL PO Take 50 mg by mouth 3 times daily as needed for moderate to severe pain prn Yes Unknown, Entered By History   MEDICATION INSTRUCTION Hold Lisinopril and Norvasc if your SBP is <110 and DBP<70   Candelario Zelaya MD            Current Medications:           latanoprost  1 drop Both Eyes At Bedtime     piperacillin-tazobactam  2.25 g Intravenous Q6H     sodium chloride (PF)  10 mL Irrigation Q8H     glucose **OR** dextrose **OR** glucagon, glucose **OR** dextrose **OR** glucagon, glucose **OR** dextrose **OR** glucagon, fentaNYL, flumazenil, hydrOXYzine, melatonin, midazolam, morphine, naloxone, naloxone, ondansetron **OR** ondansetron, oxyCODONE, temazepam         Allergies:     Allergies   Allergen Reactions     Contrast Dye Shortness Of Breath     Codeine      Zolpidem Other (See Comments)     Patient reports sleep walking.     Diatrizoate Itching     itchy throat that makes her want to cough            Social History:   Vanessa Huffman  reports that  has never smoked. she has never used smokeless tobacco. She reports that she drinks alcohol. She reports that she does not use drugs.          Family History:   Vanessa Huffman family history includes Coronary Artery Disease in her father; Diabetes in her sister, sister, and sister.          Review of Systems:   The 10 point Review of Systems is negative other than noted in the HPI.  Patient is somewhat lethargic and has difficulty responding to questions.           Physical Exam:   Blood pressure 149/71, pulse 100, temperature 96.6  F (35.9  C), temperature source Oral, resp. rate 16, height 1.626 m (5' 4\"), weight 99.6 kg (219 lb 9.3 oz), SpO2 96 %.  219 lbs 9.25 oz    Jaundiced female in no " distress.  Patient is somewhat lethargic  Pupils equal round and reactive to light.  Scleral icterus  No cervical lymphadenopathy or thyromegaly.   Lung fields clear, breathing comfortably.   Heart normal sinus rhythm.  No murmurs rubs or gallops.  Abdomen soft, significant tenderness in the right upper quadrant.  Skin warm, dry.  No obvious rashes or lesions.           Data:   All new lab and imaging data was reviewed.  CT scan shows gastric bypass.  Large mass within the right half of the liver takes up the majority of the tissue.  Some fluid around the liver, possible subcapsular hematoma.  Labs reviewed.  Recent Labs   Lab Test 03/07/19 0758 03/06/19  0821  03/03/19  1439   WBC 49.2* 35.7*   < > 17.9*   HGB 9.7* 12.3   < > 13.2   MCV 84 86   < > 87   * 114*   < > 109*   INR  --   --   --  1.01    < > = values in this interval not displayed.      Recent Labs   Lab Test 03/07/19 0758 03/06/19  0821    133   POTASSIUM 3.4 3.8   CHLORIDE 104 99   CO2 18* 20   BUN 91* 86*   CR 4.04* 4.41*   ANIONGAP 13 14   DANNA 8.6 8.8   * 94

## 2019-03-07 NOTE — PROGRESS NOTES
Chart check.  Patient had repeat liver biopsy done today.  We will see her after pathology is back.

## 2019-03-07 NOTE — PROGRESS NOTES
[facility]    Infectious Disease Progress Note    Date of Service (when I saw the patient): 03/07/2019     Assessment & Plan   Vanessa Huffman is a 68 year old female who was admitted on 3/1/2019.     ASSESSMENT:  1. KLEBSIELLA PNEUMONIAE BACTEREMIA-? Enteric source v cholangitis,   2. LIVER MASS/DENSITY  3. PROBABLE HEPATIC ABSCESS  4. ABNORMAL LFTS-C/W Liver abscess, ? Tumor, ? Cholangitis        REC  1. On Zosyn after positive for abscesses path and cultures from the liver positive for Klebsiella, continue for now, follow up on the cultures.   2. Liver abscesses s/p drain placement today, draining thick red fluid.   3. Follow LFT, WBC           Meghan Guadarrama MD    Interval History   WBC up   Afebrile   Pain improved after drain placement     Physical Exam   Temp: 96.6  F (35.9  C) Temp src: Oral BP: 149/71 Pulse: 100 Heart Rate: 103 Resp: 16 SpO2: 96 % O2 Device: None (Room air)    Vitals:    03/01/19 1845   Weight: 99.6 kg (219 lb 9.3 oz)     Vital Signs with Ranges  Temp:  [96.1  F (35.6  C)-97.9  F (36.6  C)] 96.6  F (35.9  C)  Pulse:  [100-114] 100  Heart Rate:  [] 103  Resp:  [16-22] 16  BP: (106-149)/(58-92) 149/71  SpO2:  [94 %-97 %] 96 %    Constitutional: Awake  Lungs: Clear to auscultation bilaterally, no crackles or wheezing  Cardiovascular: Regular rate and rhythm, normal S1 and S2, and no murmur noted  Abdomen: drain in the liver abscess  Skin: No rashes, no cyanosis, no edema  Other:    Medications     sodium chloride 100 mL/hr at 03/07/19 0822       latanoprost  1 drop Both Eyes At Bedtime     piperacillin-tazobactam  2.25 g Intravenous Q6H     sodium chloride (PF)  10 mL Irrigation Q8H       Data   All microbiology laboratory data reviewed.  Recent Labs   Lab Test 03/07/19  0758 03/06/19  0821 03/05/19  0717   WBC 49.2* 35.7* 22.1*   HGB 9.7* 12.3 12.2   HCT 27.9* 35.5 34.7*   MCV 84 86 86   * 114* 99*     Recent Labs   Lab Test 03/07/19  0758 03/06/19  0809  03/05/19  0717   CR 4.04* 4.41* 4.61*     Recent Labs   Lab Test 03/01/19  1130   SED 79*     Recent Labs   Lab Test 03/04/19  1410 03/04/19  1355 03/03/19  1925 03/02/19  1043 03/01/19  2148 03/01/19  2145 03/01/19  1409 03/01/19  1355 03/01/19  1333   CULT Culture negative monitoring continues  Culture negative monitoring continues Culture negative after 3 days  Light growth  Klebsiella pneumoniae  *  Culture negative monitoring continues <10,000 colonies/mL  urogenital shannon  Susceptibility testing not routinely done    Canceled, Test credited  Duplicate request   No growth after 5 days No growth No growth No growth No growth Cultured on the 1st day of incubation:  Klebsiella pneumoniae  Susceptibility testing done on previous specimen  *  Critical Value/Significant Value, preliminary result only, called to and read back by  Adonis Monk RN  03/02/19 AA    Cultured on the 1st day of incubation:  Strain 2  Klebsiella pneumoniae  Susceptibility testing done on previous specimen  *

## 2019-03-07 NOTE — PROGRESS NOTES
SPIRITUAL HEALTH SERVICES Progress Note  [facility]    Introductory visit with pt and family ( and son, perhaps), per pt's lengthy hospital stay.  Pt wasn't feeling well enough to engage in conversation.  She is [employer], but is NPO and thus hasn't been able to take communion.  Family will request a  if they would like pt to receive the sacrament of the sick.  SH team will be available if needs arise.                                                                                                                                                 Aarti Olson M.A.  Staff   Pager [phone]  Phone [phone]

## 2019-03-07 NOTE — SIGNIFICANT EVENT
Significant Event Note  Called for increasing confusion, nothing focal, but more confused.  Daughter who is INR RN states that she is markedly deteriorating but this has been a slow progressive process, ie no acute worsening tonight. Sleep felt to be good previously by family members    The patient has no focal deficits able to move extremities, speech is coherent ?asterixis. On 2 dosages of 5 mg oxycodone today, the patient states she is in severe abdominal pain.     Most likely due to septic encephalopathy. Noted enlarging abscess on today's ct scan. Ammonia is already ordered.      Mihir Owen, DO

## 2019-03-07 NOTE — PROCEDURES
RADIOLOGY POST PROCEDURE NOTE w/ SEDATION  Patient name: Vanessa Huffman  MRN: [id number]  : 1950    Pre-procedure diagnosis: hepatic mass  Post-procedure diagnosis: Same    Procedure Date/Time: 2019  11:24 AM  Procedure: ultrasound guided mass biopsy and drain placement.   Estimated blood loss: None  Specimen(s) collected with description: 18 gauge core samples and 10 ml bloody purulent fluid.     I determined this patient to be an appropriate candidate for the planned sedation and procedure and reassessed the patient IMMEDIATELY PRIOR to sedation and procedure.     The patient tolerated the procedure well with no immediate complications.  Significant findings:none    See imaging dictation for procedural details.    Provider name: Aminata Casanova  Assistant(s):None

## 2019-03-07 NOTE — PLAN OF CARE
A&O ex lethargic and confused. VSS on RA ex tachy at times. Lung sounds diminished, acapella and IS encouraged, follow-up education provided. Neuros intact. Pulses present. Pain management with PRN Oxycodone. Denies nausea. On call provider paged due to concern of cognitive status and increased confusion. Patient seen by on call, see notes. A2 with gait belt and walker, bedside commode in room due to patient being weak and lethargic. Poor appetite, food encouraged.

## 2019-03-07 NOTE — PLAN OF CARE
Pt transferred to unit @ 0515. Disoriented to time upon admission but alert. Slightly anxious. Speech slow. VSS on RA ex tachy at times. Pain in RUQ managed with PRN oxycodone. LS clear/dim, [place], slightly tachypneic- IS/acappella encouraged. Generalized skin appearing jaundice/yellow- LFTs elevated. Voiding in commode- UOP minesh colored, BS active. Regular diet, poor PO intake reported. PIV inf NS @ 100 + abx. Last WBC elevated 35.7. Up with A/1-2 to ambulate. ID and GI following, plans to discharge pending improvement. Will continue to monitor.

## 2019-03-07 NOTE — PROGRESS NOTES
Patient denies pain.  Drain site CDI, no swelling, hematoma or bleeding. Patient transferred to  by cart at 11:22 AM 03/07/19. Patient received 50 mcg fentanyl and 1mg versed.  Procedure began atr approx 1047 and ended at approx 1102. Verbal report received by oncoming RN

## 2019-03-07 NOTE — PROVIDER NOTIFICATION
Paged on call provider: increased confusion and very slow to respond. Please advise.    Continue to monitor, if concerned about cognitive status, call RRT. Check BG.

## 2019-03-07 NOTE — PROVIDER NOTIFICATION
MD Notification    Notified Person: MD    Notified Person Name: Shamir    Notification Date/Time: 03/07/19 @ 5:50 PM     Notification Interaction: paged MD; spoke with MD    Purpose of Notification: Patient still NPO - can we change to clear liquid diet.    Orders Received: Telephone order with readback - patient can go back to previous diet, as she was only NPO for procedure.    Comments:

## 2019-03-07 NOTE — PROGRESS NOTES
"GASTROENTEROLOGY PROGRESS NOTE     SUBJECTIVE: Continues to have severe RUQ pain with light touch/movement/deep breaths. More lethargic now. Remains afebrile.     GI ROS: Denies nausea, vomiting, diarrhea, constipation, melena, or rectal bleeding.     OBJECTIVE:  /70 (BP Location: Right arm)   Pulse 100   Temp 96.6  F (35.9  C) (Oral)   Resp 18   Ht 1.626 m (5' 4\")   Wt 99.6 kg (219 lb 9.3 oz)   SpO2 97%   BMI 37.69 kg/m    Temp (24hrs), Av.6  F (36.4  C), Min:97.5  F (36.4  C), Max:97.7  F (36.5  C)    No data found.    Intake/Output Summary (Last 24 hours) at 3/3/2019 1742  Last data filed at 3/3/2019 1722  Gross per 24 hour   Intake 3552 ml   Output 1325 ml   Net 2227 ml        General Appearance: sleeping in between cares, no distress at rest   Eyes: PERRL, scleral icterus   GI: Soft, NABS, RUQ tenderness on palpation  Skin: +jaundice      Labs:     Recent Labs   Lab Test 19  0819  0719  1439   WBC 49.2* 35.7* 22.1*   < > 17.9*   HGB 9.7* 12.3 12.2   < > 13.2   MCV 84 86 86   < > 87   * 114* 99*   < > 109*   INR  --   --   --   --  1.01    < > = values in this interval not displayed.     Recent Labs   Lab Test 19  0821 19  0717   POTASSIUM 3.4 3.8 3.6   CHLORIDE 104 99 102   CO2 18* 20 18*   BUN 91* 86* 82*   ANIONGAP 13 14 13     Recent Labs   Lab Test 19  0821 19  0717  19  1925  19  1355  18  0645   ALBUMIN 1.4* 1.7* 1.6*   < >  --    < >  --    < > 4.1   BILITOTAL 7.9* 7.9* 5.5*   < >  --    < >  --    < > 0.7   ALT 87* 124* 125*   < >  --    < >  --    < > 48   * 229* 207*   < >  --    < >  --    < > 44   PROTEIN  --   --   --   --  30*  --  100*  --   --    LIPASE  --   --   --   --   --   --   --   --  133    < > = values in this interval not displayed.     MRCP/MRI    IMPRESSION:   1. Intra and extrahepatic biliary dilatation could be related to the  patient's age " and postcholecystectomy state. No other cause for  biliary dilatation is identified.  2. Indeterminate 10.3 cm right hepatic lesion. Malignancy cannot be excluded from this appearance. Evaluation of this lesion is limited by lack of IV contrast.  3. A small amount of ascites in the right upper quadrant and a small right pleural effusion are new since the previous exam.       Assessment: 68 year old female with elevated LFTs in the setting of Klebsiella  bacteremia and RUQ pain.  MRCP showed biliary dilation but no evidence of obstruction. Large liver mass appears to be consistent with abscess. Viral and autoimmune workup negative including: AMA, JUSTUS negative, smooth muscle antibody Hep A/B/C nonreactive. LFTs and WBC continue to increase.      3/4 Drainage/bx of liver mass-biopsy shows hemorrhagic necrosis and acute inflammation - no malignancy identified   3/6 CT showed abscess had increased in size with nodular consolidation in left lobe. WBC 25, total bili 7.9, alkaline phosphatase 626, , .   3/7 Surgery consulted. Drain placed per IR. WBC continues to rise - up to 49.2. Total bili the same 7.9; Alk phos/trasamininases trending down slightly       Plan:   -Trend LFTs  -ID/Oncology following:        -abx per ID   -Discussed with Dr. Brooke Thompson, [facility]  Cell: [phone]

## 2019-03-07 NOTE — PROGRESS NOTES
Pt. Has an order for PICC placement.  Pt.'s nurse will check with nephrology MD if placement ok because of acute renal injury.

## 2019-03-07 NOTE — PROGRESS NOTES
Nephrology Initial Consult  March 4, 2019        ASSESSMENT AND RECOMMENDATIONS:   1. ELISE -   Likely ATN given clinical course of septic/ hypovolemic  shock, low PO intake, concommittant use of lisinopril , multiple granular casts in urine.   - nonoliguric . Adequately fluid resuscitated.   - continue to hold lisinopril .   - Avoid IV contrast until kidney function improves, unless an emergency . S.Cr plateaued and slightly better today .   - Dose meds for eGFR of ~10 -20  -Cr continues  to improve. Good UOP     2 HTN - BP okay   Continue to hold lisinopril     3. Septic shock / Klebsiella bacteremia /  Liver abscess - management per ID/ Primary team. Liver Bx shows necrosis and ac inflammation c/w abscess.     4. Lytes okay . Mild acidosis with ELISE.     Recommendations were communicated to primary team via this note.     Magalys Hernandez MD  [facility] - Nephrology   [phone]      Interval hx  S.Cr continues to trend down.   Good UOP .   Repeat CT shows increase in size of the  Liver mass with some gas in it (? Related to procedure) -s/p drain placement. Reports pain is better with drain placement. Still tired, drowsy.     PAST MEDICAL HISTORY:  Reviewed with patient on 03/07/2019     Past Medical History:   Diagnosis Date     Arthritis      Depressive disorder      Hypertension      Obesity        Past Surgical History:   Procedure Laterality Date     APPENDECTOMY       ARTHROPLASTY CARPOMETACARPAL (THUMB JOINT) Left 4/6/2018    Procedure: ARTHROPLASTY CARPOMETACARPAL (THUMB JOINT);  LEFT  THUMB CARPOMETACARPAL EXCISIONAL ARTHROPLASTY WITH FASCIA CLARE GRAFT ;  Surgeon: Kalyani King MD;  Location: [facility]     CHOLECYSTECTOMY       GI SURGERY      gastric bypass     GYN SURGERY      abdominal hysterectomy     ORTHOPEDIC SURGERY      bilateral bunionectomies        MEDICATIONS:  PTA Meds  Prior to Admission medications    Medication Sig Last Dose Taking? Auth Provider   amLODIPine (NORVASC)  2.5 MG tablet Take 1 tablet (2.5 mg) by mouth daily 3/1/2019 at Unknown time Yes Candelario Zelaya MD   CYCLOBENZAPRINE HCL PO Take 10 mg by mouth At Bedtime 2/28/2019 at Unknown time Yes Unknown, Entered By History   hydrOXYzine (ATARAX) 10 MG tablet Take 1 10-mg tablet every 6 hours as needed for muscle relaxation and to supplement your pain medication. prn Yes Magda Plasencia PA-C   latanoprost (XALATAN) 0.005 % ophthalmic solution Place 1 drop into both eyes At Bedtime 2/28/2019 at Unknown time Yes Unknown, Entered By History   LISINOPRIL PO Take 10 mg by mouth daily 3/1/2019 at Unknown time Yes Unknown, Entered By History   loperamide (IMODIUM) 2 MG capsule Take 2 mg by mouth 4 times daily as needed for diarrhea prn Yes Unknown, Entered By History   nystatin (MYCOSTATIN) 033507 UNIT/GM POWD Apply topically 2 times daily as needed (affected area) prn Yes Unknown, Entered By History   oxyCODONE-acetaminophen (PERCOCET) 5-325 MG per tablet Take 1-2 tablets every 4-6 hours for pain if needed. prn Yes Magda Plasencia PA-C   oxyCODONE-acetaminophen (PERCOCET) 5-325 MG per tablet Take 0.5 tablets by mouth every morning  3/1/2019 at Unknown time Yes Unknown, Entered By History   oxyCODONE-acetaminophen (PERCOCET) 5-325 MG per tablet Take 1 tablet by mouth every evening  2/28/2019 at Unknown time Yes Unknown, Entered By History   SIMVASTATIN PO Take 40 mg by mouth At Bedtime 2/28/2019 at Unknown time Yes Unknown, Entered By History   SUMATRIPTAN SUCCINATE PO Take 50 mg by mouth every 2 hours as needed for migraine (max of 200mg q 24hr) Past Week at Unknown time Yes Unknown, Entered By History   TEMAZEPAM PO Take 15 mg by mouth nightly as needed for sleep prn Yes Unknown, Entered By History   TRAMADOL HCL PO Take 50 mg by mouth 3 times daily as needed for moderate to severe pain prn Yes Unknown, Entered By History   MEDICATION INSTRUCTION Hold Lisinopril and Norvasc if your SBP is <110 and DBP<70    "Candelario Zelaya MD      Current Meds    latanoprost  1 drop Both Eyes At Bedtime     piperacillin-tazobactam  2.25 g Intravenous Q6H     Infusion Meds    sodium chloride 100 mL/hr at 19 0822       ALLERGIES:    Allergies   Allergen Reactions     Contrast Dye Shortness Of Breath     Codeine      Zolpidem Other (See Comments)     Patient reports sleep walking.     Diatrizoate Itching     itchy throat that makes her want to cough       REVIEW OF SYSTEMS:  A comprehensive of systems was negative except as noted above.      PHYSICAL EXAM:   Temp  Av.6  F (36.4  C)  Min: 97.4  F (36.3  C)  Max: 97.7  F (36.5  C)      Pulse  Av.1  Min: 66  Max: 118 Resp  Av.7  Min: 12  Max: 22  SpO2  Av %  Min: 92 %  Max: 100 %       /70 (BP Location: Right arm)   Pulse 100   Temp 96.6  F (35.9  C) (Oral)   Resp 16   Ht 1.626 m (5' 4\")   Wt 99.6 kg (219 lb 9.3 oz)   SpO2 97%   BMI 37.69 kg/m     Date 19 0700 - 19 0659   Shift 8336-3179 8582-8203 0403-3085 24 Hour Total   INTAKE   P.O. 240   240   Shift Total(mL/kg) 240(2.41)   240(2.41)   OUTPUT   Shift Total(mL/kg)       Weight (kg) 99.6 99.6 99.6 99.6      Admit Weight: 99.6 kg (219 lb 9.3 oz)     GENERAL APPEARANCE: tired ,  awake  EYES: no scleral icterus, pupils equal  HENT: NC/AT,  mouth  without ulcers or lesions  Lymphatics: no cervical or supraclavicular LAD  Endo: no moon facies, no goiter  Pulmonary: lungs clear to auscultation with equal breath sounds bilaterally, no clubbing  CV: regular rhythm, normal rate, no rub   - JVP -   - Edema +  GI: soft, + tenderness in RUQ  MS: no evidence of inflammation in joints, no muscle tenderness  : no carias  SKIN: no rash, warm, dry, no cyanosis  NEURO: face symmetric, no asterixis     LABS:   CMP  Recent Labs   Lab 19  0758 19  0821 19  0717 19  0806    133 133 134   POTASSIUM 3.4 3.8 3.6 3.9   CHLORIDE 104 99 102 101   CO2 18* 20 18* 22   ANIONGAP 13 14 " 13 11   * 94 83 98   BUN 91* 86* 82* 78*   CR 4.04* 4.41* 4.61* 4.70*   GFRESTIMATED 11* 10* 9* 9*   GFRESTBLACK 12* 11* 11* 10*   DANNA 8.6 8.8 8.1* 8.7   PHOS 3.1 3.6 3.3  --    PROTTOTAL 5.1* 6.0* 5.5* 6.2*   ALBUMIN 1.4* 1.7* 1.6* 1.9*   BILITOTAL 7.9* 7.9* 5.5* 3.9*   ALKPHOS 565* 626* 460* 433*   * 229* 207* 247*   ALT 87* 124* 125* 170*     CBC  Recent Labs   Lab 03/07/19  0758 03/06/19  0821 03/05/19  0717 03/04/19  0849   HGB 9.7* 12.3 12.2 13.9   WBC 49.2* 35.7* 22.1* 22.1*   RBC 3.31* 4.13 4.05 4.59   HCT 27.9* 35.5 34.7* 39.3   MCV 84 86 86 86   MCH 29.3 29.8 30.1 30.3   MCHC 34.8 34.6 35.2 35.4   RDW 13.9 14.1 14.0 13.8   * 114* 99* 101*     INR  Recent Labs   Lab 03/03/19  1439   INR 1.01   PTT 21*     ABGNo lab results found in last 7 days.   URINE STUDIES  Recent Labs   Lab Test 03/03/19  1925 03/01/19  1355   COLOR Yellow Dark Brown   APPEARANCE Slightly Cloudy Cloudy   URINEGLC Negative 70*   URINEBILI Small* Moderate*   URINEKETONE Negative Negative   SG 1.013 1.017   UBLD Trace* Moderate*   URINEPH 5.5 5.5   PROTEIN 30* 100*   NITRITE Negative Negative   LEUKEST Small* Negative   RBCU 0 8*   WBCU 16* 16*       IMAGING:  Personally reviewed CT scan images. B/l kidneys look WNL. Hypodensity in liver as above.     Magalys Hernandez MD

## 2019-03-07 NOTE — PROGRESS NOTES
[facility]    Hospitalist Progress Note    Assessment & Plan   Vanessa Huffman is a 68 year old female admitted on 3/1/2019. She is a 68 year old with a hx of htn and migraines who presents with ana cristina, elevated liver tests, after 3 days of headaches, nausea and vomiting, and subjective fevers.      sepsis   Gram negative bacteremia- klebsiella   Urine culture negative   Liver mass Vs Abscess  (Most likely Abscess)  -continue antibiotics  -started on zosyn 3/1 ,vanco 3/1 stopped after 2 doses ,will stop zosyn and start on cefepime 3/3. Antibiotics  Was on rocephin 2 gm daily , all others stopped, agree with that, white cell count increase to 35.7, more fatigued and tired on 3/6/2019, and worsening right upper quadrant pain I did switch her back to IV Zosyn for now to cover for anaerobes and broad-spectrum coverage at this time.  Liver biopsy shows hemorrhagic necrosis  with acute inflammatory changes, no malignant cells identified, will treating her as liver abscess at this time.  -urine culture and repeat blood cultures negative , esr and c reactive protein  Is very high ,procal was positive as well.  -bacteremia with klebsiella , source ? Liver abscess, CT abdomen does not show any other concerning areas, status post CT guided liver biopsy  -wbc continue to be high , off note she had a CT abdomen last year same time and there were no mass noted in the liver than which indicates fast growth and possibility of a infectious cause.  - cholangitis is a differential due to elevated alp , she has a large liver mass possibly causing ductal obstruction as well   -mri liver didn't give any further info on the mass due to lack of contrast   -GI , infectious disease , nephrology and oncology on consult      I will keep her on IV Zosyn while she is in the hospital at this time , discussed with infectious disease they agree with that.  Repeat the CT scan of the  chest abdomen and pelvis without contrast, to rule  out any postprocedure complications.  Rule out any pleural effusion or hemorrhage.    Patient did develop some right-sided pleural effusion, some atelectasis versus consolidation in the right lower lobe and had liver mass/abscess is increased in size.  I think if this is abscess that will need to be drained, discussed with the interventional radiology for ultrasound-guided abscess drainage and drain placement.  If unable to get the fluid out or placed drainable consult the general surgery to evaluate the patient.  Continue with IV Zosyn at this time.    Acute Renal Failure- likely 2/2 hypovolemia/sepsis  Thrombocytopenia   When patient  Was admitted on 3/1 her renal function had gone from 0.7 creatinine to 4+, it was assumed due to sepsis, prerenal etc and the renal function had shown improvement 3/2 with hydration, urine sodium was 35 with hydration , 3/3 renal function worsening and urine culture negative while Urine Analysis  Shows blood moderate .  - renal function high but showing early signs of recovery , there is associated thrombocytopenia, her mental status is stable   -she was on heparin for deep vein thrombosis prophylaxis , it was stopped and HIT negative    -she had received 2 doses of vanco following admission, had 2 days of zosyn,now both stopped and is on rocephin, now back on Zosyn  -appreciate nephrology input , renal function elevated but steady , ldh elevated along with ddimer and fibrin , platelet count stable , c3/c4 complement pending, us shows blood but less rbc ,alp still elevated , will repeat liver fucntion test  In am with direct and indirect bili .  - fibrin is high with elevated d dimer , DIC is not a concern as platelets also remain stable. The smear didn't show any sign of microangiopathy /schiztocytes as per hematology.     the patient is in ATN, creatinine now stable and start to trend down.  Continue with IV fluids at this time.  Nephrology is following.  Platelets are stable and  improving at this time as well.     liver mass per CT abdomen  elevated liver function test    - patient  Is status post cholecystectomy, ultrasound does not show any cbd stones, liver mass noted in CT  -mri abdomen shows a liver mass 10.3 cm , appreciate oncology input   -status post liver biopsy 3/4,please monitor the results    Biopsy is negative for any malignant malignancy at this time but cannot completely rule out malignancy given necrotic hemorrhagic sample.  If she does not improve with IV antibiotics will need to follow-up with outpatient and may need to repeat biopsy.    Gastric bypass h/o  Stable    DVT Prophylaxis: heparin stopped due to low platelets, will use scds for now   Code Status: Full Code     Disposition: Expected discharge in 2-3 days     Worsening leukocytosis, most likely secondary to worsening abscess, that need to be drained.  Started her back on IV Zosyn on 3/6/2019 and stop ceftriaxone  Interventional radiology consult for abscess drainage and drain placement  Consult general surgery if unable to drain the abscess.  Aggressive incentive spirometry and flutter continue with the Zosyn for possible pneumonia as well.  Repeat blood cultures are negative so far  Send fluid for cultures aerobic anaerobic fungal cytology and repeat pathology if able to get the sample.      Discussed with nephrology, radiology and the nursing staff taking care of the patient today      Ruben Barksdale MD  Text Page   (7am to 6pm)    Interval History   Patient still have pain in the right upper quadrant but feeling somewhat better than yesterday denies any shortness of breath but does hurt when she takes deep breath no fever chills, chest pain dizziness or lightheadedness.  Still very poor oral intake.    Discussed with the bedside nursing staff as well      -Data reviewed today: I reviewed all new labs and imaging results over the last 24 hours.    Physical Exam   Temp: 96.6  F (35.9  C) Temp src: Oral BP: 114/64  Pulse: 100 Heart Rate: 102 Resp: 16 SpO2: 96 % O2 Device: None (Room air)    Vitals:    03/01/19 1845   Weight: 99.6 kg (219 lb 9.3 oz)     Vital Signs with Ranges  Temp:  [96.1  F (35.6  C)-97.9  F (36.6  C)] 96.6  F (35.9  C)  Pulse:  [100-114] 100  Heart Rate:  [] 102  Resp:  [16-22] 16  BP: (106-145)/(58-92) 114/64  SpO2:  [94 %-97 %] 96 %  I/O last 3 completed shifts:  In: 750 [I.V.:750]  Out: 1400 [Urine:1400]    Constitutional: Fatigued, cooperative, no apparent distress  Respiratory: Clear to auscultation bilaterally, no crackles or wheezing, decreased air entry in the right base  Cardiovascular: Regular rate and rhythm, normal S1 and S2, and no murmur noted  GI: Normal bowel sounds, soft, non-distended, tender right upper quadrant ++  Skin/Integumen: No rashes, no cyanosis, trace edema  Neuro : moving all 4 extremities, no focal deficit noted     Medications     sodium chloride 100 mL/hr at 03/07/19 0822       latanoprost  1 drop Both Eyes At Bedtime     piperacillin-tazobactam  2.25 g Intravenous Q6H       Data   Recent Labs   Lab 03/07/19  0758 03/06/19  0821 03/05/19  0717  03/03/19  1439   WBC 49.2* 35.7* 22.1*   < > 17.9*   HGB 9.7* 12.3 12.2   < > 13.2   MCV 84 86 86   < > 87   * 114* 99*   < > 109*   INR  --   --   --   --  1.01    133 133   < >  --    POTASSIUM 3.4 3.8 3.6   < >  --    CHLORIDE 104 99 102   < >  --    CO2 18* 20 18*   < >  --    BUN 91* 86* 82*   < >  --    CR 4.04* 4.41* 4.61*   < >  --    ANIONGAP 13 14 13   < >  --    DANNA 8.6 8.8 8.1*   < >  --    * 94 83   < >  --    ALBUMIN 1.4* 1.7* 1.6*   < >  --    PROTTOTAL 5.1* 6.0* 5.5*   < >  --    BILITOTAL 7.9* 7.9* 5.5*   < >  --    ALKPHOS 565* 626* 460*   < >  --    ALT 87* 124* 125*   < >  --    * 229* 207*   < >  --     < > = values in this interval not displayed.     Recent Labs   Lab 03/07/19  0758 03/06/19  2134 03/06/19  0821 03/05/19  0717 03/04/19  0806 03/03/19  1055   *  --  94 83 98  109*   BGM  --  134*  --   --   --   --        Imaging:   Recent Results (from the past 24 hour(s))   CT Chest Abdomen Pelvis w/o Contrast    Narrative    PROCEDURE:  CT of the chest, abdomen and pelvis without contrast    DATE OF PROCEDURE:  3/6/2019 12:32 PM    DOSE:  1664 mGy-cm    CLINICAL HISTORY/INDICATION:  Sepsis; ; liver abscess s/p biopsy, rule out pleural effusion, post  biopsy complications or worsening abscess    COMPARISON:  CT 3/1/19    TECHNIQUE:  CT of the chest, abdomen and pelvis was performed without the  administration of intravenous contrast. Coronal and axial MIP  reformats were performed. Radiation dose for this scan was reduced  using automated exposure control, adjustment of the mA and/or kV  according to patient size, or iterative reconstruction technique.     FINDINGS:  Chest:   Nodular consolidation in the left lower lobe is new from arch third  2019 inferior versus infectious/inflammatory process. Worsening right  lower lobe consolidation with air bronchograms again favoring  infectious versus inflammatory process. Small right pleural effusion  is new from March 3, 2019 but present at time of abscess aspiration  3/4/2019. The heart is not enlarged. Trace pericardial effusion.    Abdomen/Pelvis:  The liver is noncirrhotic in morphology. Hypodense gas containing  masslike lesion/collection involving the majority of the right hepatic  lobe measuring at least 12.5 x 10.5 x 9.6 cm AP by transverse by long.  Surrounding perihepatic ascites. The spleen, bilateral adrenal glands  and pancreas are unremarkable. Surgical staple line along the lesser  curvature of the stomach. No hydronephrosis. No large renal stone.  Large and small bowel are nondilated without evidence of obstruction.  Trace pelvic ascites. Diffuse soft tissue edema.      Impression    IMPRESSION:   1.  Small right pleural effusion not significantly changed from March 4, 2019.  2.  12.5 x 10.5 x 9.6 cm gas-containing right  hepatic mass/fluid  collection. This is increased from prior exams. The gas may relate to  recent biopsy versus infection.  3.  Trace perihepatic and pelvic ascites    VIOLET SANTIZO MD

## 2019-03-08 ENCOUNTER — RESULTS ONLY (OUTPATIENT)
Dept: ENDOSCOPY | Facility: CLINIC | Age: 69
End: 2019-03-08

## 2019-03-08 ENCOUNTER — ANESTHESIA EVENT (OUTPATIENT)
Dept: GASTROENTEROLOGY | Facility: CLINIC | Age: 69
DRG: 871 | End: 2019-03-08
Payer: MEDICARE

## 2019-03-08 ENCOUNTER — ANESTHESIA (OUTPATIENT)
Dept: GASTROENTEROLOGY | Facility: CLINIC | Age: 69
DRG: 871 | End: 2019-03-08
Payer: MEDICARE

## 2019-03-08 LAB
ALBUMIN SERPL-MCNC: 1.2 G/DL (ref 3.4–5)
ALP SERPL-CCNC: 429 U/L (ref 40–150)
ALT SERPL W P-5'-P-CCNC: 69 U/L (ref 0–50)
ANION GAP SERPL CALCULATED.3IONS-SCNC: 12 MMOL/L (ref 3–14)
ANION GAP SERPL CALCULATED.3IONS-SCNC: 13 MMOL/L (ref 3–14)
AST SERPL W P-5'-P-CCNC: 150 U/L (ref 0–45)
BACTERIA SPEC CULT: NO GROWTH
BASOPHILS # BLD AUTO: 0 10E9/L (ref 0–0.2)
BASOPHILS NFR BLD AUTO: 0 %
BILIRUB DIRECT SERPL-MCNC: 4.3 MG/DL (ref 0–0.2)
BILIRUB SERPL-MCNC: 5 MG/DL (ref 0.2–1.3)
BLD PROD TYP BPU: NORMAL
BLD PROD TYP BPU: NORMAL
BLD UNIT ID BPU: 0
BLD UNIT ID BPU: 0
BLOOD PRODUCT CODE: NORMAL
BLOOD PRODUCT CODE: NORMAL
BPU ID: NORMAL
BPU ID: NORMAL
BUN SERPL-MCNC: 105 MG/DL (ref 7–30)
BUN SERPL-MCNC: 96 MG/DL (ref 7–30)
CALCIUM SERPL-MCNC: 7.6 MG/DL (ref 8.5–10.1)
CALCIUM SERPL-MCNC: 7.8 MG/DL (ref 8.5–10.1)
CHLORIDE SERPL-SCNC: 107 MMOL/L (ref 94–109)
CHLORIDE SERPL-SCNC: 110 MMOL/L (ref 94–109)
CO2 SERPL-SCNC: 18 MMOL/L (ref 20–32)
CO2 SERPL-SCNC: 18 MMOL/L (ref 20–32)
COPATH REPORT: NORMAL
CREAT SERPL-MCNC: 3.67 MG/DL (ref 0.52–1.04)
CREAT SERPL-MCNC: 3.96 MG/DL (ref 0.52–1.04)
DIFFERENTIAL METHOD BLD: ABNORMAL
EOSINOPHIL # BLD AUTO: 0 10E9/L (ref 0–0.7)
EOSINOPHIL NFR BLD AUTO: 0 %
ERYTHROCYTE [DISTWIDTH] IN BLOOD BY AUTOMATED COUNT: 14.3 % (ref 10–15)
GFR SERPL CREATININE-BSD FRML MDRD: 11 ML/MIN/{1.73_M2}
GFR SERPL CREATININE-BSD FRML MDRD: 12 ML/MIN/{1.73_M2}
GLUCOSE BLDC GLUCOMTR-MCNC: 103 MG/DL (ref 70–99)
GLUCOSE SERPL-MCNC: 100 MG/DL (ref 70–99)
GLUCOSE SERPL-MCNC: 114 MG/DL (ref 70–99)
HCT VFR BLD AUTO: 16.7 % (ref 35–47)
HGB BLD-MCNC: 6.1 G/DL (ref 11.7–15.7)
HGB BLD-MCNC: 7.8 G/DL (ref 11.7–15.7)
HGB BLD-MCNC: 8.4 G/DL (ref 11.7–15.7)
INR PPP: 1.58 (ref 0.86–1.14)
LYMPHOCYTES # BLD AUTO: 8.4 10E9/L (ref 0.8–5.3)
LYMPHOCYTES NFR BLD AUTO: 14 %
Lab: NORMAL
MCH RBC QN AUTO: 31 PG (ref 26.5–33)
MCHC RBC AUTO-ENTMCNC: 36.5 G/DL (ref 31.5–36.5)
MCV RBC AUTO: 85 FL (ref 78–100)
MONOCYTES # BLD AUTO: 1.8 10E9/L (ref 0–1.3)
MONOCYTES NFR BLD AUTO: 3 %
MYELOCYTES # BLD: 1.2 10E9/L
MYELOCYTES NFR BLD MANUAL: 2 %
NEUTROPHILS # BLD AUTO: 48.4 10E9/L (ref 1.6–8.3)
NEUTROPHILS NFR BLD AUTO: 81 %
PHOSPHATE SERPL-MCNC: 4.3 MG/DL (ref 2.5–4.5)
PLATELET # BLD AUTO: 128 10E9/L (ref 150–450)
PLATELET # BLD EST: ABNORMAL 10*3/UL
POTASSIUM SERPL-SCNC: 3.3 MMOL/L (ref 3.4–5.3)
POTASSIUM SERPL-SCNC: 3.5 MMOL/L (ref 3.4–5.3)
PROT SERPL-MCNC: 4.6 G/DL (ref 6.8–8.8)
RBC # BLD AUTO: 1.97 10E12/L (ref 3.8–5.2)
RBC MORPH BLD: ABNORMAL
SODIUM SERPL-SCNC: 138 MMOL/L (ref 133–144)
SODIUM SERPL-SCNC: 140 MMOL/L (ref 133–144)
SPECIMEN SOURCE: NORMAL
TRANSFUSION STATUS PATIENT QL: NORMAL
UPPER GI ENDOSCOPY: NORMAL
WBC # BLD AUTO: 59.8 10E9/L (ref 4–11)

## 2019-03-08 PROCEDURE — 82248 BILIRUBIN DIRECT: CPT | Performed by: INTERNAL MEDICINE

## 2019-03-08 PROCEDURE — 86901 BLOOD TYPING SEROLOGIC RH(D): CPT | Performed by: HOSPITALIST

## 2019-03-08 PROCEDURE — 25000128 H RX IP 250 OP 636: Performed by: INTERNAL MEDICINE

## 2019-03-08 PROCEDURE — 84155 ASSAY OF PROTEIN SERUM: CPT | Performed by: INTERNAL MEDICINE

## 2019-03-08 PROCEDURE — 12000000 ZZH R&B MED SURG/OB

## 2019-03-08 PROCEDURE — 37000009 ZZH ANESTHESIA TECHNICAL FEE, EACH ADDTL 15 MIN: Performed by: INTERNAL MEDICINE

## 2019-03-08 PROCEDURE — 40000239 ZZH STATISTIC VAT ROUNDS

## 2019-03-08 PROCEDURE — P9016 RBC LEUKOCYTES REDUCED: HCPCS | Performed by: HOSPITALIST

## 2019-03-08 PROCEDURE — 43255 EGD CONTROL BLEEDING ANY: CPT | Performed by: INTERNAL MEDICINE

## 2019-03-08 PROCEDURE — 20000003 ZZH R&B ICU

## 2019-03-08 PROCEDURE — 85025 COMPLETE CBC W/AUTO DIFF WBC: CPT | Performed by: INTERNAL MEDICINE

## 2019-03-08 PROCEDURE — 85610 PROTHROMBIN TIME: CPT | Performed by: NURSE PRACTITIONER

## 2019-03-08 PROCEDURE — 86900 BLOOD TYPING SEROLOGIC ABO: CPT | Performed by: HOSPITALIST

## 2019-03-08 PROCEDURE — 25800030 ZZH RX IP 258 OP 636: Performed by: INTERNAL MEDICINE

## 2019-03-08 PROCEDURE — C9113 INJ PANTOPRAZOLE SODIUM, VIA: HCPCS | Performed by: INTERNAL MEDICINE

## 2019-03-08 PROCEDURE — 25000132 ZZH RX MED GY IP 250 OP 250 PS 637: Mod: GY | Performed by: INTERNAL MEDICINE

## 2019-03-08 PROCEDURE — 99231 SBSQ HOSP IP/OBS SF/LOW 25: CPT | Performed by: SURGERY

## 2019-03-08 PROCEDURE — 43236 UPPR GI SCOPE W/SUBMUC INJ: CPT | Performed by: INTERNAL MEDICINE

## 2019-03-08 PROCEDURE — 80069 RENAL FUNCTION PANEL: CPT | Performed by: INTERNAL MEDICINE

## 2019-03-08 PROCEDURE — 25000125 ZZHC RX 250: Performed by: NURSE ANESTHETIST, CERTIFIED REGISTERED

## 2019-03-08 PROCEDURE — A9270 NON-COVERED ITEM OR SERVICE: HCPCS | Mod: GY | Performed by: INTERNAL MEDICINE

## 2019-03-08 PROCEDURE — 0DJ08ZZ INSPECTION OF UPPER INTESTINAL TRACT, VIA NATURAL OR ARTIFICIAL OPENING ENDOSCOPIC: ICD-10-PCS | Performed by: INTERNAL MEDICINE

## 2019-03-08 PROCEDURE — 37000008 ZZH ANESTHESIA TECHNICAL FEE, 1ST 30 MIN: Performed by: INTERNAL MEDICINE

## 2019-03-08 PROCEDURE — 82247 BILIRUBIN TOTAL: CPT | Performed by: INTERNAL MEDICINE

## 2019-03-08 PROCEDURE — 85018 HEMOGLOBIN: CPT | Performed by: INTERNAL MEDICINE

## 2019-03-08 PROCEDURE — 99233 SBSQ HOSP IP/OBS HIGH 50: CPT | Performed by: INTERNAL MEDICINE

## 2019-03-08 PROCEDURE — 84075 ASSAY ALKALINE PHOSPHATASE: CPT | Performed by: INTERNAL MEDICINE

## 2019-03-08 PROCEDURE — 25000128 H RX IP 250 OP 636: Performed by: NURSE ANESTHETIST, CERTIFIED REGISTERED

## 2019-03-08 PROCEDURE — 80048 BASIC METABOLIC PNL TOTAL CA: CPT | Performed by: INTERNAL MEDICINE

## 2019-03-08 PROCEDURE — 40000010 ZZH STATISTIC ANES STAT CODE-CRNA PER MINUTE: Performed by: INTERNAL MEDICINE

## 2019-03-08 PROCEDURE — 00000146 ZZHCL STATISTIC GLUCOSE BY METER IP

## 2019-03-08 PROCEDURE — 25800030 ZZH RX IP 258 OP 636: Performed by: NURSE ANESTHETIST, CERTIFIED REGISTERED

## 2019-03-08 PROCEDURE — 86923 COMPATIBILITY TEST ELECTRIC: CPT | Performed by: HOSPITALIST

## 2019-03-08 PROCEDURE — 84460 ALANINE AMINO (ALT) (SGPT): CPT | Performed by: INTERNAL MEDICINE

## 2019-03-08 PROCEDURE — 27210582 ZZH DEVICE CLIP RESOLUTION, EACH: Performed by: INTERNAL MEDICINE

## 2019-03-08 PROCEDURE — 84450 TRANSFERASE (AST) (SGOT): CPT | Performed by: INTERNAL MEDICINE

## 2019-03-08 PROCEDURE — 86850 RBC ANTIBODY SCREEN: CPT | Performed by: HOSPITALIST

## 2019-03-08 RX ORDER — LIDOCAINE 40 MG/G
CREAM TOPICAL
Status: DISCONTINUED | OUTPATIENT
Start: 2019-03-08 | End: 2019-03-08 | Stop reason: HOSPADM

## 2019-03-08 RX ORDER — POTASSIUM CL/LIDO/0.9 % NACL 10MEQ/0.1L
10 INTRAVENOUS SOLUTION, PIGGYBACK (ML) INTRAVENOUS
Status: COMPLETED | OUTPATIENT
Start: 2019-03-08 | End: 2019-03-08

## 2019-03-08 RX ORDER — PROPOFOL 10 MG/ML
INJECTION, EMULSION INTRAVENOUS CONTINUOUS PRN
Status: DISCONTINUED | OUTPATIENT
Start: 2019-03-08 | End: 2019-03-08

## 2019-03-08 RX ORDER — NALOXONE HYDROCHLORIDE 0.4 MG/ML
.1-.4 INJECTION, SOLUTION INTRAMUSCULAR; INTRAVENOUS; SUBCUTANEOUS
Status: DISCONTINUED | OUTPATIENT
Start: 2019-03-08 | End: 2019-03-08

## 2019-03-08 RX ORDER — SODIUM CHLORIDE, SODIUM LACTATE, POTASSIUM CHLORIDE, CALCIUM CHLORIDE 600; 310; 30; 20 MG/100ML; MG/100ML; MG/100ML; MG/100ML
INJECTION, SOLUTION INTRAVENOUS CONTINUOUS PRN
Status: DISCONTINUED | OUTPATIENT
Start: 2019-03-08 | End: 2019-03-08

## 2019-03-08 RX ORDER — PROPOFOL 10 MG/ML
INJECTION, EMULSION INTRAVENOUS PRN
Status: DISCONTINUED | OUTPATIENT
Start: 2019-03-08 | End: 2019-03-08

## 2019-03-08 RX ORDER — FLUMAZENIL 0.1 MG/ML
0.2 INJECTION, SOLUTION INTRAVENOUS
Status: ACTIVE | OUTPATIENT
Start: 2019-03-08 | End: 2019-03-09

## 2019-03-08 RX ADMIN — DEXMEDETOMIDINE HYDROCHLORIDE 8 MCG: 100 INJECTION, SOLUTION INTRAVENOUS at 13:17

## 2019-03-08 RX ADMIN — MORPHINE SULFATE 2 MG: 2 INJECTION, SOLUTION INTRAMUSCULAR; INTRAVENOUS at 21:57

## 2019-03-08 RX ADMIN — LATANOPROST 1 DROP: 50 SOLUTION/ DROPS OPHTHALMIC at 23:36

## 2019-03-08 RX ADMIN — PANTOPRAZOLE SODIUM 40 MG: 40 INJECTION, POWDER, FOR SOLUTION INTRAVENOUS at 08:19

## 2019-03-08 RX ADMIN — METRONIDAZOLE 500 MG: 500 INJECTION, SOLUTION INTRAVENOUS at 12:04

## 2019-03-08 RX ADMIN — PHENYLEPHRINE HYDROCHLORIDE 100 MCG: 10 INJECTION, SOLUTION INTRAMUSCULAR; INTRAVENOUS; SUBCUTANEOUS at 13:48

## 2019-03-08 RX ADMIN — Medication 10 MEQ: at 18:59

## 2019-03-08 RX ADMIN — MORPHINE SULFATE 2 MG: 2 INJECTION, SOLUTION INTRAMUSCULAR; INTRAVENOUS at 19:10

## 2019-03-08 RX ADMIN — SODIUM CHLORIDE, POTASSIUM CHLORIDE, SODIUM LACTATE AND CALCIUM CHLORIDE: 600; 310; 30; 20 INJECTION, SOLUTION INTRAVENOUS at 13:10

## 2019-03-08 RX ADMIN — PROPOFOL 100 MCG/KG/MIN: 10 INJECTION, EMULSION INTRAVENOUS at 13:13

## 2019-03-08 RX ADMIN — OXYCODONE HYDROCHLORIDE 5 MG: 5 TABLET ORAL at 12:14

## 2019-03-08 RX ADMIN — SODIUM CHLORIDE 8 MG/HR: 9 INJECTION, SOLUTION INTRAVENOUS at 16:42

## 2019-03-08 RX ADMIN — PIPERACILLIN AND TAZOBACTAM 2.25 G: 2; .25 INJECTION, POWDER, FOR SOLUTION INTRAVENOUS at 17:15

## 2019-03-08 RX ADMIN — SODIUM CHLORIDE: 9 INJECTION, SOLUTION INTRAVENOUS at 16:29

## 2019-03-08 RX ADMIN — PIPERACILLIN AND TAZOBACTAM 2.25 G: 2; .25 INJECTION, POWDER, FOR SOLUTION INTRAVENOUS at 23:36

## 2019-03-08 RX ADMIN — Medication 10 MEQ: at 19:58

## 2019-03-08 RX ADMIN — PIPERACILLIN AND TAZOBACTAM 2.25 G: 2; .25 INJECTION, POWDER, FOR SOLUTION INTRAVENOUS at 11:09

## 2019-03-08 RX ADMIN — PROPOFOL 30 MG: 10 INJECTION, EMULSION INTRAVENOUS at 13:17

## 2019-03-08 RX ADMIN — PHENYLEPHRINE HYDROCHLORIDE 100 MCG: 10 INJECTION, SOLUTION INTRAMUSCULAR; INTRAVENOUS; SUBCUTANEOUS at 13:40

## 2019-03-08 RX ADMIN — MORPHINE SULFATE 2 MG: 2 INJECTION, SOLUTION INTRAMUSCULAR; INTRAVENOUS at 08:19

## 2019-03-08 RX ADMIN — PANTOPRAZOLE SODIUM 40 MG: 40 INJECTION, POWDER, FOR SOLUTION INTRAVENOUS at 17:14

## 2019-03-08 RX ADMIN — DEXMEDETOMIDINE HYDROCHLORIDE 8 MCG: 100 INJECTION, SOLUTION INTRAVENOUS at 13:13

## 2019-03-08 RX ADMIN — PIPERACILLIN AND TAZOBACTAM 2.25 G: 2; .25 INJECTION, POWDER, FOR SOLUTION INTRAVENOUS at 04:58

## 2019-03-08 RX ADMIN — DEXMEDETOMIDINE HYDROCHLORIDE 4 MCG: 100 INJECTION, SOLUTION INTRAVENOUS at 13:19

## 2019-03-08 ASSESSMENT — ACTIVITIES OF DAILY LIVING (ADL)
ADLS_ACUITY_SCORE: 15
ADLS_ACUITY_SCORE: 15
ADLS_ACUITY_SCORE: 19
ADLS_ACUITY_SCORE: 15

## 2019-03-08 NOTE — PLAN OF CARE
Lethargic, oriented x3-4, forgetful; slow to respond. VS stable on room air. Patient had abscess drain placed this morning, reddish/brown output, irrigated with 10 ml flush. Patient complained of right upper quadrant pain, PRN oxycodone given. PICC line placed in right upper arm. Patient gets dyspneic on exertion. Up with assist x1 with gait belt to bathroom. Stools dark brown/black. Discharge plan pending clinical improvement.

## 2019-03-08 NOTE — ANESTHESIA CARE TRANSFER NOTE
Patient: Vanessa Huffman    Procedure(s):  COMBINED ESOPHAGOSCOPY, GASTROSCOPY, DUODENOSCOPY (EGD), CONTROL BLEED    Diagnosis: gi bleed  Diagnosis Additional Information: No value filed.    Anesthesia Type:   MAC     Note:  Airway :Nasal Cannula  Patient transferred to:PACU  Comments: Pt to endo recovery room. VSS. Report complete to RN.Handoff Report: Identifed the Patient, Identified the Reponsible Provider, Reviewed the pertinent medical history, Discussed the surgical course, Reviewed Intra-OP anesthesia mangement and issues during anesthesia, Set expectations for post-procedure period and Allowed opportunity for questions and acknowledgement of understanding      Vitals: (Last set prior to Anesthesia Care Transfer)    CRNA VITALS  3/8/2019 1328 - 3/8/2019 1358      3/8/2019             Resp Rate (set):  10                Electronically Signed By: Radha Mireles CRNA, LUIS ANTONIO GIBSON  March 8, 2019  1:58 PM

## 2019-03-08 NOTE — PROGRESS NOTES
[facility]     Hgb drop from 12.3-->9.7-->6.1 over last three days now with maroon colored bowel movements. Updated Dr. Cox - will check INR and make NPO. Vital signs are stable.     Geovanna Thompson, CNP  Cell: [phone]

## 2019-03-08 NOTE — PLAN OF CARE
PT: Orders received, chart reviewed, Conferred with Nursing who indicates patient not appropriate to participate in PT evaluation this date having undergone  ultrasound guided hepatic mass biopsy and drain placement.  Will reschedule PT Eval and treatment initiation for tomorrow.

## 2019-03-08 NOTE — PROGRESS NOTES
Hospitalist cross cover note:    RN noticed blood per rectum after BM while wiping. Patient also c/o dizziness. Asked to get all the AM labs at that time to see what her Hb was. It came back at 6.1. Patient had liver abscess drainage with catheter still in place which is draining purulent liquid and No blood. She had subsequent large BM with more blood- maroon color. Discussed with her spouse Buck who reported patient had GI bleed a year ago March 2018. Flex sig was done, diagnosed with ischemic colitis.     Hb 13.9-->12.3-->9.7-->6.1    Transfuse 2 units PRBC.   Telephone consent obtained from her spouse, placed in chart.  Follow Hb after transfusion  GI following, await further recommendation.    Discussed with MUNA Beach MD  Hospitalist

## 2019-03-08 NOTE — PLAN OF CARE
PT: Orders received, chart reviewed. Noting pt with critical hgb 6.1 and suspected new GI bleed, currently transfusing and awaiting EGD this afternoon. Holding PT eval at this time.

## 2019-03-08 NOTE — PROGRESS NOTES
[facility]    Hospitalist Progress Note    Assessment & Plan   Vanessa Huffman is a 68 year old female admitted on 3/1/2019. She is a 68 year old with a hx of htn and migraines who presents with ana cristina, elevated liver tests, after 3 days of headaches, nausea and vomiting, and subjective fevers.      sepsis   Gram negative bacteremia- klebsiella   Urine culture negative   Liver mass Vs Abscess  (Most likely Abscess)  -continue antibiotics  -started on zosyn 3/1 ,vanco 3/1 stopped after 2 doses ,will stop zosyn and start on cefepime 3/3. Antibiotics  Was on rocephin 2 gm daily , all others stopped, agree with that, white cell count increase to 35.7, more fatigued and tired on 3/6/2019, and worsening right upper quadrant pain I did switch her back to IV Zosyn for now to cover for anaerobes and broad-spectrum coverage at this time.  Liver biopsy shows hemorrhagic necrosis  with acute inflammatory changes, no malignant cells identified, will treating her as liver abscess at this time.  -urine culture and repeat blood cultures negative , esr and c reactive protein  Is very high ,procal was positive as well.  -bacteremia with klebsiella , source ? Liver abscess, CT abdomen does not show any other concerning areas, status post CT guided liver biopsy  -wbc continue to be high , off note she had a CT abdomen last year same time and there were no mass noted in the liver than which indicates fast growth and possibility of a infectious cause.  - cholangitis is a differential due to elevated alp , she has a large liver mass possibly causing ductal obstruction as well   -mri liver didn't give any further info on the mass due to lack of contrast   -GI , infectious disease , nephrology and oncology on consult      I will keep her on IV Zosyn while she is in the hospital at this time , discussed with infectious disease they agree with that.  Repeat the CT scan of the  chest abdomen and pelvis without contrast, to rule  out any postprocedure complications.  Rule out any pleural effusion or hemorrhage.     Patient did develop some right-sided pleural effusion, some atelectasis versus consolidation in the right lower lobe and had liver mass/abscess is increased in size.  I think if this is abscess that  need to be drained, discussed with the interventional radiology for ultrasound-guided abscess drainage and drain placement, drain was placed successfully by the radiology under ultrasound guidance.  General surgery is consulted and been following, Dr. Vallecillo, following and he have been in discussion with the [facility] surgeon.  At this time I will continue with Zosyn, and add IV metronidazole as well.  Cultures will showing lactose fermenting gram-negative rods at this time.  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  They think it is an abscess need to be treated like that.    Acute Renal Failure- likely 2/2 hypovolemia/sepsis  Thrombocytopenia   When patient  Was admitted on 3/1 her renal function had gone from 0.7 creatinine to 4+, it was assumed due to sepsis, prerenal etc and the renal function had shown improvement 3/2 with hydration, urine sodium was 35 with hydration , 3/3 renal function worsening and urine culture negative while Urine Analysis  Shows blood moderate .  - renal function high but showing early signs of recovery , there is associated thrombocytopenia, her mental status is stable   -she was on heparin for deep vein thrombosis prophylaxis , it was stopped and HIT negative    -she had received 2 doses of vanco following admission, had 2 days of zosyn,now both stopped and is on rocephin, now back on Zosyn  -appreciate nephrology input , renal function elevated but steady , ldh elevated along with ddimer and fibrin , platelet count stable , c3/c4 complement negative,   -  fibrin is high with elevated d dimer , DIC is not a concern as platelets also remain stable. The smear didn't show any sign of microangiopathy /schiztocytes as per hematology.     the patient is in ATN, creatinine now stable and start to trend down.  Continue with IV fluids at this time.  Nephrology is following.  Platelets are stable and improving at this time as well.     liver mass per CT abdomen  elevated liver function test    - patient  Is status post cholecystectomy, ultrasound does not show any cbd stones, liver mass noted in CT  -mri abdomen shows a liver mass 10.3 cm , appreciate oncology input   -status post liver biopsy 3/4,please monitor the results    Biopsy is negative for any malignant malignancy at this time but cannot completely rule out malignancy given necrotic hemorrhagic sample.  If she does not improve with IV antibiotics will need to follow-up with outpatient and may need to repeat biopsy.  Now we are treating her for liver abscess.    Gastric bypass h/o  Stable    Acute GI bleeding  Have bloody stool overnight and did drop her hemoglobin to 6.1 this morning from 9.7  Started on IV Protonix 40 twice daily, keep n.p.o. GI is consulted and following.  She will going to have the EGD today if negative she will most likely will need a colonoscopy.    Acute blood loss anemia  Because of the GI bleed, no blood in the drainage catheter from the liver.  Agree with 2 units of packed RBCs this morning, will check her hemoglobin every 6-hour and try to keep her hemoglobin above 8    DVT Prophylaxis: heparin stopped due to low platelets, will use scds for now   Code Status: Full Code     Disposition: Expected discharge in 2-3 days        Worsening leukocytosis, most likely secondary to worsening, ID is consulted and following.  I will going to add metronidazole as well and continue with IV Zosyn for now.  Started her back on IV Zosyn on 3/6/2019 and stop ceftriaxone  Interventional radiology consult for  abscess drainage and drain placement was done on 3/7/2019  Consult general surgery is following, discussed with the [facility] surgery, as per their recommendation no need for transfer or need for transfer to [facility] if develop fulminant hepatic failure and need liver transplantation.  Aggressive incentive spirometry and flutter continue with the Zosyn for possible pneumonia as well.  Repeat blood cultures are negative so far  Send fluid for cultures aerobic anaerobic fungal cytology and repeat pathology if able to get the sample.  Cultures from the abscess growing lactose fermenting gram-negative chantelle identification pending at this time.  Acute blood loss anemia and acute GI bleed, scheduled for EGD today.  Check hemoglobin every 6 hours, and keep the hemoglobin 8 above she received 2 units of packed RBCs today on 3/8/2090    Discussed with nephrology, general surgery and the nursing staff taking care of the patient today      Ruben Barksdale MD  Text Page   (7am to 6pm)    Interval History   Patient had bloody bowel movement overnight, drop her hemoglobin to 6.1.  Complaining of abdominal pain about the same, no fever chills chest pain or shortness of breath at this time.  But feeling very weak and tired no appetite.    Discussed with the bedside nursing staff as well      -Data reviewed today: I reviewed all new labs and imaging results over the last 24 hours.    Physical Exam   Temp: (P) 98.4  F (36.9  C) Temp src: (P) Axillary BP: (P) 107/46 Pulse: 95 Heart Rate: (P) 91 Resp: (P) 14 SpO2: (P) 96 % O2 Device: (P) None (Room air)    Vitals:    03/01/19 1845   Weight: 99.6 kg (219 lb 9.3 oz)     Vital Signs with Ranges  Temp:  [95.1  F (35.1  C)-98.4  F (36.9  C)] (P) 98.4  F (36.9  C)  Pulse:  [] 95  Heart Rate:  [] (P) 91  Resp:  [12-20] (P) 14  BP: (106-149)/(40-71) (P) 107/46  SpO2:  [94 %-99 %] (P) 96 %  I/O last 3 completed shifts:  In: 1350 [P.O.:200; I.V.:1120; Other:30]  Out: 95  [Drains:95]    Constitutional: Fatigued, cooperative, no apparent distress  Respiratory: Clear to auscultation bilaterally, no crackles or wheezing, decreased air entry in the right base  Cardiovascular: Regular rate and rhythm, normal S1 and S2, and no murmur noted  GI: Normal bowel sounds, soft, non-distended, tender right upper quadrant ++  Skin/Integumen: No rashes, no cyanosis, trace edema  Neuro : moving all 4 extremities, no focal deficit noted     Medications     sodium chloride 100 mL/hr at 03/07/19 2325       latanoprost  1 drop Both Eyes At Bedtime     metroNIDAZOLE  500 mg Intravenous Q6H     pantoprazole (PROTONIX) IV  40 mg Intravenous BID     piperacillin-tazobactam  2.25 g Intravenous Q6H     sodium chloride (PF)  10 mL Irrigation Q8H     sodium chloride (PF)  10 mL Intracatheter Q8H       Data   Recent Labs   Lab 03/08/19  0830 03/08/19  0530 03/08/19  0440 03/07/19  0758 03/06/19  0821  03/03/19  1439   WBC  --  59.8*  --  49.2* 35.7*   < > 17.9*   HGB  --  6.1*  --  9.7* 12.3   < > 13.2   MCV  --  85  --  84 86   < > 87   PLT  --  128*  --  126* 114*   < > 109*   INR 1.58*  --   --   --   --   --  1.01   NA  --   --  138 135 133   < >  --    POTASSIUM  --   --  3.5 3.4 3.8   < >  --    CHLORIDE  --   --  107 104 99   < >  --    CO2  --   --  18* 18* 20   < >  --    BUN  --   --  105* 91* 86*   < >  --    CR  --   --  3.96* 4.04* 4.41*   < >  --    ANIONGAP  --   --  13 13 14   < >  --    DANNA  --   --  7.6* 8.6 8.8   < >  --    GLC  --   --  114* 111* 94   < >  --    ALBUMIN  --   --  1.2* 1.4* 1.7*   < >  --    PROTTOTAL  --   --  4.6* 5.1* 6.0*   < >  --    BILITOTAL  --   --  5.0* 7.9* 7.9*   < >  --    ALKPHOS  --   --  429* 565* 626*   < >  --    ALT  --   --  69* 87* 124*   < >  --    AST  --   --  150* 175* 229*   < >  --     < > = values in this interval not displayed.     Recent Labs   Lab 03/08/19  0440 03/07/19  0758 03/06/19  2134 03/06/19  0821 03/05/19  0717 03/04/19  0806   *  111*  --  94 83 98   [facility]  --   --  134*  --   --   --        Imaging:   Recent Results (from the past 24 hour(s))   US Abscess Drain Peritoneal    Narrative    ULTRASOUND-GUIDED ABDOMINAL DRAIN PLACEMENT 3/7/2019 12:00 PM    HISTORY: Hepatic mass, previously this was biopsied and only necrotic  tissue was obtained. The patient has elevated white count, concerning  for necrotic mass versus liver abscess.    COMPARISON: CT 3/6/2019, 3/4/2019    MEDICATIONS: 10 mL 1% lidocaine, 50 mcg fentanyl IV and 1 mg Versed  IV.    PROCEDURE AND FINDINGS:  Following a discussion of the risks, benefits, indications and  alternatives to treatment, appropriate informed consent was obtained.   The patient was brought to the interventional radiology suite and  placed supine on the table. The abdomen was prepped and draped in the  usual sterile fashion. A timeout was performed per universal protocol  policy to confirm the correct patient, site and procedure to be  performed.    A route for biopsy was chosen. Overlying skin was prepped and draped  in a sterile fashion. 1% lidocaine was used for local anesthesia.  Under ultrasound guidance, 17-gauge access needle was advanced into  hepatic mass. Through this access needle, a 18-gauge Temno needle was  used to obtain 4 core biopsy specimens. These specimens were submitted  to pathology.     The needle was advanced more centrally into the hepatic mass and 10 mL  of bloody purulent fluid was aspirated. The decision was made to place  a drain. Through the existing needle a 0.035 wire was advanced through  the catheter. The tract was serially dilated and a 10 Citizen of the [place] locking  pigtail drain was advanced into the fluid collection. The pigtail was  locked and connected to a BILL bulb. Drain was sutured in with 2-0  Ethilon suture. Sterile dressing was applied.    Throughout the procedure, the patient was monitored by a radiology  nurse for cardiac rhythm and oxygen saturation which remained  stable.  Total sedation time was 20 minutes minutes. The patient tolerated the  procedure well and left interventional radiology in stable condition.      Impression    IMPRESSION: Successful liver mass biopsy and placement of a 10 Uzbek  drain into a hepatic mass/abscess. 10 mL of red purulent fluid was  aspirated and sent to lab for cultures.    YON CERVANTES DO

## 2019-03-08 NOTE — PROGRESS NOTES
Surgery  Patient clinically doing about the same.  Continues to have abdominal pain, now with bloody stools.  White blood cell count significantly elevated, near 60,000.  Hemoglobin down to 6.  Blood transfusing.  Total bilirubin has improved.  Drain output is purulent, cultures pending.  I went over the case with Dr. Mihir Herrera at the [facility] who was kind enough to review the patient's chart and images.  He agrees with current management.  Feels most likely diagnosis is still a liver abscess despite previous biopsies.  We should continue with drainage and keep the drain in place even if output is extremely low.  Drain may have to be repositioned in the future.  Continue with broad-spectrum antibiotics and supportive management.  No role for surgery at this time.  No role for transfer to the [place] unless patient's clinical function deteriorates and she goes into emmanuel liver failure, in which case she may need to be evaluated for liver transplant.  Hopefully it will not come to this.  Tejas Vallecillo MD  General Surgery, Office [phone]

## 2019-03-08 NOTE — PROGRESS NOTES
"GASTROENTEROLOGY PROGRESS NOTE     SUBJECTIVE: Continues to have severe RUQ pain with light touch/movement/deep breaths. Son notes, the patient is guarding her epigastric area more now. Developed maroon stools overnight. Hgb drop from 12.3-->9.7-->6.1 over last three days.     OBJECTIVE:  /42 (BP Location: Left arm)   Pulse 95   Temp 97.1  F (36.2  C) (Axillary)   Resp 12   Ht 1.626 m (5' 4\")   Wt 99.6 kg (219 lb 9.3 oz)   SpO2 97%   BMI 37.69 kg/m    Temp (24hrs), Av.6  F (36.4  C), Min:97.5  F (36.4  C), Max:97.7  F (36.5  C)    No data found.    Intake/Output Summary (Last 24 hours) at 3/3/2019 1742  Last data filed at 3/3/2019 1722  Gross per 24 hour   Intake 3552 ml   Output 1325 ml   Net 2227 ml        General Appearance: ill appearing, sleeping in between cares, no distress at rest   Eyes: PERRL, scleral icterus   GI: Soft, NABS, epigastric and RUQ tenderness on palpation  Skin: +jaundice - improving      Labs:     Recent Labs   Lab Test 19  0830 19  0530 198 19  0821  19  1439   WBC  --  59.8* 49.2* 35.7*   < > 17.9*   HGB  --  6.1* 9.7* 12.3   < > 13.2   MCV  --  85 84 86   < > 87   PLT  --  128* 126* 114*   < > 109*   INR 1.58*  --   --   --   --  1.01    < > = values in this interval not displayed.     Recent Labs   Lab Test 19  0758 19  0821   POTASSIUM 3.5 3.4 3.8   CHLORIDE 107 104 99   CO2 18* 18* 20   * 91* 86*   ANIONGAP 13 13 14     Recent Labs   Lab Test 19  0440 19  0758 19  0821  19  1925  19  1355  18  0645   ALBUMIN 1.2* 1.4* 1.7*   < >  --    < >  --    < > 4.1   BILITOTAL 5.0* 7.9* 7.9*   < >  --    < >  --    < > 0.7   ALT 69* 87* 124*   < >  --    < >  --    < > 48   * 175* 229*   < >  --    < >  --    < > 44   PROTEIN  --   --   --   --  30*  --  100*  --   --    LIPASE  --   --   --   --   --   --   --   --  133    < > = values in this interval not displayed. "     MRCP/MRI    IMPRESSION:   1. Intra and extrahepatic biliary dilatation could be related to the  patient's age and postcholecystectomy state. No other cause for  biliary dilatation is identified.  2. Indeterminate 10.3 cm right hepatic lesion. Malignancy cannot be excluded from this appearance. Evaluation of this lesion is limited by lack of IV contrast.  3. A small amount of ascites in the right upper quadrant and a small right pleural effusion are new since the previous exam.       Assessment: 68 year old female with elevated LFTs in the setting of Klebsiella  bacteremia and liver mass.  MRCP showed biliary dilation but no evidence of obstruction. Large liver mass appears to be consistent with abscess. Viral and autoimmune workup negative including: AMA, JUSTUS negative, smooth muscle antibody Hep A/B/C nonreactive.      3/4 Drainage/bx of liver mass-biopsy shows hemorrhagic necrosis and acute inflammation - no malignancy identified   3/6 CT showed abscess had increased in size with nodular consolidation in left lobe.   3/7 Surgery consulted. Drain placed per IR.    3/8 New onset of gastrointestinal bleeding with multiple maroon stools overnight. Has also been having epigastric pain. She has history of ischemic colitis, diagnosed March 2018. Hgb drop over the last 3 days from 12.3-->9.7-->6.1 . She is getting 2 units of RBCs transfused. Vital signs remain stable. We will pursue upper endoscopy this afternoon for further evaluation to rule out upper GI source of blood loss including peptic ulcer disease, dieulafoy lesion, esophageal varices, gastritis, AVM, or malignancy.     In regards to her liver abscess, Dr. Cox has discussed this patient with one of our biliary physicians, Dr. Salas and it was determined there was no indication for ERCP. It is recommended she be considered for transfer to the [place] for further management given inability to control infection and to have the expertise of hepatobiliary  surgery, in the event this may be needed. This was discussed with Dr. Barksdale, who will be in touch with general surgery this morning.     WBC continues to increase, up to 58 today.  LFTs are trending down over the last 2 days. Total bili 5.0, alkaline phosphatase 429, ALT 69, .     Plan:   -Closely follow LFTs/CBC  -NPO  -PPI BID IV  -EGD this afternoon with Dr. Cox   -ID/Oncology following:        -Antibiotics per VY Thompson, [facility]  Cell: [phone]

## 2019-03-08 NOTE — PROVIDER NOTIFICATION
MD Notification    Notified Person: MD    Notified Person Name: Sukumar    Notification Date/Time: 3/8/19 0429    Notification Interaction: page    Purpose of Notification: Notified of new bloody stools. Vitals stable. Patient reports dizziness.     Orders Received: Do morning labs now and new order for type and screen.     Comments:

## 2019-03-08 NOTE — ANESTHESIA PREPROCEDURE EVALUATION
Anesthesia Pre-Procedure Evaluation    Patient: Vanessa Huffman   MRN: [id number] : 1950          Preoperative Diagnosis: gi bleed    Procedure(s):  COMBINED ESOPHAGOSCOPY, GASTROSCOPY, DUODENOSCOPY (EGD)    Past Medical History:   Diagnosis Date     Arthritis      Depressive disorder      Hypertension      Obesity      Past Surgical History:   Procedure Laterality Date     APPENDECTOMY       ARTHROPLASTY CARPOMETACARPAL (THUMB JOINT) Left 2018    Procedure: ARTHROPLASTY CARPOMETACARPAL (THUMB JOINT);  LEFT  THUMB CARPOMETACARPAL EXCISIONAL ARTHROPLASTY WITH FASCIA CLARE GRAFT ;  Surgeon: Kalyani King MD;  Location: [facility]     CHOLECYSTECTOMY       GI SURGERY      gastric bypass     GYN SURGERY      abdominal hysterectomy     ORTHOPEDIC SURGERY      bilateral bunionectomies       Anesthesia Evaluation     .             ROS/MED HX    ENT/Pulmonary:      (-) sleep apnea   Neurologic:     (+)delerium     Cardiovascular: Comment: Recent history of sepsis     (+) hypertension----. : . . . :. .       METS/Exercise Tolerance:     Hematologic: Comments: Leukocytosis, anemia, GI bleed    (+) Anemia, -      Musculoskeletal:         GI/Hepatic:     (+) GERD       Renal/Genitourinary:     (+) chronic renal disease, type: ARF,       Endo:     (+) Obesity, .      Psychiatric:         Infectious Disease:   (+) Recent Fever, Other Infectious Disease liver abscess       Malignancy:         Other:                          Physical Exam      Airway   Mallampati: III  TM distance: >3 FB  Neck ROM: full    Dental     Cardiovascular       Pulmonary             Lab Results   Component Value Date    WBC 59.8 (HH) 2019    HGB 6.1 (LL) 2019    HCT 16.7 (L) 2019     (L) 2019    .0 (H) 2019    SED 79 (H) 2019     2019    POTASSIUM 3.5 2019    CHLORIDE 107 2019    CO2 18 (L) 2019     (H) 2019    CR 3.96 (H) 2019    GLC  "114 (H) 03/08/2019    DANNA 7.6 (L) 03/08/2019    PHOS 4.3 03/08/2019    ALBUMIN 1.2 (L) 03/08/2019    PROTTOTAL 4.6 (L) 03/08/2019    ALT 69 (H) 03/08/2019     (H) 03/08/2019    ALKPHOS 429 (H) 03/08/2019    BILITOTAL 5.0 (H) 03/08/2019    LIPASE 133 03/19/2018    PARMINDER 12 03/06/2019    PTT 21 (L) 03/03/2019    INR 1.58 (H) 03/08/2019    FIBR 790 (H) 03/03/2019       Preop Vitals  BP Readings from Last 3 Encounters:   03/08/19 121/63   04/06/18 115/64   03/21/18 106/46    Pulse Readings from Last 3 Encounters:   03/08/19 95   03/20/18 79      Resp Readings from Last 3 Encounters:   03/08/19 14   04/06/18 16   03/21/18 16    SpO2 Readings from Last 3 Encounters:   03/08/19 97%   04/06/18 95%   03/21/18 96%      Temp Readings from Last 1 Encounters:   03/08/19 36.6  C (97.9  F) (Axillary)    Ht Readings from Last 1 Encounters:   03/01/19 1.626 m (5' 4\")      Wt Readings from Last 1 Encounters:   03/01/19 99.6 kg (219 lb 9.3 oz)    Estimated body mass index is 37.69 kg/m  as calculated from the following:    Height as of this encounter: 1.626 m (5' 4\").    Weight as of this encounter: 99.6 kg (219 lb 9.3 oz).       Anesthesia Plan      History & Physical Review  History and physical reviewed and following examination; no interval change.    ASA Status:  3 .    NPO Status:  > 8 hours    Plan for MAC          Postoperative Care      Consents  Anesthetic plan, risks, benefits and alternatives discussed with:  Patient..                 Ashley Baker  "

## 2019-03-08 NOTE — PROGRESS NOTES
"Care Coordination:    Pt is off the floor.  MD noted pt may discharge with IV antibiotics, and has a PICC.     Requested benefit check, in anticipation of Care Transition RN/SW IP Consult to set up IV abx.  Per [facility]:     Pt will have 100% coverage for IV abx through her BCBS-Federal secondary plan. Let me know if you have any questions.   Thank you   Kranthi He  Intake    [facility]   [address] [place] [id number]   [email or web address]   Phone: [phone]  Fax: [phone]     When appropriate, please add a \"Care Transition RN/SW IP Consult\" for discharge planning. Thank you!     Erika Dave RN, BSN  [facility] Care Coordinator   Mobile Phone: [phone]    "

## 2019-03-08 NOTE — PROGRESS NOTES
Nephrology Initial Consult  March 4, 2019        ASSESSMENT AND RECOMMENDATIONS:   1. ELISE -   Likely ATN given clinical course of septic/ hypovolemic  shock, low PO intake, concommittant use of lisinopril , multiple granular casts in urine.   - nonoliguric . Adequately fluid resuscitated.   - continue to hold lisinopril .   - Avoid IV contrast until kidney function improves, unless an emergency . S.Cr plateaued and slightly better today .   - Dose meds for eGFR of ~10 -20  -Cr continues  to improve. Good UOP . BUN up d/t GI bleed.     2 HTN - BP okay   Continue to hold lisinopril     3. Septic shock / Klebsiella bacteremia /  Liver abscess - management per ID/ Primary team. Liver Bx shows necrosis and ac inflammation c/w abscess s/p drain    4. Lytes okay . Mild acidosis with ELISE.     5 Gi Bleed/ Anemia - EGD today . Getting PRBC.     Recommendations were communicated to primary team via this note.     Magalys Hernandez MD  [facility] - Nephrology   [phone]      Interval hx    Noted overnight events, drop in HGb with GI bleed and plan for EGD.   S.Cr continues to trend down. BUN up likely d/t GI bleed.   Good UOP .   Pt drowsy after getting Morphine. Continues to have abd pain.   Afebrile. No dyspnea, cough, dysuria.     PAST MEDICAL HISTORY:  Reviewed with patient on 03/08/2019     Past Medical History:   Diagnosis Date     Arthritis      Depressive disorder      Hypertension      Obesity        Past Surgical History:   Procedure Laterality Date     APPENDECTOMY       ARTHROPLASTY CARPOMETACARPAL (THUMB JOINT) Left 4/6/2018    Procedure: ARTHROPLASTY CARPOMETACARPAL (THUMB JOINT);  LEFT  THUMB CARPOMETACARPAL EXCISIONAL ARTHROPLASTY WITH FASCIA CLARE GRAFT ;  Surgeon: Kalyani King MD;  Location: [facility]     CHOLECYSTECTOMY       GI SURGERY      gastric bypass     GYN SURGERY      abdominal hysterectomy     ORTHOPEDIC SURGERY      bilateral bunionectomies        MEDICATIONS:  PTA  Meds  Prior to Admission medications    Medication Sig Last Dose Taking? Auth Provider   amLODIPine (NORVASC) 2.5 MG tablet Take 1 tablet (2.5 mg) by mouth daily 3/1/2019 at Unknown time Yes Candelario Zelaya MD   CYCLOBENZAPRINE HCL PO Take 10 mg by mouth At Bedtime 2/28/2019 at Unknown time Yes Unknown, Entered By History   hydrOXYzine (ATARAX) 10 MG tablet Take 1 10-mg tablet every 6 hours as needed for muscle relaxation and to supplement your pain medication. prn Yes Magda Plasencia PA-C   latanoprost (XALATAN) 0.005 % ophthalmic solution Place 1 drop into both eyes At Bedtime 2/28/2019 at Unknown time Yes Unknown, Entered By History   LISINOPRIL PO Take 10 mg by mouth daily 3/1/2019 at Unknown time Yes Unknown, Entered By History   loperamide (IMODIUM) 2 MG capsule Take 2 mg by mouth 4 times daily as needed for diarrhea prn Yes Unknown, Entered By History   nystatin (MYCOSTATIN) 863845 UNIT/GM POWD Apply topically 2 times daily as needed (affected area) prn Yes Unknown, Entered By History   oxyCODONE-acetaminophen (PERCOCET) 5-325 MG per tablet Take 1-2 tablets every 4-6 hours for pain if needed. prn Yes Magda Plasencia PA-C   oxyCODONE-acetaminophen (PERCOCET) 5-325 MG per tablet Take 0.5 tablets by mouth every morning  3/1/2019 at Unknown time Yes Unknown, Entered By History   oxyCODONE-acetaminophen (PERCOCET) 5-325 MG per tablet Take 1 tablet by mouth every evening  2/28/2019 at Unknown time Yes Unknown, Entered By History   SIMVASTATIN PO Take 40 mg by mouth At Bedtime 2/28/2019 at Unknown time Yes Unknown, Entered By History   SUMATRIPTAN SUCCINATE PO Take 50 mg by mouth every 2 hours as needed for migraine (max of 200mg q 24hr) Past Week at Unknown time Yes Unknown, Entered By History   TEMAZEPAM PO Take 15 mg by mouth nightly as needed for sleep prn Yes Unknown, Entered By History   TRAMADOL HCL PO Take 50 mg by mouth 3 times daily as needed for moderate to severe pain prn Yes  "Unknown, Entered By History   MEDICATION INSTRUCTION Hold Lisinopril and Norvasc if your SBP is <110 and DBP<70   Candelario Zelaya MD      Current Meds    latanoprost  1 drop Both Eyes At Bedtime     pantoprazole (PROTONIX) IV  40 mg Intravenous BID     piperacillin-tazobactam  2.25 g Intravenous Q6H     sodium chloride (PF)  10 mL Irrigation Q8H     sodium chloride (PF)  10 mL Intracatheter Q8H     Infusion Meds    sodium chloride 100 mL/hr at 19 9088       ALLERGIES:    Allergies   Allergen Reactions     Contrast Dye Shortness Of Breath     Codeine      Zolpidem Other (See Comments)     Patient reports sleep walking.     Diatrizoate Itching     itchy throat that makes her want to cough       REVIEW OF SYSTEMS:  A comprehensive of systems was negative except as noted above.      PHYSICAL EXAM:   Temp  Av.6  F (36.4  C)  Min: 97.4  F (36.3  C)  Max: 97.7  F (36.5  C)      Pulse  Av.1  Min: 66  Max: 118 Resp  Av.7  Min: 12  Max: 22  SpO2  Av %  Min: 92 %  Max: 100 %       /42 (BP Location: Left arm)   Pulse 95   Temp 97.1  F (36.2  C) (Axillary)   Resp 12   Ht 1.626 m (5' 4\")   Wt 99.6 kg (219 lb 9.3 oz)   SpO2 97%   BMI 37.69 kg/m     Date 19 0700 - 19 0659   Shift 2518-3305 1397-8945 6718-0743 24 Hour Total   INTAKE   P.O. 240   240   Shift Total(mL/kg) 240(2.41)   240(2.41)   OUTPUT   Shift Total(mL/kg)       Weight (kg) 99.6 99.6 99.6 99.6      Admit Weight: 99.6 kg (219 lb 9.3 oz)     GENERAL APPEARANCE: tired ,  awake  EYES: no scleral icterus, pupils equal  Pulmonary: lungs clear to auscultation with equal breath sounds bilaterally, no clubbing  CV: regular rhythm, normal rate, no rub   - JVP -   - Edema +  GI: soft, + tenderness in RUQ  MS: no evidence of inflammation in joints, no muscle tenderness  : no carias  SKIN: no rash, warm, dry, no cyanosis  NEURO: face symmetric, no asterixis     LABS:   CMP  Recent Labs   Lab 19  0440 19  0758 " 03/06/19  0821 03/05/19  0717    135 133 133   POTASSIUM 3.5 3.4 3.8 3.6   CHLORIDE 107 104 99 102   CO2 18* 18* 20 18*   ANIONGAP 13 13 14 13   * 111* 94 83   * 91* 86* 82*   CR 3.96* 4.04* 4.41* 4.61*   GFRESTIMATED 11* 11* 10* 9*   GFRESTBLACK 13* 12* 11* 11*   DANNA 7.6* 8.6 8.8 8.1*   PHOS 4.3 3.1 3.6 3.3   PROTTOTAL 4.6* 5.1* 6.0* 5.5*   ALBUMIN 1.2* 1.4* 1.7* 1.6*   BILITOTAL 5.0* 7.9* 7.9* 5.5*   ALKPHOS 429* 565* 626* 460*   * 175* 229* 207*   ALT 69* 87* 124* 125*     CBC  Recent Labs   Lab 03/08/19  0530 03/07/19 0758 03/06/19  0821 03/05/19  0717   HGB 6.1* 9.7* 12.3 12.2   WBC 59.8* 49.2* 35.7* 22.1*   RBC 1.97* 3.31* 4.13 4.05   HCT 16.7* 27.9* 35.5 34.7*   MCV 85 84 86 86   MCH 31.0 29.3 29.8 30.1   MCHC 36.5 34.8 34.6 35.2   RDW 14.3 13.9 14.1 14.0   * 126* 114* 99*     INR  Recent Labs   Lab 03/08/19  0830 03/03/19  1439   INR 1.58* 1.01   PTT  --  21*     ABGNo lab results found in last 7 days.   URINE STUDIES  Recent Labs   Lab Test 03/03/19  1925 03/01/19  1355   COLOR Yellow Dark Brown   APPEARANCE Slightly Cloudy Cloudy   URINEGLC Negative 70*   URINEBILI Small* Moderate*   URINEKETONE Negative Negative   SG 1.013 1.017   UBLD Trace* Moderate*   URINEPH 5.5 5.5   PROTEIN 30* 100*   NITRITE Negative Negative   LEUKEST Small* Negative   RBCU 0 8*   WBCU 16* 16*       Jiwan David, MD

## 2019-03-08 NOTE — ANESTHESIA POSTPROCEDURE EVALUATION
Patient: Vanessa Huffman    Procedure(s):  COMBINED ESOPHAGOSCOPY, GASTROSCOPY, DUODENOSCOPY (EGD), CONTROL BLEED    Diagnosis:gi bleed  Diagnosis Additional Information: No value filed.    Anesthesia Type:  MAC    Note:  Anesthesia Post Evaluation    Patient location during evaluation: PACU  Patient participation: Able to fully participate in evaluation  Level of consciousness: awake and awake and alert  Pain management: adequate  Airway patency: patent  Cardiovascular status: acceptable  Respiratory status: acceptable  Hydration status: acceptable  PONV: none     Anesthetic complications: None    Comments: Patient tolerated sedation well.  Transferred to ICU per GI.         Last vitals:  Vitals:    03/08/19 1415 03/08/19 1418 03/08/19 1421   BP: 97/61 (!) 81/55 95/57   Pulse: 87 90 90   Resp: 13 14    Temp:      SpO2: 98% 99%          Electronically Signed By: Ashley Baker  March 8, 2019  2:39 PM

## 2019-03-08 NOTE — PROGRESS NOTES
IR Note    Vanessa Huffman  [id number]  1950      S: This is a 68-year-old female with a history of a liver abscess.  Biopsy and cultures were obtained on 3/7/2019 by Dr. Casanova.  A drainage catheter was placed inside of the abscess.  In addition the patient has had continued decline in her hemoglobin and is being evaluated by gastroenterology.  During today's exam, the patient discussed significant abdominal pain which has been persistent and she rates it as 10 out of 10.  The family present, states that Mrs. Huffman does appear to be feeling better however she definitely still has abdominal pain.  The nurse provides feedback that the drain has been working well and that the patient had a recent very dark stool suggestive of melena.  The patient is getting blood products as well as Zosyn.      Current Facility-Administered Medications:      glucose gel 15-30 g, 15-30 g, Oral, Q15 Min PRN **OR** dextrose 50 % injection 25-50 mL, 25-50 mL, Intravenous, Q15 Min PRN **OR** glucagon injection 1 mg, 1 mg, Subcutaneous, Q15 Min PRN, Shayan Esparza MD     fentaNYL (PF) (SUBLIMAZE) injection 25-50 mcg, 25-50 mcg, Intravenous, Q5 Min PRN, Aminata Casanova DO, 50 mcg at 03/07/19 1047     flumazenil (ROMAZICON) injection 0.2 mg, 0.2 mg, Intravenous, q1 min prn, Aminata Casanova DO     hydrOXYzine (ATARAX) tablet 10 mg, 10 mg, Oral, Q6H PRN, Leslie Werner MD     latanoprost (XALATAN) 0.005 % ophthalmic solution 1 drop, 1 drop, Both Eyes, At Bedtime, Leslie Werner MD, Stopped at 03/07/19 2325     melatonin tablet 1 mg, 1 mg, Oral, At Bedtime PRN, Leslie Werner MD, 1 mg at 03/05/19 2339     midazolam (VERSED) injection 0.5-1 mg, 0.5-1 mg, Intravenous, Q4 Min PRN, Aminata Casanova DO, 1 mg at 03/07/19 1047     morphine (PF) injection 2-4 mg, 2-4 mg, Intravenous, Q2H PRN, Regine Nina MD, 2 mg at 03/08/19 0819     naloxone (NARCAN) injection 0.1-0.4 mg,  "0.1-0.4 mg, Intravenous, Q2 Min PRN, Aminata Casanova DO     naloxone (NARCAN) injection 0.1-0.4 mg, 0.1-0.4 mg, Intravenous, Q2 Min PRN, Leslie Werner MD     ondansetron (ZOFRAN-ODT) ODT tab 4 mg, 4 mg, Oral, Q6H PRN, 4 mg at 03/02/19 0624 **OR** ondansetron (ZOFRAN) injection 4 mg, 4 mg, Intravenous, Q6H PRN, Leslie Werner MD, 4 mg at 03/07/19 1739     oxyCODONE (ROXICODONE) tablet 5 mg, 5 mg, Oral, Q3H PRN, Leslie Werner MD, 5 mg at 03/08/19 1214     pantoprazole (PROTONIX) 40 mg IV push injection, 40 mg, Intravenous, BID, Ruben Barksdale MD, 40 mg at 03/08/19 0819     piperacillin-tazobactam (ZOSYN) 2.25 g vial to attach to  ml bag, 2.25 g, Intravenous, Q6H, Ruben Barksdale MD, Last Rate: 200 mL/hr at 03/08/19 1109, 2.25 g at 03/08/19 1109     sodium chloride (PF) 0.9% PF flush 10 mL, 10 mL, Irrigation, Q8H, Amniata Casanova DO, 10 mL at 03/08/19 1205     sodium chloride (PF) 0.9% PF flush 10 mL, 10 mL, Intracatheter, Q8H, Meghan Guadarrama MD, 10 mL at 03/07/19 2315     sodium chloride (PF) 0.9% PF flush 10-20 mL, 10-20 mL, Intracatheter, q1 min prn, Meghan Guadarrama MD     sodium chloride 0.9% infusion, , Intravenous, Continuous, Ruben Barksdale MD, Last Rate: 100 mL/hr at 03/07/19 2325     temazepam (RESTORIL) capsule 15 mg, 15 mg, Oral, At Bedtime PRN, Leslie Werner MD, 15 mg at 03/04/19 2110      O: /63 (BP Location: Left arm)   Pulse 95   Temp 97.9  F (36.6  C) (Axillary)   Resp 14   Ht 1.626 m (5' 4\")   Wt 99.6 kg (219 lb 9.3 oz)   SpO2 97%   BMI 37.69 kg/m      GA: Overweight female in no acute distress.  The patient is lying in her bed.  There is a percutaneous drain placed into the right upper quadrant of the abdomen.  This appears to be draining well.    Abdomen: The right upper quadrant drain appears to be in place with clean dressing.  The drainage within the Marck-Vaughan bulb is cloudy and slightly blood-tinged serous " fluid.  We irrigated 10 mL of fluid into the liver abscess and there was immediate return into the drainage tube.    Intake/Output Summary (Last 24 hours) at 3/8/2019 1243  Last data filed at 3/8/2019 1144  Gross per 24 hour   Intake 1938.67 ml   Output 95 ml   Net 1843.67 ml     35 in the last shift    Hemoglobin   Date Value Ref Range Status   03/08/2019 6.1 (LL) 11.7 - 15.7 g/dL Final     Comment:     This result has been called to [facility] [address] by Kayla Bancroft on 03 08 2019 at 0557, and has been read back.      03/07/2019 9.7 (L) 11.7 - 15.7 g/dL Final     Lab Results   Component Value Date    WBC 59.8 03/08/2019     Lab Results   Component Value Date    RBC 1.97 03/08/2019     Lab Results   Component Value Date    HGB 6.1 03/08/2019     Lab Results   Component Value Date    HCT 16.7 03/08/2019     No components found for: MCT  Lab Results   Component Value Date    MCV 85 03/08/2019     Lab Results   Component Value Date    MCH 31.0 03/08/2019     Lab Results   Component Value Date    MCHC 36.5 03/08/2019     Lab Results   Component Value Date    RDW 14.3 03/08/2019     Lab Results   Component Value Date     03/08/2019       All cultures:  Latest culture shows lactose fermenting gram negative rods.    Imaging:  No results found for this or any previous visit (from the past 24 hour(s)).    A/P:  1.  Liver abscess-the patient appears to be tolerating the drain in the right upper quadrant.  The dressing is clean dry and intact.  We added tape to hold the tube in place as it was kinked during our exam.  Flushing was smooth.  Continued flushing every shift is recommended.  Infectious disease managing antibiotic therapy.  2.  Anemia with declining hemoglobin.  The patient's current hemoglobin is 6.1.  Gastroenterology has been consulted and a endoscopy will be performed on 3/8/2019.  We will follow the results.  Possible GI bleed.    We will continue to round on this patient.  Please contact us at  [phone] if you have any acute questions regarding the abscess drainage tube.    Madi Rosales PA-C  Interventional radiology

## 2019-03-08 NOTE — PROGRESS NOTES
Oncology:    Biopsy result is still pending.  We will follow-up with patient again after pathology report is available.    ADDENDUM: Liver biopsy from 3/7/19 shows hemorrhagic necrosis and acute inflammation.  Culture is growing Klebsiella.  Cytology is pending.      ID is managing liver abscess.  Patient is on antibiotics and supportive cares.      Oncology team will not plan to see the patient over the weekend, but please do not hesitate to call with any questions.

## 2019-03-08 NOTE — PLAN OF CARE
A&Ox1-3, very slow to respond, anxious at times, stares. VSS on RA. Reports RUQ pain, managed with oxy. Additional biopsy 3-7, site CDI with liquid bandage, some bruising. Hepatic drain placed 3-7, serosang/brown output, sediment, dressing CDI. BS active, 0420 had large incontinent loose bloody/brown/small clots stool, reports feeling dizzy, vitals stable, am labs drawn early. LS diminished, DOBOE, low energy/tolerance. Up with 1 assist to bedside commode. Regular diet, poor jennifer, denies nausea. R PICC, double lumen, placed 3-7. NS @ 100. Monitoring WBCs and liver functions. Plan to transfer to the  if no improvement.

## 2019-03-08 NOTE — PROVIDER NOTIFICATION
MD Notification    Notified Person: MD    Notified Person Name:Sukumar    Notification Date/Time:3/8/19 0600    Notification Interaction: page    Purpose of Notification: Critical WBC 59.8 and critical hgb 6.1    Orders Received:    Comments:

## 2019-03-08 NOTE — PROGRESS NOTES
[facility]    Infectious Disease Progress Note    Date of Service (when I saw the patient): 03/08/2019     Assessment & Plan   Vanessa Huffman is a 68 year old female who was admitted on 3/1/2019.     ASSESSMENT:  1. KLEBSIELLA PNEUMONIAE BACTEREMIA-? Enteric source v cholangitis,   2. LIVER MASS/DENSITY  3. PROBABLE HEPATIC ABSCESS  4. ABNORMAL LFTS-C/W Liver abscess, ? Tumor, ? Cholangitis  5. Over night melena. To the endoscopy this morning.   6. Leucocytosis.         REC  1. On Zosyn after positive for abscesses path and cultures from the liver positive for Klebsiella, repeat culture again GNR most likely the same Klebsiella, continue for now, follow up on the cultures. No added benefits of adding metronidazole to zosyn ( other than double anaerobic coverage, which is not needed) will add GI toxicity and can cause more nausea and abdominal discomfort.   2. Liver abscesses s/p drain placement, draining thick red fluid.   3. Follow LFT, WBC  4. EGD today for GI bleed will follow             Meghan Guadarrama MD    Interval History   WBC up to 60k   Afebrile   To endoscopy for GI bleed    Physical Exam   Temp: 97.9  F (36.6  C) Temp src: Axillary BP: 121/63 Pulse: 95 Heart Rate: 92 Resp: 14 SpO2: 97 % O2 Device: None (Room air)    Vitals:    03/01/19 1845   Weight: 99.6 kg (219 lb 9.3 oz)     Vital Signs with Ranges  Temp:  [95.1  F (35.1  C)-98.4  F (36.9  C)] 97.9  F (36.6  C)  Pulse:  [] 95  Heart Rate:  [] 92  Resp:  [12-20] 14  BP: (106-139)/(40-63) 121/63  SpO2:  [94 %-99 %] 97 %    Constitutional: Awake  Lungs: Clear to auscultation bilaterally, no crackles or wheezing  Cardiovascular: Regular rate and rhythm, normal S1 and S2, and no murmur noted  Abdomen: drain in the liver abscess  Skin: No rashes, no cyanosis, no edema  Other:    Medications     sodium chloride 100 mL/hr at 03/07/19 2325       latanoprost  1 drop Both Eyes At Bedtime     pantoprazole (PROTONIX) IV  40 mg  Intravenous BID     piperacillin-tazobactam  2.25 g Intravenous Q6H     sodium chloride (PF)  10 mL Irrigation Q8H     sodium chloride (PF)  10 mL Intracatheter Q8H       Data   All microbiology laboratory data reviewed.  Recent Labs   Lab Test 03/08/19  0530 03/07/19  0758 03/06/19  0821   WBC 59.8* 49.2* 35.7*   HGB 6.1* 9.7* 12.3   HCT 16.7* 27.9* 35.5   MCV 85 84 86   * 126* 114*     Recent Labs   Lab Test 03/08/19  0440 03/07/19  0758 03/06/19  0821   CR 3.96* 4.04* 4.41*     Recent Labs   Lab Test 03/01/19  1130   SED 79*     Recent Labs   Lab Test 03/07/19  1050 03/04/19  1410 03/04/19  1355 03/03/19  1925 03/02/19  1043 03/01/19  2148 03/01/19  2145 03/01/19  1409 03/01/19  1355   CULT Culture negative after 19 hours  Moderate growth  Lactose fermenting gram negative rods  *  Culture in progress  Culture negative monitoring continues Culture negative monitoring continues  Culture negative monitoring continues Culture negative after 4 days  Light growth  Klebsiella pneumoniae  *  Culture negative monitoring continues <10,000 colonies/mL  urogenital shannon  Susceptibility testing not routinely done    Canceled, Test credited  Duplicate request   No growth No growth No growth No growth No growth

## 2019-03-08 NOTE — PLAN OF CARE
[id number]: Pt A&O x2- fluctuated. VSS on RA- SOB. C/o pain in upper abdomen- morphine 2 mg given x1, oxycodone given x1. 2 units PRBCs given- no reactions. R PICC- NS infusing + abx. Hepatic drain- tan, purulent output. Irrigated 10 ml. NPO ex meds for EGD at 1200. On bedrest- incontinent, 1 large black, tarry loose stool. Encouraged repositioning. WBC 59.8, Hgb 6.1. Family supportive and at bedside most of shift. Report given to ICU nurse at 1430.

## 2019-03-09 ENCOUNTER — APPOINTMENT (OUTPATIENT)
Dept: GENERAL RADIOLOGY | Facility: CLINIC | Age: 69
DRG: 871 | End: 2019-03-09
Attending: INTERNAL MEDICINE
Payer: MEDICARE

## 2019-03-09 ENCOUNTER — APPOINTMENT (OUTPATIENT)
Dept: CARDIOLOGY | Facility: CLINIC | Age: 69
DRG: 871 | End: 2019-03-09
Attending: INTERNAL MEDICINE
Payer: MEDICARE

## 2019-03-09 LAB
ABO + RH BLD: NORMAL
ABO + RH BLD: NORMAL
ALBUMIN SERPL-MCNC: 1.1 G/DL (ref 3.4–5)
ALP SERPL-CCNC: 395 U/L (ref 40–150)
ALT SERPL W P-5'-P-CCNC: 55 U/L (ref 0–50)
ANION GAP SERPL CALCULATED.3IONS-SCNC: 11 MMOL/L (ref 3–14)
AST SERPL W P-5'-P-CCNC: 115 U/L (ref 0–45)
BACTERIA SPEC CULT: NO GROWTH
BASOPHILS # BLD AUTO: 0 10E9/L (ref 0–0.2)
BASOPHILS NFR BLD AUTO: 0 %
BILIRUB DIRECT SERPL-MCNC: 3.3 MG/DL (ref 0–0.2)
BILIRUB SERPL-MCNC: 3.7 MG/DL (ref 0.2–1.3)
BLD GP AB SCN SERPL QL: NORMAL
BLD PROD TYP BPU: NORMAL
BLD PROD TYP BPU: NORMAL
BLD UNIT ID BPU: 0
BLOOD BANK CMNT PATIENT-IMP: NORMAL
BLOOD PRODUCT CODE: NORMAL
BPU ID: NORMAL
BUN SERPL-MCNC: 92 MG/DL (ref 7–30)
BURR CELLS BLD QL SMEAR: ABNORMAL
C DIFF TOX B STL QL: NEGATIVE
CALCIUM SERPL-MCNC: 7.4 MG/DL (ref 8.5–10.1)
CHLORIDE SERPL-SCNC: 112 MMOL/L (ref 94–109)
CO2 SERPL-SCNC: 17 MMOL/L (ref 20–32)
CREAT SERPL-MCNC: 3.48 MG/DL (ref 0.52–1.04)
DIFFERENTIAL METHOD BLD: ABNORMAL
EOSINOPHIL # BLD AUTO: 0 10E9/L (ref 0–0.7)
EOSINOPHIL NFR BLD AUTO: 0 %
ERYTHROCYTE [DISTWIDTH] IN BLOOD BY AUTOMATED COUNT: 14.5 % (ref 10–15)
GFR SERPL CREATININE-BSD FRML MDRD: 13 ML/MIN/{1.73_M2}
GLUCOSE BLDC GLUCOMTR-MCNC: 114 MG/DL (ref 70–99)
GLUCOSE BLDC GLUCOMTR-MCNC: 147 MG/DL (ref 70–99)
GLUCOSE BLDC GLUCOMTR-MCNC: 87 MG/DL (ref 70–99)
GLUCOSE SERPL-MCNC: 88 MG/DL (ref 70–99)
HCT VFR BLD AUTO: 20.9 % (ref 35–47)
HGB BLD-MCNC: 7.4 G/DL (ref 11.7–15.7)
HGB BLD-MCNC: 7.5 G/DL (ref 11.7–15.7)
HGB BLD-MCNC: 8.7 G/DL (ref 11.7–15.7)
HGB BLD-MCNC: 9 G/DL (ref 11.7–15.7)
INR PPP: 1.37 (ref 0.86–1.14)
LYMPHOCYTES # BLD AUTO: 2.7 10E9/L (ref 0.8–5.3)
LYMPHOCYTES NFR BLD AUTO: 5 %
MAGNESIUM SERPL-MCNC: 2.1 MG/DL (ref 1.6–2.3)
MCH RBC QN AUTO: 29.7 PG (ref 26.5–33)
MCHC RBC AUTO-ENTMCNC: 35.4 G/DL (ref 31.5–36.5)
MCV RBC AUTO: 84 FL (ref 78–100)
METAMYELOCYTES # BLD: 3.3 10E9/L
METAMYELOCYTES NFR BLD MANUAL: 6 %
MONOCYTES # BLD AUTO: 0 10E9/L (ref 0–1.3)
MONOCYTES NFR BLD AUTO: 0 %
MYELOCYTES # BLD: 0.5 10E9/L
MYELOCYTES NFR BLD MANUAL: 1 %
NEUTROPHILS # BLD AUTO: 48.2 10E9/L (ref 1.6–8.3)
NEUTROPHILS NFR BLD AUTO: 88 %
NUM BPU REQUESTED: 3
PHOSPHATE SERPL-MCNC: 4.4 MG/DL (ref 2.5–4.5)
PLATELET # BLD AUTO: 114 10E9/L (ref 150–450)
PLATELET # BLD EST: ABNORMAL 10*3/UL
POLYCHROMASIA BLD QL SMEAR: SLIGHT
POTASSIUM SERPL-SCNC: 3.4 MMOL/L (ref 3.4–5.3)
PROT SERPL-MCNC: 4.6 G/DL (ref 6.8–8.8)
RBC # BLD AUTO: 2.49 10E12/L (ref 3.8–5.2)
SODIUM SERPL-SCNC: 140 MMOL/L (ref 133–144)
SPECIMEN EXP DATE BLD: NORMAL
SPECIMEN SOURCE: NORMAL
SPECIMEN SOURCE: NORMAL
TRANSFUSION STATUS PATIENT QL: NORMAL
TRANSFUSION STATUS PATIENT QL: NORMAL
WBC # BLD AUTO: 54.8 10E9/L (ref 4–11)

## 2019-03-09 PROCEDURE — 25000128 H RX IP 250 OP 636: Performed by: INTERNAL MEDICINE

## 2019-03-09 PROCEDURE — 82248 BILIRUBIN DIRECT: CPT | Performed by: INTERNAL MEDICINE

## 2019-03-09 PROCEDURE — 25000132 ZZH RX MED GY IP 250 OP 250 PS 637: Mod: GY | Performed by: INTERNAL MEDICINE

## 2019-03-09 PROCEDURE — A9270 NON-COVERED ITEM OR SERVICE: HCPCS | Mod: GY | Performed by: INTERNAL MEDICINE

## 2019-03-09 PROCEDURE — 99232 SBSQ HOSP IP/OBS MODERATE 35: CPT | Performed by: SURGERY

## 2019-03-09 PROCEDURE — A9270 NON-COVERED ITEM OR SERVICE: HCPCS | Performed by: INTERNAL MEDICINE

## 2019-03-09 PROCEDURE — 99233 SBSQ HOSP IP/OBS HIGH 50: CPT | Performed by: INTERNAL MEDICINE

## 2019-03-09 PROCEDURE — P9016 RBC LEUKOCYTES REDUCED: HCPCS | Performed by: HOSPITALIST

## 2019-03-09 PROCEDURE — 25800030 ZZH RX IP 258 OP 636: Performed by: INTERNAL MEDICINE

## 2019-03-09 PROCEDURE — 85610 PROTHROMBIN TIME: CPT | Performed by: INTERNAL MEDICINE

## 2019-03-09 PROCEDURE — 00000146 ZZHCL STATISTIC GLUCOSE BY METER IP

## 2019-03-09 PROCEDURE — 12000000 ZZH R&B MED SURG/OB

## 2019-03-09 PROCEDURE — 40000239 ZZH STATISTIC VAT ROUNDS

## 2019-03-09 PROCEDURE — 84155 ASSAY OF PROTEIN SERUM: CPT | Performed by: INTERNAL MEDICINE

## 2019-03-09 PROCEDURE — 85018 HEMOGLOBIN: CPT | Performed by: INTERNAL MEDICINE

## 2019-03-09 PROCEDURE — 40000264 ECHOCARDIOGRAM COMPLETE

## 2019-03-09 PROCEDURE — 25000125 ZZHC RX 250: Performed by: INTERNAL MEDICINE

## 2019-03-09 PROCEDURE — 83735 ASSAY OF MAGNESIUM: CPT | Performed by: INTERNAL MEDICINE

## 2019-03-09 PROCEDURE — 87493 C DIFF AMPLIFIED PROBE: CPT | Performed by: INTERNAL MEDICINE

## 2019-03-09 PROCEDURE — 82247 BILIRUBIN TOTAL: CPT | Performed by: INTERNAL MEDICINE

## 2019-03-09 PROCEDURE — 80069 RENAL FUNCTION PANEL: CPT | Performed by: INTERNAL MEDICINE

## 2019-03-09 PROCEDURE — 71045 X-RAY EXAM CHEST 1 VIEW: CPT

## 2019-03-09 PROCEDURE — 25500064 ZZH RX 255 OP 636: Performed by: INTERNAL MEDICINE

## 2019-03-09 PROCEDURE — 85025 COMPLETE CBC W/AUTO DIFF WBC: CPT | Performed by: INTERNAL MEDICINE

## 2019-03-09 PROCEDURE — 84450 TRANSFERASE (AST) (SGOT): CPT | Performed by: INTERNAL MEDICINE

## 2019-03-09 PROCEDURE — C9113 INJ PANTOPRAZOLE SODIUM, VIA: HCPCS | Performed by: INTERNAL MEDICINE

## 2019-03-09 PROCEDURE — 93306 TTE W/DOPPLER COMPLETE: CPT | Mod: 26 | Performed by: INTERNAL MEDICINE

## 2019-03-09 PROCEDURE — 84075 ASSAY ALKALINE PHOSPHATASE: CPT | Performed by: INTERNAL MEDICINE

## 2019-03-09 PROCEDURE — 84460 ALANINE AMINO (ALT) (SGPT): CPT | Performed by: INTERNAL MEDICINE

## 2019-03-09 RX ORDER — SUCRALFATE ORAL 1 G/10ML
1 SUSPENSION ORAL 4 TIMES DAILY
Status: DISCONTINUED | OUTPATIENT
Start: 2019-03-09 | End: 2019-03-10

## 2019-03-09 RX ORDER — SODIUM CHLORIDE 9 MG/ML
INJECTION, SOLUTION INTRAVENOUS CONTINUOUS
Status: DISCONTINUED | OUTPATIENT
Start: 2019-03-09 | End: 2019-03-11

## 2019-03-09 RX ADMIN — PHYTONADIONE 5 MG: 10 INJECTION, EMULSION INTRAMUSCULAR; INTRAVENOUS; SUBCUTANEOUS at 14:52

## 2019-03-09 RX ADMIN — PIPERACILLIN AND TAZOBACTAM 2.25 G: 2; .25 INJECTION, POWDER, FOR SOLUTION INTRAVENOUS at 05:09

## 2019-03-09 RX ADMIN — OXYCODONE HYDROCHLORIDE 5 MG: 5 TABLET ORAL at 17:25

## 2019-03-09 RX ADMIN — PIPERACILLIN AND TAZOBACTAM 2.25 G: 2; .25 INJECTION, POWDER, FOR SOLUTION INTRAVENOUS at 18:31

## 2019-03-09 RX ADMIN — SODIUM CHLORIDE 8 MG/HR: 9 INJECTION, SOLUTION INTRAVENOUS at 00:29

## 2019-03-09 RX ADMIN — OXYCODONE HYDROCHLORIDE 5 MG: 5 TABLET ORAL at 23:40

## 2019-03-09 RX ADMIN — SODIUM CHLORIDE: 9 INJECTION, SOLUTION INTRAVENOUS at 03:21

## 2019-03-09 RX ADMIN — SUCRALFATE 1 G: 1 SUSPENSION ORAL at 22:08

## 2019-03-09 RX ADMIN — SUCRALFATE 1 G: 1 SUSPENSION ORAL at 14:55

## 2019-03-09 RX ADMIN — OXYCODONE HYDROCHLORIDE 5 MG: 5 TABLET ORAL at 12:43

## 2019-03-09 RX ADMIN — PIPERACILLIN AND TAZOBACTAM 2.25 G: 2; .25 INJECTION, POWDER, FOR SOLUTION INTRAVENOUS at 12:27

## 2019-03-09 RX ADMIN — SODIUM CHLORIDE 8 MG/HR: 9 INJECTION, SOLUTION INTRAVENOUS at 18:34

## 2019-03-09 RX ADMIN — LATANOPROST 1 DROP: 50 SOLUTION/ DROPS OPHTHALMIC at 22:09

## 2019-03-09 RX ADMIN — SODIUM CHLORIDE 8 MG/HR: 9 INJECTION, SOLUTION INTRAVENOUS at 10:37

## 2019-03-09 RX ADMIN — OXYCODONE HYDROCHLORIDE 5 MG: 5 TABLET ORAL at 09:47

## 2019-03-09 RX ADMIN — HUMAN ALBUMIN MICROSPHERES AND PERFLUTREN 9 ML: 10; .22 INJECTION, SOLUTION INTRAVENOUS at 15:34

## 2019-03-09 RX ADMIN — SUCRALFATE 1 G: 1 SUSPENSION ORAL at 18:33

## 2019-03-09 ASSESSMENT — ACTIVITIES OF DAILY LIVING (ADL)
ADLS_ACUITY_SCORE: 21
ADLS_ACUITY_SCORE: 19
ADLS_ACUITY_SCORE: 21
ADLS_ACUITY_SCORE: 19
ADLS_ACUITY_SCORE: 19
ADLS_ACUITY_SCORE: 21

## 2019-03-09 ASSESSMENT — MIFFLIN-ST. JEOR: SCORE: 1632

## 2019-03-09 NOTE — PROGRESS NOTES
[facility]    Infectious Disease Progress Note    Date of Service (when I saw the patient): 03/09/2019     Assessment & Plan   Vanessa Huffman is a 68 year old female who was admitted on 3/1/2019.     ASSESSMENT:  1. KLEBSIELLA PNEUMONIAE BACTEREMIA-? Enteric source v cholangitis,   2. LIVER MASS/DENSITY  3. PROBABLE HEPATIC ABSCESS  4. ABNORMAL LFTS-C/W Liver abscess, ? Tumor, ? Cholangitis  5. Melena. Bleeding ulcer at the anastomosis.   6. Leucocytosis.   7. c diff PCR negative.         REC  1. On Zosyn blood cultures and cultures from the liver abscesses positive for Klebsiella.  2. Liver abscesses s/p drain placement, draining thick pus now.    3. Follow LFT, WBC              Meghan Guadarrama MD    Interval History   WBC up to 54k  Afebrile   In icu   Nausea and abdominal discomfort both improved   Daughter is bedside     Physical Exam   Temp: 97.4  F (36.3  C) Temp src: Oral BP: 100/53 Pulse: 102 Heart Rate: 82 Resp: 17 SpO2: 99 % O2 Device: Nasal cannula Oxygen Delivery: 3 LPM  Vitals:    03/01/19 1845 03/09/19 0600   Weight: 99.6 kg (219 lb 9.3 oz) 111.2 kg (245 lb 2.4 oz)     Vital Signs with Ranges  Temp:  [96  F (35.6  C)-98.4  F (36.9  C)] 97.4  F (36.3  C)  Pulse:  [] 102  Heart Rate:  [80-93] 82  Resp:  [10-22] 17  BP: ()/(41-77) 100/53  SpO2:  [96 %-100 %] 99 %    Constitutional: Awake  Lungs: Clear to auscultation bilaterally, no crackles or wheezing  Cardiovascular: Regular rate and rhythm, normal S1 and S2, and no murmur noted  Abdomen: drain in the liver abscess  Skin: No rashes, no cyanosis, no edema  Other:    Medications     - MEDICATION INSTRUCTIONS -       pantoprazole (PROTONIX) infusion ADULT/PEDS GREATER than or EQUAL to 45 kg 8 mg/hr (03/09/19 0029)     sodium chloride 100 mL/hr at 03/09/19 0321       latanoprost  1 drop Both Eyes At Bedtime     piperacillin-tazobactam  2.25 g Intravenous Q6H     sodium chloride (PF)  10 mL Irrigation Q8H     sodium chloride (PF)   10 mL Intracatheter Q8H       Data   All microbiology laboratory data reviewed.  Recent Labs   Lab Test 03/09/19  0505 03/09/19  0030 03/08/19  2148  03/08/19  0530 03/07/19  0758   WBC 54.8*  --   --   --  59.8* 49.2*   HGB 7.4* 7.5* 7.8*   < > 6.1* 9.7*   HCT 20.9*  --   --   --  16.7* 27.9*   MCV 84  --   --   --  85 84   *  --   --   --  128* 126*    < > = values in this interval not displayed.     Recent Labs   Lab Test 03/09/19  0505 03/08/19  1623 03/08/19  0440   CR 3.48* 3.67* 3.96*     Recent Labs   Lab Test 03/01/19  1130   SED 79*     Recent Labs   Lab Test 03/07/19  1050 03/04/19  1410 03/04/19  1355 03/03/19  1925 03/02/19  1043 03/01/19  2148 03/01/19  2145 03/01/19  1409 03/01/19  1355   CULT Moderate growth  Klebsiella pneumoniae  Susceptibility testing done on previous specimen  *  Culture negative after 19 hours  Culture negative monitoring continues No growth  Culture negative monitoring continues Culture negative after 4 days  Light growth  Klebsiella pneumoniae  *  Culture negative monitoring continues <10,000 colonies/mL  urogenital shannon  Susceptibility testing not routinely done    Canceled, Test credited  Duplicate request   No growth No growth No growth No growth No growth

## 2019-03-09 NOTE — CONSULTS
"Received MD consult - \"pt/family request\".  We are following, see full nutrition assessment done 3/6:  \"Consider an appetite stimulant. If oral intake does not improve in the next few days, consider a FT\".    We were sending supplements when she was on regular diet, she is currently on clear liquids so will send Boost Breeze until she advances.    Shari Crow, RD  Pager [phone] (M-F)            [phone] (W/E & Hol)    "

## 2019-03-09 NOTE — PROGRESS NOTES
"GASTROENTEROLOGY PROGRESS NOTE    CC:     SUBJECTIVE:  Feels terrible, having a lot of abd pain    OBJECTIVE:  General Appearance:  Awake but drowsy NAD  /55   Pulse 81   Temp 97.8  F (36.6  C) (Oral)   Resp 18   Ht 1.626 m (5' 4\")   Wt 111.2 kg (245 lb 2.4 oz)   SpO2 97%   BMI 42.08 kg/m    Temp (24hrs), Av.4  F (36.3  C), Min:96.9  F (36.1  C), Max:97.8  F (36.6  C)    Patient Vitals for the past 72 hrs:   Weight   19 0600 111.2 kg (245 lb 2.4 oz)       Intake/Output Summary (Last 24 hours) at 3/9/2019 1247  Last data filed at 3/9/2019 1224  Gross per 24 hour   Intake 5240 ml   Output 570 ml   Net 4670 ml       PHYSICAL EXAM    Cor: RRR  Abd: S obese diffusely tender         Additional Comments:  ROS, FH, SH: See initial GI consult for details.    I have reviewed the patient's new clinical lab results:    Recent Labs   Lab Test 19  0505 19  0030 19  2148  19  0830 19  0530 19  0758  19  1439   WBC 54.8*  --   --   --   --  59.8* 49.2*   < > 17.9*   HGB 7.4* 7.5* 7.8*   < >  --  6.1* 9.7*   < > 13.2   MCV 84  --   --   --   --  85 84   < > 87   *  --   --   --   --  128* 126*   < > 109*   INR 1.37*  --   --   --  1.58*  --   --   --  1.01    < > = values in this interval not displayed.     Recent Labs   Lab Test 19  0505 19  1623 19  0440   POTASSIUM 3.4 3.3* 3.5   CHLORIDE 112* 110* 107   CO2 17* 18* 18*   BUN 92* 96* 105*   ANIONGAP 11 12 13     Recent Labs   Lab Test 19  0505 19  0440 19  0758  19  1925  19  1355  18  0645   ALBUMIN 1.1* 1.2* 1.4*   < >  --    < >  --    < > 4.1   BILITOTAL 3.7* 5.0* 7.9*   < >  --    < >  --    < > 0.7   ALT 55* 69* 87*   < >  --    < >  --    < > 48   * 150* 175*   < >  --    < >  --    < > 44   PROTEIN  --   --   --   --  30*  --  100*  --   --    LIPASE  --   --   --   --   --   --   --   --  133    < > = values in this interval not displayed. "         Active Problems:  1. Liver abscess: management per ID and IR  2. Anastomotic ulcer: still passing black stools but Hgb stable ON.    -IV PPI gtt x 72 hours then BID  X 6-8 weeks   -Start Carafate   -Transfusions per hospitalist   -Rescope if significant rebleeds   -No NSAIDS      Ashley Cordoba MD  [facility]  Pager: [phone]  Office: [phone]

## 2019-03-09 NOTE — PROGRESS NOTES
[facility]    Hospitalist Progress Note    Assessment & Plan   Vanessa Huffman is a 68 year old female admitted on 3/1/2019. She is a 68 year old with a hx of htn and migraines who presents with ana cristina, elevated liver tests, after 3 days of headaches, nausea and vomiting, and subjective fevers.     Sepsis secondary to Liver Abscess  Gram negative bacteremia- klebsiella   Urine culture negative   Liver mass Vs Abscess  (Most likely Abscess)  -continue antibiotics  -started on zosyn 3/1 ,vanco 3/1 stopped after 2 doses ,will stop zosyn and start on cefepime 3/3. Antibiotics  Was on rocephin 2 gm daily , all others stopped, agree with that, white cell count increase to 35.7, more fatigued and tired on 3/6/2019, and worsening right upper quadrant pain I did switch her back to IV Zosyn for now to cover for anaerobes and broad-spectrum coverage at this time.  Liver biopsy shows hemorrhagic necrosis  with acute inflammatory changes, no malignant cells identified, will treating her as liver abscess at this time.  -urine culture and repeat blood cultures negative , esr and c reactive protein  Is very high ,procal was positive as well.  -bacteremia with klebsiella , source ? Liver abscess, CT abdomen does not show any other concerning areas, status post CT guided liver biopsy  -wbc continue to be high , off note she had a CT abdomen last year same time and there were no mass noted in the liver than which indicates fast growth and possibility of a infectious cause.  - cholangitis is a differential due to elevated alp , she has a large liver mass possibly causing ductal obstruction as well   -mri liver didn't give any further info on the mass due to lack of contrast   -GI , infectious disease , nephrology and oncology on consult      I will keep her on IV Zosyn while she is in the hospital at this time , discussed with infectious disease they agree with that.  Repeat the CT scan of the  chest abdomen and pelvis  without contrast, to rule out any postprocedure complications.  Rule out any pleural effusion or hemorrhage.     Patient did develop some right-sided pleural effusion, some atelectasis versus consolidation in the right lower lobe and had liver mass/abscess is increased in size.  I think if this is abscess that  need to be drained, discussed with the interventional radiology for ultrasound-guided abscess drainage and drain placement, drain was placed successfully by the radiology under ultrasound guidance.  General surgery is consulted and been following, Dr. Vallecillo, following and he have been in discussion with the [facility] surgeon.  At this time I will continue with Zosyn.  Cultures will showing lactose fermenting gram-negative rods at this time again positive is Klebsiella pneumoniae from the liver abscess.   General surgery discussed with the [facility] surgeon, they did not recommend any transfer at this time, only transfer if she went into fulminant hepatic failure and need liver transplantation.  They think it is an abscess need to be treated like that.      Acute Renal Failure- likely 2/2 hypovolemia/sepsis  Thrombocytopenia secondary to sepsis   When patient  Was admitted on 3/1 her renal function had gone from 0.7 creatinine increase to 4+, it was assumed due to sepsis, prerenal etc and the renal function had shown improvement 3/2 with hydration, urine sodium was 35 with hydration , 3/3 renal function worsening and urine culture negative while Urine Analysis  Shows blood moderate .  - renal function high but showing early signs of recovery , there is associated thrombocytopenia, her mental status is stable   -she was on heparin for deep vein thrombosis prophylaxis , it was stopped and HIT negative    -she had received 2 doses of vanco following admission, had 2 days of zosyn,now both stopped and is on rocephin, now back on Zosyn  -appreciate nephrology input , renal function elevated but steady , ldh elevated  along with ddimer and fibrin , platelet count stable , c3/c4 complement negative,   - fibrin is high with elevated d dimer , DIC is not a concern as platelets also remain stable. The smear didn't show any sign of microangiopathy /schiztocytes as per hematology.     the patient is in ATN, creatinine now stable and start to trend down.  Continue with IV fluids at this time.  Nephrology is following.  Platelets are stable and improving at this time as well.     liver mass per CT abdomen  elevated liver function test    - patient  Is status post cholecystectomy, ultrasound does not show any cbd stones, liver mass noted in CT  -mri abdomen shows a liver mass 10.3 cm , appreciate oncology input   -status post liver biopsy 3/4,please monitor the results    Biopsy is negative for any malignant malignancy at this time but cannot completely rule out malignancy given necrotic hemorrhagic sample.  If she does not improve with IV antibiotics will need to follow-up with outpatient and may need to repeat biopsy.  Now we are treating her for liver abscess, drain is placed and biopsy is repeated at this time.  Repeat biopsy base negative for malignancy as well.    Gastric bypass h/o  Stable    Acute GI bleeding secondary to anastomotic ulcer  Patient had a EGD done on 3/8/2019, showed crater ulcer with visible vessel and actively bleeding.  Treated with injection 1: 10,000 epinephrine and Hemoclip.  She is on IV Protonix drip, I will continue with IV Protonix drip today for the next 24 hours, continue to check her hemoglobin every 6 hours.  She did drop her hemoglobin to 7.4 from 8 yesterday.  She is still having some melena.  Have bloody stool overnight on 3/8/2019 and did drop her hemoglobin to 6.1 from 9.7    Continue with IV Protonix drip, continue to check hemoglobin every 6-hour.  Patient is still having melena but I do not think that she is actively bleeding at this time.    Acute blood loss anemia secondary to acute GI  bleed  Because of the GI bleed, no blood in the drainage catheter from the liver.  Was given 2 units of packed RBCs on 3/8/2019.  Her hemoglobin improved to 8 and now dropped to 7.4 again.  I would transfuse her 1 unit of packed RBCs today keep her hemoglobin 8 or above.    Leukocytosis  Because of sepsis, liver abscess pleural effusion atelectasis.  Not start to trending down.  Continue with IV Zosyn at this time, C. difficile is pending at this time.  If come back positive we will start her on oral vancomycin.    DVT Prophylaxis: heparin stopped due to low platelets, will use scds for now   Code Status: Full Code     Disposition: Expected discharge in 2-3 days        Worsening leukocytosis, most likely secondary to worsening, ID is consulted and following.  continue with IV Zosyn for now.  As she is going Klebsiella pneumonia from the liver abscess, and the blood culture positive for that as well repeat blood cultures are negative so far  Started her back on IV Zosyn on 3/6/2019 and stop ceftriaxone  Interventional radiology consult for abscess drainage and drain placement was done on 3/7/2019  Consult general surgery, is following, discussed with the [facility] surgery, as per their recommendation no need for transfer or need for transfer to [facility] if develop fulminant hepatic failure and need liver transplantation.  Aggressive incentive spirometry and flutter continue with the Zosyn for possible pneumonia as well.  Repeat blood cultures are negative so far  Send fluid for cultures aerobic anaerobic fungal cytology and repeat pathology if able to get the sample.  Cultures from the abscess growing lactose fermenting gram-negative chantelle identification pending at this time.  Acute blood loss anemia and acute GI bleed, scheduled for EGD today.  Check hemoglobin every 6 hours, and keep the hemoglobin 8 above she received 2 units of packed RBCs today on 3/8/2090  We will transfuse her 1 more unit of packed RBCs today at  3/9/2019.    Discussed with nephrology, general surgery and the nursing staff taking care of the patient today      Ruben Barksdale MD  Text Page   (7am to 6pm)    Interval History   She is sitting in a chair, more awake than usual she is, still having right upper quadrant pain, some shortness of breath because of the unable to take deep breath.  No fever chills chest pain headache dizziness or lightheadedness.  Has some melanotic stool overnight.  No bright red blood, nausea is improved, trying to eat this morning.  Appetite remains poor    Discussed with the bedside nursing staff as well      -Data reviewed today: I reviewed all new labs and imaging results over the last 24 hours.    Physical Exam   Temp: 97.8  F (36.6  C) Temp src: Oral BP: 101/55 Pulse: 81 Heart Rate: 82 Resp: 18 SpO2: 97 % O2 Device: None (Room air) Oxygen Delivery: 3 LPM  Vitals:    03/01/19 1845 03/09/19 0600   Weight: 99.6 kg (219 lb 9.3 oz) 111.2 kg (245 lb 2.4 oz)     Vital Signs with Ranges  Temp:  [96.9  F (36.1  C)-97.9  F (36.6  C)] 97.8  F (36.6  C)  Pulse:  [] 81  Heart Rate:  [80-99] 82  Resp:  [10-26] 18  BP: ()/(41-77) 101/55  SpO2:  [96 %-100 %] 97 %  I/O last 3 completed shifts:  In: 3428.67 [P.O.:240; I.V.:2600]  Out: 535 [Urine:400; Drains:135]    Constitutional: Fatigued, cooperative, no apparent distress  Respiratory: Clear to auscultation bilaterally, no crackles or wheezing, decreased air entry in the right base  Cardiovascular: Regular rate and rhythm, normal S1 and S2, and no murmur noted  GI: Normal bowel sounds, soft, non-distended, tender right upper quadrant ++  Skin/Integumen: No rashes, no cyanosis, trace edema  Neuro : moving all 4 extremities, no focal deficit noted     Medications     - MEDICATION INSTRUCTIONS -       pantoprazole (PROTONIX) infusion ADULT/PEDS GREATER than or EQUAL to 45 kg 8 mg/hr (03/09/19 1037)     sodium chloride 100 mL/hr at 03/09/19 0321       latanoprost  1 drop Both Eyes At  Bedtime     piperacillin-tazobactam  2.25 g Intravenous Q6H     sodium chloride (PF)  10 mL Irrigation Q8H     sodium chloride (PF)  10 mL Intracatheter Q8H       Data   Recent Labs   Lab 03/09/19  0505 03/09/19  0030 03/08/19  2148 03/08/19  1623 03/08/19  0830 03/08/19  0530 03/08/19  0440 03/07/19  0758  03/03/19  1439   WBC 54.8*  --   --   --   --  59.8*  --  49.2*   < > 17.9*   HGB 7.4* 7.5* 7.8* 8.4*  --  6.1*  --  9.7*   < > 13.2   MCV 84  --   --   --   --  85  --  84   < > 87   *  --   --   --   --  128*  --  126*   < > 109*   INR 1.37*  --   --   --  1.58*  --   --   --   --  1.01     --   --  140  --   --  138 135   < >  --    POTASSIUM 3.4  --   --  3.3*  --   --  3.5 3.4   < >  --    CHLORIDE 112*  --   --  110*  --   --  107 104   < >  --    CO2 17*  --   --  18*  --   --  18* 18*   < >  --    BUN 92*  --   --  96*  --   --  105* 91*   < >  --    CR 3.48*  --   --  3.67*  --   --  3.96* 4.04*   < >  --    ANIONGAP 11  --   --  12  --   --  13 13   < >  --    DANNA 7.4*  --   --  7.8*  --   --  7.6* 8.6   < >  --    GLC 88  --   --  100*  --   --  114* 111*   < >  --    ALBUMIN 1.1*  --   --   --   --   --  1.2* 1.4*   < >  --    PROTTOTAL 4.6*  --   --   --   --   --  4.6* 5.1*   < >  --    BILITOTAL 3.7*  --   --   --   --   --  5.0* 7.9*   < >  --    ALKPHOS 395*  --   --   --   --   --  429* 565*   < >  --    ALT 55*  --   --   --   --   --  69* 87*   < >  --    *  --   --   --   --   --  150* 175*   < >  --     < > = values in this interval not displayed.     Recent Labs   Lab 03/09/19  0823 03/09/19  0505 03/08/19  1623 03/08/19  1622 03/08/19  0440 03/07/19  0758 03/06/19  2134 03/06/19  0821   GLC  --  88 100*  --  114* 111*  --  94   BGM 87  --   --  103*  --   --  134*  --        Imaging:   Recent Results (from the past 24 hour(s))   XR Chest Port 1 View    Narrative    XR CHEST PORT 1 VW 3/9/2019 5:22 AM    HISTORY: Sepsis.     COMPARISON: None.      Impression     IMPRESSION: A right PICC terminates in the region of the superior vena  cava. There is elevation of the right hemidiaphragm with likely  associated right pleural effusion. The left lung appears clear. No  pneumothorax.    JULIANNA CAMARGO MD

## 2019-03-09 NOTE — PROGRESS NOTES
Patient arrived to ICU from [facility] around 1600.  VSS.  NC weaned off to RA.  Protonix gtt started.  BILL drain in place.  Labs drawn.  Hgb stable.  K+ low and MD paged for replacement.  MD also paged for nutrition consult per family request.  Patient has been hospitalized for 7 days and has eaten very little d/t poor appetite, pain, and then sleepiness for the past 3-4 days.  Supplements ordered but patient is not drinking them.  Nutrition consult placed per MD.  Patient confused and CAM+.  Need Cdiff sample but no stool for me.  Family at bedside and updated.

## 2019-03-09 NOTE — PLAN OF CARE
PT Note 3/9/2019 3:11 PM    PT eval attempted. Spoke with RN. Pt is currently undergoing echo. Per RN, pt was assisted to chair earlier today with 2 person assist.

## 2019-03-09 NOTE — PROGRESS NOTES
Nephrology Initial Consult  March 4, 2019        ASSESSMENT AND RECOMMENDATIONS:   1. ELISE -   Likely ATN given clinical course of septic/ hypovolemic  shock, low PO intake, concommittant use of lisinopril , multiple granular casts in urine.   - nonoliguric . Adequately fluid resuscitated.   - continue to hold lisinopril .   - Avoid IV contrast until kidney function improves, unless an emergency . S.Cr plateaued and slightly better today .   - Dose meds for eGFR of ~10 -20  -Cr continues  to improve. Good UOP . BUN up d/t GI bleed.     2 HTN - BP okay   Continue to hold lisinopril     3. Septic shock / Klebsiella bacteremia /  Liver abscess - management per ID/ Primary team. Liver Bx shows necrosis and ac inflammation c/w abscess s/p drain    4. Lytes okay . Mild acidosis with ELISE.     5 Gi Bleed/ Anemia - s/p clipping of bleeding artery yesterday  . Getting PRBC.     6 Volume status - getting volume overloaded. 12 Kg up . Getting PRBC and IVF at 100 ml/ hr NS. Good PO intake.     Recc - discontinue routing IVF unless worried about major ongoing GI bleed. Pt is getting volume overloaded.     Magalys Hernandez MD  [facility] - Nephrology   [phone]      Interval hx    S/p EGD yesterday with clipping of bleeding vessel.   Getting PRBC today .   Tired. C/o abd pain .   S.Cr trending down.   Thirsty, good PO fluid intake.   Afebrile. No dyspnea, cough, dysuria.     PAST MEDICAL HISTORY:  Reviewed with patient on 03/09/2019     Past Medical History:   Diagnosis Date     Arthritis      Depressive disorder      Hypertension      Obesity        Past Surgical History:   Procedure Laterality Date     APPENDECTOMY       ARTHROPLASTY CARPOMETACARPAL (THUMB JOINT) Left 4/6/2018    Procedure: ARTHROPLASTY CARPOMETACARPAL (THUMB JOINT);  LEFT  THUMB CARPOMETACARPAL EXCISIONAL ARTHROPLASTY WITH FASCIA CLARE GRAFT ;  Surgeon: Kalyani King MD;  Location: [facility]     CHOLECYSTECTOMY       GI SURGERY       "gastric bypass     GYN SURGERY      abdominal hysterectomy     ORTHOPEDIC SURGERY      bilateral bunionectomies        MEDICATIONS:      Current Meds    latanoprost  1 drop Both Eyes At Bedtime     phytonadione  5 mg Oral Daily     piperacillin-tazobactam  2.25 g Intravenous Q6H     sodium chloride (PF)  10 mL Irrigation Q8H     sodium chloride (PF)  10 mL Intracatheter Q8H     sucralfate  1 g Oral 4x Daily     Infusion Meds    - MEDICATION INSTRUCTIONS -       pantoprazole (PROTONIX) infusion ADULT/PEDS GREATER than or EQUAL to 45 kg 8 mg/hr (19 1037)       ALLERGIES:    Allergies   Allergen Reactions     Contrast Dye Shortness Of Breath     Codeine      Zolpidem Other (See Comments)     Patient reports sleep walking.     Diatrizoate Itching     itchy throat that makes her want to cough       REVIEW OF SYSTEMS:  A comprehensive of systems was negative except as noted above.      PHYSICAL EXAM:   Temp  Av.6  F (36.4  C)  Min: 97.4  F (36.3  C)  Max: 97.7  F (36.5  C)      Pulse  Av.1  Min: 66  Max: 118 Resp  Av.7  Min: 12  Max: 22  SpO2  Av %  Min: 92 %  Max: 100 %       /55   Pulse 81   Temp 97.8  F (36.6  C) (Oral)   Resp 18   Ht 1.626 m (5' 4\")   Wt 111.2 kg (245 lb 2.4 oz)   SpO2 97%   BMI 42.08 kg/m     Date 19 0700 - 19 0659   Shift 5412-4768 0375-7374 8297-9141 24 Hour Total   INTAKE   P.O. 240   240   Shift Total(mL/kg) 240(2.41)   240(2.41)   OUTPUT   Shift Total(mL/kg)       Weight (kg) 99.6 99.6 99.6 99.6      Admit Weight: 99.6 kg (219 lb 9.3 oz)     GENERAL APPEARANCE: tired ,  awake  EYES: no scleral icterus, pupils equal  Pulmonary: lungs clear to auscultation with equal breath sounds bilaterally, no clubbing  CV: regular rhythm, normal rate, no rub   - JVP -   - Edema ++  GI: soft, + tenderness in RUQ  MS: no evidence of inflammation in joints, no muscle tenderness  : no carias  SKIN: no rash, warm, dry, no cyanosis  NEURO: face symmetric, no " asterixis     LABS:   CMP  Recent Labs   Lab 03/09/19  0505 03/08/19  1623 03/08/19  0440 03/07/19  0758 03/06/19  0821    140 138 135 133   POTASSIUM 3.4 3.3* 3.5 3.4 3.8   CHLORIDE 112* 110* 107 104 99   CO2 17* 18* 18* 18* 20   ANIONGAP 11 12 13 13 14   GLC 88 100* 114* 111* 94   BUN 92* 96* 105* 91* 86*   CR 3.48* 3.67* 3.96* 4.04* 4.41*   GFRESTIMATED 13* 12* 11* 11* 10*   GFRESTBLACK 15* 14* 13* 12* 11*   DANNA 7.4* 7.8* 7.6* 8.6 8.8   MAG 2.1  --   --   --   --    PHOS 4.4  --  4.3 3.1 3.6   PROTTOTAL 4.6*  --  4.6* 5.1* 6.0*   ALBUMIN 1.1*  --  1.2* 1.4* 1.7*   BILITOTAL 3.7*  --  5.0* 7.9* 7.9*   ALKPHOS 395*  --  429* 565* 626*   *  --  150* 175* 229*   ALT 55*  --  69* 87* 124*     CBC  Recent Labs   Lab 03/09/19  0505 03/09/19  0030 03/08/19  2148 03/08/19  1623 03/08/19  0530 03/07/19  0758 03/06/19  0821   HGB 7.4* 7.5* 7.8* 8.4* 6.1* 9.7* 12.3   WBC 54.8*  --   --   --  59.8* 49.2* 35.7*   RBC 2.49*  --   --   --  1.97* 3.31* 4.13   HCT 20.9*  --   --   --  16.7* 27.9* 35.5   MCV 84  --   --   --  85 84 86   MCH 29.7  --   --   --  31.0 29.3 29.8   MCHC 35.4  --   --   --  36.5 34.8 34.6   RDW 14.5  --   --   --  14.3 13.9 14.1   *  --   --   --  128* 126* 114*     INR  Recent Labs   Lab 03/09/19  0505 03/08/19  0830 03/03/19  1439   INR 1.37* 1.58* 1.01   PTT  --   --  21*     ABGNo lab results found in last 7 days.   URINE STUDIES  Recent Labs   Lab Test 03/03/19  1925 03/01/19  1355   COLOR Yellow Dark Brown   APPEARANCE Slightly Cloudy Cloudy   URINEGLC Negative 70*   URINEBILI Small* Moderate*   URINEKETONE Negative Negative   SG 1.013 1.017   UBLD Trace* Moderate*   URINEPH 5.5 5.5   PROTEIN 30* 100*   NITRITE Negative Negative   LEUKEST Small* Negative   RBCU 0 8*   WBCU 16* 16*       Magalys Hernandez MD

## 2019-03-09 NOTE — PLAN OF CARE
Vitals stable through night. Patient rested well. Disoriented to place, time and situation. Sad at times. Frequent small BMs, loose, dark brown and maroon. Uses bedpan when needing to urinate. Drain with small output. Hgb down to 7.4 this morning.

## 2019-03-09 NOTE — PROGRESS NOTES
Events of last evening reviewed  Transferred to intensive care following EGD with findings of bleeding anastomotic ulcer  Bleeding observed to be controlled with hemostatic clip  Hemodynamically stable  Reporting epigastric abdominal discomfort and pain  Denies nausea  Continues to have stools    Abdomen soft and nondistended, epigastric abdominal tenderness right upper quadrant drain in place with purulent fluid observed  Hemo-globin appears stable at 7.4, white blood cell count continues to be markedly elevated at 54.8  LFTs are improving with total bilirubin down to 3.7    Assessment/plan: #1 liver abscess status post percutaneous drainage with improvement of liver function tests.  Recommend ongoing antibiotics and percutaneous drain to remain in place, #2 bleeding gastrojejunal anastomotic ulcer status post endoscopic control with hemoglobin apparently stable, recommend ongoing monitoring of hemoglobin with possible redo endoscopy if signs of additional bleeding.  No present indication for surgical intervention.  Total time spent was 25 minutes with greater than 50% in face-to-face consultation.

## 2019-03-10 ENCOUNTER — APPOINTMENT (OUTPATIENT)
Dept: PHYSICAL THERAPY | Facility: CLINIC | Age: 69
DRG: 871 | End: 2019-03-10
Payer: MEDICARE

## 2019-03-10 LAB
ALBUMIN SERPL-MCNC: 1.3 G/DL (ref 3.4–5)
ANION GAP SERPL CALCULATED.3IONS-SCNC: 11 MMOL/L (ref 3–14)
BUN SERPL-MCNC: 78 MG/DL (ref 7–30)
CALCIUM SERPL-MCNC: 7.5 MG/DL (ref 8.5–10.1)
CHLORIDE SERPL-SCNC: 111 MMOL/L (ref 94–109)
CO2 SERPL-SCNC: 17 MMOL/L (ref 20–32)
CREAT SERPL-MCNC: 3.15 MG/DL (ref 0.52–1.04)
GFR SERPL CREATININE-BSD FRML MDRD: 15 ML/MIN/{1.73_M2}
GLUCOSE SERPL-MCNC: 93 MG/DL (ref 70–99)
HGB BLD-MCNC: 8.6 G/DL (ref 11.7–15.7)
HGB BLD-MCNC: 8.7 G/DL (ref 11.7–15.7)
PHOSPHATE SERPL-MCNC: 4.1 MG/DL (ref 2.5–4.5)
PLATELET # BLD AUTO: 117 10E9/L (ref 150–450)
POTASSIUM SERPL-SCNC: 3.3 MMOL/L (ref 3.4–5.3)
SODIUM SERPL-SCNC: 139 MMOL/L (ref 133–144)
WBC # BLD AUTO: 54.3 10E9/L (ref 4–11)

## 2019-03-10 PROCEDURE — 25000132 ZZH RX MED GY IP 250 OP 250 PS 637: Mod: GY | Performed by: INTERNAL MEDICINE

## 2019-03-10 PROCEDURE — 99207 ZZC CDG-MDM COMPONENT: MEETS LOW - DOWN CODED: CPT | Performed by: INTERNAL MEDICINE

## 2019-03-10 PROCEDURE — 25000128 H RX IP 250 OP 636: Performed by: INTERNAL MEDICINE

## 2019-03-10 PROCEDURE — 25800030 ZZH RX IP 258 OP 636: Performed by: INTERNAL MEDICINE

## 2019-03-10 PROCEDURE — 85018 HEMOGLOBIN: CPT | Performed by: HOSPITALIST

## 2019-03-10 PROCEDURE — 99232 SBSQ HOSP IP/OBS MODERATE 35: CPT | Performed by: INTERNAL MEDICINE

## 2019-03-10 PROCEDURE — 25000125 ZZHC RX 250: Performed by: INTERNAL MEDICINE

## 2019-03-10 PROCEDURE — 25000132 ZZH RX MED GY IP 250 OP 250 PS 637: Mod: GY | Performed by: SURGERY

## 2019-03-10 PROCEDURE — 85027 COMPLETE CBC AUTOMATED: CPT | Performed by: INTERNAL MEDICINE

## 2019-03-10 PROCEDURE — A9270 NON-COVERED ITEM OR SERVICE: HCPCS | Mod: GY | Performed by: SURGERY

## 2019-03-10 PROCEDURE — 12000000 ZZH R&B MED SURG/OB

## 2019-03-10 PROCEDURE — C9113 INJ PANTOPRAZOLE SODIUM, VIA: HCPCS | Performed by: INTERNAL MEDICINE

## 2019-03-10 PROCEDURE — A9270 NON-COVERED ITEM OR SERVICE: HCPCS | Mod: GY | Performed by: INTERNAL MEDICINE

## 2019-03-10 PROCEDURE — A9270 NON-COVERED ITEM OR SERVICE: HCPCS | Performed by: INTERNAL MEDICINE

## 2019-03-10 PROCEDURE — 97161 PT EVAL LOW COMPLEX 20 MIN: CPT | Mod: GP

## 2019-03-10 PROCEDURE — 97530 THERAPEUTIC ACTIVITIES: CPT | Mod: GP

## 2019-03-10 PROCEDURE — 80069 RENAL FUNCTION PANEL: CPT | Performed by: INTERNAL MEDICINE

## 2019-03-10 PROCEDURE — 40000239 ZZH STATISTIC VAT ROUNDS

## 2019-03-10 PROCEDURE — 99231 SBSQ HOSP IP/OBS SF/LOW 25: CPT | Performed by: SURGERY

## 2019-03-10 RX ORDER — SUCRALFATE ORAL 1 G/10ML
1 SUSPENSION ORAL
Status: DISCONTINUED | OUTPATIENT
Start: 2019-03-10 | End: 2019-03-11 | Stop reason: CLARIF

## 2019-03-10 RX ORDER — POTASSIUM CHLORIDE 1500 MG/1
20 TABLET, EXTENDED RELEASE ORAL ONCE
Status: COMPLETED | OUTPATIENT
Start: 2019-03-10 | End: 2019-03-10

## 2019-03-10 RX ADMIN — SODIUM CHLORIDE: 9 INJECTION, SOLUTION INTRAVENOUS at 06:15

## 2019-03-10 RX ADMIN — PIPERACILLIN AND TAZOBACTAM 2.25 G: 2; .25 INJECTION, POWDER, FOR SOLUTION INTRAVENOUS at 16:37

## 2019-03-10 RX ADMIN — SUCRALFATE 1 G: 1 SUSPENSION ORAL at 20:58

## 2019-03-10 RX ADMIN — PIPERACILLIN AND TAZOBACTAM 2.25 G: 2; .25 INJECTION, POWDER, FOR SOLUTION INTRAVENOUS at 00:01

## 2019-03-10 RX ADMIN — PIPERACILLIN AND TAZOBACTAM 2.25 G: 2; .25 INJECTION, POWDER, FOR SOLUTION INTRAVENOUS at 21:15

## 2019-03-10 RX ADMIN — SODIUM CHLORIDE: 9 INJECTION, SOLUTION INTRAVENOUS at 17:01

## 2019-03-10 RX ADMIN — LATANOPROST 1 DROP: 50 SOLUTION/ DROPS OPHTHALMIC at 21:19

## 2019-03-10 RX ADMIN — OXYCODONE HYDROCHLORIDE 5 MG: 5 TABLET ORAL at 14:45

## 2019-03-10 RX ADMIN — POTASSIUM CHLORIDE 20 MEQ: 1500 TABLET, EXTENDED RELEASE ORAL at 12:02

## 2019-03-10 RX ADMIN — PIPERACILLIN AND TAZOBACTAM 2.25 G: 2; .25 INJECTION, POWDER, FOR SOLUTION INTRAVENOUS at 06:17

## 2019-03-10 RX ADMIN — SODIUM CHLORIDE 8 MG/HR: 9 INJECTION, SOLUTION INTRAVENOUS at 06:39

## 2019-03-10 RX ADMIN — SUCRALFATE 1 G: 1 SUSPENSION ORAL at 16:35

## 2019-03-10 RX ADMIN — OXYCODONE HYDROCHLORIDE 5 MG: 5 TABLET ORAL at 04:48

## 2019-03-10 RX ADMIN — SODIUM CHLORIDE 8 MG/HR: 9 INJECTION, SOLUTION INTRAVENOUS at 17:00

## 2019-03-10 RX ADMIN — OXYCODONE HYDROCHLORIDE 5 MG: 5 TABLET ORAL at 20:56

## 2019-03-10 RX ADMIN — PHYTONADIONE 5 MG: 10 INJECTION, EMULSION INTRAMUSCULAR; INTRAVENOUS; SUBCUTANEOUS at 08:25

## 2019-03-10 RX ADMIN — PIPERACILLIN AND TAZOBACTAM 2.25 G: 2; .25 INJECTION, POWDER, FOR SOLUTION INTRAVENOUS at 12:02

## 2019-03-10 RX ADMIN — SUCRALFATE 1 G: 1 SUSPENSION ORAL at 12:02

## 2019-03-10 RX ADMIN — SUCRALFATE 1 G: 1 SUSPENSION ORAL at 08:25

## 2019-03-10 ASSESSMENT — ACTIVITIES OF DAILY LIVING (ADL)
ADLS_ACUITY_SCORE: 21

## 2019-03-10 NOTE — PROGRESS NOTES
[facility]    Infectious Disease Progress Note    Date of Service (when I saw the patient): 03/10/2019     Assessment & Plan   Vanessa Huffman is a 68 year old female who was admitted on 3/1/2019.     ASSESSMENT:  1. KLEBSIELLA PNEUMONIAE BACTEREMIA-? Enteric source v cholangitis,   2. LIVER MASS/DENSITY  3. PROBABLE HEPATIC ABSCESS  4. ABNORMAL LFTS-C/W Liver abscess, ? Tumor, ? Cholangitis  5. Melena. Bleeding ulcer at the anastomosis.   6. Leucocytosis.   7. c diff PCR negative.         REC  1. On Zosyn blood cultures and cultures from the liver abscesses positive for Klebsiella.  2. Liver abscesses s/p drain placement, draining thick pus now.    3. Follow LFT, WBC              Meghan Guadarrama MD    Interval History   WBC 54k  Afebrile   Out of ICU   Hungry today   Nausea and abdominal discomfort both improved   Daughter is bedside     Physical Exam   Temp: 96.9  F (36.1  C) Temp src: Oral BP: 143/58 Pulse: 87 Heart Rate: 74 Resp: 18 SpO2: 96 % O2 Device: None (Room air)    Vitals:    03/01/19 1845 03/09/19 0600   Weight: 99.6 kg (219 lb 9.3 oz) 111.2 kg (245 lb 2.4 oz)     Vital Signs with Ranges  Temp:  [95.5  F (35.3  C)-97.7  F (36.5  C)] 96.9  F (36.1  C)  Pulse:  [79-90] 87  Heart Rate:  [74-94] 74  Resp:  [12-24] 18  BP: ()/(48-74) 143/58  SpO2:  [94 %-97 %] 96 %    Constitutional: Awake  Lungs: Clear to auscultation bilaterally, no crackles or wheezing  Cardiovascular: Regular rate and rhythm, normal S1 and S2, and no murmur noted  Abdomen: drain in the liver abscess  Skin: No rashes, no cyanosis, no edema  Other:    Medications     - MEDICATION INSTRUCTIONS -       pantoprazole (PROTONIX) infusion ADULT/PEDS GREATER than or EQUAL to 45 kg 8 mg/hr (03/10/19 0639)     sodium chloride 10 mL/hr at 03/10/19 0615       latanoprost  1 drop Both Eyes At Bedtime     phytonadione  5 mg Oral Daily     piperacillin-tazobactam  2.25 g Intravenous Q6H     potassium chloride  20 mEq Oral Once      sodium chloride (PF)  10 mL Irrigation Q8H     sodium chloride (PF)  10 mL Intracatheter Q8H     sucralfate  1 g Oral 4x Daily AC & HS       Data   All microbiology laboratory data reviewed.  Recent Labs   Lab Test 03/10/19  0619 03/09/19  2350 03/09/19  1830  03/09/19  0505  03/08/19  0530 03/07/19  0758   WBC  --   --   --   --  54.8*  --  59.8* 49.2*   HGB 8.6* 8.7* 9.0*   < > 7.4*   < > 6.1* 9.7*   HCT  --   --   --   --  20.9*  --  16.7* 27.9*   MCV  --   --   --   --  84  --  85 84   *  --   --   --  114*  --  128* 126*    < > = values in this interval not displayed.     Recent Labs   Lab Test 03/10/19  0800 03/09/19  0505 03/08/19  1623   CR 3.15* 3.48* 3.67*     Recent Labs   Lab Test 03/01/19  1130   SED 79*     Recent Labs   Lab Test 03/07/19  1050 03/04/19  1410 03/04/19  1355 03/03/19  1925 03/02/19  1043 03/01/19  2148 03/01/19  2145 03/01/19  1409 03/01/19  1355   CULT Moderate growth  Klebsiella pneumoniae  Susceptibility testing done on previous specimen  *  Culture negative after 19 hours  Culture negative monitoring continues No growth  Culture negative monitoring continues Culture negative after 4 days  Light growth  Klebsiella pneumoniae  *  Culture negative monitoring continues <10,000 colonies/mL  urogenital shannon  Susceptibility testing not routinely done    Canceled, Test credited  Duplicate request   No growth No growth No growth No growth No growth

## 2019-03-10 NOTE — PROGRESS NOTES
03/10/19 1100   Quick Adds   Type of Visit Initial PT Evaluation   Living Environment   Lives With spouse   Living Arrangements house   Home Accessibility stairs to enter home   Number of Stairs, Main Entrance two   Stair Railings, Main Entrance railings safe and in good condition;railing on right side (ascending)   Transportation Anticipated family or friend will provide   Living Environment Comment Pt lives with spouse in a 1SH with 2STE with rail support.   Self-Care   Usual Activity Tolerance good   Current Activity Tolerance fair   Regular Exercise No   Equipment Currently Used at Home none   Activity/Exercise/Self-Care Comment Pt was ind with all ADL's.   Functional Level Prior   Ambulation 0-->independent   Transferring 0-->independent   Toileting 0-->independent   Bathing 0-->independent   Communication 0-->understands/communicates without difficulty   Swallowing 0-->swallows foods/liquids without difficulty   Cognition 0 - no cognition issues reported   Fall history within last six months no   Which of the above functional risks had a recent onset or change? none   Prior Functional Level Comment Pt was ind with ambulation   General Information   Onset of Illness/Injury or Date of Surgery - Date 03/01/19   Referring Physician Ruben Barksdale MD   Patient/Family Goals Statement Get stronger and walk   Pertinent History of Current Problem (include personal factors and/or comorbidities that impact the POC) Pt is a 68 yr old female admitted with headache. Found with acute kidney injury, liver abcess and liver failure.    Precautions/Limitations fall precautions   Weight-Bearing Status - LLE full weight-bearing   Weight-Bearing Status - RLE full weight-bearing   General Observations Pleasant and cooperative   General Info Comments Activity: up ad abraham   Cognitive Status Examination   Orientation orientation to person, place and time   Level of Consciousness alert   Follows Commands and Answers Questions 100%  of the time   Personal Safety and Judgment intact   Memory intact   Pain Assessment   Patient Currently in Pain Yes, see Vital Sign flowsheet  (c/o R upper abdominal pain at rest)   Integumentary/Edema   Integumentary/Edema Comments Abcess drain on the R side. Pt noted with edemal in BLE's   Range of Motion (ROM)   ROM Comment BLE: WFL   Strength   Strength Comments BLE: 4-/5    Bed Mobility   Bed Mobility Comments Supine>sit: mod assist x 1 with verbal cues on initiation and technique. Sit>supine: mod assist x 2 with verbla cues   Transfer Skills   Transfer Comments STS at min assist x 2    Gait   Gait Comments pt took 5 side steps to the HOB with RW and min assist x 2    Balance   Balance Comments Sitting: good   Sensory Examination   Sensory Perception no deficits were identified   Coordination   Coordination no deficits were identified   Muscle Tone   Muscle Tone no deficits were identified   General Therapy Interventions   Planned Therapy Interventions balance training;bed mobility training;gait training;strengthening;transfer training;progressive activity/exercise;home program guidelines;risk factor education   Clinical Impression   Criteria for Skilled Therapeutic Intervention yes, treatment indicated   PT Diagnosis Impaired gait   Influenced by the following impairments Decreased strength, decreased activity tolerance   Functional limitations due to impairments assistance needed with mobility   Clinical Presentation Stable/Uncomplicated   Clinical Presentation Rationale Current fucntional presentation   Clinical Decision Making (Complexity) Low complexity   Therapy Frequency` daily   Predicted Duration of Therapy Intervention (days/wks) 5   Anticipated Discharge Disposition Transitional Care Facility   Risk & Benefits of therapy have been explained Yes   Patient, Family & other staff in agreement with plan of care Yes   Clinical Impression Comments Pt presents with decreased strength and activity toelrance  "limting independence with fuctional mobility. Pt will benefit from continued skilled PT to acheive PLOF and independence.    [facility] AM-PAC  \"6 Clicks\" V.2 Basic Mobility Inpatient Short Form   1. Turning from your back to your side while in a flat bed without using bedrails? 2 - A Lot   2. Moving from lying on your back to sitting on the side of a flat bed without using bedrails? 2 - A Lot   3. Moving to and from a bed to a chair (including a wheelchair)? 3 - A Little   4. Standing up from a chair using your arms (e.g., wheelchair, or bedside chair)? 3 - A Little   5. To walk in hospital room? 2 - A Lot   6. Climbing 3-5 steps with a railing? 2 - A Lot   Basic Mobility Raw Score (Score out of 24.Lower scores equate to lower levels of function) 14   Total Evaluation Time   Total Evaluation Time (Minutes) 15     "

## 2019-03-10 NOTE — PLAN OF CARE
VSS on RA. Pt disoriented to time and situation- acute delirium. C/o pain in RUQ abdomen- oxycodone 5 mg x1. nausea. Minimal appetite. BS active, +gas, BM x1- black smear. Hepatic drain flushed w/ 30 ml dark yellow output- less purulent. Turned/repositioned q2h. Incontinent at times. Powder applied to abd folds. PICC infusing protonix at 10 ml/hr. K replaced w/ scheduled supplement. Hgb stable. WBC 54.3.

## 2019-03-10 NOTE — PROGRESS NOTES
Patient reports ongoing abdominal pain.  Different than yesterday this seems more localized in the right upper quadrant around the percutaneous drain.  She reports that she is not having as much epigastric discomfort.  She denies nausea or vomiting.  She is tolerating her present diet.  Continues to have dark liquid stools.    Afebrile with normal vital signs  Drain today appears more bilious with not as much purulence.  Abdomen tender at drain site otherwise seemingly benign  Hemoglobin stable    Assessment/plan: Complex patient with significant hepatic abscess as well as gastrojejunal anastomotic ulcer.  Recommend ongoing IV antibiotics for treatment of abscess, will likely need recheck CT in the next couple of days to assess adequacy of drainage.  As it pertains to bleeding ulcer her hemoglobin is stable and does not appear to have ongoing signs of bleeding.  This will require ongoing PPI therapy, also recommend Carafate slurry.  Surgery will continue to follow.

## 2019-03-10 NOTE — PLAN OF CARE
Patient up to chair this morning with assist of 2 and did well.  Unable to be seen by PT because patient was getting her echo when they came to see her.  Will see in am.  Diet advanced to full liquid.  Very poor appetite put taking in large amounts of water.  IV fluids TKO'd.  Nutrition consult in.  Stooling brown/black, liquid stool every couple hours.  Urine and stool mix so very difficult to keep track of I&O's.  1 unit of blood given this morning.  Serial hemoglobins have remained stable.  Son, daughter,  at bedside throughout the day and updated.  Neurologically seems a little improved from yesterday.  Transfer out of ICU tomorrow if stable night.

## 2019-03-10 NOTE — PROGRESS NOTES
Patient stable. disoriented to time and situation. One BM liquid black. Protonix still infusing. Transferred to

## 2019-03-10 NOTE — PLAN OF CARE
Pt disoriented to time/situation. VSS on RA-sting 97%. C/o of RUQ pain-gave prn oxycodone x2. Pt denies nausea. LS dim. Bs active, +flatus. Pt incontinent of bowel and bladder. Pt having loose black stools x3 (c.diff-negative). Abscess drain c/d/i with purulent/tan output. Scattered bruising, redness under abdominal folds (powder applied). Turn/repo q2hrs for pressure/comfort relief. PICC infusing protonix at 10mL/hr and 0.9% NS at TKO. Pt on intermittent zosyn. Continue to monitor.

## 2019-03-10 NOTE — PROGRESS NOTES
[facility]    Hospitalist Progress Note    Assessment & Plan   Vanessa Huffman is a 68 year old female admitted on 3/1/2019. She is a 68 year old with a hx of htn and migraines who presents with ana cristina, elevated liver tests, after 3 days of headaches, nausea and vomiting, and subjective fevers.     Sepsis secondary to Liver Abscess  Gram negative bacteremia- klebsiella   Urine culture negative   Liver mass Vs Abscess  (Most likely Abscess)  -continue antibiotics  -started on zosyn 3/1 ,vanco 3/1 stopped after 2 doses ,will stop zosyn and start on cefepime 3/3. Antibiotics  Was on rocephin 2 gm daily , all others stopped, agree with that, white cell count increase to 35.7, more fatigued and tired on 3/6/2019, and worsening right upper quadrant pain I did switch her back to IV Zosyn for now to cover for anaerobes and broad-spectrum coverage at this time.  Liver biopsy shows hemorrhagic necrosis  with acute inflammatory changes, no malignant cells identified, will treating her as liver abscess at this time.  -urine culture and repeat blood cultures negative , esr and c reactive protein  Is very high ,procal was positive as well.  -bacteremia with klebsiella , source ? Liver abscess, CT abdomen does not show any other concerning areas, status post CT guided liver biopsy  -wbc continue to be high , off note she had a CT abdomen last year same time and there were no mass noted in the liver than which indicates fast growth and possibility of a infectious cause.  - cholangitis is a differential due to elevated alp , she has a large liver mass possibly causing ductal obstruction as well   -mri liver didn't give any further info on the mass due to lack of contrast   -GI , infectious disease , nephrology and oncology on consult      I will keep her on IV Zosyn while she is in the hospital at this time , discussed with infectious disease they agree with that.  Repeat the CT scan of the  chest abdomen and pelvis  without contrast, to rule out any postprocedure complications.  Rule out any pleural effusion or hemorrhage.     Patient did develop some right-sided pleural effusion, some atelectasis versus consolidation in the right lower lobe and had liver mass/abscess is increased in size.  I think if this is abscess that  need to be drained, discussed with the interventional radiology for ultrasound-guided abscess drainage and drain placement, drain was placed successfully by the radiology under ultrasound guidance.  General surgery was consulted and evaluated by Dr. Vallecillo,  he have been in discussion with the [facility] surgeon.  At this time I will continue with Zosyn.  Cultures will showing lactose fermenting gram-negative rods at this time again positive is Klebsiella pneumoniae from the liver abscess.   General surgery discussed with the [facility] surgeon, they did not recommend any transfer at this time, only transfer if she goes into fulminant hepatic failure and need liver transplantation.  They think it is an abscess need to be treated like that.      Acute Renal Failure- likely 2/2 hypovolemia/sepsis  Thrombocytopenia secondary to sepsis   When patient  Was admitted on 3/1 her renal function had gone from 0.7 creatinine increase to 4+, it was assumed due to sepsis, prerenal etc and the renal function had shown improvement 3/2 with hydration, urine sodium was 35 with hydration , 3/3 renal function worsening and urine culture negative while Urine Analysis  Shows blood moderate .  - renal function high but showing early signs of recovery , there is associated thrombocytopenia, her mental status is stable   -she was on heparin for deep vein thrombosis prophylaxis , it was stopped and HIT negative    -she had received 2 doses of vanco following admission, had 2 days of zosyn,now both stopped and is on rocephin, now back on Zosyn  -appreciate nephrology input , renal function elevated but steady , ldh elevated along with ddimer  and fibrin , platelet count stable , c3/c4 complement negative,   - fibrin is high with elevated d dimer , DIC is not a concern as platelets also remain stable. The smear didn't show any sign of microangiopathy /schiztocytes as per hematology.     the patient is in ATN, creatinine now stable and start to trend down.  Stop IV fluids 3/9/2090.  Nephrology is following.  Platelets are stable and improving at this time as well.     liver mass per CT abdomen  elevated liver function test    - patient  Is status post cholecystectomy, ultrasound does not show any cbd stones, liver mass noted in CT  -mri abdomen shows a liver mass 10.3 cm , appreciate oncology input   -status post liver biopsy 3/4,please monitor the results    Biopsy is negative for any malignant malignancy at this time but cannot completely rule out malignancy given necrotic hemorrhagic sample.  If she does not improve with IV antibiotics will need to follow-up with outpatient and may need to repeat biopsy.  Now we are treating her for liver abscess, drain is placed and biopsy is repeated at this time.  Repeat biopsy base negative for malignancy as well.    Gastric bypass h/o  Stable    Acute GI bleeding secondary to anastomotic ulcer  Patient had a EGD done on 3/8/2019, showed crater ulcer with visible vessel and actively bleeding.  Treated with injection 1: 10,000 epinephrine and Hemoclip.  She is on IV Protonix drip, I will continue with IV Protonix drip today for the next 24 hours, continue to check her hemoglobin every 6 hours.  She did drop her hemoglobin to 7.4 from 8 on 3/9/19. Have bloody stool overnight on 3/8/2019 and did drop her hemoglobin to 6.1 from 9.7    Continue with IV Protonix drip, continue to check hemoglobin every 6-hour.  Patient hemoglobin is stable at this time.   Advance diet as tolerated. Can switch to IV Protonix 40 mg BID if ok with GI.     Acute blood loss anemia secondary to acute GI bleed  Because of the GI bleed, no blood  in the drainage catheter from the liver.  Was given 2 units of packed RBCs on 3/8/2019.  Her hemoglobin improved to 8 and dropped to 7.4 again.  transfuse her 1 unit of packed RBCs on 3/9, keep her hemoglobin 8 or above.  Total 3 units of Pack RBC and now her Hb is stable.     Leukocytosis  Because of sepsis, liver abscess pleural effusion atelectasis.  Not start to trending down.  Continue with IV Zosyn at this time, C. difficile is negative.     DVT Prophylaxis: heparin stopped due to low platelets, will use scds for now   Code Status: Full Code     Disposition: Expected discharge in 2-3 days        Worsening leukocytosis, most likely secondary to abscess, ID is consulted and following.  continue with IV Zosyn for now.  As she is growing Klebsiella pneumonia from the liver abscess, and the blood culture positive for that as well repeat blood cultures are negative so far  Started her back on IV Zosyn on 3/6/2019 and stop ceftriaxone  Interventional radiology consult for abscess drainage and drain placement was done on 3/7/2019  Consult general surgery,  discussed with the [facility] surgery, as per their recommendation no need for transfer or need for transfer to [facility] if develop fulminant hepatic failure and need liver transplantation.  Aggressive incentive spirometry and flutter continue with the Zosyn for possible pneumonia as well.  Repeat blood cultures are negative so far  Cultures from the abscess growing Klebsiella pneumoniae  Acute blood loss anemia and acute GI bleed, start post EGD showing anastomotic ulcer with actively bleeding.  No more bleeding at this time.  Hemoglobin remained stable at this time.    Discussed with nephrology, general surgery and the nursing staff taking care of the patient today      Ruben Barksdale MD  Text Page   (7am to 6pm)    Interval History   Feeling better this morning, still have abdominal pain.  Drinking enough water at this time.  Appetite is improving slowly but is still  not much eating by mouth.  Denies any fever chills chest pain headache dizziness lightheadedness.  No diarrhea    Discussed with the bedside nursing staff as well      -Data reviewed today: I reviewed all new labs and imaging results over the last 24 hours.    Physical Exam   Temp: 96.9  F (36.1  C) Temp src: Oral BP: 143/58 Pulse: 87 Heart Rate: 74 Resp: 18 SpO2: 96 % O2 Device: None (Room air)    Vitals:    03/01/19 1845 03/09/19 0600   Weight: 99.6 kg (219 lb 9.3 oz) 111.2 kg (245 lb 2.4 oz)     Vital Signs with Ranges  Temp:  [95.5  F (35.3  C)-97.7  F (36.5  C)] 96.9  F (36.1  C)  Pulse:  [79-87] 87  Heart Rate:  [74-87] 74  Resp:  [12-24] 18  BP: ()/(48-74) 143/58  SpO2:  [94 %-97 %] 96 %  I/O last 3 completed shifts:  In: 7941 [P.O.:3820; I.V.:4111; Other:10]  Out: 585 [Urine:450; Drains:135]    Constitutional: Fatigued, cooperative, no apparent distress  Respiratory: Clear to auscultation bilaterally, no crackles or wheezing, decreased air entry in the right base  Cardiovascular: Regular rate and rhythm, normal S1 and S2, and no murmur noted  GI: Normal bowel sounds, soft, non-distended, tender right upper quadrant ++  Skin/Integumen: No rashes, no cyanosis, trace edema  Neuro : moving all 4 extremities, no focal deficit noted     Medications     - MEDICATION INSTRUCTIONS -       pantoprazole (PROTONIX) infusion ADULT/PEDS GREATER than or EQUAL to 45 kg 8 mg/hr (03/10/19 0639)     sodium chloride 10 mL/hr at 03/10/19 0615       latanoprost  1 drop Both Eyes At Bedtime     phytonadione  5 mg Oral Daily     piperacillin-tazobactam  2.25 g Intravenous Q6H     sodium chloride (PF)  10 mL Irrigation Q8H     sodium chloride (PF)  10 mL Intracatheter Q8H     sucralfate  1 g Oral 4x Daily AC & HS       Data   Recent Labs   Lab 03/10/19  0800 03/10/19  0619 03/09/19  2350 03/09/19  1830  03/09/19  0505  03/08/19  1623 03/08/19  0830 03/08/19  0530 03/08/19  0440 03/07/19  0758  03/03/19  1439   WBC  --   --    --   --   --  54.8*  --   --   --  59.8*  --  49.2*   < > 17.9*   HGB  --  8.6* 8.7* 9.0*   < > 7.4*   < > 8.4*  --  6.1*  --  9.7*   < > 13.2   MCV  --   --   --   --   --  84  --   --   --  85  --  84   < > 87   PLT  --  117*  --   --   --  114*  --   --   --  128*  --  126*   < > 109*   INR  --   --   --   --   --  1.37*  --   --  1.58*  --   --   --   --  1.01     --   --   --   --  140  --  140  --   --  138 135   < >  --    POTASSIUM 3.3*  --   --   --   --  3.4  --  3.3*  --   --  3.5 3.4   < >  --    CHLORIDE 111*  --   --   --   --  112*  --  110*  --   --  107 104   < >  --    CO2 17*  --   --   --   --  17*  --  18*  --   --  18* 18*   < >  --    BUN 78*  --   --   --   --  92*  --  96*  --   --  105* 91*   < >  --    CR 3.15*  --   --   --   --  3.48*  --  3.67*  --   --  3.96* 4.04*   < >  --    ANIONGAP 11  --   --   --   --  11  --  12  --   --  13 13   < >  --    DANNA 7.5*  --   --   --   --  7.4*  --  7.8*  --   --  7.6* 8.6   < >  --    GLC 93  --   --   --   --  88  --  100*  --   --  114* 111*   < >  --    ALBUMIN 1.3*  --   --   --   --  1.1*  --   --   --   --  1.2* 1.4*   < >  --    PROTTOTAL  --   --   --   --   --  4.6*  --   --   --   --  4.6* 5.1*   < >  --    BILITOTAL  --   --   --   --   --  3.7*  --   --   --   --  5.0* 7.9*   < >  --    ALKPHOS  --   --   --   --   --  395*  --   --   --   --  429* 565*   < >  --    ALT  --   --   --   --   --  55*  --   --   --   --  69* 87*   < >  --    AST  --   --   --   --   --  115*  --   --   --   --  150* 175*   < >  --     < > = values in this interval not displayed.     Recent Labs   Lab 03/10/19  0800 03/09/19  1727 03/09/19  1240 03/09/19  0823 03/09/19  0505 03/08/19  1623 03/08/19  1622 03/08/19  0440 03/07/19  0758 03/06/19  2134   GLC 93  --   --   --  88 100*  --  114* 111*  --    BGM  --  114* 147* 87  --   --  103*  --   --  134*       Imaging:   No results found for this or any previous visit (from the past 24 hour(s)).

## 2019-03-10 NOTE — PROGRESS NOTES
GI chart check:    Chart reviewed. Hgb stable.     Continue current management of anastomotic ulcer and liver abscess    OK to advance diet    Will see again tomorrow. Call with questions    Ashley Cordoba MD  [facility]  Pager [phone]  Office [phone]

## 2019-03-10 NOTE — PROGRESS NOTES
Nephrology Initial Consult  March 4, 2019        ASSESSMENT AND RECOMMENDATIONS:   1. ELISE -   Likely ATN given clinical course of septic/ hypovolemic  shock, low PO intake, concommittant use of lisinopril , multiple granular casts in urine.   - nonoliguric . Adequately fluid resuscitated.   - continue to hold lisinopril .   - Avoid IV contrast until kidney function improves, unless an emergency . S.Cr plateaued and slightly better today .   - Dose meds for eGFR of ~10 -20  -Cr continues  to improve. Good UOP .    2 HTN - BP okay   Continue to hold lisinopril     3. Septic shock / Klebsiella bacteremia /  Liver abscess - management per ID/ Primary team. Liver Bx shows necrosis and ac inflammation c/w abscess s/p drain    4. Lytes okay . Mild acidosis with ELISE.     5 Gi Bleed/ Anemia - s/p clipping of bleeding artery . stable    6 Volume status - getting volume overloaded. 12 Kg up . Agree with discontinuing IVF and using Lasix PRN.     No other reccs at this time. Kidney function has steadily been improving for last several days.   Will sign off. Suggest f/u with Nephrology for post hosp f/u unless S.Cr back to [facility] at time of discharge.     Magalys Hernandez MD  [facility] - Nephrology   [phone]      Interval hx    Much more awake today. AAO.  No dyspnea. Mild abd pain.   S.Cr trending down.   Thirsty, good PO fluid intake.   Afebrile. No dyspnea, cough, dysuria.     PAST MEDICAL HISTORY:  Reviewed with patient on 03/10/2019     Past Medical History:   Diagnosis Date     Arthritis      Depressive disorder      Hypertension      Obesity        Past Surgical History:   Procedure Laterality Date     APPENDECTOMY       ARTHROPLASTY CARPOMETACARPAL (THUMB JOINT) Left 4/6/2018    Procedure: ARTHROPLASTY CARPOMETACARPAL (THUMB JOINT);  LEFT  THUMB CARPOMETACARPAL EXCISIONAL ARTHROPLASTY WITH FASCIA CLARE GRAFT ;  Surgeon: Kalyani King MD;  Location: [facility]     CHOLECYSTECTOMY       GI SURGERY   "    gastric bypass     GYN SURGERY      abdominal hysterectomy     ORTHOPEDIC SURGERY      bilateral bunionectomies        MEDICATIONS:      Current Meds    latanoprost  1 drop Both Eyes At Bedtime     phytonadione  5 mg Oral Daily     piperacillin-tazobactam  2.25 g Intravenous Q6H     sodium chloride (PF)  10 mL Irrigation Q8H     sodium chloride (PF)  10 mL Intracatheter Q8H     sucralfate  1 g Oral 4x Daily AC & HS     Infusion Meds    - MEDICATION INSTRUCTIONS -       pantoprazole (PROTONIX) infusion ADULT/PEDS GREATER than or EQUAL to 45 kg 8 mg/hr (03/10/19 0639)     sodium chloride 10 mL/hr at 03/10/19 0615       ALLERGIES:    Allergies   Allergen Reactions     Contrast Dye Shortness Of Breath     Codeine      Zolpidem Other (See Comments)     Patient reports sleep walking.     Diatrizoate Itching     itchy throat that makes her want to cough       REVIEW OF SYSTEMS:  A comprehensive of systems was negative except as noted above.      PHYSICAL EXAM:   Temp  Av.6  F (36.4  C)  Min: 97.4  F (36.3  C)  Max: 97.7  F (36.5  C)      Pulse  Av.1  Min: 66  Max: 118 Resp  Av.7  Min: 12  Max: 22  SpO2  Av %  Min: 92 %  Max: 100 %       /58 (BP Location: Left arm)   Pulse 87   Temp 96.9  F (36.1  C) (Oral)   Resp 18   Ht 1.626 m (5' 4\")   Wt 111.2 kg (245 lb 2.4 oz)   SpO2 96%   BMI 42.08 kg/m     Date 19 07 - 19 0659   Shift 0529-5878 8956-5060 2201-9019 24 Hour Total   INTAKE   P.O. 240   240   Shift Total(mL/kg) 240(2.41)   240(2.41)   OUTPUT   Shift Total(mL/kg)       Weight (kg) 99.6 99.6 99.6 99.6      Admit Weight: 99.6 kg (219 lb 9.3 oz)     GENERAL APPEARANCE: tired ,  awake  EYES: no scleral icterus, pupils equal  Pulmonary: lungs clear to auscultation with equal breath sounds bilaterally, no clubbing  CV: regular rhythm, normal rate, no rub   - JVP -   - Edema ++  GI: soft, + tenderness in RUQ  MS: no evidence of inflammation in joints, no muscle tenderness  : " no carias  SKIN: no rash, warm, dry, no cyanosis  NEURO: face symmetric, no asterixis     LABS:   CMP  Recent Labs   Lab 03/10/19  0800 03/09/19  0505 03/08/19  1623 03/08/19  0440 03/07/19  0758 03/06/19  0821    140 140 138 135 133   POTASSIUM 3.3* 3.4 3.3* 3.5 3.4 3.8   CHLORIDE 111* 112* 110* 107 104 99   CO2 17* 17* 18* 18* 18* 20   ANIONGAP 11 11 12 13 13 14   GLC 93 88 100* 114* 111* 94   BUN 78* 92* 96* 105* 91* 86*   CR 3.15* 3.48* 3.67* 3.96* 4.04* 4.41*   GFRESTIMATED 15* 13* 12* 11* 11* 10*   GFRESTBLACK 17* 15* 14* 13* 12* 11*   DANNA 7.5* 7.4* 7.8* 7.6* 8.6 8.8   MAG  --  2.1  --   --   --   --    PHOS 4.1 4.4  --  4.3 3.1 3.6   PROTTOTAL  --  4.6*  --  4.6* 5.1* 6.0*   ALBUMIN 1.3* 1.1*  --  1.2* 1.4* 1.7*   BILITOTAL  --  3.7*  --  5.0* 7.9* 7.9*   ALKPHOS  --  395*  --  429* 565* 626*   AST  --  115*  --  150* 175* 229*   ALT  --  55*  --  69* 87* 124*     CBC  Recent Labs   Lab 03/10/19  0619 03/09/19  2350 03/09/19  1830 03/09/19  1239 03/09/19  0505  03/08/19  0530 03/07/19  0758 03/06/19  0821   HGB 8.6* 8.7* 9.0* 8.7* 7.4*   < > 6.1* 9.7* 12.3   WBC 54.3*  --   --   --  54.8*  --  59.8* 49.2* 35.7*   RBC  --   --   --   --  2.49*  --  1.97* 3.31* 4.13   HCT  --   --   --   --  20.9*  --  16.7* 27.9* 35.5   MCV  --   --   --   --  84  --  85 84 86   MCH  --   --   --   --  29.7  --  31.0 29.3 29.8   MCHC  --   --   --   --  35.4  --  36.5 34.8 34.6   RDW  --   --   --   --  14.5  --  14.3 13.9 14.1   *  --   --   --  114*  --  128* 126* 114*    < > = values in this interval not displayed.     INR  Recent Labs   Lab 03/09/19  0505 03/08/19  0830   INR 1.37* 1.58*     ABGNo lab results found in last 7 days.   URINE STUDIES  Recent Labs   Lab Test 03/03/19  1925 03/01/19  1355   COLOR Yellow Dark Brown   APPEARANCE Slightly Cloudy Cloudy   URINEGLC Negative 70*   URINEBILI Small* Moderate*   URINEKETONE Negative Negative   SG 1.013 1.017   UBLD Trace* Moderate*   URINEPH 5.5 5.5   PROTEIN  30* 100*   NITRITE Negative Negative   LEUKEST Small* Negative   RBCU 0 8*   WBCU 16* 16*       Magalys Hernandez MD

## 2019-03-10 NOTE — PLAN OF CARE
Discharge Planner PT   Patient plan for discharge: TCU  Current status: PT eval completed, treatment initiated. Pt was admitted  on 03/01/2019 with head ache and found to have liver failure, liver abscess and ELISE. Pt lives with spouse in a 1Sh with 2STE and 1 rail support. Pt was independent with all ADL's and gait prior to admission. Currently pt requires mod assist x 1 with supine>sit, min assist x 2 with STS and min assist x 2 with taking side steps to the HOB using RW. Pt reported 10/10 RPE with minimal exertion. Pt was assisted back to bed with mod assist x 2.   Barriers to return to prior living situation: Fall risk, Level of assistance needed, Decreased strength and activity tolerance.   Recommendations for discharge: TCU  Rationale for recommendations: Pt will benefit from continued skilled PT at a TCU to achieve PLOF and independence.        Entered by: Eliel Nina 03/10/2019 12:41 PM

## 2019-03-11 LAB
ALBUMIN SERPL-MCNC: 1.3 G/DL (ref 3.4–5)
ALP SERPL-CCNC: 547 U/L (ref 40–150)
ALT SERPL W P-5'-P-CCNC: 41 U/L (ref 0–50)
ANION GAP SERPL CALCULATED.3IONS-SCNC: 13 MMOL/L (ref 3–14)
AST SERPL W P-5'-P-CCNC: 66 U/L (ref 0–45)
BACTERIA SPEC CULT: NORMAL
BACTERIA SPEC CULT: NORMAL
BASOPHILS # BLD AUTO: 0 10E9/L (ref 0–0.2)
BASOPHILS NFR BLD AUTO: 0 %
BILIRUB SERPL-MCNC: 4.1 MG/DL (ref 0.2–1.3)
BUN SERPL-MCNC: 71 MG/DL (ref 7–30)
BURR CELLS BLD QL SMEAR: ABNORMAL
CALCIUM SERPL-MCNC: 7.6 MG/DL (ref 8.5–10.1)
CHLORIDE SERPL-SCNC: 108 MMOL/L (ref 94–109)
CO2 SERPL-SCNC: 17 MMOL/L (ref 20–32)
COPATH REPORT: NORMAL
CREAT SERPL-MCNC: 2.98 MG/DL (ref 0.52–1.04)
DIFFERENTIAL METHOD BLD: ABNORMAL
EOSINOPHIL # BLD AUTO: 0.3 10E9/L (ref 0–0.7)
EOSINOPHIL NFR BLD AUTO: 0.5 %
ERYTHROCYTE [DISTWIDTH] IN BLOOD BY AUTOMATED COUNT: 15.2 % (ref 10–15)
GFR SERPL CREATININE-BSD FRML MDRD: 16 ML/MIN/{1.73_M2}
GLUCOSE SERPL-MCNC: 91 MG/DL (ref 70–99)
HCT VFR BLD AUTO: 24.5 % (ref 35–47)
HGB BLD-MCNC: 8 G/DL (ref 11.7–15.7)
HGB BLD-MCNC: 8.5 G/DL (ref 11.7–15.7)
HGB BLD-MCNC: 8.5 G/DL (ref 11.7–15.7)
HGB BLD-MCNC: 8.7 G/DL (ref 11.7–15.7)
LYMPHOCYTES # BLD AUTO: 3.2 10E9/L (ref 0.8–5.3)
LYMPHOCYTES NFR BLD AUTO: 6 %
Lab: NORMAL
Lab: NORMAL
MCH RBC QN AUTO: 29.5 PG (ref 26.5–33)
MCHC RBC AUTO-ENTMCNC: 34.7 G/DL (ref 31.5–36.5)
MCV RBC AUTO: 85 FL (ref 78–100)
METAMYELOCYTES # BLD: 0.8 10E9/L
METAMYELOCYTES NFR BLD MANUAL: 1.5 %
MONOCYTES # BLD AUTO: 1.1 10E9/L (ref 0–1.3)
MONOCYTES NFR BLD AUTO: 2 %
NEUTROPHILS # BLD AUTO: 48.5 10E9/L (ref 1.6–8.3)
NEUTROPHILS NFR BLD AUTO: 90 %
PLATELET # BLD AUTO: 146 10E9/L (ref 150–450)
PLATELET # BLD EST: ABNORMAL 10*3/UL
POTASSIUM SERPL-SCNC: 3.3 MMOL/L (ref 3.4–5.3)
POTASSIUM SERPL-SCNC: 3.6 MMOL/L (ref 3.4–5.3)
PROT SERPL-MCNC: 5 G/DL (ref 6.8–8.8)
RBC # BLD AUTO: 2.88 10E12/L (ref 3.8–5.2)
SODIUM SERPL-SCNC: 138 MMOL/L (ref 133–144)
SPECIMEN SOURCE: NORMAL
SPECIMEN SOURCE: NORMAL
WBC # BLD AUTO: 53.9 10E9/L (ref 4–11)

## 2019-03-11 PROCEDURE — 40000239 ZZH STATISTIC VAT ROUNDS

## 2019-03-11 PROCEDURE — 84132 ASSAY OF SERUM POTASSIUM: CPT | Performed by: INTERNAL MEDICINE

## 2019-03-11 PROCEDURE — 85025 COMPLETE CBC W/AUTO DIFF WBC: CPT | Performed by: INTERNAL MEDICINE

## 2019-03-11 PROCEDURE — 25000132 ZZH RX MED GY IP 250 OP 250 PS 637: Mod: GY | Performed by: INTERNAL MEDICINE

## 2019-03-11 PROCEDURE — 25000132 ZZH RX MED GY IP 250 OP 250 PS 637: Mod: GY | Performed by: SURGERY

## 2019-03-11 PROCEDURE — 85018 HEMOGLOBIN: CPT | Performed by: INTERNAL MEDICINE

## 2019-03-11 PROCEDURE — 25000128 H RX IP 250 OP 636: Performed by: INTERNAL MEDICINE

## 2019-03-11 PROCEDURE — 99233 SBSQ HOSP IP/OBS HIGH 50: CPT | Performed by: INTERNAL MEDICINE

## 2019-03-11 PROCEDURE — 25800030 ZZH RX IP 258 OP 636: Performed by: INTERNAL MEDICINE

## 2019-03-11 PROCEDURE — A9270 NON-COVERED ITEM OR SERVICE: HCPCS | Mod: GY | Performed by: INTERNAL MEDICINE

## 2019-03-11 PROCEDURE — 80053 COMPREHEN METABOLIC PANEL: CPT | Performed by: INTERNAL MEDICINE

## 2019-03-11 PROCEDURE — C9113 INJ PANTOPRAZOLE SODIUM, VIA: HCPCS | Performed by: INTERNAL MEDICINE

## 2019-03-11 PROCEDURE — 25000125 ZZHC RX 250: Performed by: INTERNAL MEDICINE

## 2019-03-11 PROCEDURE — A9270 NON-COVERED ITEM OR SERVICE: HCPCS | Mod: GY | Performed by: SURGERY

## 2019-03-11 PROCEDURE — 12000000 ZZH R&B MED SURG/OB

## 2019-03-11 PROCEDURE — A9270 NON-COVERED ITEM OR SERVICE: HCPCS | Performed by: INTERNAL MEDICINE

## 2019-03-11 RX ORDER — POTASSIUM CL/LIDO/0.9 % NACL 10MEQ/0.1L
10 INTRAVENOUS SOLUTION, PIGGYBACK (ML) INTRAVENOUS
Status: DISCONTINUED | OUTPATIENT
Start: 2019-03-11 | End: 2019-03-19 | Stop reason: HOSPADM

## 2019-03-11 RX ORDER — POTASSIUM CHLORIDE 7.45 MG/ML
10 INJECTION INTRAVENOUS
Status: DISCONTINUED | OUTPATIENT
Start: 2019-03-11 | End: 2019-03-19 | Stop reason: HOSPADM

## 2019-03-11 RX ORDER — POTASSIUM CHLORIDE 1.5 G/1.58G
20-40 POWDER, FOR SOLUTION ORAL
Status: DISCONTINUED | OUTPATIENT
Start: 2019-03-11 | End: 2019-03-19 | Stop reason: HOSPADM

## 2019-03-11 RX ORDER — POTASSIUM CHLORIDE 1500 MG/1
20-40 TABLET, EXTENDED RELEASE ORAL
Status: DISCONTINUED | OUTPATIENT
Start: 2019-03-11 | End: 2019-03-19 | Stop reason: HOSPADM

## 2019-03-11 RX ORDER — POTASSIUM CHLORIDE 29.8 MG/ML
20 INJECTION INTRAVENOUS
Status: DISCONTINUED | OUTPATIENT
Start: 2019-03-11 | End: 2019-03-19 | Stop reason: HOSPADM

## 2019-03-11 RX ADMIN — OXYCODONE HYDROCHLORIDE 5 MG: 5 TABLET ORAL at 03:12

## 2019-03-11 RX ADMIN — PIPERACILLIN AND TAZOBACTAM 2.25 G: 2; .25 INJECTION, POWDER, FOR SOLUTION INTRAVENOUS at 10:19

## 2019-03-11 RX ADMIN — PHYTONADIONE 5 MG: 10 INJECTION, EMULSION INTRAMUSCULAR; INTRAVENOUS; SUBCUTANEOUS at 08:38

## 2019-03-11 RX ADMIN — POTASSIUM CHLORIDE 20 MEQ: 1500 TABLET, EXTENDED RELEASE ORAL at 15:37

## 2019-03-11 RX ADMIN — SUCRALFATE 1 G: 1 SUSPENSION ORAL at 08:38

## 2019-03-11 RX ADMIN — OXYCODONE HYDROCHLORIDE 5 MG: 5 TABLET ORAL at 00:00

## 2019-03-11 RX ADMIN — LATANOPROST 1 DROP: 50 SOLUTION/ DROPS OPHTHALMIC at 20:13

## 2019-03-11 RX ADMIN — OXYCODONE HYDROCHLORIDE 5 MG: 5 TABLET ORAL at 06:17

## 2019-03-11 RX ADMIN — PIPERACILLIN AND TAZOBACTAM 2.25 G: 2; .25 INJECTION, POWDER, FOR SOLUTION INTRAVENOUS at 22:55

## 2019-03-11 RX ADMIN — PIPERACILLIN AND TAZOBACTAM 2.25 G: 2; .25 INJECTION, POWDER, FOR SOLUTION INTRAVENOUS at 03:13

## 2019-03-11 RX ADMIN — PIPERACILLIN AND TAZOBACTAM 2.25 G: 2; .25 INJECTION, POWDER, FOR SOLUTION INTRAVENOUS at 15:37

## 2019-03-11 RX ADMIN — SODIUM CHLORIDE 8 MG/HR: 9 INJECTION, SOLUTION INTRAVENOUS at 04:14

## 2019-03-11 RX ADMIN — OXYCODONE HYDROCHLORIDE 5 MG: 5 TABLET ORAL at 18:20

## 2019-03-11 RX ADMIN — POTASSIUM CHLORIDE 40 MEQ: 1500 TABLET, EXTENDED RELEASE ORAL at 11:50

## 2019-03-11 RX ADMIN — OXYCODONE HYDROCHLORIDE 5 MG: 5 TABLET ORAL at 23:24

## 2019-03-11 RX ADMIN — SODIUM CHLORIDE 8 MG/HR: 9 INJECTION, SOLUTION INTRAVENOUS at 15:08

## 2019-03-11 ASSESSMENT — ACTIVITIES OF DAILY LIVING (ADL)
ADLS_ACUITY_SCORE: 21
ADLS_ACUITY_SCORE: 22
ADLS_ACUITY_SCORE: 23
ADLS_ACUITY_SCORE: 22
ADLS_ACUITY_SCORE: 22
ADLS_ACUITY_SCORE: 23

## 2019-03-11 NOTE — PROGRESS NOTES
"[facility]  [facility] [place]  Gastroenterology Progress note    Interval History:      Patient continues to complain of pain at drain site.  Denies epigastric pain or pain with swallowing.  Tolerating small amounts of food.  Does not like Carafate.   Small, dark BM.       Vital Signs:      /66 (BP Location: Left arm)   Pulse 76   Temp 96.2  F (35.7  C) (Oral)   Resp 16   Ht 1.626 m (5' 4\")   Wt 111.2 kg (245 lb 2.4 oz)   SpO2 97%   BMI 42.08 kg/m    Temp (24hrs), Av.4  F (35.8  C), Min:96.2  F (35.7  C), Max:96.5  F (35.8  C)    Patient Vitals for the past 72 hrs:   Weight   19 0600 111.2 kg (245 lb 2.4 oz)       Intake/Output Summary (Last 24 hours) at 3/11/2019 0831  Last data filed at 3/11/2019 0627  Gross per 24 hour   Intake 1261.4 ml   Output 2020 ml   Net -758.6 ml         Constitutional: NAD, comfortable  Cardiovascular: RRR, normal S1, S2   Respiratory: CTAB  Abdomen: soft, non-tender, nondistended.    Additional Comments:  ROS, FH, SH: See initial GI consult for details.    Laboratory Data:  Recent Labs   Lab Test 03/11/19  0627 03/10/19  2356 03/10/19  0619  19  0505  19  0830 19  0530  19  1439   WBC 53.9*  --  54.3*  --  54.8*  --   --  59.8*   < > 17.9*   HGB 8.5* 8.5* 8.6*   < > 7.4*   < >  --  6.1*   < > 13.2   MCV 85  --   --   --  84  --   --  85   < > 87   *  --  117*  --  114*  --   --  128*   < > 109*   INR  --   --   --   --  1.37*  --  1.58*  --   --  1.01    < > = values in this interval not displayed.     Recent Labs   Lab Test 03/11/19  0627 03/10/19  0800 19  0505    139 140   POTASSIUM 3.3* 3.3* 3.4   CHLORIDE 108 111* 112*   CO2 17* 17* 17*   BUN 71* 78* 92*   CR 2.98* 3.15* 3.48*   ANIONGAP 13 11 11   DANNA 7.6* 7.5* 7.4*     Recent Labs   Lab Test 19  0627 03/10/19  0800 19  0505 19  0440 19  0758  19  1925  19  1355  18  0645   ALBUMIN 1.3* 1.3* " 1.1* 1.2* 1.4*   < >  --    < >  --    < > 4.1   BILITOTAL 4.1*  --  3.7* 5.0* 7.9*   < >  --    < >  --    < > 0.7   DBIL  --   --  3.3* 4.3* 6.9*   < >  --   --   --    < >  --    ALT 41  --  55* 69* 87*   < >  --    < >  --    < > 48   AST 66*  --  115* 150* 175*   < >  --    < >  --    < > 44   ALKPHOS 547*  --  395* 429* 565*   < >  --    < >  --    < > 146   PROTEIN  --   --   --   --   --   --  30*  --  100*  --   --    LIPASE  --   --   --   --   --   --   --   --   --   --  133    < > = values in this interval not displayed.         Assessment:  69 yo female with elevated liver function tests, Klebsiella bacteremia, and liver mass.  MRCP showed biliary dilatation with no evidence of obstruction.  Liver mass consistent with abscess.  Negative viral and autoimmune hepatitis workup.  A drainage/biopsy of the liver mass 3/4 showed hemorrhagic necrosis and acute inflammation, no malignancy noted.  A CT 3/6 showed the abscess has increased in size.  Surgery was consulted on 3/7 and a drain was placed per IR.  The patient developed multiple maroon stools 3/8.  + epigastric pain.  History of ischemic colitis 3/2018.  Hemoglobin dropped 12.3 -> 6.1.  Transfused 2 U PRBCs.  EGD completed 3/8 showed LA grade B esophagitis, GJ anastamosis with cratered ulcer and visibly bleeding vessel.  Unsuccessfully injected with epinephrine.  One hemostatic clip placed with successful cessation of bleeding.  Hemoglobin, liver function tests stable.  Plan:  -  Continue pantoprazole gtt until 1630 today, then switch to pantoprazole 40 mg BID x 6-8 weeks  -  Discontinue Carafate  -  Advance diet as tolerated  -  If evidence of significant recurrent bleeding, repeat endoscopy (possible hemospray) vs IR/angio  -  Avoid NSAID medications    -  Monitor liver function tests, CBC  -  ID/oncology following; antibiotics per ID  -  No indication for ERCP.  If needs further management, consider transfer to [facility] for evaluation by hepatobiliary  surgery        Crys Sutherland, PAC  [facility]  Office:  [phone] call if needed after 5PM  Cell:  [phone], not available after 5PM at this number

## 2019-03-11 NOTE — PLAN OF CARE
PT:  Attempted session. Patient politely declining, stating she just returned to bed from sitting up in chair. Feeling very tired and wanting to try and rest. Requested PT return later.

## 2019-03-11 NOTE — PROGRESS NOTES
[facility]    Infectious Disease Progress Note    Date of Service (when I saw the patient): 03/11/2019     Assessment & Plan   Vanessa Huffman is a 68 year old female who was admitted on 3/1/2019.     ASSESSMENT:  1. KLEBSIELLA PNEUMONIAE BACTEREMIA-? Enteric source v cholangitis,   2. LIVER MASS/DENSITY  3. PROBABLE HEPATIC ABSCESS  4. ABNORMAL LFTS-C/W Liver abscess, ? Tumor, ? Cholangitis  5. Melena. Bleeding ulcer at the anastomosis.   6. Leucocytosis.   7. c diff PCR negative.         REC  1. On Zosyn blood cultures and cultures from the liver abscesses positive for Klebsiella.  2. Liver abscesses s/p drain placement, draining thick pus now.    3. Follow LFT, WBC              Meghan Guadarrama MD    Interval History   WBC 53k  Afebrile   Eating   Nausea and abdominal discomfort both improved       Physical Exam   Temp: 96.2  F (35.7  C) Temp src: Oral BP: 136/66 Pulse: 76 Heart Rate: 84 Resp: 16 SpO2: 97 % O2 Device: None (Room air)    Vitals:    03/01/19 1845 03/09/19 0600   Weight: 99.6 kg (219 lb 9.3 oz) 111.2 kg (245 lb 2.4 oz)     Vital Signs with Ranges  Temp:  [96.2  F (35.7  C)-96.5  F (35.8  C)] 96.2  F (35.7  C)  Pulse:  [76] 76  Heart Rate:  [79-84] 84  Resp:  [16-18] 16  BP: (129-136)/(53-66) 136/66  SpO2:  [97 %-98 %] 97 %    Constitutional: Awake  Lungs: Clear to auscultation bilaterally, no crackles or wheezing  Cardiovascular: Regular rate and rhythm, normal S1 and S2, and no murmur noted  Abdomen: drain in the liver abscess  Skin: No rashes, no cyanosis, no edema  Other:    Medications     - MEDICATION INSTRUCTIONS -       pantoprazole (PROTONIX) infusion ADULT/PEDS GREATER than or EQUAL to 45 kg 8 mg/hr (03/11/19 1934)     sodium chloride 10 mL/hr at 03/10/19 1701       latanoprost  1 drop Both Eyes At Bedtime     piperacillin-tazobactam  2.25 g Intravenous Q6H     sodium chloride (PF)  10 mL Irrigation Q8H     sodium chloride (PF)  10 mL Intracatheter Q8H       Data   All  microbiology laboratory data reviewed.  Recent Labs   Lab Test 03/11/19  0627 03/10/19  2356 03/10/19  0619  03/09/19  0505  03/08/19  0530   WBC 53.9*  --  54.3*  --  54.8*  --  59.8*   HGB 8.5* 8.5* 8.6*   < > 7.4*   < > 6.1*   HCT 24.5*  --   --   --  20.9*  --  16.7*   MCV 85  --   --   --  84  --  85   *  --  117*  --  114*  --  128*    < > = values in this interval not displayed.     Recent Labs   Lab Test 03/11/19  0627 03/10/19  0800 03/09/19  0505   CR 2.98* 3.15* 3.48*     Recent Labs   Lab Test 03/01/19  1130   SED 79*     Recent Labs   Lab Test 03/07/19  1050 03/04/19  1410 03/04/19  1355 03/03/19  1925 03/02/19  1043 03/01/19  2148 03/01/19  2145 03/01/19  1409 03/01/19  1355   CULT Moderate growth  Klebsiella pneumoniae  Susceptibility testing done on previous specimen  *  Culture negative after 19 hours  Culture negative monitoring continues No growth  Culture negative monitoring continues Culture negative after 4 days  Light growth  Klebsiella pneumoniae  *  Culture negative monitoring continues <10,000 colonies/mL  urogenital shannon  Susceptibility testing not routinely done    Canceled, Test credited  Duplicate request   No growth No growth No growth No growth No growth

## 2019-03-11 NOTE — PLAN OF CARE
Pt disoriented to situation at times, improving. VSS on RA. C/o severe RUQ abd pain, given PRN oxycodone x1, effective. One loose dark brown/black BM this shift. Hepatic drain flushed, 45 ml dark yellow/amador output. Reg diet, poor appetite, encouraged intake. Turned/repositioned q2h. PICC infusing protonix at 10 ml/hr and NS TKO.

## 2019-03-11 NOTE — PLAN OF CARE
Adjustment to Illness (Sepsis/Septic Shock)  Optimal Coping  3/11/2019 1851 - Improving by Linda Vazquez, RN   VSS, Alert, disoriented to time. Up with A/1 pivot to chair or BSC.  Very small incision site from biopsy- covered with liquid bandage, surrounding bruising. Slight Jaundice/yellow appearing skin, yellow sclera. Hepatic drain placed 3/7- purulent/tan output with sediment, irrigating w/ 10 mL NS q8hrs, to bulb suction.  Hgb stable.   Yes

## 2019-03-11 NOTE — PROGRESS NOTES
[facility] [place]    Hospitalist Progress Note    Interval History   - Remains exhausted, tired, walking around only in room. PO intake is poor but is being pushed by  to eat more.  - Discussed with patient that it's okay to decline PT, as her infectious burden remains significant.    Assessment & Plan   Summary: Vanessa Huffman is a 68 year old female with PMH gastric bypass, chronic diarrhea, HTN, migraines who was admitted on 3/1/2019 with sepsis and found to have ELISE, liver abscess, and Klebsiella bacteremia. Liver biopsy negative for malignancy. S/p drain placement 3/8/2019.    Sepsis secondary to liver abscess  Klebsiella bacteremia  Persistent leukocytosis  Patient presented with procal 60, , hypotension. CT abd pelvis showed large liver mass. Started on Vanc and Zosyn. MRCP liver didn't give any further info on the mass due to lack of contrast  Liver biopsy 3/4 showed hemorrhagic necrosis with acute inflammatory changes, no malignant cells identified, this is likely primarily a liver abscess. Blood cultures also positive for Klebsiella, source likely from abscess. WBC trending up from admission, now stabilized around 55k as of 3/7/2019. ID and GI consulted, no significant concern for cholangitis. S/p drain placement by general surgery on 3/7/2019. Repeat biopsy 3/7 negative for malignancy as well. Malignancy cannot be completely ruled out.   - ID consulted, appreciate recs   - IV Zosyn  - General surgery following, appreciate recs   - Consider transfer if patient has liver failure   - Recommend treating as abscess    ELISE, likely ATN: Baseline Cr ~0.7, on admission Cr was 4.27. Improved slowly with hydration. Nephrology consulted, suspect primarily ATN. Creatinine gradually improved to 2.98 as of 3/11/2019.  - Avoid nephrotoxic drugs    Thrombocytopenia secondary to sepsis, improving: HIT ruled out. Hemolysis also evaluated and ruled out.    Hx gastric bypass: PTA on high dose  Immodium--15 tabs a day. Currently on oxycodone which may have a similar effect.  - Monitor     Acute GI bleeding secondary to anastomotic ulcer  Acute blood loss anemia secondary to acute GI bleed  Hgb drop 12-->6 while inpatient. Was given 2 units of packed RBCs on 3/8/2019. Patient had a EGD done on 3/8/2019, showed crater ulcer with visible vessel and actively bleeding. Treated with injection 1: 10,000 epinephrine and Hemoclip.  - GI consulted, appreciate recs  - Switch to PPI PO today    DVT Prophylaxis: Pneumatic Compression Devices. Will consider restarting heparin in the next day or so  Code Status: Full Code  PT/OT: Ordered    Disposition: Expected discharge in 3+ days, pending improvement in leukocytosis, weakness    Erick Savage MD  Text Page  (7am to 6pm)  -Data reviewed today: I reviewed all new labs and imaging results over the last 24 hours.    Physical Exam   Temp: 96.2  F (35.7  C) Temp src: Oral BP: 136/66 Pulse: 76 Heart Rate: 84 Resp: 16 SpO2: 97 % O2 Device: None (Room air)    Vitals:    03/01/19 1845 03/09/19 0600   Weight: 99.6 kg (219 lb 9.3 oz) 111.2 kg (245 lb 2.4 oz)     Vital Signs with Ranges  Temp:  [96.2  F (35.7  C)-96.5  F (35.8  C)] 96.2  F (35.7  C)  Pulse:  [76] 76  Heart Rate:  [79-84] 84  Resp:  [16-18] 16  BP: (129-136)/(53-66) 136/66  SpO2:  [97 %-98 %] 97 %  I/O last 3 completed shifts:  In: 1261.4 [P.O.:360; I.V.:871.4; Other:30]  Out: 2050 [Urine:1900; Drains:150]  O2 requirements: none    Constitutional: Obese female in mild distress, appears tired, weak  Cardiovascular: RRR, normal S1/2, no m/r/g  Respiratory: CTAB  Vascular: 1-2+ BLE pitting edema  GI: Normoactive bowel sounds, nontender left side, right side BILL drain noted with purulent draining material  Neuro/Psych: Appropriate affect and mood. A&Ox3, moves all extremities    Medications     - MEDICATION INSTRUCTIONS -       pantoprazole (PROTONIX) infusion ADULT/PEDS GREATER than or EQUAL to 45 kg 8 mg/hr  (03/11/19 0414)     sodium chloride 10 mL/hr at 03/10/19 1701       latanoprost  1 drop Both Eyes At Bedtime     piperacillin-tazobactam  2.25 g Intravenous Q6H     sodium chloride (PF)  10 mL Irrigation Q8H     sodium chloride (PF)  10 mL Intracatheter Q8H       Data   Recent Labs   Lab 03/11/19  0627 03/10/19  2356 03/10/19  0800 03/10/19  0619  03/09/19  0505  03/08/19  0830 03/08/19  0530   WBC 53.9*  --   --  54.3*  --  54.8*  --   --  59.8*   HGB 8.5* 8.5*  --  8.6*   < > 7.4*   < >  --  6.1*   MCV 85  --   --   --   --  84  --   --  85   *  --   --  117*  --  114*  --   --  128*   INR  --   --   --   --   --  1.37*  --  1.58*  --      --  139  --   --  140   < >  --   --    POTASSIUM 3.3*  --  3.3*  --   --  3.4   < >  --   --    CHLORIDE 108  --  111*  --   --  112*   < >  --   --    CO2 17*  --  17*  --   --  17*   < >  --   --    BUN 71*  --  78*  --   --  92*   < >  --   --    CR 2.98*  --  3.15*  --   --  3.48*   < >  --   --    ANIONGAP 13  --  11  --   --  11   < >  --   --    DANNA 7.6*  --  7.5*  --   --  7.4*   < >  --   --    GLC 91  --  93  --   --  88   < >  --   --    ALBUMIN 1.3*  --  1.3*  --   --  1.1*  --   --   --    PROTTOTAL 5.0*  --   --   --   --  4.6*  --   --   --    BILITOTAL 4.1*  --   --   --   --  3.7*  --   --   --    ALKPHOS 547*  --   --   --   --  395*  --   --   --    ALT 41  --   --   --   --  55*  --   --   --    AST 66*  --   --   --   --  115*  --   --   --     < > = values in this interval not displayed.       Imaging:   No results found for this or any previous visit (from the past 24 hour(s)).

## 2019-03-11 NOTE — PLAN OF CARE
Pt disoriented to situation/time. VSS on RA. C/o RUQ abd pain-gave prn oxycodone x3. Incontinent of urine at times- otherwise will ask for the bedpan. Incontinent of loose black stool. Hepatic drain with purulent/amador output. Reg diet-poor appetite. Needs assistance with feeding. Turn/repo q2hrs. Protonix drip infusing through PICC. Continue to monitor.

## 2019-03-11 NOTE — PROGRESS NOTES
"CLINICAL NUTRITION SERVICES - REASSESSMENT NOTE      Future/Additional Recommendations:     Discussed importance of increasing po intake.  Strongly encouraged pt to try the PLUS2 shakes - she agreed  Will send a chocolate PLUS2 shake at 10am and 2pm (each provides 625 cals and 19 gm pro)     Malnutrition: (3/6)  % Weight Loss:  None noted  % Intake:  </= 50% for >/= 5 days (severe malnutrition)  Subcutaneous Fat Loss:  None observed  Muscle Loss:  None observed  Fluid Retention:  None noted     Malnutrition Diagnosis: Patient does not meet two of the above criteria necessary for diagnosing malnutrition        EVALUATION OF PROGRESS TOWARD GOALS   Diet:    Regular  Room Service with Assist  Strawberry PLUS2 shake at 10am and 2pm    Chart reviewed  Visited with pt and  this morning  Pt reports eating fruit, milk and coffee for breakfast  States she hasn't tried the PLUS2 shakes yet - \"didn't know what they were\"  She continues to have a decreased appetite - eating small amounts  Pt promised me she would try the shakes today - prefers to have chocolate flavor      ASSESSED NUTRITION NEEDS PER APPROVED PRACTICE GUIDELINES:     Dosing Weight 66 kg (adjusted)  Estimated Energy Needs: 3336-7265 kcals (25-30 Kcal/Kg)  Justification: obese  Estimated Protein Needs:  grams protein (1.2-1.5 g pro/Kg)  Justification: hypercatabolism with acute illness      NEW FINDINGS:   3/8: EGD ---> bleeding anastomotic ulcer    Previous Goals (3/6):   Patient will consume 25-50% of meals TID and supplements BID  Evaluation: Not met    Previous Nutrition Diagnosis (3/6):   Inadequate oral intake related to poor appetite, nausea, and pain as evidenced by minimal intake over the last week (taking bites only)  Evaluation: No change      CURRENT NUTRITION DIAGNOSIS  Inadequate oral intake related to decreased appetite as evidenced by pt eating small amounts at meals    INTERVENTIONS  Recommendations / Nutrition Prescription  Regular " diet  Nutrition supplements    Implementation  Discussed importance of increasing po intake.  Strongly encouraged pt to try the PLUS2 shakes - she agreed  Will send a chocolate PLUS2 shake at 10am and 2pm (each provides 625 cals and 19 gm pro)    Goals  Pt to take 50% meals and 100% of the shakes being sent BID      MONITORING AND EVALUATION:  Progress towards goals will be monitored and evaluated per protocol and Practice Guidelines

## 2019-03-11 NOTE — PROGRESS NOTES
Vanessa Huffman is a 68 year old woman with a PMH of obesity, arthritis, hypertension and depression who was admitted on 3/1/19 with abnormal liver labs, acute renal failure and 3 days of headache, nausea and fevers. She had klebsiella bacteremia and was treated with IV antibiotics. She was found to have a large liver abscess and is s/p a US guided drain placement. She also had a drop in hemoglobin from 12 to 6, was transfused and an endoscopy was done and active bleeding was noted at the GJ junction and clip placed over a bleeding vessel.     She is being seen in IR follow up today.      S: I am so tired. RUQ area is very painful and rates it at a 10/10. Nothing helps. Morphine helps me sleep.     O: Temp:  [96.2  F (35.7  C)-96.5  F (35.8  C)] 96.2  F (35.7  C)  Pulse:  [76] 76  Heart Rate:  [79-84] 84  Resp:  [16-18] 16  BP: (129-136)/(53-66) 136/66  SpO2:  [97 %-98 %] 97 %    Labs, notes, imaging, IOs and VSs reviewed    ROUTINE ICU LABS (Last four results)  CMP  Recent Labs   Lab 03/11/19  0627 03/10/19  0800 03/09/19  0505 03/08/19  1623 03/08/19  0440 03/07/19  0758    139 140 140 138 135   POTASSIUM 3.3* 3.3* 3.4 3.3* 3.5 3.4   CHLORIDE 108 111* 112* 110* 107 104   CO2 17* 17* 17* 18* 18* 18*   ANIONGAP 13 11 11 12 13 13   GLC 91 93 88 100* 114* 111*   BUN 71* 78* 92* 96* 105* 91*   CR 2.98* 3.15* 3.48* 3.67* 3.96* 4.04*   GFRESTIMATED 16* 15* 13* 12* 11* 11*   GFRESTBLACK 18* 17* 15* 14* 13* 12*   DANNA 7.6* 7.5* 7.4* 7.8* 7.6* 8.6   MAG  --   --  2.1  --   --   --    PHOS  --  4.1 4.4  --  4.3 3.1   PROTTOTAL 5.0*  --  4.6*  --  4.6* 5.1*   ALBUMIN 1.3* 1.3* 1.1*  --  1.2* 1.4*   BILITOTAL 4.1*  --  3.7*  --  5.0* 7.9*   ALKPHOS 547*  --  395*  --  429* 565*   AST 66*  --  115*  --  150* 175*   ALT 41  --  55*  --  69* 87*     CBC  Recent Labs   Lab 03/11/19  0627 03/10/19  2356 03/10/19  0619 03/09/19  2350  03/09/19  0505  03/08/19  0530 03/07/19  0758   WBC 53.9*  --  54.3*  --   --  54.8*  --  59.8*  49.2*   RBC 2.88*  --   --   --   --  2.49*  --  1.97* 3.31*   HGB 8.5* 8.5* 8.6* 8.7*   < > 7.4*   < > 6.1* 9.7*   HCT 24.5*  --   --   --   --  20.9*  --  16.7* 27.9*   MCV 85  --   --   --   --  84  --  85 84   MCH 29.5  --   --   --   --  29.7  --  31.0 29.3   MCHC 34.7  --   --   --   --  35.4  --  36.5 34.8   RDW 15.2*  --   --   --   --  14.5  --  14.3 13.9   *  --  117*  --   --  114*  --  128* 126*    < > = values in this interval not displayed.     INR  Recent Labs   Lab 03/09/19  0505 03/08/19  0830   INR 1.37* 1.58*     Arterial Blood GasNo lab results found in last 7 days.    General-Alert, oriented, tired appearing, pleasant woman in some distress  RUQ dressing-CD&I. Fluid is cloudy tan to thick.   Outputs-3/6 10 mL; 3/7 95 mL; 3/8 135 mL; 3/9 135 mL; 3/10 150 mL; 3/11 20 mL so far  Drain flushed and aspirated easily with a total of 20 mL NX without pain or leaking   Cultures-Moderate growth Klebsiella pneumoniae  Blood cultures negative since admission    A/P: Vanessa Huffman is a 68 year old woman with a PMH of obesity, arthritis, hypertension and depression who was admitted on 3/1/19 with abnormal liver labs, acute renal failure and 3 days of headache, nausea and fevers. She had klebsiella bacteremia and was treated with IV antibiotics. She was found to have a large liver abscess and is s/p a US guided drain placement. She also had a drop in hemoglobin from 12 to 6, was transfused and an endoscopy was done and active bleeding was noted at the GJ junction and clip placed over a bleeding vessel.     Vanessa is slowly improving with incremental increase in renal function, stable hemoglobin, improvement in liver labs, afebrile and outputs from liver drain. She is not feeling well though today. Liver drains placed close to the diaphragm can cause increased pain with breathing but she denied any change in pain with deep breaths.     Recommend a non contrast CT scan in a few days to evaluate fluid  size and response to drainage.     Will continue to follow.     Thanks [facility] Interventional Radiology CNP ([phone]) (phone [phone])

## 2019-03-12 ENCOUNTER — APPOINTMENT (OUTPATIENT)
Dept: PHYSICAL THERAPY | Facility: CLINIC | Age: 69
DRG: 871 | End: 2019-03-12
Payer: MEDICARE

## 2019-03-12 LAB
BACTERIA SPEC CULT: ABNORMAL
HGB BLD-MCNC: 7.8 G/DL (ref 11.7–15.7)
HGB BLD-MCNC: 8.1 G/DL (ref 11.7–15.7)
HGB BLD-MCNC: 8.1 G/DL (ref 11.7–15.7)
POTASSIUM SERPL-SCNC: 3.8 MMOL/L (ref 3.4–5.3)
SPECIMEN SOURCE: ABNORMAL

## 2019-03-12 PROCEDURE — 40000239 ZZH STATISTIC VAT ROUNDS

## 2019-03-12 PROCEDURE — 25000132 ZZH RX MED GY IP 250 OP 250 PS 637: Mod: GY | Performed by: INTERNAL MEDICINE

## 2019-03-12 PROCEDURE — 25000128 H RX IP 250 OP 636: Performed by: INTERNAL MEDICINE

## 2019-03-12 PROCEDURE — 25800030 ZZH RX IP 258 OP 636: Performed by: INTERNAL MEDICINE

## 2019-03-12 PROCEDURE — A9270 NON-COVERED ITEM OR SERVICE: HCPCS | Mod: GY | Performed by: INTERNAL MEDICINE

## 2019-03-12 PROCEDURE — 97530 THERAPEUTIC ACTIVITIES: CPT | Mod: GP

## 2019-03-12 PROCEDURE — C9113 INJ PANTOPRAZOLE SODIUM, VIA: HCPCS | Performed by: INTERNAL MEDICINE

## 2019-03-12 PROCEDURE — 99232 SBSQ HOSP IP/OBS MODERATE 35: CPT | Performed by: INTERNAL MEDICINE

## 2019-03-12 PROCEDURE — 85018 HEMOGLOBIN: CPT | Performed by: INTERNAL MEDICINE

## 2019-03-12 PROCEDURE — 97116 GAIT TRAINING THERAPY: CPT | Mod: GP

## 2019-03-12 PROCEDURE — 12000000 ZZH R&B MED SURG/OB

## 2019-03-12 PROCEDURE — 84132 ASSAY OF SERUM POTASSIUM: CPT | Performed by: INTERNAL MEDICINE

## 2019-03-12 RX ORDER — PANTOPRAZOLE SODIUM 40 MG/1
40 TABLET, DELAYED RELEASE ORAL
Status: DISCONTINUED | OUTPATIENT
Start: 2019-03-12 | End: 2019-03-19 | Stop reason: HOSPADM

## 2019-03-12 RX ADMIN — MICONAZOLE NITRATE: 2 POWDER TOPICAL at 21:52

## 2019-03-12 RX ADMIN — OXYCODONE HYDROCHLORIDE 5 MG: 5 TABLET ORAL at 14:41

## 2019-03-12 RX ADMIN — PIPERACILLIN AND TAZOBACTAM 2.25 G: 2; .25 INJECTION, POWDER, FOR SOLUTION INTRAVENOUS at 05:15

## 2019-03-12 RX ADMIN — PIPERACILLIN AND TAZOBACTAM 2.25 G: 2; .25 INJECTION, POWDER, FOR SOLUTION INTRAVENOUS at 09:13

## 2019-03-12 RX ADMIN — PANTOPRAZOLE SODIUM 40 MG: 40 TABLET, DELAYED RELEASE ORAL at 09:13

## 2019-03-12 RX ADMIN — PANTOPRAZOLE SODIUM 40 MG: 40 TABLET, DELAYED RELEASE ORAL at 15:31

## 2019-03-12 RX ADMIN — OXYCODONE HYDROCHLORIDE 5 MG: 5 TABLET ORAL at 21:52

## 2019-03-12 RX ADMIN — LATANOPROST 1 DROP: 50 SOLUTION/ DROPS OPHTHALMIC at 21:52

## 2019-03-12 RX ADMIN — OXYCODONE HYDROCHLORIDE 5 MG: 5 TABLET ORAL at 09:29

## 2019-03-12 RX ADMIN — PIPERACILLIN AND TAZOBACTAM 2.25 G: 2; .25 INJECTION, POWDER, FOR SOLUTION INTRAVENOUS at 15:31

## 2019-03-12 RX ADMIN — OXYCODONE HYDROCHLORIDE 5 MG: 5 TABLET ORAL at 02:20

## 2019-03-12 RX ADMIN — PIPERACILLIN AND TAZOBACTAM 2.25 G: 2; .25 INJECTION, POWDER, FOR SOLUTION INTRAVENOUS at 21:52

## 2019-03-12 RX ADMIN — MICONAZOLE NITRATE: 2 POWDER TOPICAL at 20:39

## 2019-03-12 RX ADMIN — SODIUM CHLORIDE 8 MG/HR: 9 INJECTION, SOLUTION INTRAVENOUS at 02:20

## 2019-03-12 ASSESSMENT — ACTIVITIES OF DAILY LIVING (ADL)
ADLS_ACUITY_SCORE: 22
ADLS_ACUITY_SCORE: 21
ADLS_ACUITY_SCORE: 19
ADLS_ACUITY_SCORE: 22
ADLS_ACUITY_SCORE: 19
ADLS_ACUITY_SCORE: 22

## 2019-03-12 NOTE — PROGRESS NOTES
"[facility]  [facility]  Gastroenterology Progress note    Interval History:    Pt with occasional agitation/confusion.  Abdominal pain at drain site.  Purulent output from BILL drain.  No BM overnight.  Hemoglobin drifting down, but no signs of bleeding.      Vital Signs:      /63 (BP Location: Left arm)   Pulse 76   Temp 97.4  F (36.3  C) (Oral)   Resp 16   Ht 1.626 m (5' 4\")   Wt 111.2 kg (245 lb 2.4 oz)   SpO2 99%   BMI 42.08 kg/m    Temp (24hrs), Av.3  F (36.3  C), Min:97  F (36.1  C), Max:97.5  F (36.4  C)    No data found.    Intake/Output Summary (Last 24 hours) at 3/12/2019 09  Last data filed at 3/12/2019 0911  Gross per 24 hour   Intake 1120 ml   Output 1685 ml   Net -565 ml         Constitutional: NAD, comfortable  Cardiovascular: RRR, normal S1, S2   Respiratory: CTAB  Abdomen: soft, non-tender, nondistended.    Additional Comments:  ROS, FH, SH: See initial GI consult for details.    Laboratory Data:  Recent Labs   Lab Test 19  0700 19  0220 19  0627  03/10/19  0619  19  0505  19  0830 19  0530  19  1439   WBC  --   --   --   --  53.9*  --  54.3*  --  54.8*  --   --  59.8*   < > 17.9*   HGB 7.8* 8.1* 8.0*   < > 8.5*   < > 8.6*   < > 7.4*   < >  --  6.1*   < > 13.2   MCV  --   --   --   --  85  --   --   --  84  --   --  85   < > 87   PLT  --   --   --   --  146*  --  117*  --  114*  --   --  128*   < > 109*   INR  --   --   --   --   --   --   --   --  1.37*  --  1.58*  --   --  1.01    < > = values in this interval not displayed.     Recent Labs   Lab Test 19  0719  0627 03/10/19  0819  0505   NA  --   --  138 139 140   POTASSIUM 3.8 3.6 3.3* 3.3* 3.4   CHLORIDE  --   --  108 111* 112*   CO2  --   --  17* 17* 17*   BUN  --   --  71* 78* 92*   CR  --   --  2.98* 3.15* 3.48*   ANIONGAP  --   --  13 11 11   DANNA  --   --  7.6* 7.5* 7.4*     Recent Labs "   Lab Test 03/11/19  0627 03/10/19  0800 03/09/19  0505 03/08/19  0440 03/07/19  0758  03/03/19  1925  03/01/19  1355  03/19/18  0645   ALBUMIN 1.3* 1.3* 1.1* 1.2* 1.4*   < >  --    < >  --    < > 4.1   BILITOTAL 4.1*  --  3.7* 5.0* 7.9*   < >  --    < >  --    < > 0.7   DBIL  --   --  3.3* 4.3* 6.9*   < >  --   --   --    < >  --    ALT 41  --  55* 69* 87*   < >  --    < >  --    < > 48   AST 66*  --  115* 150* 175*   < >  --    < >  --    < > 44   ALKPHOS 547*  --  395* 429* 565*   < >  --    < >  --    < > 146   PROTEIN  --   --   --   --   --   --  30*  --  100*  --   --    LIPASE  --   --   --   --   --   --   --   --   --   --  133    < > = values in this interval not displayed.         Assessment:  67 yo female with elevated liver function tests, Klebsiella bacteremia, and liver mass.  MRCP showed biliary dilatation with no evidence of obstruction.  Liver mass consistent with abscess.  Negative viral and autoimmune hepatitis workup.  A drainage/biopsy of the liver mass 3/4 showed hemorrhagic necrosis and acute inflammation, no malignancy noted.  A CT 3/6 showed the abscess has increased in size.  Surgery was consulted on 3/7 and a drain was placed per IR.  The patient developed multiple maroon stools 3/8.  + epigastric pain.  History of ischemic colitis 3/2018.  Hemoglobin dropped 12.3 -> 6.1.  Transfused 2 U PRBCs.  EGD completed 3/8 showed LA grade B esophagitis, GJ anastamosis with cratered ulcer and visibly bleeding vessel.  Unsuccessfully injected with epinephrine.  One hemostatic clip placed with successful cessation of bleeding.  Hemoglobin slowly drifting down.  No signs of GI bleeding.  Plan:  -  Pantoprazole 40 mg BID x 6-8 weeks  -  Discontinue Carafate  -  Advance diet as tolerated  -  If evidence of significant recurrent bleeding, repeat endoscopy (possible hemospray) vs IR/angio  -  Avoid NSAID medications     -  Monitor liver function tests, CBC  -  ID/oncology following; antibiotics per ID  -   No indication for ERCP.  If needs further management, consider transfer to [facility] [place] for [place] by hepatobiliary surgery        TISHA Payan  [facility]  Office:  [phone] call if needed after 5PM  Cell:  [phone], not available after 5PM at this number

## 2019-03-12 NOTE — PLAN OF CARE
Discharge Planner PT   Patient plan for discharge: TCU  Current status: Patient supine in bed upon arrival; agreeable to therapy. Supine <> sit with HOB elevated and Mod A x 2; extra time and 2 attempts needed to complete. Static sitting EOB x 4 minutes. Sit <> stand transfer to FWW x 2 completed with Min A x 2. Upon standing, patient requiring 1 minute to acclimate to position change. Ambulated 35 ft x 2 with FWW, close w/c follow and CGA x 2; very slow pace. Required seated rest break x 3 minutes between trials. Returned to bed at end of session. Dependent for repositioning.    Barriers to return to prior living situation: Fall risk, Level of assistance needed, Decreased strength and activity tolerance.   Recommendations for discharge: TCU per plan established by the PT.  Rationale for recommendations: Pt will benefit from continued skilled PT at a TCU to achieve PLOF and independence.   Entered by: María Barrientos 03/12/2019 10:58 AM

## 2019-03-12 NOTE — PROGRESS NOTES
Interventional Radiology    Patient name: Vanessa Huffman    MRN:[id number]    :  1950    Vanessa Huffman is a 68 year old woman with a PMH of obesity, arthritis, hypertension and depression who was admitted on 3/1/19 with abnormal liver labs, acute renal failure and 3 days of headache, nausea and fevers. She had klebsiella bacteremia and was treated with IV antibiotics. She was found to have a large liver abscess and is s/p a US guided drain placement. She also had a drop in hemoglobin from 12 to 6, was transfused and an endoscopy was done and active bleeding was noted at the GJ junction and clip placed over a bleeding vessel. Is feeling better with stable hemoglobin and is expressing less concern about pain related to the drain or in the right upper quadrant.     She is being seen in IR follow up today.           O: B/P: 135/63, T: 97.4, P: 76, R: 16    Lab Results   Component Value Date    WBC 53.9 2019     Lab Results   Component Value Date    RBC 2.88 2019     Lab Results   Component Value Date    HGB 7.8 2019     Lab Results   Component Value Date    HCT 24.5 2019     Lab Results   Component Value Date     2019     Last Basic Metabolic Panel:  Lab Results   Component Value Date     2019      Lab Results   Component Value Date    POTASSIUM 3.8 2019     Lab Results   Component Value Date    CHLORIDE 108 2019     Lab Results   Component Value Date    DANNA 7.6 2019     Lab Results   Component Value Date    CO2 17 2019     Lab Results   Component Value Date    BUN 71 2019     Lab Results   Component Value Date    CR 2.98 2019     Lab Results   Component Value Date    GLC 91 2019           General-Alert, oriented, less tired appearing and pain still 7/10 but improved  RUQ dressing-CD&I. Fluid is cloudy tan to thick.   Outputs-3/6 10 mL; 3/7 95 mL; 3/8 135 mL; 3/9 135 mL; 3/10 150 mL; 3/11 135ml; 3/12 40mL so far  Drain  flushed and aspirated easily  Cultures-Moderate growth Klebsiella pneumoniae       A/P: Vanessa Huffman is a 68 year old woman with a PMH of obesity, arthritis, hypertension and depression who was admitted on 3/1/19 with abnormal liver labs, acute renal failure and 3 days of headache, nausea and fevers. She had klebsiella bacteremia and was treated with IV antibiotics. She was found to have a large liver abscess and is s/p a US guided drain placement. She also had a drop in hemoglobin from 12 to 6, was transfused and an endoscopy was done and active bleeding was noted at the GJ junction and clip placed over a bleeding vessel.  Hgb low but stable.       Feeling better and moving outside of the room, still some tenderness around drain site but no erythema with dressing changes and was able to tolerate cleaning around the drain site.  Plan is for follow up CT Abd/Pelvis to assess drain position and hepatic abscess size    Pavel Iglesias

## 2019-03-12 NOTE — PROGRESS NOTES
[facility]    Hospitalist Progress Note    Interval History   - Walked outside of room, feels increased energy and strength, very happy about this.   - Bowel movement today, no blood or melena noted    Assessment & Plan   Summary: Vanessa Huffman is a 68 year old female with PMH gastric bypass, chronic diarrhea, HTN, migraines who was admitted on 3/1/2019 with sepsis and found to have ELISE, liver abscess, and Klebsiella bacteremia. Liver biopsy negative for malignancy. S/p drain placement 3/8/2019. Symptomatically improving as of 3/12/2019.    Sepsis secondary to liver abscess  Klebsiella bacteremia  Persistent leukocytosis  Patient presented with procal 60, , hypotension. CT abd pelvis showed large liver mass. Started on Vanc and Zosyn. MRCP liver didn't give any further info on the mass due to lack of contrast  Liver biopsy 3/4 showed hemorrhagic necrosis with acute inflammatory changes, no malignant cells identified, this is likely primarily a liver abscess. Blood cultures also positive for Klebsiella, source likely from abscess. WBC trending up from admission, now stabilized around 55k as of 3/7/2019. ID and GI consulted, no significant concern for cholangitis. S/p drain placement by general surgery on 3/7/2019. Repeat biopsy 3/7 negative for malignancy as well. Malignancy cannot be completely ruled out.   - ID consulted, appreciate recs   - IV Zosyn  - General surgery following, appreciate recs   - Consider transfer if patient has liver failure   - Recommend treating as abscess   - Repeat CT abd noncontrast ordered for tomorrow    ELISE, likely ATN: Baseline Cr ~0.7, on admission Cr was 4.27. Improved slowly with hydration. Nephrology consulted, suspect primarily ATN. Creatinine gradually improved to 2.98 as of 3/11/2019.  - Avoid nephrotoxic drugs    Acute GI bleeding secondary to anastomotic ulcer  Acute blood loss anemia secondary to acute GI bleed  Hgb drop 12-->6 while inpatient. Was  given 2 units of packed RBCs on 3/8/2019. Patient had a EGD done on 3/8/2019, showed crater ulcer with visible vessel and actively bleeding. Treated with injection 1: 10,000 epinephrine and Hemoclip. Hgb downtrending slightly to 7.8 on 3/12/2019, but stools have been brown.  - GI consulted, appreciate recs  - Switch to PPI PO today  - Decrease Hgb checks to q12h    Thrombocytopenia secondary to sepsis, improving: HIT ruled out. Hemolysis also evaluated and ruled out.    Hx gastric bypass: PTA on high dose Immodium--15 tabs a day. Currently on oxycodone which may have a similar effect.  - Monitor    DVT Prophylaxis: Pneumatic Compression Devices. Avoid subcutaneous heparin while Hgb is in 7's  Code Status: Full Code  PT/OT: Ordered    Disposition: Expected discharge in 2-3 days, pending improvement in abscess, and transitioning to PO antibiotics    Erick Savage MD  Text Page  (7am to 6pm)  -Data reviewed today: I reviewed all new labs and imaging results over the last 24 hours.    Physical Exam   Temp: 97.4  F (36.3  C) Temp src: Oral BP: 135/63   Heart Rate: 96 Resp: 16 SpO2: 99 % O2 Device: None (Room air)    Vitals:    03/01/19 1845 03/09/19 0600   Weight: 99.6 kg (219 lb 9.3 oz) 111.2 kg (245 lb 2.4 oz)     Vital Signs with Ranges  Temp:  [97  F (36.1  C)-97.5  F (36.4  C)] 97.4  F (36.3  C)  Heart Rate:  [84-96] 96  Resp:  [16] 16  BP: (135-150)/(56-63) 135/63  SpO2:  [98 %-99 %] 99 %  I/O last 3 completed shifts:  In: 1130 [P.O.:1080; Other:50]  Out: 1235 [Urine:1100; Drains:135]  O2 requirements: none    Constitutional: Obese female in NAD  Cardiovascular: RRR, normal S1/2, no m/r/g  Respiratory: CTAB  Vascular: 1-2+ BLE pitting edema  GI: Normoactive bowel sounds, nontender left side, right side BILL drain noted with purulent draining material  Neuro/Psych: Appropriate affect and mood. A&Ox3, moves all extremities    Medications     - MEDICATION INSTRUCTIONS -         latanoprost  1 drop Both Eyes At  Bedtime     pantoprazole  40 mg Oral BID AC     piperacillin-tazobactam  2.25 g Intravenous Q6H     sodium chloride (PF)  10 mL Irrigation Q8H     sodium chloride (PF)  10 mL Intracatheter Q8H       Data   Recent Labs   Lab 03/12/19  0700 03/12/19  0220 03/11/19 2010 03/11/19  0627  03/10/19  0800 03/10/19  0619  03/09/19  0505  03/08/19  0830 03/08/19  0530   WBC  --   --   --   --  53.9*  --   --  54.3*  --  54.8*  --   --  59.8*   HGB 7.8* 8.1* 8.0*   < > 8.5*   < >  --  8.6*   < > 7.4*   < >  --  6.1*   MCV  --   --   --   --  85  --   --   --   --  84  --   --  85   PLT  --   --   --   --  146*  --   --  117*  --  114*  --   --  128*   INR  --   --   --   --   --   --   --   --   --  1.37*  --  1.58*  --    NA  --   --   --   --  138  --  139  --   --  140   < >  --   --    POTASSIUM 3.8  --  3.6  --  3.3*  --  3.3*  --   --  3.4   < >  --   --    CHLORIDE  --   --   --   --  108  --  111*  --   --  112*   < >  --   --    CO2  --   --   --   --  17*  --  17*  --   --  17*   < >  --   --    BUN  --   --   --   --  71*  --  78*  --   --  92*   < >  --   --    CR  --   --   --   --  2.98*  --  3.15*  --   --  3.48*   < >  --   --    ANIONGAP  --   --   --   --  13  --  11  --   --  11   < >  --   --    DANNA  --   --   --   --  7.6*  --  7.5*  --   --  7.4*   < >  --   --    GLC  --   --   --   --  91  --  93  --   --  88   < >  --   --    ALBUMIN  --   --   --   --  1.3*  --  1.3*  --   --  1.1*  --   --   --    PROTTOTAL  --   --   --   --  5.0*  --   --   --   --  4.6*  --   --   --    BILITOTAL  --   --   --   --  4.1*  --   --   --   --  3.7*  --   --   --    ALKPHOS  --   --   --   --  547*  --   --   --   --  395*  --   --   --    ALT  --   --   --   --  41  --   --   --   --  55*  --   --   --    AST  --   --   --   --  66*  --   --   --   --  115*  --   --   --     < > = values in this interval not displayed.       Imaging:   No results found for this or any previous visit (from the past 24 hour(s)).

## 2019-03-12 NOTE — PLAN OF CARE
Pt AO (forgetful), VSS on RA. Oxycodone x 1 for pain. BILL draining moderate amount tan/prurulent drainage. IR changed dressing and irrigated drain. Hgb 7.8 this am, q12h. PICC on RUE patent. Plan for CT abd tomorrow. +2 BLE edema. IV atbx. Up A1 to BR. No stools this shift. Regular diet. Continue to monitor.    Addendum: Oxy x 1 this nury. 2 very small (dime-sized) dark stools this nury. No other complaints. Continue to monitor.

## 2019-03-12 NOTE — PLAN OF CARE
A&Ox4, slightly confused/forgetful at times. VSS on RA. C/o R abdominal pain, PRN oxycodone and ice with good effect. Hepatic BILL drain patent with purulent output, irrigated per orders, dressing CDI. Hgb checks 8.0 and 8.1, no stools overnight. Continues on Protonix gtt at 10ml/hr with intermittent zosyn to R PICC. K+ replaced, recheck 3.6. 2+ BLE edema. Up with A1 using gait belt/walker to bedside commode. Will continue to monitor.

## 2019-03-12 NOTE — PROGRESS NOTES
[facility]    Infectious Disease Progress Note    Date of Service (when I saw the patient): 03/12/2019     Assessment & Plan   Vanessa Huffman is a 68 year old female who was admitted on 3/1/2019.     ASSESSMENT:  1. KLEBSIELLA PNEUMONIAE BACTEREMIA-? Enteric source v cholangitis,   2. LIVER MASS/DENSITY  3. PROBABLE HEPATIC ABSCESS  4. ABNORMAL LFTS-C/W Liver abscess, ? Tumor, ? Cholangitis  5. Melena. Bleeding ulcer at the anastomosis.   6. Leucocytosis.   7. c diff PCR negative.         REC  1. On Zosyn blood cultures and cultures from the liver abscesses positive for Klebsiella.  2. Liver abscesses s/p drain placement, draining thick pus now.    3. Follow LFT, WBC              Meghan Guadarrama MD    Interval History   WBC 53k when last checked   Afebrile   Eating   Nausea and abdominal discomfort both improved       Physical Exam   Temp: 97.4  F (36.3  C) Temp src: Oral BP: 135/63   Heart Rate: 96 Resp: 16 SpO2: 99 % O2 Device: None (Room air)    Vitals:    03/01/19 1845 03/09/19 0600   Weight: 99.6 kg (219 lb 9.3 oz) 111.2 kg (245 lb 2.4 oz)     Vital Signs with Ranges  Temp:  [97  F (36.1  C)-97.5  F (36.4  C)] 97.4  F (36.3  C)  Heart Rate:  [84-96] 96  Resp:  [16] 16  BP: (135-150)/(56-63) 135/63  SpO2:  [98 %-99 %] 99 %    Constitutional: Awake  Lungs: Clear to auscultation bilaterally, no crackles or wheezing  Cardiovascular: Regular rate and rhythm, normal S1 and S2, and no murmur noted  Abdomen: drain in the liver abscess  Skin: No rashes, no cyanosis, no edema  Other:    Medications     - MEDICATION INSTRUCTIONS -         latanoprost  1 drop Both Eyes At Bedtime     pantoprazole  40 mg Oral BID AC     piperacillin-tazobactam  2.25 g Intravenous Q6H     sodium chloride (PF)  10 mL Irrigation Q8H     sodium chloride (PF)  10 mL Intracatheter Q8H       Data   All microbiology laboratory data reviewed.  Recent Labs   Lab Test 03/12/19  0700 03/12/19  0220 03/11/19 2010 03/11/19 0627   03/10/19  0619  03/09/19  0505  03/08/19  0530   WBC  --   --   --   --  53.9*  --  54.3*  --  54.8*  --  59.8*   HGB 7.8* 8.1* 8.0*   < > 8.5*   < > 8.6*   < > 7.4*   < > 6.1*   HCT  --   --   --   --  24.5*  --   --   --  20.9*  --  16.7*   MCV  --   --   --   --  85  --   --   --  84  --  85   PLT  --   --   --   --  146*  --  117*  --  114*  --  128*    < > = values in this interval not displayed.     Recent Labs   Lab Test 03/11/19  0627 03/10/19  0800 03/09/19  0505   CR 2.98* 3.15* 3.48*     Recent Labs   Lab Test 03/01/19  1130   SED 79*     Recent Labs   Lab Test 03/07/19  1050 03/04/19  1410 03/04/19  1355 03/03/19  1925 03/02/19  1043 03/01/19  2148 03/01/19  2145 03/01/19  1409 03/01/19  1355   CULT Moderate growth  Klebsiella pneumoniae  Susceptibility testing done on previous specimen  *  Culture negative after 4 days  Culture negative monitoring continues No anaerobes isolated  No growth No anaerobes isolated  Culture negative after 1 week  Light growth  Klebsiella pneumoniae  * <10,000 colonies/mL  urogenital shannon  Susceptibility testing not routinely done    Canceled, Test credited  Duplicate request   No growth No growth No growth No growth No growth

## 2019-03-13 ENCOUNTER — APPOINTMENT (OUTPATIENT)
Dept: CT IMAGING | Facility: CLINIC | Age: 69
DRG: 871 | End: 2019-03-13
Attending: INTERNAL MEDICINE
Payer: MEDICARE

## 2019-03-13 LAB
ANION GAP SERPL CALCULATED.3IONS-SCNC: 11 MMOL/L (ref 3–14)
BUN SERPL-MCNC: 53 MG/DL (ref 7–30)
CALCIUM SERPL-MCNC: 7.5 MG/DL (ref 8.5–10.1)
CHLORIDE SERPL-SCNC: 112 MMOL/L (ref 94–109)
CO2 SERPL-SCNC: 18 MMOL/L (ref 20–32)
CREAT SERPL-MCNC: 2.62 MG/DL (ref 0.52–1.04)
ERYTHROCYTE [DISTWIDTH] IN BLOOD BY AUTOMATED COUNT: 16.4 % (ref 10–15)
GFR SERPL CREATININE-BSD FRML MDRD: 18 ML/MIN/{1.73_M2}
GLUCOSE SERPL-MCNC: 92 MG/DL (ref 70–99)
HCT VFR BLD AUTO: 21.8 % (ref 35–47)
HGB BLD-MCNC: 7.4 G/DL (ref 11.7–15.7)
HGB BLD-MCNC: 7.4 G/DL (ref 11.7–15.7)
MCH RBC QN AUTO: 29.5 PG (ref 26.5–33)
MCHC RBC AUTO-ENTMCNC: 33.9 G/DL (ref 31.5–36.5)
MCV RBC AUTO: 87 FL (ref 78–100)
PLATELET # BLD AUTO: 266 10E9/L (ref 150–450)
POTASSIUM SERPL-SCNC: 3.7 MMOL/L (ref 3.4–5.3)
RBC # BLD AUTO: 2.51 10E12/L (ref 3.8–5.2)
SODIUM SERPL-SCNC: 141 MMOL/L (ref 133–144)
WBC # BLD AUTO: 32.5 10E9/L (ref 4–11)

## 2019-03-13 PROCEDURE — A9270 NON-COVERED ITEM OR SERVICE: HCPCS | Mod: GY | Performed by: INTERNAL MEDICINE

## 2019-03-13 PROCEDURE — 25000132 ZZH RX MED GY IP 250 OP 250 PS 637: Mod: GY | Performed by: INTERNAL MEDICINE

## 2019-03-13 PROCEDURE — 80048 BASIC METABOLIC PNL TOTAL CA: CPT | Performed by: INTERNAL MEDICINE

## 2019-03-13 PROCEDURE — 85018 HEMOGLOBIN: CPT | Performed by: HOSPITALIST

## 2019-03-13 PROCEDURE — 25000128 H RX IP 250 OP 636: Performed by: INTERNAL MEDICINE

## 2019-03-13 PROCEDURE — 99232 SBSQ HOSP IP/OBS MODERATE 35: CPT | Performed by: INTERNAL MEDICINE

## 2019-03-13 PROCEDURE — 12000000 ZZH R&B MED SURG/OB

## 2019-03-13 PROCEDURE — 85027 COMPLETE CBC AUTOMATED: CPT | Performed by: INTERNAL MEDICINE

## 2019-03-13 PROCEDURE — 74176 CT ABD & PELVIS W/O CONTRAST: CPT

## 2019-03-13 PROCEDURE — 40000239 ZZH STATISTIC VAT ROUNDS

## 2019-03-13 RX ORDER — PANTOPRAZOLE SODIUM 40 MG/1
40 TABLET, DELAYED RELEASE ORAL
Status: DISCONTINUED | OUTPATIENT
Start: 2019-03-13 | End: 2019-03-13

## 2019-03-13 RX ORDER — SUCRALFATE ORAL 1 G/10ML
1 SUSPENSION ORAL
Status: DISCONTINUED | OUTPATIENT
Start: 2019-03-13 | End: 2019-03-19 | Stop reason: HOSPADM

## 2019-03-13 RX ADMIN — OXYCODONE HYDROCHLORIDE 5 MG: 5 TABLET ORAL at 14:14

## 2019-03-13 RX ADMIN — PIPERACILLIN AND TAZOBACTAM 2.25 G: 2; .25 INJECTION, POWDER, FOR SOLUTION INTRAVENOUS at 21:45

## 2019-03-13 RX ADMIN — OXYCODONE HYDROCHLORIDE 5 MG: 5 TABLET ORAL at 04:01

## 2019-03-13 RX ADMIN — OXYCODONE HYDROCHLORIDE 5 MG: 5 TABLET ORAL at 07:53

## 2019-03-13 RX ADMIN — OXYCODONE HYDROCHLORIDE 5 MG: 5 TABLET ORAL at 01:16

## 2019-03-13 RX ADMIN — TEMAZEPAM 15 MG: 15 CAPSULE ORAL at 21:45

## 2019-03-13 RX ADMIN — SUCRALFATE 1 G: 1 SUSPENSION ORAL at 11:22

## 2019-03-13 RX ADMIN — OXYCODONE HYDROCHLORIDE 5 MG: 5 TABLET ORAL at 20:24

## 2019-03-13 RX ADMIN — LATANOPROST 1 DROP: 50 SOLUTION/ DROPS OPHTHALMIC at 21:46

## 2019-03-13 RX ADMIN — PIPERACILLIN AND TAZOBACTAM 2.25 G: 2; .25 INJECTION, POWDER, FOR SOLUTION INTRAVENOUS at 04:01

## 2019-03-13 RX ADMIN — PIPERACILLIN AND TAZOBACTAM 2.25 G: 2; .25 INJECTION, POWDER, FOR SOLUTION INTRAVENOUS at 16:39

## 2019-03-13 RX ADMIN — PANTOPRAZOLE SODIUM 40 MG: 40 TABLET, DELAYED RELEASE ORAL at 16:39

## 2019-03-13 RX ADMIN — PIPERACILLIN AND TAZOBACTAM 2.25 G: 2; .25 INJECTION, POWDER, FOR SOLUTION INTRAVENOUS at 09:43

## 2019-03-13 RX ADMIN — PANTOPRAZOLE SODIUM 40 MG: 40 TABLET, DELAYED RELEASE ORAL at 06:39

## 2019-03-13 RX ADMIN — SUCRALFATE 1 G: 1 SUSPENSION ORAL at 16:38

## 2019-03-13 ASSESSMENT — ACTIVITIES OF DAILY LIVING (ADL)
ADLS_ACUITY_SCORE: 14
ADLS_ACUITY_SCORE: 14
ADLS_ACUITY_SCORE: 19
ADLS_ACUITY_SCORE: 14

## 2019-03-13 NOTE — PROGRESS NOTES
Service Date: 03/13/2019      SUBJECTIVE:  Mrs. Huffman is a 68-year-old female who was admitted with weakness, headache and back pain.  She has had multiple investigations done.  Blood culture revealed Klebsiella pneumoniae.  A CT scan revealed a large hypodense lesion in the right lobe of the liver suspicious for liver abscess.      Patient had drainage of liver abscess and biopsy twice.  Both of the liver biopsies reveals abscess.  No evidence of malignancy.      Patient was consulted for thrombocytopenia.  Multiple workup was done.  Thrombocytopenia is secondary to sepsis.      Patient's overall condition is improving.  She is getting stronger.  She is not having any fever or chills.  Her LFTs are slowly improving.  Renal function is better.  Thrombocytopenia has resolved.  Leukocytosis is improving.      Patient had a followup CT scan done today.  It again reveals a large right hepatic fluid collection/mass.      PHYSICAL EXAMINATION:   GENERAL:  Patient is alert and oriented x3.   VITAL SIGNS:  Reviewed.   Rest of the systems not examined.      LABORATORY DATA:  Reviewed.      ASSESSMENT:    1.  A 68-year-old female with liver abscess.  No evidence of malignancy on two liver biopsies.   2.  Thrombocytopenia, resolved.   3.  Leukocytosis secondary to infection, improving.   4.  Recent gastrointestinal bleed.      PLAN:   1.  Patient is clinically improving.  Treatment of liver abscess and bacteremia as per Infectious Disease.   2.  Liver biopsy report was reviewed with the patient and her son.  I explained to them that 2 liver biopsies are both negative for malignancy.  CT from today still reveals liver abscess.  Surgery is following.  Followup scans as per them.     3.  Labs reviewed.  I explained to them that thrombocytopenia resolved.  Leukocytosis is improving.  It should normalize once infection resolves.  Patient is anemic.  She had recent GI bleed.  Her anemia should improve over time.   4.  Her son had a  few questions, which were all answered. We will sign off. Please call us with any questions.      TOTAL TIME SPENT:  25 minutes, most of the time was spent in counseling and coordination of care.         MARINO COLLINS MD             D: 2019   T: 2019   MT: TERRY      Name:     MASSIMO FLOWERS   MRN:      [id number]        Account:      [id number]   :      1950           Service Date: 2019      Document: [id number]

## 2019-03-13 NOTE — PROGRESS NOTES
"GASTROENTEROLOGY PROGRESS NOTE    CC:     SUBJECTIVE: Still having pain at drain site, no melena or bloody stools    OBJECTIVE:  General Appearance:  Brighter today, NAD  /58 (BP Location: Left arm)   Pulse 82   Temp 98  F (36.7  C) (Oral)   Resp 16   Ht 1.626 m (5' 4\")   Wt 111.2 kg (245 lb 2.4 oz)   SpO2 97%   BMI 42.08 kg/m    Temp (24hrs), Av.7  F (36.5  C), Min:97.3  F (36.3  C), Max:98  F (36.7  C)    No data found.    Intake/Output Summary (Last 24 hours) at 3/13/2019 09  Last data filed at 3/13/2019 0601  Gross per 24 hour   Intake 20 ml   Output 1520 ml   Net -1500 ml       PHYSICAL EXAM    Cor: RRR  Abd: S +tender RUQ obese +bs        Additional Comments:  ROS, FH, SH: See initial GI consult for details.    I have reviewed the patient's new clinical lab results:    Recent Labs   Lab Test 19  05195 19  0700  03/11/19  0627  03/10/19  0619  19  0505  19  0830  19  1439   WBC 32.5*  --   --   --  53.9*  --  54.3*  --  54.8*  --   --    < > 17.9*   HGB 7.4* 8.1* 7.8*   < > 8.5*   < > 8.6*   < > 7.4*   < >  --    < > 13.2   MCV 87  --   --   --  85  --   --   --  84  --   --    < > 87     --   --   --  146*  --  117*  --  114*  --   --    < > 109*   INR  --   --   --   --   --   --   --   --  1.37*  --  1.58*  --  1.01    < > = values in this interval not displayed.     Recent Labs   Lab Test 19  0700 19  0627 03/10/19  0800   POTASSIUM 3.7 3.8 3.6 3.3* 3.3*   CHLORIDE 112*  --   --  108 111*   CO2 18*  --   --  17* 17*   BUN 53*  --   --  71* 78*   ANIONGAP 11  --   --  13 11     Recent Labs   Lab Test 19  0627 03/10/19  0800 19  0505 19  0440  19  1925  19  1355  18  0645   ALBUMIN 1.3* 1.3* 1.1* 1.2*   < >  --    < >  --    < > 4.1   BILITOTAL 4.1*  --  3.7* 5.0*   < >  --    < >  --    < > 0.7   ALT 41  --  55* 69*   < >  --    < >  --    < > 48   AST 66*  --  " 115* 150*   < >  --    < >  --    < > 44   PROTEIN  --   --   --   --   --  30*  --  100*  --   --    LIPASE  --   --   --   --   --   --   --   --   --  133    < > = values in this interval not displayed.         Active Problems:  1. Liver abscess: stable, management per ID, IR and surgery  2. Anastomotic ulcer, no sign of ongoing bleeding. Continue carafate x 2-3 weeks, PPI     Will sign off. Call with questions.         Ashley Cordoba MD  [facility]  Pager: [phone]  Office: [phone]

## 2019-03-13 NOTE — PROGRESS NOTES
[facility]    Hospitalist Progress Note    Interval History   - Strength/energy about the same today  - CT shows no change in size of hepatic mass/abscess, defer to surgery re: management    Assessment & Plan   Summary: Vanessa Huffman is a 68 year old female with PMH gastric bypass, chronic diarrhea, HTN, migraines who was admitted on 3/1/2019 with sepsis and found to have ELISE, liver abscess, and Klebsiella bacteremia. Liver biopsy negative for malignancy. S/p drain placement 3/8/2019. Symptomatically improving as of 3/12/2019.    Sepsis secondary to liver abscess  Klebsiella bacteremia  Persistent leukocytosis  Patient presented with procal 60, , hypotension. CT abd pelvis showed large liver mass. Started on Vanc and Zosyn. MRCP liver didn't give any further info on the mass due to lack of contrast  Liver biopsy 3/4 showed hemorrhagic necrosis with acute inflammatory changes, no malignant cells identified, this is likely primarily a liver abscess. Blood cultures also positive for Klebsiella, source likely from abscess. WBC trending up from admission, now stabilized around 55k as of 3/7/2019. ID and GI consulted, no significant concern for cholangitis. S/p drain placement by general surgery on 3/7/2019. Repeat biopsy 3/7 negative for malignancy as well. Malignancy cannot be completely ruled out.   - ID consulted, appreciate recs   - IV Zosyn  - General surgery following, appreciate recs   - Consider transfer if patient has liver failure   - Recommend treating as abscess   - Defer to surgery regarding management of drain, hepatic mass    ELISE, likely ATN: Baseline Cr ~0.7, on admission Cr was 4.27. Improved slowly with hydration. Nephrology consulted, suspect primarily ATN. Creatinine gradually improved to 2.6 as of 3/13/2019.  - Avoid nephrotoxic drugs    Acute GI bleeding secondary to anastomotic ulcer  Acute blood loss anemia secondary to acute GI bleed  Hgb drop 12-->6 while inpatient. Was  given 2 units of packed RBCs on 3/8/2019. Patient had a EGD done on 3/8/2019, showed crater ulcer with visible vessel and actively bleeding. Treated with injection 1: 10,000 epinephrine and Hemoclip. Hgb downtrending slightly to 7.4 on 3/13/2019, but stools have been brown.  - GI consulted, appreciate recs, signed off 3/13  - PPI PO + Carafate  - Decrease Hgb checks to daily as patient has not had any evidence of bleeding    Thrombocytopenia secondary to sepsis, improving: HIT ruled out. Hemolysis also evaluated and ruled out.    Hx gastric bypass: PTA on high dose Immodium--15 tabs a day. Currently on oxycodone which may have a similar effect.  - Monitor    DVT Prophylaxis: Pneumatic Compression Devices. Avoid subcutaneous heparin while Hgb is in 7's  Code Status: Full Code  PT/OT: Ordered    Disposition: Expected discharge in 3+ days, pending improvement in abscess drainage, and transitioning to PO antibiotics    Erick Savage MD  Text Page  (7am to 6pm)  -Data reviewed today: I reviewed all new labs and imaging results over the last 24 hours.    Physical Exam   Temp: 98  F (36.7  C) Temp src: Oral BP: 118/58 Pulse: 82 Heart Rate: 91 Resp: 16 SpO2: 97 % O2 Device: None (Room air)    Vitals:    03/01/19 1845 03/09/19 0600   Weight: 99.6 kg (219 lb 9.3 oz) 111.2 kg (245 lb 2.4 oz)     Vital Signs with Ranges  Temp:  [97.3  F (36.3  C)-98  F (36.7  C)] 98  F (36.7  C)  Pulse:  [82] 82  Heart Rate:  [86-91] 91  Resp:  [12-20] 16  BP: (118-130)/(52-65) 118/58  SpO2:  [96 %-97 %] 97 %  I/O last 3 completed shifts:  In: 20 [Other:20]  Out: 1970 [Urine:1850; Drains:120]  O2 requirements: none    Constitutional: Obese female in NAD  Cardiovascular: RRR, normal S1/2, no m/r/g  Respiratory: CTAB  Vascular: 1-2+ BLE pitting edema  GI: Normoactive bowel sounds, nontender left side, right side BILL drain noted with purulent draining material  Neuro/Psych: Appropriate affect and mood. A&Ox3, moves all  extremities    Medications     - MEDICATION INSTRUCTIONS -         latanoprost  1 drop Both Eyes At Bedtime     pantoprazole  40 mg Oral BID AC     piperacillin-tazobactam  2.25 g Intravenous Q6H     sodium chloride (PF)  10 mL Irrigation Q8H     sodium chloride (PF)  10 mL Intracatheter Q8H     sucralfate  1 g Oral BID AC       Data   Recent Labs   Lab 03/13/19  0557 03/12/19  1755 03/12/19  0700  03/11/19 2010 03/11/19  0627  03/10/19  0800 03/10/19  0619  03/09/19  0505  03/08/19  0830   WBC 32.5*  --   --   --   --   --  53.9*  --   --  54.3*  --  54.8*  --   --    HGB 7.4* 8.1* 7.8*   < > 8.0*   < > 8.5*   < >  --  8.6*   < > 7.4*   < >  --    MCV 87  --   --   --   --   --  85  --   --   --   --  84  --   --      --   --   --   --   --  146*  --   --  117*  --  114*  --   --    INR  --   --   --   --   --   --   --   --   --   --   --  1.37*  --  1.58*     --   --   --   --   --  138  --  139  --   --  140   < >  --    POTASSIUM 3.7  --  3.8  --  3.6  --  3.3*  --  3.3*  --   --  3.4   < >  --    CHLORIDE 112*  --   --   --   --   --  108  --  111*  --   --  112*   < >  --    CO2 18*  --   --   --   --   --  17*  --  17*  --   --  17*   < >  --    BUN 53*  --   --   --   --   --  71*  --  78*  --   --  92*   < >  --    CR 2.62*  --   --   --   --   --  2.98*  --  3.15*  --   --  3.48*   < >  --    ANIONGAP 11  --   --   --   --   --  13  --  11  --   --  11   < >  --    DANNA 7.5*  --   --   --   --   --  7.6*  --  7.5*  --   --  7.4*   < >  --    GLC 92  --   --   --   --   --  91  --  93  --   --  88   < >  --    ALBUMIN  --   --   --   --   --   --  1.3*  --  1.3*  --   --  1.1*  --   --    PROTTOTAL  --   --   --   --   --   --  5.0*  --   --   --   --  4.6*  --   --    BILITOTAL  --   --   --   --   --   --  4.1*  --   --   --   --  3.7*  --   --    ALKPHOS  --   --   --   --   --   --  547*  --   --   --   --  395*  --   --    ALT  --   --   --   --   --   --  41  --   --   --   --  55*  --    --    AST  --   --   --   --   --   --  66*  --   --   --   --  115*  --   --     < > = values in this interval not displayed.       Imaging:   Recent Results (from the past 24 hour(s))   CT Abdomen Pelvis w/o Contrast    Narrative    PROCEDURE:  CT of the abdomen and pelvis without contrast    DATE OF PROCEDURE:  3/13/2019 8:43 AM    CLINICAL HISTORY/INDICATION:  Liver abscess follow up    COMPARISON:  CT 3/6/2019    TECHNIQUE:  CT of the abdomen pelvis was performed without intravenous contrast.  Coronal reformats were performed. Radiation dose for this scan was  reduced using automated exposure control, adjustment of the mA and/or  kV according to patient size, or iterative reconstruction technique.    DOSE:  DLP: 1300 mGy-cm    FINDINGS:  Lower chest:   Right pleural effusion has increased in size from prior examination.  Right lower lobe atelectasis. No left pleural effusion. No new  pulmonary nodules within the visualized lung fields.     Abdomen/pelvis:  Interval placement of a pigtail drainage catheter into the large right  hepatic fluid collection. No significant change in overall size of the  fluid collection. Perihepatic fluid is unchanged. The spleen, adrenal  glands, kidneys and pancreas are unremarkable. Large and small bowel  are nondilated without evidence of obstruction. Trace pelvic free  fluid.      Impression    IMPRESSION:  1.  No significant change in size of large right hepatic fluid  collection/mass status post percutaneous drain placement. If drain  output is low, would consider attempt TPA instillation before  considering drain upsize/repositioning.  2.  Increase in size of small right pleural effusion.    VIOLET SANTIZO MD

## 2019-03-13 NOTE — PLAN OF CARE
Attempted to see pt, pt politely declines PT despite encouragement and states that she is waiting for her breakfast to arrive prior to participating in PT

## 2019-03-13 NOTE — PLAN OF CARE
A&Ox4, anxious at times. VSS. C/o RUQ pain, given oxycodone x 2, effective. BILL w/ tan/yellow drainage, irrigated w/10 mL NS, 20 mL net output. Reg diet, poor appetite. PICC SL. Up to commode w/walker and SBA, strength improving.

## 2019-03-13 NOTE — PROGRESS NOTES
Vanessa Huffman is a 68 year old woman with a PMH of obesity, arthritis, hypertension and depression who was admitted on 3/1/19 with abnormal liver labs, acute renal failure and 3 days of headache, nausea and fevers. She had klebsiella bacteremia and was treated with IV antibiotics. She was found to have a large liver abscess and is s/p a US guided drain placement. She also had a drop in hemoglobin from 12 to 6, was transfused and an endoscopy was done and active bleeding was noted at the GJ junction and clip placed over a bleeding vessel.    She is being seen in IR follow up today    S: Feeling stronger today. Pain improved. Starting to get some appetite.    O: Temp:  [97.3  F (36.3  C)-98  F (36.7  C)] 98  F (36.7  C)  Pulse:  [82] 82  Heart Rate:  [86-91] 91  Resp:  [12-20] 16  BP: (118-130)/(52-65) 118/58  SpO2:  [96 %-97 %] 97 %    Labs, notes, imaging, IOs and VSs reviewed    ROUTINE ICU LABS (Last four results)  CMP  Recent Labs   Lab 03/13/19  0557 03/12/19  0700 03/11/19 2010 03/11/19  0627 03/10/19  0800 03/09/19  0505  03/08/19  0440 03/07/19  0758     --   --  138 139 140   < > 138 135   POTASSIUM 3.7 3.8 3.6 3.3* 3.3* 3.4   < > 3.5 3.4   CHLORIDE 112*  --   --  108 111* 112*   < > 107 104   CO2 18*  --   --  17* 17* 17*   < > 18* 18*   ANIONGAP 11  --   --  13 11 11   < > 13 13   GLC 92  --   --  91 93 88   < > 114* 111*   BUN 53*  --   --  71* 78* 92*   < > 105* 91*   CR 2.62*  --   --  2.98* 3.15* 3.48*   < > 3.96* 4.04*   GFRESTIMATED 18*  --   --  16* 15* 13*   < > 11* 11*   GFRESTBLACK 21*  --   --  18* 17* 15*   < > 13* 12*   DANNA 7.5*  --   --  7.6* 7.5* 7.4*   < > 7.6* 8.6   MAG  --   --   --   --   --  2.1  --   --   --    PHOS  --   --   --   --  4.1 4.4  --  4.3 3.1   PROTTOTAL  --   --   --  5.0*  --  4.6*  --  4.6* 5.1*   ALBUMIN  --   --   --  1.3* 1.3* 1.1*  --  1.2* 1.4*   BILITOTAL  --   --   --  4.1*  --  3.7*  --  5.0* 7.9*   ALKPHOS  --   --   --  547*  --  395*  --  429* 565*    AST  --   --   --  66*  --  115*  --  150* 175*   ALT  --   --   --  41  --  55*  --  69* 87*    < > = values in this interval not displayed.     CBC  Recent Labs   Lab 03/13/19  0557 03/12/19  1755 03/12/19  0700 03/12/19  0220  03/11/19  0627  03/10/19  0619  03/09/19  0505  03/08/19  0530   WBC 32.5*  --   --   --   --  53.9*  --  54.3*  --  54.8*  --  59.8*   RBC 2.51*  --   --   --   --  2.88*  --   --   --  2.49*  --  1.97*   HGB 7.4* 8.1* 7.8* 8.1*   < > 8.5*   < > 8.6*   < > 7.4*   < > 6.1*   HCT 21.8*  --   --   --   --  24.5*  --   --   --  20.9*  --  16.7*   MCV 87  --   --   --   --  85  --   --   --  84  --  85   MCH 29.5  --   --   --   --  29.5  --   --   --  29.7  --  31.0   MCHC 33.9  --   --   --   --  34.7  --   --   --  35.4  --  36.5   RDW 16.4*  --   --   --   --  15.2*  --   --   --  14.5  --  14.3     --   --   --   --  146*  --  117*  --  114*  --  128*    < > = values in this interval not displayed.     INR  Recent Labs   Lab 03/09/19  0505 03/08/19  0830   INR 1.37* 1.58*     Arterial Blood GasNo lab results found in last 7 days.     Imaging    CT of the abdomen and pelvis without contrast     DATE OF PROCEDURE: 3/13/2019 8:43 AM     CLINICAL HISTORY/INDICATION: Liver abscess follow up     COMPARISON: CT 3/6/2019     TECHNIQUE:  CT of the abdomen pelvis was performed without intravenous contrast. Coronal reformats were performed. Radiation dose for this scan was reduced using automated exposure control, adjustment of the mA and/or kV according to patient size, or iterative reconstruction technique.     DOSE:  DLP: 1300 mGy-cm     FINDINGS:  Lower chest:   Right pleural effusion has increased in size from prior examination. Right lower lobe atelectasis. No left pleural effusion. No new pulmonary nodules within the visualized lung fields.      Abdomen/pelvis:  Interval placement of a pigtail drainage catheter into the large right hepatic fluid collection. No significant change in  overall size of the fluid collection. Perihepatic fluid is unchanged. The spleen, adrenal glands, kidneys and pancreas are unremarkable. Large and small bowel are nondilated without evidence of obstruction. Trace pelvic free fluid.                                                                   IMPRESSION:  1.  No significant change in size of large right hepatic fluid collection/mass status post percutaneous drain placement. If drain output is low, would consider attempt TPA instillation before considering drain upsize/repositioning.  2.  Increase in size of small right pleural effusion.     VIOLET SANTIZO MD    General-Alert, oriented, less tired appearing, smiling, appropriate  RUQ dressing-CD&I. Fluid is cloudy tan to thick.   Outputs-3/6 10 mL; 3/7 95 mL; 3/8 135 mL; 3/9 135 mL; 3/10 150 mL; 3/11 135ml; 3/12 120 mL;   Drain flushed and aspirated easily what was flushed in. (changed the BILL bulb and tubing)  Cultures-Moderate growth Klebsiella pneumoniae    A/P: Vanessa Huffman is a 68 year old woman with a PMH of obesity, arthritis, hypertension and depression who was admitted on 3/1/19 with abnormal liver labs, acute renal failure and 3 days of headache, nausea and fevers. She had klebsiella bacteremia and was treated with IV antibiotics. She was found to have a large liver abscess and is s/p a US guided drain placement. She also had a drop in hemoglobin from 12 to 6, was transfused and an endoscopy was done and active bleeding was noted at the GJ junction and clip placed over a bleeding vessel.    Hemoglobin stable, no active signs of bleeding. WBC down to 32 today!, afebrile, Patient feels like she has turned a corner and is feeling better.     Ct done today indicates size of abscess is basically the same though outputs have been 120-135 mL daily. [place] of fluid shows density of 23-30 which is pretty dense. Sl confused where fluid is coming from, could be a combination of bile/ascites/pus-uncertain.  Since there is good output and she is doing so much better would watch for now. Would consider injecting TPA into abscess but since she had a recent ulcer at the gJ junction and clipping. (Reviewed with Dr Amor today) Could consider a sinogram in the future.     Thanks [facility] Interventional Radiology CNP ([phone]) (phone [phone])

## 2019-03-13 NOTE — PROGRESS NOTES
Discussed with patient the ct findings  Not sure of next step  Will discuss with [facility] hepatobiliary surgeon  Feel that transfer may be best option for patient

## 2019-03-13 NOTE — CONSULTS
SW Consult Note:    Care Transition Initial Assessment - SW     Met with: Patient    Active Problems:    ELISE (acute kidney injury) (H)       DATA  Lives With: spouse   Living Arrangements: house  Quality of Family Relationships: helpful, involved, supportive  Description of Support System: Supportive, Involved  Who is your support system?: , Children  Support Assessment: Adequate family and caregiver support, Adequate social supports.   Identified issues/concerns regarding health management: Per social service protocol for discharge planning, patient was admitted on 3/1/19 with a primary diagnosis of ELISE.  Reviewed chart and spoke with patient regarding discharge plans.  Patient lives at home with her  who is supportive.  She also has a son and daughter who live locally and are supportive of patient.  Discussed discharge recommendations of TCU placement and patient requested that SW place call to her daughter Kerry to discuss TCU choices.  SW left a TCU list with patient and placed call to daughter.  No answer so SW left VM regarding TCU choices for discharge.        Quality of Family Relationships: helpful, involved, supportive  Transportation Anticipated: family or friend will provide    ASSESSMENT  Cognitive Status:  awake, alert and oriented  Concerns to be addressed: Discharge planning.     PLAN  Financial costs for the patient includes: N/A  Patient given options and choices for discharge: Provided TCU list for patient.   Patient/family is agreeable to the plan?  Yes  Patient Goals and Preferences: TCU  Patient anticipates discharging to: TCU    WARD Carnes, LUCISW

## 2019-03-13 NOTE — PLAN OF CARE
A& O x 4, VSS. C/o pain, prn oxycodone given x 3. BILL w/amador drainage, irrigate w/10 mL NS as ordered. PICC SL, good blood return noted, getting IV ABX. Skin jaundiced, light yellow. LS dim, slight HUNTLEY. Up to commode w/walker and assist of one.

## 2019-03-13 NOTE — PROGRESS NOTES
[facility]    Infectious Disease Progress Note    Date of Service (when I saw the patient): 03/13/2019     Assessment & Plan   Vanessa Huffman is a 68 year old female who was admitted on 3/1/2019.     ASSESSMENT:  1. KLEBSIELLA PNEUMONIAE BACTEREMIA-? Enteric source v cholangitis,   2. LIVER MASS/DENSITY  3. PROBABLE HEPATIC ABSCESS  4. ABNORMAL LFTS-C/W Liver abscess, ? Tumor, ? Cholangitis  5. Melena. Bleeding ulcer at the anastomosis.   6. Leucocytosis.   7. c diff PCR negative.         REC  1. On Zosyn blood cultures and cultures from the liver abscesses positive for Klebsiella.  2. Liver abscesses s/p drain placement, draining thick pus now.    3. Follow LFT, WBC              Meghan Guadarrama MD    Interval History   WBC 32 k when last checked   Afebrile   Eating   Nausea and abdominal discomfort both improved       Physical Exam   Temp: 98  F (36.7  C) Temp src: Oral BP: 118/58 Pulse: 82 Heart Rate: 91 Resp: 16 SpO2: 97 % O2 Device: None (Room air)    Vitals:    03/01/19 1845 03/09/19 0600   Weight: 99.6 kg (219 lb 9.3 oz) 111.2 kg (245 lb 2.4 oz)     Vital Signs with Ranges  Temp:  [97.3  F (36.3  C)-98  F (36.7  C)] 98  F (36.7  C)  Pulse:  [82] 82  Heart Rate:  [86-91] 91  Resp:  [12-20] 16  BP: (118-130)/(52-65) 118/58  SpO2:  [96 %-97 %] 97 %    Constitutional: Awake  Lungs: Clear to auscultation bilaterally, no crackles or wheezing  Cardiovascular: Regular rate and rhythm, normal S1 and S2, and no murmur noted  Abdomen: drain in the liver abscess  Skin: No rashes, no cyanosis, no edema  Other:    Medications     - MEDICATION INSTRUCTIONS -         latanoprost  1 drop Both Eyes At Bedtime     pantoprazole  40 mg Oral BID AC     piperacillin-tazobactam  2.25 g Intravenous Q6H     sodium chloride (PF)  10 mL Irrigation Q8H     sodium chloride (PF)  10 mL Intracatheter Q8H     sucralfate  1 g Oral BID AC       Data   All microbiology laboratory data reviewed.  Recent Labs   Lab Test  03/13/19  0557 03/12/19  1755 03/12/19  0700  03/11/19  0627  03/10/19  0619  03/09/19  0505   WBC 32.5*  --   --   --  53.9*  --  54.3*  --  54.8*   HGB 7.4* 8.1* 7.8*   < > 8.5*   < > 8.6*   < > 7.4*   HCT 21.8*  --   --   --  24.5*  --   --   --  20.9*   MCV 87  --   --   --  85  --   --   --  84     --   --   --  146*  --  117*  --  114*    < > = values in this interval not displayed.     Recent Labs   Lab Test 03/13/19  0557 03/11/19  0627 03/10/19  0800   CR 2.62* 2.98* 3.15*     Recent Labs   Lab Test 03/01/19  1130   SED 79*     Recent Labs   Lab Test 03/07/19  1050 03/04/19  1410 03/04/19  1355 03/03/19  1925 03/02/19  1043 03/01/19  2148 03/01/19  2145 03/01/19  1409 03/01/19  1355   CULT Moderate growth  Klebsiella pneumoniae  Susceptibility testing done on previous specimen  *  Culture negative after 4 days  Culture negative monitoring continues No anaerobes isolated  No growth No anaerobes isolated  Culture negative after 1 week  Light growth  Klebsiella pneumoniae  * <10,000 colonies/mL  urogenital shannon  Susceptibility testing not routinely done    Canceled, Test credited  Duplicate request   No growth No growth No growth No growth No growth

## 2019-03-14 ENCOUNTER — APPOINTMENT (OUTPATIENT)
Dept: PHYSICAL THERAPY | Facility: CLINIC | Age: 69
DRG: 871 | End: 2019-03-14
Payer: MEDICARE

## 2019-03-14 LAB
ANION GAP SERPL CALCULATED.3IONS-SCNC: 8 MMOL/L (ref 3–14)
BACTERIA SPEC CULT: NORMAL
BUN SERPL-MCNC: 44 MG/DL (ref 7–30)
CALCIUM SERPL-MCNC: 7.3 MG/DL (ref 8.5–10.1)
CHLORIDE SERPL-SCNC: 112 MMOL/L (ref 94–109)
CO2 SERPL-SCNC: 20 MMOL/L (ref 20–32)
CREAT SERPL-MCNC: 2.28 MG/DL (ref 0.52–1.04)
ERYTHROCYTE [DISTWIDTH] IN BLOOD BY AUTOMATED COUNT: 17.1 % (ref 10–15)
GFR SERPL CREATININE-BSD FRML MDRD: 21 ML/MIN/{1.73_M2}
GLUCOSE SERPL-MCNC: 92 MG/DL (ref 70–99)
HCT VFR BLD AUTO: 21 % (ref 35–47)
HGB BLD-MCNC: 7.1 G/DL (ref 11.7–15.7)
Lab: NORMAL
MCH RBC QN AUTO: 29.6 PG (ref 26.5–33)
MCHC RBC AUTO-ENTMCNC: 33.8 G/DL (ref 31.5–36.5)
MCV RBC AUTO: 88 FL (ref 78–100)
PLATELET # BLD AUTO: 312 10E9/L (ref 150–450)
POTASSIUM SERPL-SCNC: 3.4 MMOL/L (ref 3.4–5.3)
RBC # BLD AUTO: 2.4 10E12/L (ref 3.8–5.2)
SODIUM SERPL-SCNC: 140 MMOL/L (ref 133–144)
SPECIMEN SOURCE: NORMAL
WBC # BLD AUTO: 23.1 10E9/L (ref 4–11)

## 2019-03-14 PROCEDURE — 99232 SBSQ HOSP IP/OBS MODERATE 35: CPT | Performed by: INTERNAL MEDICINE

## 2019-03-14 PROCEDURE — 12000000 ZZH R&B MED SURG/OB

## 2019-03-14 PROCEDURE — 97116 GAIT TRAINING THERAPY: CPT | Mod: GP | Performed by: PHYSICAL THERAPIST

## 2019-03-14 PROCEDURE — A9270 NON-COVERED ITEM OR SERVICE: HCPCS | Mod: GY | Performed by: INTERNAL MEDICINE

## 2019-03-14 PROCEDURE — 40000895 ZZH STATISTIC SLP IP EVAL DEFER

## 2019-03-14 PROCEDURE — 99207 ZZC CDG-MDM COMPONENT: MEETS LOW - DOWN CODED: CPT | Performed by: INTERNAL MEDICINE

## 2019-03-14 PROCEDURE — 40000239 ZZH STATISTIC VAT ROUNDS

## 2019-03-14 PROCEDURE — 40000257 ZZH STATISTIC CONSULT NO CHARGE VASC ACCESS

## 2019-03-14 PROCEDURE — 25000132 ZZH RX MED GY IP 250 OP 250 PS 637: Mod: GY | Performed by: INTERNAL MEDICINE

## 2019-03-14 PROCEDURE — 25000128 H RX IP 250 OP 636: Performed by: INTERNAL MEDICINE

## 2019-03-14 PROCEDURE — 97530 THERAPEUTIC ACTIVITIES: CPT | Mod: GP | Performed by: PHYSICAL THERAPIST

## 2019-03-14 PROCEDURE — 85027 COMPLETE CBC AUTOMATED: CPT | Performed by: INTERNAL MEDICINE

## 2019-03-14 PROCEDURE — 80048 BASIC METABOLIC PNL TOTAL CA: CPT | Performed by: INTERNAL MEDICINE

## 2019-03-14 PROCEDURE — G0463 HOSPITAL OUTPT CLINIC VISIT: HCPCS

## 2019-03-14 RX ADMIN — SUCRALFATE 1 G: 1 SUSPENSION ORAL at 06:32

## 2019-03-14 RX ADMIN — PIPERACILLIN AND TAZOBACTAM 2.25 G: 2; .25 INJECTION, POWDER, FOR SOLUTION INTRAVENOUS at 15:36

## 2019-03-14 RX ADMIN — LATANOPROST 1 DROP: 50 SOLUTION/ DROPS OPHTHALMIC at 21:01

## 2019-03-14 RX ADMIN — OXYCODONE HYDROCHLORIDE 5 MG: 5 TABLET ORAL at 10:14

## 2019-03-14 RX ADMIN — OXYCODONE HYDROCHLORIDE 5 MG: 5 TABLET ORAL at 19:29

## 2019-03-14 RX ADMIN — TEMAZEPAM 15 MG: 15 CAPSULE ORAL at 21:01

## 2019-03-14 RX ADMIN — OXYCODONE HYDROCHLORIDE 5 MG: 5 TABLET ORAL at 05:08

## 2019-03-14 RX ADMIN — SUCRALFATE 1 G: 1 SUSPENSION ORAL at 15:42

## 2019-03-14 RX ADMIN — PANTOPRAZOLE SODIUM 40 MG: 40 TABLET, DELAYED RELEASE ORAL at 15:42

## 2019-03-14 RX ADMIN — PIPERACILLIN AND TAZOBACTAM 2.25 G: 2; .25 INJECTION, POWDER, FOR SOLUTION INTRAVENOUS at 21:01

## 2019-03-14 RX ADMIN — PANTOPRAZOLE SODIUM 40 MG: 40 TABLET, DELAYED RELEASE ORAL at 06:32

## 2019-03-14 RX ADMIN — OXYCODONE HYDROCHLORIDE 5 MG: 5 TABLET ORAL at 00:27

## 2019-03-14 RX ADMIN — PIPERACILLIN AND TAZOBACTAM 2.25 G: 2; .25 INJECTION, POWDER, FOR SOLUTION INTRAVENOUS at 03:09

## 2019-03-14 RX ADMIN — OXYCODONE HYDROCHLORIDE 5 MG: 5 TABLET ORAL at 14:41

## 2019-03-14 RX ADMIN — PIPERACILLIN AND TAZOBACTAM 2.25 G: 2; .25 INJECTION, POWDER, FOR SOLUTION INTRAVENOUS at 09:14

## 2019-03-14 ASSESSMENT — ACTIVITIES OF DAILY LIVING (ADL)
ADLS_ACUITY_SCORE: 19
ADLS_ACUITY_SCORE: 14
ADLS_ACUITY_SCORE: 14
ADLS_ACUITY_SCORE: 19
ADLS_ACUITY_SCORE: 14
ADLS_ACUITY_SCORE: 15

## 2019-03-14 NOTE — PROGRESS NOTES
SW Note:    D/I: SW met with patient and daughter to discuss TCU choices.  Per patient and daughter, they would like to have referrals sent to [facility].  SW sent referrals via [facility].    P: SW will continue to assist for discharge.    WARD Carnes, LGSW

## 2019-03-14 NOTE — PLAN OF CARE
Discharge Planner PT   Patient plan for discharge: TCU  Current status: pt required min A with VC for supine > sit, SBA for sit to stand, CGA- min A for ambulating 120' with FWW and 2 standing rest breaks, and CGA for transferring commode> bed. Pt was dependent with nikita cares.  Barriers to return to prior living situation: Fall risk, Level of assistance needed, Decreased strength and activity tolerance.   Recommendations for discharge: TCU  Rationale for recommendations: Pt will benefit from continued skilled PT at a TCU improve safety and IND with functional mobility and to decrease burden of care.       Entered by: Belinda Hall 03/14/2019 12:08 PM

## 2019-03-14 NOTE — PLAN OF CARE
A&OX4, but forgetful at times. VSS on RA. C/o pain at the BILL site. BILL with purulent/yellow output. Pain managed with Oxycodone. IV saline locked. Up SBA to the bedside commode. Redness/rash noted in the butt cracks and between groin and abdominal folds. Treated with miconazole powder. PICC line saline locked. On regular diet. Frequent small and soft BM noted this shift that were black. MD notified. Hgb checked, unchanged from this morning. No intervention needed. BLE with moderate edema.GI, heme onc following.Discharge pending progress. Continue to monitor.

## 2019-03-14 NOTE — PROGRESS NOTES
"[facility]    Hospitalist Progress Note    Interval History   - Having \"explosive\" diarrhea after meals, but they have been brown today  - Patient denies any dysphagia, she is used to eating smaller morsels at home but has been having bigger bites here  - Daughter Kerry at bedside and updated    Assessment & Plan   Summary: Vanessa Huffman is a 68 year old female with PMH gastric bypass, chronic diarrhea, HTN, migraines who was admitted on 3/1/2019 with sepsis and found to have ELISE, liver abscess, and Klebsiella bacteremia. Liver biopsy negative for malignancy. S/p drain placement 3/8/2019. Symptomatically improving as of 3/12/2019.    Sepsis secondary to liver abscess/mass  Klebsiella bacteremia  Persistent leukocytosis, improving  Patient presented with procal 60, , hypotension. CT abd pelvis showed large liver mass. Started on Vanc and Zosyn. MRCP liver didn't give any further info on the mass due to lack of contrast  Liver biopsy 3/4 showed hemorrhagic necrosis with acute inflammatory changes, no malignant cells identified, this is likely primarily a liver abscess. Blood cultures also positive for Klebsiella, source likely from abscess. WBC up to 55k this admission, however improving since 3/12 now down to 23k as if 3.14. ID and GI consulted, lower concern for cholangitis. S/p drain placement by general surgery on 3/7/2019. Repeat biopsy 3/7 negative for malignancy as well. Malignancy cannot be completely ruled out.   - ID consulted, appreciate recs   - IV Zosyn  - General surgery following, appreciate recs   - No further surgical management at this time    ELISE, likely ATN: Baseline Cr ~0.7, on admission Cr was 4.27. Improved slowly with hydration. Nephrology consulted, suspect primarily ATN. Creatinine gradually improved to 2.2 as of 3/14/2019.  - Avoid nephrotoxic drugs    Acute GI bleeding secondary to anastomotic ulcer  Acute blood loss anemia secondary to acute GI bleed  Hgb drop " 12-->6 while inpatient. Was given 2 units of packed RBCs on 3/8/2019. Patient had a EGD done on 3/8/2019, showed crater ulcer with visible vessel and actively bleeding. Treated with injection 1: 10,000 epinephrine and Hemoclip. Hgb downtrending slightly to 7.1 on 3/14/2019, but stools have been brown.  - GI consulted, appreciate recs, signed off 3/13  - PPI PO + Carafate  - Transfuse for Hgb  < 7.0    Thrombocytopenia secondary to sepsis, improving: HIT ruled out. Hemolysis also evaluated and ruled out.    Hx gastric bypass  Chronic diarrhea  PTA on high dose Immodium--15 tabs a day. Currently on oxycodone which may have a similar effect.  - Consider trial of Lomotil when Hgb stops downtrending    DVT Prophylaxis: Pneumatic Compression Devices. Avoid subcutaneous heparin while Hgb is downtrending  Code Status: Full Code  PT/OT: Ordered    Disposition: Expected discharge in 2-3 days, will need to discuss with ID about PO vs IV antibiotics, needs stable Hgb, and would like to see further improvement with WBC count    Erick Savage MD  Text Page  (7am to 6pm)  -Data reviewed today: I reviewed all new labs and imaging results over the last 24 hours.    Physical Exam   Temp: 96.6  F (35.9  C) Temp src: Oral BP: 130/69 Pulse: 86 Heart Rate: 81 Resp: 14 SpO2: 97 % O2 Device: None (Room air)    Vitals:    03/01/19 1845 03/09/19 0600   Weight: 99.6 kg (219 lb 9.3 oz) 111.2 kg (245 lb 2.4 oz)     Vital Signs with Ranges  Temp:  [96.6  F (35.9  C)-97.4  F (36.3  C)] 96.6  F (35.9  C)  Pulse:  [86] 86  Heart Rate:  [78-81] 81  Resp:  [14-16] 14  BP: (117-139)/(42-69) 130/69  SpO2:  [97 %-99 %] 97 %  I/O last 3 completed shifts:  In: 800 [P.O.:770; Other:30]  Out: 1835 [Urine:1750; Drains:85]  O2 requirements: none    Constitutional: Obese female in NAD  Cardiovascular: RRR, normal S1/2, no m/r/g  Respiratory: CTAB  Vascular: 1-2+ BLE pitting edema  GI: Normoactive bowel sounds, nontender left side, right side BILL drain  noted with purulent draining material  Neuro/Psych: Appropriate affect and mood. A&Ox3, moves all extremities    Medications     - MEDICATION INSTRUCTIONS -         latanoprost  1 drop Both Eyes At Bedtime     pantoprazole  40 mg Oral BID AC     piperacillin-tazobactam  2.25 g Intravenous Q6H     sodium chloride (PF)  10 mL Irrigation Q8H     sodium chloride (PF)  10 mL Intracatheter Q8H     sucralfate  1 g Oral BID AC       Data   Recent Labs   Lab 03/14/19  0625 03/13/19  2035 03/13/19  0557  03/12/19  0700  03/11/19  0627  03/10/19  0800  03/09/19  0505  03/08/19  0830   WBC 23.1*  --  32.5*  --   --   --  53.9*  --   --    < > 54.8*  --   --    HGB 7.1* 7.4* 7.4*   < > 7.8*   < > 8.5*   < >  --    < > 7.4*   < >  --    MCV 88  --  87  --   --   --  85  --   --   --  84  --   --      --  266  --   --   --  146*  --   --    < > 114*  --   --    INR  --   --   --   --   --   --   --   --   --   --  1.37*  --  1.58*     --  141  --   --   --  138  --  139  --  140   < >  --    POTASSIUM 3.4  --  3.7  --  3.8   < > 3.3*  --  3.3*  --  3.4   < >  --    CHLORIDE 112*  --  112*  --   --   --  108  --  111*  --  112*   < >  --    CO2 20  --  18*  --   --   --  17*  --  17*  --  17*   < >  --    BUN 44*  --  53*  --   --   --  71*  --  78*  --  92*   < >  --    CR 2.28*  --  2.62*  --   --   --  2.98*  --  3.15*  --  3.48*   < >  --    ANIONGAP 8  --  11  --   --   --  13  --  11  --  11   < >  --    DANNA 7.3*  --  7.5*  --   --   --  7.6*  --  7.5*  --  7.4*   < >  --    GLC 92  --  92  --   --   --  91  --  93  --  88   < >  --    ALBUMIN  --   --   --   --   --   --  1.3*  --  1.3*  --  1.1*  --   --    PROTTOTAL  --   --   --   --   --   --  5.0*  --   --   --  4.6*  --   --    BILITOTAL  --   --   --   --   --   --  4.1*  --   --   --  3.7*  --   --    ALKPHOS  --   --   --   --   --   --  547*  --   --   --  395*  --   --    ALT  --   --   --   --   --   --  41  --   --   --  55*  --   --    AST  --   --    --   --   --   --  66*  --   --   --  115*  --   --     < > = values in this interval not displayed.       Imaging:   No results found for this or any previous visit (from the past 24 hour(s)).

## 2019-03-14 NOTE — PLAN OF CARE
SLP: Swallow evaluation ordered d/t pt reporting to nursing that she was having difficulty with breads and meats. During the interview pt reported that has determined the main difficulty for her swallowing difficulties is that she continues to eat in bed and this does not feel natural for her or good for her swallowing. Pt and family member state this has never been a problem prior and she typically eats and drinks all items without difficulty     Of note, pt completed an EGD on 3/8 reporting - LA Grade B esophagitis. Therefore, these symptoms of esophageal difficulty would coincide with these results.     Initial attempt at evaluation was interpreted by doctors visiting with pt, but she did tolerate puree and thin liquids without indications of aspiration. Upon re attempt to finish evaluation pt requested no further PO trials as every time she eats she has had diarrhea. Based on further conversation pt does not feel her swallowing is a significant issue and she would like to focus on getting up to the chair for meals. SLP also reviewed general safe swallow and reflux precautions with pt - she is agreeable, verbalizes understanding and agrees with deferring evaluation at this time but will notify medical team if new or ongoing concerns occur.     Defer SLP swallow evaluation - please re order or notify SLP department with concerns.

## 2019-03-14 NOTE — PLAN OF CARE
A&Ox4. VSS. C/o RUQ pain, given oxycodone x 2, effective. BILL w/ tan/yellow drainage, irrigated w/10 mL NS, 25 mL net output. Fungal rash on skin fold of groin, miconazole powder and interdry applied, WOC consult placed. Reg diet, poor appetite, c/o difficulty swallowing. 3 loose dark brown stool, no blood noted. PICC SL. Up to commode w/walker and SBA, tires quickly.

## 2019-03-14 NOTE — PROGRESS NOTES
Attempted to reach the hepatobiliary surgeon at the [facility] without success.  No return of my message or phone call.  Patient overall appears stable.  Not having any fevers or chills.  Exam is unchanged.  White count is finally coming down and 23,000 today.  I have no new surgical recommendations.  It is still my opinion that the patient would likely be better served in the hands and care of a hepatobiliary surgeon even if the recommendation is not for any type of surgical intervention.  For now I guess we continue with current cares.

## 2019-03-14 NOTE — PROGRESS NOTES
X cover 2014    Called for patient reporting some infrequent black stools  - had EGD on 3/8 and noted with esophagitis and GJ anastomosis ulcer which was cauterzied    - Hb has been stable around 7-8 for past few days  - Hb stis am 7.4  - will recheck Hb now and in am  - conditional transfusion for Hb <7 ordered  - patient on protonix  - hemodynamically stable

## 2019-03-14 NOTE — PLAN OF CARE
Pt A&Ox4, forgetful at times.  VSS on RA.  Pain at BILL site w/purulent/yellow output.  Pain managed w/oxycodone.  +3 BLE edema.  Up w/SBA to BSC.  R PICC SL.  Voiding adequately.  Interdry applied to redness under abdominal folds.  Regular diet.  Discharge pending.  Nursing to continue to monitor.

## 2019-03-14 NOTE — PROGRESS NOTES
[facility] Nurse Inpatient Skin Assessment     Initial Assessment of:   Skin folds to pannus/groin/gluteal cleft        Data:   Patient History:      per MD note(s):  Vanessa Huffman is a 68 year old female with PMH gastric bypass, chronic diarrhea, HTN, migraines who was admitted on 3/1/2019 with sepsis and found to have ELISE, liver abscess, and Klebsiella bacteremia. Liver biopsy negative for malignancy. S/p drain placement 3/8/2019.      Oneil Risk Assessment  Sensory Perception: 4-->no impairment    Moisture: 3-->occasionally moist   Activity: 3-->walks occasionally     Mobility: 3-->slightly limited   Nutrition: 2-->probably inadequate   Oneil Score: 18      Positioning: Pillows    Mattress:  Standard , Atmos Air mattress    Moisture Management: commode, pull-ups       Current Diet / Nutrition:       Orders Placed This Encounter        Regular Diet Adult        Labs:   Recent Labs   Lab Test 03/14/19  0625  03/11/19  0627  03/09/19  0505  03/01/19  1130   ALBUMIN  --   --  1.3*   < > 1.1*   < > 2.4*   HGB 7.1*   < > 8.5*   < > 7.4*   < > 13.1   INR  --   --   --   --  1.37*   < >  --    WBC 23.1*   < > 53.9*   < > 54.8*   < > 15.5*   CRP  --   --   --   --   --   --  485.0*    < > = values in this interval not displayed.         Skin Assessment (location):   Skin folds - pannus, groin, gluteal cleft  History:  Pt states just started getting bad over the past day or two.  Pt obese and weak and needs assist for all pericares and transfers, but could slowly stand and step to bed from commode today with assist of 1.      Skin: bilateral and center pannus folds with large areas of red irritated rashy tissue, where the skin folds rubs together, not so much in the base of crease.  Proximal groin folds red and rashy also, but not as bad.  Gluteal cleft with defined rashy pink area, slightly moist, but no open wounds to coccyx or buttocks.      Drainage:  Slight weepy serous    Odor:  mild    Pain: tender/sore with cleansing    3-14-19 [facility] photo - pannus folds            Intervention:     Patient's chart evaluated.      Assessed: skin; cares completed    Orders  Written    Supplies  reviewed    Discussed plan of care with Patient and Nurse and daughter        Assessment:        Moisture/friction-associated skin irritation to pannus, groin, gluteal folds.  Possible fungal component.            Plan:   Nursing to notify the Provider(s) and re-consult the [facility] Nurse if skin deteriorate(s).      Skin care plan to pannus/groin/gluteal folds: BID and prn:  1.  Cleanse gently with Nargsi perineal lotion and soft dry wipes.  Lay pt as flat as tolerated to help clean to very base of all folds.   2.  Apply antifungal powder to all reddened areas and folds.  Rub the powder in lightly, so there is a thin even layer of powder everywhere and no clumping.    3.  Apply a piece of InterDry into right and left pannus folds (one piece for each side), in a flat even layer.  Cut a piece big enough that it will stick out from fold a few inches when pt sitting up.   Replace when soiled, otherwise can reuse if just slightly damp.      [facility] Nurse will return: weekly and prn

## 2019-03-14 NOTE — PROVIDER NOTIFICATION
MD Notification    Notified Person: MD    Notified Person Name:  Magaly    Notification Date/Time: 3/13/2019 20:07      Notification Interaction: On call paging service    Purpose of Notification: Pt with frequest small soft stools that are black. Concern for rebleeding. Should we check Hgb? Hgb today was 7.4 down from yesterday.    Orders Received: Pending    Comments:

## 2019-03-15 ENCOUNTER — APPOINTMENT (OUTPATIENT)
Dept: PHYSICAL THERAPY | Facility: CLINIC | Age: 69
DRG: 871 | End: 2019-03-15
Payer: MEDICARE

## 2019-03-15 LAB
ALBUMIN SERPL-MCNC: 1.3 G/DL (ref 3.4–5)
ALP SERPL-CCNC: 443 U/L (ref 40–150)
ALT SERPL W P-5'-P-CCNC: 25 U/L (ref 0–50)
ANION GAP SERPL CALCULATED.3IONS-SCNC: 9 MMOL/L (ref 3–14)
AST SERPL W P-5'-P-CCNC: 42 U/L (ref 0–45)
BILIRUB SERPL-MCNC: 1.8 MG/DL (ref 0.2–1.3)
BUN SERPL-MCNC: 33 MG/DL (ref 7–30)
CALCIUM SERPL-MCNC: 7 MG/DL (ref 8.5–10.1)
CHLORIDE SERPL-SCNC: 116 MMOL/L (ref 94–109)
CO2 SERPL-SCNC: 18 MMOL/L (ref 20–32)
CREAT SERPL-MCNC: 1.94 MG/DL (ref 0.52–1.04)
ERYTHROCYTE [DISTWIDTH] IN BLOOD BY AUTOMATED COUNT: 17.7 % (ref 10–15)
GFR SERPL CREATININE-BSD FRML MDRD: 26 ML/MIN/{1.73_M2}
GLUCOSE SERPL-MCNC: 82 MG/DL (ref 70–99)
HCT VFR BLD AUTO: 21.3 % (ref 35–47)
HGB BLD-MCNC: 7.2 G/DL (ref 11.7–15.7)
MCH RBC QN AUTO: 30 PG (ref 26.5–33)
MCHC RBC AUTO-ENTMCNC: 33.8 G/DL (ref 31.5–36.5)
MCV RBC AUTO: 89 FL (ref 78–100)
PLATELET # BLD AUTO: 377 10E9/L (ref 150–450)
POTASSIUM SERPL-SCNC: 3.2 MMOL/L (ref 3.4–5.3)
POTASSIUM SERPL-SCNC: 4 MMOL/L (ref 3.4–5.3)
PROT SERPL-MCNC: 5 G/DL (ref 6.8–8.8)
RBC # BLD AUTO: 2.4 10E12/L (ref 3.8–5.2)
SODIUM SERPL-SCNC: 143 MMOL/L (ref 133–144)
WBC # BLD AUTO: 16.4 10E9/L (ref 4–11)

## 2019-03-15 PROCEDURE — 80053 COMPREHEN METABOLIC PANEL: CPT | Performed by: INTERNAL MEDICINE

## 2019-03-15 PROCEDURE — 25000132 ZZH RX MED GY IP 250 OP 250 PS 637: Mod: GY | Performed by: INTERNAL MEDICINE

## 2019-03-15 PROCEDURE — 40000239 ZZH STATISTIC VAT ROUNDS

## 2019-03-15 PROCEDURE — 99231 SBSQ HOSP IP/OBS SF/LOW 25: CPT | Performed by: SURGERY

## 2019-03-15 PROCEDURE — 85027 COMPLETE CBC AUTOMATED: CPT | Performed by: INTERNAL MEDICINE

## 2019-03-15 PROCEDURE — A9270 NON-COVERED ITEM OR SERVICE: HCPCS | Mod: GY | Performed by: INTERNAL MEDICINE

## 2019-03-15 PROCEDURE — 99232 SBSQ HOSP IP/OBS MODERATE 35: CPT | Performed by: INTERNAL MEDICINE

## 2019-03-15 PROCEDURE — 25000128 H RX IP 250 OP 636: Performed by: INTERNAL MEDICINE

## 2019-03-15 PROCEDURE — 84132 ASSAY OF SERUM POTASSIUM: CPT | Performed by: INTERNAL MEDICINE

## 2019-03-15 PROCEDURE — 12000000 ZZH R&B MED SURG/OB

## 2019-03-15 PROCEDURE — 97116 GAIT TRAINING THERAPY: CPT | Mod: GP | Performed by: PHYSICAL THERAPIST

## 2019-03-15 RX ORDER — FUROSEMIDE 20 MG
20 TABLET ORAL DAILY
Status: DISCONTINUED | OUTPATIENT
Start: 2019-03-15 | End: 2019-03-16

## 2019-03-15 RX ORDER — DIPHENOXYLATE HCL/ATROPINE 2.5-.025MG
1 TABLET ORAL
Status: DISCONTINUED | OUTPATIENT
Start: 2019-03-15 | End: 2019-03-18

## 2019-03-15 RX ADMIN — DIPHENOXYLATE HYDROCHLORIDE AND ATROPINE SULFATE 1 TABLET: 2.5; .025 TABLET ORAL at 18:58

## 2019-03-15 RX ADMIN — SUCRALFATE 1 G: 1 SUSPENSION ORAL at 06:25

## 2019-03-15 RX ADMIN — POTASSIUM CHLORIDE 20 MEQ: 1500 TABLET, EXTENDED RELEASE ORAL at 12:04

## 2019-03-15 RX ADMIN — OXYCODONE HYDROCHLORIDE 5 MG: 5 TABLET ORAL at 12:19

## 2019-03-15 RX ADMIN — PANTOPRAZOLE SODIUM 40 MG: 40 TABLET, DELAYED RELEASE ORAL at 17:02

## 2019-03-15 RX ADMIN — POTASSIUM CHLORIDE 40 MEQ: 1500 TABLET, EXTENDED RELEASE ORAL at 06:20

## 2019-03-15 RX ADMIN — Medication 1 MG: at 22:33

## 2019-03-15 RX ADMIN — PIPERACILLIN AND TAZOBACTAM 2.25 G: 2; .25 INJECTION, POWDER, FOR SOLUTION INTRAVENOUS at 10:21

## 2019-03-15 RX ADMIN — OXYCODONE HYDROCHLORIDE 5 MG: 5 TABLET ORAL at 22:24

## 2019-03-15 RX ADMIN — PIPERACILLIN AND TAZOBACTAM 2.25 G: 2; .25 INJECTION, POWDER, FOR SOLUTION INTRAVENOUS at 22:16

## 2019-03-15 RX ADMIN — PIPERACILLIN AND TAZOBACTAM 2.25 G: 2; .25 INJECTION, POWDER, FOR SOLUTION INTRAVENOUS at 04:58

## 2019-03-15 RX ADMIN — PIPERACILLIN AND TAZOBACTAM 2.25 G: 2; .25 INJECTION, POWDER, FOR SOLUTION INTRAVENOUS at 16:35

## 2019-03-15 RX ADMIN — OXYCODONE HYDROCHLORIDE 5 MG: 5 TABLET ORAL at 15:36

## 2019-03-15 RX ADMIN — PANTOPRAZOLE SODIUM 40 MG: 40 TABLET, DELAYED RELEASE ORAL at 06:25

## 2019-03-15 RX ADMIN — LATANOPROST 1 DROP: 50 SOLUTION/ DROPS OPHTHALMIC at 22:16

## 2019-03-15 RX ADMIN — OXYCODONE HYDROCHLORIDE 5 MG: 5 TABLET ORAL at 19:10

## 2019-03-15 RX ADMIN — OXYCODONE HYDROCHLORIDE 5 MG: 5 TABLET ORAL at 04:58

## 2019-03-15 RX ADMIN — OXYCODONE HYDROCHLORIDE 5 MG: 5 TABLET ORAL at 01:54

## 2019-03-15 RX ADMIN — SUCRALFATE 1 G: 1 SUSPENSION ORAL at 17:02

## 2019-03-15 ASSESSMENT — ACTIVITIES OF DAILY LIVING (ADL)
ADLS_ACUITY_SCORE: 14

## 2019-03-15 NOTE — PLAN OF CARE
Discharge Planner PT   Patient plan for discharge: TCU  Current status: Pt sitting in chair on arrival. Pt transfered sit to/from stand with FWW and CGA. Pt ambulated to commode and back with FWW and CGA. Gait training 100' with FWW and CGA. Pt took x2 standing rest breaks due to increased fatigue. Pt performed with a step through gait pattern and slow pace. Pt c/o being light headed following ambulation. Pt left sitting in chair with needs in reach.   Barriers to return to prior living situation: Fall risk, Level of assistance needed, Decreased strength and activity tolerance.   Recommendations for discharge: TCU  Rationale for recommendations: Pt will benefit from continued skilled PT at a TCU improve safety and IND with functional mobility and to decrease burden of care.           Entered by: Laura Carranza 03/15/2019 11:39 AM

## 2019-03-15 NOTE — PROGRESS NOTES
[facility]    Infectious Disease Progress Note    Date of Service (when I saw the patient): 03/15/2019     Assessment & Plan   Vanessa Huffman is a 68 year old female who was admitted on 3/1/2019.     ASSESSMENT:  1. KLEBSIELLA PNEUMONIAE BACTEREMIA-? Enteric source v cholangitis,   2. LIVER MASS/DENSITY  3. PROBABLE HEPATIC ABSCESS  4. ABNORMAL LFTS-C/W Liver abscess, ? Tumor, ? Cholangitis  5. Melena. Bleeding ulcer at the anastomosis.   6. Leucocytosis.   7. c diff PCR negative.         REC  1. On Zosyn blood cultures and cultures from the liver abscesses positive for Klebsiella.  2. Liver abscesses s/p drain placement, draining thick pus now.    3. Follow LFT, WBC   4. I think most likely here over the weekend. On discharge will switch to IV ceftriaxone, anticipate weeks of IV antibiotics. Will repeat CT scan early next week.              Meghan Guadarrama MD    Interval History   WBC 16 k   Afebrile   Eating   Nausea and abdominal discomfort both improved       Physical Exam   Temp: 96.2  F (35.7  C) Temp src: Oral BP: 147/72   Heart Rate: 78 Resp: 18 SpO2: 98 % O2 Device: None (Room air)    Vitals:    03/01/19 1845 03/09/19 0600   Weight: 99.6 kg (219 lb 9.3 oz) 111.2 kg (245 lb 2.4 oz)     Vital Signs with Ranges  Temp:  [96.2  F (35.7  C)-97.8  F (36.6  C)] 96.2  F (35.7  C)  Heart Rate:  [78-84] 78  Resp:  [12-18] 18  BP: (126-147)/(57-72) 147/72  SpO2:  [97 %-98 %] 98 %    Constitutional: sitting up in a chair   Lungs: Clear to auscultation bilaterally, no crackles or wheezing  Cardiovascular: Regular rate and rhythm, normal S1 and S2, and no murmur noted  Abdomen: drain in the liver abscess  Skin: No rashes, no cyanosis, no edema  Other:    Medications     - MEDICATION INSTRUCTIONS -         latanoprost  1 drop Both Eyes At Bedtime     pantoprazole  40 mg Oral BID AC     piperacillin-tazobactam  2.25 g Intravenous Q6H     sodium chloride (PF)  10 mL Irrigation Q8H     sodium chloride (PF)  10  mL Intracatheter Q8H     sucralfate  1 g Oral BID AC       Data   All microbiology laboratory data reviewed.  Recent Labs   Lab Test 03/15/19  0523 03/14/19  0625 03/13/19 2035 03/13/19  0557   WBC 16.4* 23.1*  --  32.5*   HGB 7.2* 7.1* 7.4* 7.4*   HCT 21.3* 21.0*  --  21.8*   MCV 89 88  --  87    312  --  266     Recent Labs   Lab Test 03/15/19  0523 03/14/19  0625 03/13/19  0557   CR 1.94* 2.28* 2.62*     Recent Labs   Lab Test 03/01/19  1130   SED 79*     Recent Labs   Lab Test 03/07/19  1050 03/04/19  1410 03/04/19  1355 03/03/19  1925 03/02/19  1043 03/01/19  2148 03/01/19  2145 03/01/19  1409 03/01/19  1355   CULT Culture negative after 1 week  No anaerobes isolated  Moderate growth  Klebsiella pneumoniae  Susceptibility testing done on previous specimen  * No anaerobes isolated  No growth No anaerobes isolated  Culture negative after 1 week  Light growth  Klebsiella pneumoniae  * <10,000 colonies/mL  urogenital shannon  Susceptibility testing not routinely done    Canceled, Test credited  Duplicate request   No growth No growth No growth No growth No growth

## 2019-03-15 NOTE — PLAN OF CARE
A&Ox4. VSS. C/o RUQ pain, given oxycodone x 1, effective. BILL w/ tan/yellow drainage, irrigated w/10 mL NS, 30 mL net output. Fungal rash on skin fold of groin, skin cares performed, improving. Reg diet, fair appetite, upright in chair for meals. 2 loose dark brown/black stools. PICC SL. Up to commode w/walker and SBA, tires quickly.

## 2019-03-15 NOTE — PROGRESS NOTES
[facility]    Hospitalist Progress Note    Interval History   - Continues to have explosive diarrhea after meals, requesting anti-diarrheal again today. Will start Lomotil with meals  - Strength, energy improving little each day    Assessment & Plan   Summary: Vanessa Huffman is a 68 year old female with PMH gastric bypass, chronic diarrhea, HTN, migraines who was admitted on 3/1/2019 with sepsis and found to have ELISE, liver abscess, and Klebsiella bacteremia. Liver biopsy negative for malignancy. S/p drain placement 3/8/2019. Symptomatically improving as of 3/12/2019.    Sepsis secondary to liver abscess/mass  Klebsiella bacteremia  Persistent leukocytosis, improving  Patient presented with procal 60, , hypotension. CT abd pelvis showed large liver mass. Started on Vanc and Zosyn. MRCP liver didn't give any further info on the mass due to lack of contrast  Liver biopsy 3/4 showed hemorrhagic necrosis with acute inflammatory changes, no malignant cells identified, this is likely primarily a liver abscess. Blood cultures also positive for Klebsiella, source likely from abscess. WBC up to 55k this admission, however improving since 3/12 now down to 16k as of 3/14. ID and GI consulted, lower concern for cholangitis. S/p drain placement by general surgery on 3/7/2019. Repeat biopsy 3/7 negative for malignancy as well. Malignancy cannot be completely ruled out.   Repeat CT abdomen 3/13 showed unchanged size of liver abscess. However, given her clinical improvement, plan is now to continue current management with drainage and IV antibiotics.  - ID consulted, appreciate recs   - IV Zosyn for now, IV ceftriaxone at discharge  - General surgery following, appreciate recs   - No further surgical management at this time  - Start Lasix 20mg PO daily today for persistent BLE pitting edema which is new since her illness, may be related to liver dysfunction  - Consider CT or MRI abdomen with contrast once  creatinine reaches a certain threshold (1.5? 1.2? to be determined)    ELISE, likely ATN: Baseline Cr ~0.7, on admission Cr was 4.27. Improved slowly with hydration. Nephrology consulted, suspect primarily ATN. Creatinine gradually improved to 1.9 as of 3/14/2019.  - Avoid nephrotoxic drugs    Acute GI bleeding secondary to anastomotic ulcer, improved  Acute blood loss anemia secondary to acute GI bleed  Hgb drop 12-->6 while inpatient. Was given 2 units of packed RBCs on 3/8/2019. Patient had a EGD done on 3/8/2019, showed crater ulcer with visible vessel and actively bleeding. Treated with injection 1: 10,000 epinephrine and Hemoclip. Hgb downtrending slightly to 7.1 on 3/14/2019, but stools have been brown.  - GI consulted, appreciate recs, signed off 3/13  - PPI PO + Carafate  - Transfuse for Hgb  < 7.0    Hx gastric bypass  Chronic diarrhea  PTA on high dose Immodium--15 tabs a day. Currently on oxycodone which may have a similar effect.  - Lomotil started TID WM, stop if any evidence of bleeding    Thrombocytopenia secondary to sepsis, resolved: HIT ruled out. Hemolysis also evaluated and ruled out.    DVT Prophylaxis: Pneumatic Compression Devices. Avoid subcutaneous heparin while Hgb is low in 7s  Code Status: Full Code  PT/OT: Ordered    Disposition: Expected discharge around 3/18 TCU (already accepted), surgery no further management, dispo per ID    Erick Savage MD  Text Page  (7am to 6pm)  -Data reviewed today: I reviewed all new labs and imaging results over the last 24 hours.    Physical Exam   Temp: 96.2  F (35.7  C) Temp src: Oral BP: 147/72   Heart Rate: 78 Resp: 18 SpO2: 98 % O2 Device: None (Room air)    Vitals:    03/01/19 1845 03/09/19 0600   Weight: 99.6 kg (219 lb 9.3 oz) 111.2 kg (245 lb 2.4 oz)     Vital Signs with Ranges  Temp:  [96.2  F (35.7  C)-96.3  F (35.7  C)] 96.2  F (35.7  C)  Heart Rate:  [78-82] 78  Resp:  [12-18] 18  BP: (141-147)/(66-72) 147/72  SpO2:  [97 %-98 %] 98 %  I/O  last 3 completed shifts:  In: 450 [P.O.:420; Other:30]  Out: 1750 [Urine:1650; Drains:100]  O2 requirements: none    Constitutional: Obese female in NAD  Cardiovascular: RRR, normal S1/2, no m/r/g  Respiratory: CTAB  Vascular: 1-2+ BLE pitting edema  GI: Normoactive bowel sounds, nontender left side, right side BILL drain noted with purulent draining material  Neuro/Psych: Appropriate affect and mood. A&Ox3, moves all extremities    Medications     - MEDICATION INSTRUCTIONS -         diphenoxylate-atropine  1 tablet Oral TID w/meals     furosemide  20 mg Oral Daily     latanoprost  1 drop Both Eyes At Bedtime     pantoprazole  40 mg Oral BID AC     piperacillin-tazobactam  2.25 g Intravenous Q6H     sodium chloride (PF)  10 mL Irrigation Q8H     sodium chloride (PF)  10 mL Intracatheter Q8H     sucralfate  1 g Oral BID AC       Data   Recent Labs   Lab 03/15/19  0523 03/14/19  0625 03/13/19  2035 03/13/19  0557  03/11/19  0627  03/09/19  0505   WBC 16.4* 23.1*  --  32.5*  --  53.9*   < > 54.8*   HGB 7.2* 7.1* 7.4* 7.4*   < > 8.5*   < > 7.4*   MCV 89 88  --  87  --  85  --  84    312  --  266  --  146*   < > 114*   INR  --   --   --   --   --   --   --  1.37*    140  --  141  --  138   < > 140   POTASSIUM 3.2* 3.4  --  3.7   < > 3.3*   < > 3.4   CHLORIDE 116* 112*  --  112*  --  108   < > 112*   CO2 18* 20  --  18*  --  17*   < > 17*   BUN 33* 44*  --  53*  --  71*   < > 92*   CR 1.94* 2.28*  --  2.62*  --  2.98*   < > 3.48*   ANIONGAP 9 8  --  11  --  13   < > 11   DANNA 7.0* 7.3*  --  7.5*  --  7.6*   < > 7.4*   GLC 82 92  --  92  --  91   < > 88   ALBUMIN 1.3*  --   --   --   --  1.3*   < > 1.1*   PROTTOTAL 5.0*  --   --   --   --  5.0*  --  4.6*   BILITOTAL 1.8*  --   --   --   --  4.1*  --  3.7*   ALKPHOS 443*  --   --   --   --  547*  --  395*   ALT 25  --   --   --   --  41  --  55*   AST 42  --   --   --   --  66*  --  115*    < > = values in this interval not displayed.       Imaging:   No results  found for this or any previous visit (from the past 24 hour(s)).

## 2019-03-15 NOTE — PROGRESS NOTES
Interventional Radiology    Patient name: Vanessa Huffman    MRN:[id number]    :  1950    Vanessa Huffman is a 68 year old woman with a PMH of obesity, arthritis, hypertension and depression who was admitted on 3/1/19 with abnormal liver labs, acute renal failure and 3 days of headache, nausea and fevers. She had klebsiella bacteremia and was treated with IV antibiotics. She was found to have a large liver abscess and is s/p a US guided drain placement. She also had a drop in hemoglobin from 12 to 6, was transfused and an endoscopy was done and active bleeding was noted at the GJ junction and clip placed over a bleeding vessel.     She has continued to improve and WBC has gone from in the 50s to 16 today 3/15/19, renal function slowly normalizing. Hemoglobin stable.      She is being seen in IR follow up today     S: Appetite and strength improved, less pain at drain site    O: Temp:  [96.2  F (35.7  C)-97.8  F (36.6  C)] 96.2  F (35.7  C)  Heart Rate:  [78-84] 78  Resp:  [12-18] 18  BP: (126-147)/(57-72) 147/72  SpO2:  [97 %-98 %] 98 %    Labs, notes, imaging, IOs and VSs reviewed.    ROUTINE ICU LABS (Last four results)  CMP  Recent Labs   Lab 03/15/19  0523 19  0625 19  0557 19  0700  19  0627 03/10/19  0800 19  0505    140 141  --   --  138 139 140   POTASSIUM 3.2* 3.4 3.7 3.8   < > 3.3* 3.3* 3.4   CHLORIDE 116* 112* 112*  --   --  108 111* 112*   CO2 18* 20 18*  --   --  17* 17* 17*   ANIONGAP 9 8 11  --   --  13 11 11   GLC 82 92 92  --   --  91 93 88   BUN 33* 44* 53*  --   --  71* 78* 92*   CR 1.94* 2.28* 2.62*  --   --  2.98* 3.15* 3.48*   GFRESTIMATED 26* 21* 18*  --   --  16* 15* 13*   YESICA 30* 25* 21*  --   --  18* 17* 15*   DANNA 7.0* 7.3* 7.5*  --   --  7.6* 7.5* 7.4*   MAG  --   --   --   --   --   --   --  2.1   PHOS  --   --   --   --   --   --  4.1 4.4   PROTTOTAL 5.0*  --   --   --   --  5.0*  --  4.6*   ALBUMIN 1.3*  --   --   --   --  1.3* 1.3* 1.1*    BILITOTAL 1.8*  --   --   --   --  4.1*  --  3.7*   ALKPHOS 443*  --   --   --   --  547*  --  395*   AST 42  --   --   --   --  66*  --  115*   ALT 25  --   --   --   --  41  --  55*    < > = values in this interval not displayed.     CBC  Recent Labs   Lab 03/15/19  0523 03/14/19  0625 03/13/19  2035 03/13/19  0557  03/11/19  0627   WBC 16.4* 23.1*  --  32.5*  --  53.9*   RBC 2.40* 2.40*  --  2.51*  --  2.88*   HGB 7.2* 7.1* 7.4* 7.4*   < > 8.5*   HCT 21.3* 21.0*  --  21.8*  --  24.5*   MCV 89 88  --  87  --  85   MCH 30.0 29.6  --  29.5  --  29.5   MCHC 33.8 33.8  --  33.9  --  34.7   RDW 17.7* 17.1*  --  16.4*  --  15.2*    312  --  266  --  146*    < > = values in this interval not displayed.     INR  Recent Labs   Lab 03/09/19  0505   INR 1.37*     Arterial Blood GasNo lab results found in last 7 days.    General-Alert, oriented, very bright, pleasant woman in NAD, lying in bed  RUQ dressing-CD&I-  Drainage-Milky tan, with yellow tinge on the tubing   Outputs-3/6 10 mL; 3/7 95 mL; 3/8 135 mL; 3/9 135 mL; 3/10 150 mL; 3/11 135ml; 3/12 120 mL; 3/13 85 mL; 3/14 85 mL; 3/15 40 mL so far today.   Drain flushed and aspirated easily what was flushed in.  Cultures-Moderate growth Klebsiella pneumoniae    A/P: Vanessa Huffman is a 68 year old woman with a PMH of obesity, arthritis, hypertension and depression who was admitted on 3/1/19 with abnormal liver labs, acute renal failure and 3 days of headache, nausea and fevers. She had klebsiella bacteremia and was treated with IV antibiotics. She was found to have a large liver abscess and is s/p a US guided drain placement. She also had a drop in hemoglobin from 12 to 6, was transfused and an endoscopy was done and active bleeding was noted at the GJ junction and clip placed over a bleeding vessel.    She has continued to improve and WBC has gone from in the 50s to 16 today 3/15/19, renal function slowly normalizing. Hemoglobin stable.      3/13/19 CT of abdomen  indicates size of abscess is basically the same though outputs have been 120-135 mL daily. [place] of fluid shows density of 23-30 which is pretty dense. Sl confused where fluid is coming from, could be a combination of bile/ascites/pus-uncertain. Since there is good output and she is doing so much better would watch for now. Would consider injecting TPA into abscess but since she had a recent ulcer at the gJ junction and clipping would not recommend it. (Reviewed with Dr Amor) Could consider a sinogram in the future. Leukocytosis normalizing, slow improvement in renal labs, low/stable hemoglobin trend, 85ml output yesterday, drain flushed with 10ml today and functioning well, slight bilious tint to fluid in drain.  Plan to continue drain cares at TCU at discharge.    Will recommend flushing with 10 mL NS twice daily. Continued dressing change every 2-3 days with gauze and tape. This was added to the AVS.     [facility] Interventional Radiology CNP ([phone]) (phone [phone])

## 2019-03-15 NOTE — PROGRESS NOTES
SW Note:    D/I:  SW received call from [facility] and they are able to accept patient at their facility.  Discussed IV ABX with admissions. SW updated patient who requested SW speak with daughter. SW placed call to daughter and left VM.      P: SW will continue to assist for discharge.    Tremaine Astorga, MSW, LGSW

## 2019-03-15 NOTE — DISCHARGE INSTRUCTIONS
Liver abscess drain discharge cares    1) Change the gauze and tape dressing every 2-3 days. Wash the skin with soap and water and allow to air dry before re dressing  -Please cover with plastic (saran wrap) prior to showering and change it the dressing gets wet.     2) Flush the drain with 10 mL Normal Saline (NS) twice daily, morning and evening.   -Empty, measure and record the outputs daily. Subtract the NS flush amount from the total    Please call Lis DANG RN clinician at  or Francisca DANG NP at  for questions or concerns about the tube. You can leave a voicemail. We work Monday through Friday [phone] or [phone].

## 2019-03-15 NOTE — PLAN OF CARE
Pt A&Ox4.  VSS on RA.  Pain managed w/oxycodone.  +3 BLE edema.  Up w/SBA and walker to BSC.  Voiding adequately.  R PICC SL.  BILL drain patent and irrigated.  Potassium 3.2, replacement protocol started.  Hgb 7.2.  Regular diet.  Nursing to continue to monitor.

## 2019-03-15 NOTE — PROGRESS NOTES
"CLINICAL NUTRITION SERVICES - REASSESSMENT NOTE      Recommendations Ordered by Registered Dietitian (RD):     Will have pt try the Gelatein PLUS at 10am and 2pm for added protein (150 cals, 20 gm pro each)     Malnutrition: (3/6)  % Weight Loss:  None noted  % Intake:  </= 50% for >/= 5 days (severe malnutrition)  Subcutaneous Fat Loss:  None observed  Muscle Loss:  None observed  Fluid Retention:  None noted     Malnutrition Diagnosis: Patient does not meet two of the above criteria necessary for diagnosing malnutrition         EVALUATION OF PROGRESS TOWARD GOALS   Diet:  Regular           Breeze supplement at 10am and 2pm           Room Service with Assist    Chart reviewed  Visited with pt this morning  Sitting up in chair and tells me that she is feeling pretty good  Notes that she is eating more each day - \"moving in the right direction\"  Dislikes the Breeze supplement - \"too sweet\"  Didn't like the PLUS2 milkshake either  Is agreeable to trying the Gelatein PLUS supplement (150 cals, 20 gm pro each)  Flowsheets reflect pt has been eating % meals        NEW FINDINGS:   TCU at discharge  +loose stools    Previous Goals (3/11):   Pt to take 50% meals and 100% of the shakes being sent BID  Evaluation: Pt is eating % meals, but dislikes the shakes so not taking     Previous Nutrition Diagnosis (3/11):   Inadequate oral intake related to decreased appetite as evidenced by pt eating small amounts at meals  Evaluation: Improving        CURRENT NUTRITION DIAGNOSIS  No nutrition diagnosis identified at this time     INTERVENTIONS  Recommendations / Nutrition Prescription  Regular diet  Nutrition supplements    Implementation  Will have pt try the Gelatein PLUS at 10am and 2pm for added protein (150 cals, 20 gm pro each)    Goals  Pt to consume >50% meals      MONITORING AND EVALUATION:  Progress towards goals will be monitored and evaluated per protocol and Practice Guidelines        "

## 2019-03-15 NOTE — PLAN OF CARE
A&OX4, VSS on RA. C/o pain in the groin area and BILL site, managed with prn oxycodone. Skin care done per WOC recommendation. BILL drain with yellow/ purulent output of 20mL. Irrigated as ordered. Up AX1 to the bedside commode. Frequent loose stools noted. PICC saline locked. On IV Zosyn. Regular diet. Hem/onc and GI has signed off.Discharge pending stabilization of Hgb and further improvement in WBC. SW following for discharge. Continue to monitor.

## 2019-03-15 NOTE — PROGRESS NOTES
Continues slow improvement  Tolerating diet  No fevers  abd soft  Wbc down to 16  lft's improved as well    Continue current cares  No new recommendations

## 2019-03-16 LAB
ALBUMIN SERPL-MCNC: 1.5 G/DL (ref 3.4–5)
ALP SERPL-CCNC: 473 U/L (ref 40–150)
ALT SERPL W P-5'-P-CCNC: 45 U/L (ref 0–50)
ANION GAP SERPL CALCULATED.3IONS-SCNC: 8 MMOL/L (ref 3–14)
AST SERPL W P-5'-P-CCNC: 78 U/L (ref 0–45)
BILIRUB DIRECT SERPL-MCNC: 1.6 MG/DL (ref 0–0.2)
BILIRUB SERPL-MCNC: 1.8 MG/DL (ref 0.2–1.3)
BUN SERPL-MCNC: 32 MG/DL (ref 7–30)
CALCIUM SERPL-MCNC: 7.8 MG/DL (ref 8.5–10.1)
CHLORIDE SERPL-SCNC: 114 MMOL/L (ref 94–109)
CO2 SERPL-SCNC: 19 MMOL/L (ref 20–32)
CREAT SERPL-MCNC: 1.9 MG/DL (ref 0.52–1.04)
ERYTHROCYTE [DISTWIDTH] IN BLOOD BY AUTOMATED COUNT: 18.5 % (ref 10–15)
GFR SERPL CREATININE-BSD FRML MDRD: 27 ML/MIN/{1.73_M2}
GLUCOSE SERPL-MCNC: 119 MG/DL (ref 70–99)
HCT VFR BLD AUTO: 22.1 % (ref 35–47)
HGB BLD-MCNC: 7.4 G/DL (ref 11.7–15.7)
MCH RBC QN AUTO: 30.5 PG (ref 26.5–33)
MCHC RBC AUTO-ENTMCNC: 33.5 G/DL (ref 31.5–36.5)
MCV RBC AUTO: 91 FL (ref 78–100)
PLATELET # BLD AUTO: 383 10E9/L (ref 150–450)
POTASSIUM SERPL-SCNC: 3.8 MMOL/L (ref 3.4–5.3)
PROT SERPL-MCNC: 5.5 G/DL (ref 6.8–8.8)
RBC # BLD AUTO: 2.43 10E12/L (ref 3.8–5.2)
SODIUM SERPL-SCNC: 141 MMOL/L (ref 133–144)
WBC # BLD AUTO: 12.4 10E9/L (ref 4–11)

## 2019-03-16 PROCEDURE — 12000000 ZZH R&B MED SURG/OB

## 2019-03-16 PROCEDURE — A9270 NON-COVERED ITEM OR SERVICE: HCPCS | Mod: GY | Performed by: INTERNAL MEDICINE

## 2019-03-16 PROCEDURE — 80076 HEPATIC FUNCTION PANEL: CPT | Performed by: INTERNAL MEDICINE

## 2019-03-16 PROCEDURE — 80048 BASIC METABOLIC PNL TOTAL CA: CPT | Performed by: INTERNAL MEDICINE

## 2019-03-16 PROCEDURE — 85027 COMPLETE CBC AUTOMATED: CPT | Performed by: INTERNAL MEDICINE

## 2019-03-16 PROCEDURE — 25000132 ZZH RX MED GY IP 250 OP 250 PS 637: Mod: GY | Performed by: INTERNAL MEDICINE

## 2019-03-16 PROCEDURE — 40000239 ZZH STATISTIC VAT ROUNDS

## 2019-03-16 PROCEDURE — 99231 SBSQ HOSP IP/OBS SF/LOW 25: CPT | Performed by: SURGERY

## 2019-03-16 PROCEDURE — 99233 SBSQ HOSP IP/OBS HIGH 50: CPT | Performed by: INTERNAL MEDICINE

## 2019-03-16 PROCEDURE — 25000128 H RX IP 250 OP 636: Performed by: INTERNAL MEDICINE

## 2019-03-16 RX ORDER — FERROUS SULFATE 325(65) MG
325 TABLET ORAL 2 TIMES DAILY WITH MEALS
Status: DISCONTINUED | OUTPATIENT
Start: 2019-03-16 | End: 2019-03-19 | Stop reason: HOSPADM

## 2019-03-16 RX ORDER — TEMAZEPAM 7.5 MG/1
7.5 CAPSULE ORAL
Status: DISCONTINUED | OUTPATIENT
Start: 2019-03-16 | End: 2019-03-19 | Stop reason: HOSPADM

## 2019-03-16 RX ORDER — FUROSEMIDE 10 MG/ML
60 INJECTION INTRAMUSCULAR; INTRAVENOUS ONCE
Status: COMPLETED | OUTPATIENT
Start: 2019-03-16 | End: 2019-03-16

## 2019-03-16 RX ADMIN — OXYCODONE HYDROCHLORIDE 5 MG: 5 TABLET ORAL at 03:05

## 2019-03-16 RX ADMIN — OXYCODONE HYDROCHLORIDE 5 MG: 5 TABLET ORAL at 18:38

## 2019-03-16 RX ADMIN — LATANOPROST 1 DROP: 50 SOLUTION/ DROPS OPHTHALMIC at 21:53

## 2019-03-16 RX ADMIN — PANTOPRAZOLE SODIUM 40 MG: 40 TABLET, DELAYED RELEASE ORAL at 07:40

## 2019-03-16 RX ADMIN — DIPHENOXYLATE HYDROCHLORIDE AND ATROPINE SULFATE 1 TABLET: 2.5; .025 TABLET ORAL at 12:34

## 2019-03-16 RX ADMIN — SUCRALFATE 1 G: 1 SUSPENSION ORAL at 07:40

## 2019-03-16 RX ADMIN — TEMAZEPAM 7.5 MG: 7.5 CAPSULE ORAL at 21:52

## 2019-03-16 RX ADMIN — DIPHENOXYLATE HYDROCHLORIDE AND ATROPINE SULFATE 1 TABLET: 2.5; .025 TABLET ORAL at 10:44

## 2019-03-16 RX ADMIN — PIPERACILLIN AND TAZOBACTAM 2.25 G: 2; .25 INJECTION, POWDER, FOR SOLUTION INTRAVENOUS at 10:59

## 2019-03-16 RX ADMIN — FERROUS SULFATE TAB 325 MG (65 MG ELEMENTAL FE) 325 MG: 325 (65 FE) TAB at 10:44

## 2019-03-16 RX ADMIN — FUROSEMIDE 60 MG: 10 INJECTION, SOLUTION INTRAVENOUS at 10:45

## 2019-03-16 RX ADMIN — SUCRALFATE 1 G: 1 SUSPENSION ORAL at 17:39

## 2019-03-16 RX ADMIN — PIPERACILLIN AND TAZOBACTAM 2.25 G: 2; .25 INJECTION, POWDER, FOR SOLUTION INTRAVENOUS at 16:19

## 2019-03-16 RX ADMIN — PIPERACILLIN AND TAZOBACTAM 2.25 G: 2; .25 INJECTION, POWDER, FOR SOLUTION INTRAVENOUS at 21:53

## 2019-03-16 RX ADMIN — OXYCODONE HYDROCHLORIDE 5 MG: 5 TABLET ORAL at 12:34

## 2019-03-16 RX ADMIN — OXYCODONE HYDROCHLORIDE 5 MG: 5 TABLET ORAL at 23:57

## 2019-03-16 RX ADMIN — FERROUS SULFATE TAB 325 MG (65 MG ELEMENTAL FE) 325 MG: 325 (65 FE) TAB at 17:39

## 2019-03-16 RX ADMIN — DIPHENOXYLATE HYDROCHLORIDE AND ATROPINE SULFATE 1 TABLET: 2.5; .025 TABLET ORAL at 17:39

## 2019-03-16 RX ADMIN — PIPERACILLIN AND TAZOBACTAM 2.25 G: 2; .25 INJECTION, POWDER, FOR SOLUTION INTRAVENOUS at 04:18

## 2019-03-16 RX ADMIN — PANTOPRAZOLE SODIUM 40 MG: 40 TABLET, DELAYED RELEASE ORAL at 16:19

## 2019-03-16 ASSESSMENT — ACTIVITIES OF DAILY LIVING (ADL)
ADLS_ACUITY_SCORE: 15
ADLS_ACUITY_SCORE: 14
ADLS_ACUITY_SCORE: 14
ADLS_ACUITY_SCORE: 15
ADLS_ACUITY_SCORE: 13
ADLS_ACUITY_SCORE: 14

## 2019-03-16 ASSESSMENT — MIFFLIN-ST. JEOR: SCORE: 1676.67

## 2019-03-16 NOTE — PROGRESS NOTES
[facility]    Hospitalist Progress Note    Interval History   -Patient feeling well this morning, no more diarrhea at this time, denies any chest pain fever chills nausea vomiting abdominal pain dysuria, headache dizziness or lightheadedness.      Assessment & Plan      Summary: Vanessa Huffman is a 68 year old female with PMH gastric bypass, chronic diarrhea, HTN, migraines who was admitted on 3/1/2019 with sepsis and found to have ELISE, liver abscess, and Klebsiella bacteremia. Liver biopsy negative for malignancy. S/p drain placement 3/8/2019. Symptomatically improving now.    Sepsis secondary to liver abscess/mass  Klebsiella bacteremia  Persistent leukocytosis, improving  Patient presented with procal 60, , hypotension. CT abd pelvis showed large liver mass. Started on Vanc and Zosyn. MRCP liver didn't give any further info on the mass due to lack of contrast  Liver biopsy 3/4 showed hemorrhagic necrosis with acute inflammatory changes, no malignant cells identified, this is likely primarily a liver abscess. Blood cultures also positive for Klebsiella, source likely from abscess. WBC up to 55k this admission, however improving since 3/12 now down to 16k as of 3/14. ID and GI consulted, lower concern for cholangitis. S/p drain placement by interventional radiology on 3/7/2019. Repeat biopsy 3/7 negative for malignancy as well. Malignancy cannot be completely ruled out.   Repeat CT abdomen 3/13 showed unchanged size of liver abscess. However, given her clinical improvement, plan is now to continue current management with drainage and IV antibiotics.  - ID consulted, appreciate recs   - IV Zosyn for now, IV ceftriaxone at discharge  - General surgery following, appreciate recs   - No further surgical management at this time  - Startrd on Lasix 20mg PO daily on 3/15/2019 for persistent BLE pitting edema which is new since her illness, may be related to liver dysfunction.  Patient was in ATN  receive IV fluids have low albumin abnormal liver function, all adding her to have fluid retention as well as she is about 7.5 L positive balance.  Her ATN is improving, I will stop the oral Lasix and will give a dose of IV Lasix 60 mg times once now we will see the response, and check a BMP again tomorrow.  I will consult the therapy for lymphedema replacement.  - Consider CT or MRI abdomen with contrast once creatinine reaches a certain threshold (1.5? 1.2? to be determined) that can be done as an outpatient.    ELISE, likely ATN: Baseline Cr ~0.7, on admission Cr was 4.27. Improved slowly with hydration. Nephrology consulted, suspect primarily ATN. Creatinine gradually improved to 1.9 as of 3/14/2019.  - Avoid nephrotoxic drugs, started on IV Lasix give a dose of 60 mg times once today.  Will assess daily for the need of Lasix at this time.  While she is in the hospital.    Acute GI bleeding secondary to anastomotic ulcer, improved  Acute blood loss anemia secondary to acute GI bleed  Hgb drop 12-->6 while inpatient. Was given 2 units of packed RBCs on 3/8/2019. Patient had a EGD done on 3/8/2019, showed crater ulcer with visible vessel and actively bleeding. Treated with injection 1: 10,000 epinephrine and Hemoclip. Hgb downtrending slightly to 7.1 on 3/14/2019, but stools have been brown.  - GI consulted, louie monsalve, signed off 3/13  - PPI PO + Carafate  - Transfuse for Hgb  < 7.0    Overall her hemoglobin is stable, no more melanotic stool at this time.  Or hematemesis or hematochezia at this time    Hx gastric bypass  Chronic diarrhea  PTA on high dose Immodium--15 tabs a day. Currently on oxycodone   - Lomotil started TID WM, stop if any evidence of bleeding bad    Thrombocytopenia secondary to sepsis, resolved: HIT ruled out. Hemolysis also evaluated and ruled out.    DVT Prophylaxis: Pneumatic Compression Devices. Avoid subcutaneous heparin while Hgb is low in 7s  Code Status: Full Code  PT/OT:  Ordered    Disposition: Expected discharge around 3/18 TCU (already accepted), surgery no further management, dispo per ID    Patient is doing well at this time, continue with the drain in the right liver abscess.  Most likely will need a sinogram as an outpatient, continue with IV Zosyn while she is in the hospital.  Switch to IV ceftriaxone discharge  Give a dose of IV Lasix 60 mg times once now  Therapy for lymphedema wrap  PT and OT consult    Ruben Barksdale MD  Hospitalist/Internal Medicine.   -Data reviewed today: I reviewed all new labs and imaging results over the last 24 hours.    Physical Exam   Temp: 96.9  F (36.1  C) Temp src: Axillary BP: 134/47   Heart Rate: 70 Resp: 20 SpO2: 100 % O2 Device: None (Room air)    Vitals:    03/01/19 1845 03/09/19 0600 03/16/19 0552   Weight: 99.6 kg (219 lb 9.3 oz) 111.2 kg (245 lb 2.4 oz) 115.7 kg (255 lb)     Vital Signs with Ranges  Temp:  [96.9  F (36.1  C)-98  F (36.7  C)] 96.9  F (36.1  C)  Heart Rate:  [70-80] 70  Resp:  [18-20] 20  BP: (126-134)/(47-55) 134/47  SpO2:  [99 %-100 %] 100 %  I/O last 3 completed shifts:  In: 140 [P.O.:120; Other:20]  Out: 1730 [Urine:1650; Drains:80]  O2 requirements: none    Constitutional: Obese female in NAD  Cardiovascular: RRR, normal S1/2, no m/r/g  Respiratory: CTAB  Vascular: 2+ BLE pitting edema  GI: Normoactive bowel sounds, nontender left side, right side BILL drain noted with purulent draining material  Neuro/Psych: Appropriate affect and mood. A&Ox3, moves all extremities    Medications     - MEDICATION INSTRUCTIONS -         diphenoxylate-atropine  1 tablet Oral TID w/meals     ferrous sulfate  325 mg Oral BID w/meals     latanoprost  1 drop Both Eyes At Bedtime     pantoprazole  40 mg Oral BID AC     piperacillin-tazobactam  2.25 g Intravenous Q6H     sodium chloride (PF)  10 mL Irrigation Q8H     sodium chloride (PF)  10 mL Intracatheter Q8H     sucralfate  1 g Oral BID AC       Data   Recent Labs   Lab 03/16/19  4814  03/15/19  1640 03/15/19  0523 03/14/19  0625   WBC 12.4*  --  16.4* 23.1*   HGB 7.4*  --  7.2* 7.1*   MCV 91  --  89 88     --  377 312     --  143 140   POTASSIUM 3.8 4.0 3.2* 3.4   CHLORIDE 114*  --  116* 112*   CO2 19*  --  18* 20   BUN 32*  --  33* 44*   CR 1.90*  --  1.94* 2.28*   ANIONGAP 8  --  9 8   DANNA 7.8*  --  7.0* 7.3*   *  --  82 92   ALBUMIN 1.5*  --  1.3*  --    PROTTOTAL 5.5*  --  5.0*  --    BILITOTAL 1.8*  --  1.8*  --    ALKPHOS 473*  --  443*  --    ALT 45  --  25  --    AST 78*  --  42  --        Imaging:   No results found for this or any previous visit (from the past 24 hour(s)).

## 2019-03-16 NOTE — PLAN OF CARE
Shift Update: 3-7pm A&O, up with SBA and walker, BILL drain to right abd, orders to irrigate Q8hrs, purulent/yellow output, skin slightly jaundiced with scleral icterus, red rash to abd and groin folds, skin care BID, interdry replaced as it was soiled, pt prefers skin care before bed, up to BSC, small brown BM, following Hgb, EGD with ulcer clipping back on 3/8, +2 edema, lasix scheduled to start tomorrow, creat improving 1.94, WBC improving 16.4, Hgb 7.2, K 3.2 replaced and recheck 4.0, lomotil started, plan for discharge to TCU 3/18-

## 2019-03-16 NOTE — PROGRESS NOTES
"General Surgery Progress Note    Subjective: Vanessa reports she is feeling fine. Denies abdominal pain. No nausea. Expects she will discharge to rehab tomorrow.     Objective: /47 (BP Location: Left arm)   Pulse 86   Temp 96.9  F (36.1  C) (Axillary)   Resp 20   Ht 1.626 m (5' 4\")   Wt 115.7 kg (255 lb)   SpO2 100%   BMI 43.77 kg/m    Gen: alert, no distress  CV: RRR  Pulm: nonlabored breathing  Abd: mildly distended, but soft. BILL drain in RUQ with purulent fluid in bulb. 80ml/24hrs  Ext: WWP    Assessment/Plan:   Vanessa Huffman  is a 68 year old female admitted with hepatic abscess s/p IR drainage. Improving with drainage and abx. No new surgical recommendations. Continue drain until minimal output or per IR. Agree that a sinogram would be beneficial next week, can be done as an outpatient.     Leyda Borja MD  [facility]  [phone]              "

## 2019-03-16 NOTE — PLAN OF CARE
PT: attempted session. Patient just returned to bed after using BSC. Too fatigued to participate at this time.

## 2019-03-16 NOTE — PLAN OF CARE
Pt A/Ox4. VSS on RA. BILL drain to R abd, 30ml purulent/yellow output, irrigate Q8hrs. Red rash to abd and groin folds. Skin care done, Interdry reapplied. Pt up with assist of 1 to BSC. C/O pain in the RLQ managed with PRN Oxycodone. Lasix scheduled to start today. Plan for discharge to TCU on 3/18.

## 2019-03-16 NOTE — PROGRESS NOTES
MD Notification    Notified Person: hospitalist    Notified Person Name:  Dr. Barksdale    Notification Date/Time: 3/16/19 1000    Notification Interaction: Face to face    Purpose of Notification: Verifying safety of acetaminophen for pt    Orders Received: 2 grams Tylenol/24 hrs is safe    Comments:

## 2019-03-16 NOTE — PROGRESS NOTES
MARYAM  I: MARYAM was updated by RN that patient's family would like SW to check with bed availability at [facility]. Family is ok with Private room fee of $36 per day. SW sent referral and will follow up with family once assessment is complete.     P: MARYAM will continue to follow and assist as needed.    Radha Grissom, THOM   [phone]

## 2019-03-17 ENCOUNTER — APPOINTMENT (OUTPATIENT)
Dept: PHYSICAL THERAPY | Facility: CLINIC | Age: 69
DRG: 871 | End: 2019-03-17
Payer: MEDICARE

## 2019-03-17 LAB
ABO + RH BLD: NORMAL
ABO + RH BLD: NORMAL
ALBUMIN SERPL-MCNC: 1.5 G/DL (ref 3.4–5)
ANION GAP SERPL CALCULATED.3IONS-SCNC: 7 MMOL/L (ref 3–14)
BASOPHILS # BLD AUTO: 0.1 10E9/L (ref 0–0.2)
BASOPHILS NFR BLD AUTO: 0.6 %
BLD GP AB SCN SERPL QL: NORMAL
BLD PROD TYP BPU: NORMAL
BLD UNIT ID BPU: 0
BLD UNIT ID BPU: 0
BLOOD BANK CMNT PATIENT-IMP: NORMAL
BLOOD PRODUCT CODE: NORMAL
BLOOD PRODUCT CODE: NORMAL
BPU ID: NORMAL
BPU ID: NORMAL
BUN SERPL-MCNC: 29 MG/DL (ref 7–30)
CALCIUM SERPL-MCNC: 7.6 MG/DL (ref 8.5–10.1)
CHLORIDE SERPL-SCNC: 111 MMOL/L (ref 94–109)
CO2 SERPL-SCNC: 23 MMOL/L (ref 20–32)
CREAT SERPL-MCNC: 1.74 MG/DL (ref 0.52–1.04)
DIFFERENTIAL METHOD BLD: ABNORMAL
EOSINOPHIL # BLD AUTO: 0.2 10E9/L (ref 0–0.7)
EOSINOPHIL NFR BLD AUTO: 2 %
ERYTHROCYTE [DISTWIDTH] IN BLOOD BY AUTOMATED COUNT: 18.5 % (ref 10–15)
GFR SERPL CREATININE-BSD FRML MDRD: 30 ML/MIN/{1.73_M2}
GLUCOSE SERPL-MCNC: 86 MG/DL (ref 70–99)
HCT VFR BLD AUTO: 21.8 % (ref 35–47)
HGB BLD-MCNC: 10.9 G/DL (ref 11.7–15.7)
HGB BLD-MCNC: 6.8 G/DL (ref 11.7–15.7)
IMM GRANULOCYTES # BLD: 0 10E9/L (ref 0–0.4)
IMM GRANULOCYTES NFR BLD: 0.1 %
LYMPHOCYTES # BLD AUTO: 0.6 10E9/L (ref 0.8–5.3)
LYMPHOCYTES NFR BLD AUTO: 7.7 %
MCH RBC QN AUTO: 29.1 PG (ref 26.5–33)
MCHC RBC AUTO-ENTMCNC: 31.2 G/DL (ref 31.5–36.5)
MCV RBC AUTO: 93 FL (ref 78–100)
MONOCYTES # BLD AUTO: 0.3 10E9/L (ref 0–1.3)
MONOCYTES NFR BLD AUTO: 3.1 %
NEUTROPHILS # BLD AUTO: 6.9 10E9/L (ref 1.6–8.3)
NEUTROPHILS NFR BLD AUTO: 86.5 %
NUM BPU REQUESTED: 2
PHOSPHATE SERPL-MCNC: 4 MG/DL (ref 2.5–4.5)
PLATELET # BLD AUTO: 382 10E9/L (ref 150–450)
POTASSIUM SERPL-SCNC: 3.3 MMOL/L (ref 3.4–5.3)
RBC # BLD AUTO: 2.34 10E12/L (ref 3.8–5.2)
SODIUM SERPL-SCNC: 141 MMOL/L (ref 133–144)
SPECIMEN EXP DATE BLD: NORMAL
TRANSFUSION STATUS PATIENT QL: NORMAL
WBC # BLD AUTO: 7.9 10E9/L (ref 4–11)

## 2019-03-17 PROCEDURE — A9270 NON-COVERED ITEM OR SERVICE: HCPCS | Mod: GY | Performed by: INTERNAL MEDICINE

## 2019-03-17 PROCEDURE — 25000132 ZZH RX MED GY IP 250 OP 250 PS 637: Mod: GY | Performed by: INTERNAL MEDICINE

## 2019-03-17 PROCEDURE — 99207 ZZC CDG-MDM COMPONENT: MEETS LOW - DOWN CODED: CPT | Performed by: INTERNAL MEDICINE

## 2019-03-17 PROCEDURE — 97116 GAIT TRAINING THERAPY: CPT | Mod: GP

## 2019-03-17 PROCEDURE — 25000128 H RX IP 250 OP 636: Performed by: INTERNAL MEDICINE

## 2019-03-17 PROCEDURE — 85018 HEMOGLOBIN: CPT | Performed by: INTERNAL MEDICINE

## 2019-03-17 PROCEDURE — 86901 BLOOD TYPING SEROLOGIC RH(D): CPT | Performed by: INTERNAL MEDICINE

## 2019-03-17 PROCEDURE — 80069 RENAL FUNCTION PANEL: CPT | Performed by: INTERNAL MEDICINE

## 2019-03-17 PROCEDURE — 40000239 ZZH STATISTIC VAT ROUNDS

## 2019-03-17 PROCEDURE — 86900 BLOOD TYPING SEROLOGIC ABO: CPT | Performed by: INTERNAL MEDICINE

## 2019-03-17 PROCEDURE — 86923 COMPATIBILITY TEST ELECTRIC: CPT | Performed by: INTERNAL MEDICINE

## 2019-03-17 PROCEDURE — P9016 RBC LEUKOCYTES REDUCED: HCPCS | Performed by: INTERNAL MEDICINE

## 2019-03-17 PROCEDURE — 86850 RBC ANTIBODY SCREEN: CPT | Performed by: INTERNAL MEDICINE

## 2019-03-17 PROCEDURE — 12000000 ZZH R&B MED SURG/OB

## 2019-03-17 PROCEDURE — 99233 SBSQ HOSP IP/OBS HIGH 50: CPT | Performed by: INTERNAL MEDICINE

## 2019-03-17 PROCEDURE — 85025 COMPLETE CBC W/AUTO DIFF WBC: CPT | Performed by: INTERNAL MEDICINE

## 2019-03-17 RX ORDER — POTASSIUM CHLORIDE 1500 MG/1
20 TABLET, EXTENDED RELEASE ORAL ONCE
Status: COMPLETED | OUTPATIENT
Start: 2019-03-17 | End: 2019-03-17

## 2019-03-17 RX ORDER — FUROSEMIDE 10 MG/ML
40 INJECTION INTRAMUSCULAR; INTRAVENOUS
Status: DISCONTINUED | OUTPATIENT
Start: 2019-03-17 | End: 2019-03-18

## 2019-03-17 RX ADMIN — MICONAZOLE NITRATE: 2 POWDER TOPICAL at 03:04

## 2019-03-17 RX ADMIN — OXYCODONE HYDROCHLORIDE 5 MG: 5 TABLET ORAL at 18:59

## 2019-03-17 RX ADMIN — DIPHENOXYLATE HYDROCHLORIDE AND ATROPINE SULFATE 1 TABLET: 2.5; .025 TABLET ORAL at 18:15

## 2019-03-17 RX ADMIN — LATANOPROST 1 DROP: 50 SOLUTION/ DROPS OPHTHALMIC at 21:13

## 2019-03-17 RX ADMIN — OXYCODONE HYDROCHLORIDE 5 MG: 5 TABLET ORAL at 03:04

## 2019-03-17 RX ADMIN — DIPHENOXYLATE HYDROCHLORIDE AND ATROPINE SULFATE 1 TABLET: 2.5; .025 TABLET ORAL at 08:10

## 2019-03-17 RX ADMIN — FUROSEMIDE 40 MG: 10 INJECTION, SOLUTION INTRAVENOUS at 17:04

## 2019-03-17 RX ADMIN — TEMAZEPAM 7.5 MG: 7.5 CAPSULE ORAL at 21:12

## 2019-03-17 RX ADMIN — PIPERACILLIN AND TAZOBACTAM 2.25 G: 2; .25 INJECTION, POWDER, FOR SOLUTION INTRAVENOUS at 16:22

## 2019-03-17 RX ADMIN — PANTOPRAZOLE SODIUM 40 MG: 40 TABLET, DELAYED RELEASE ORAL at 16:21

## 2019-03-17 RX ADMIN — PIPERACILLIN AND TAZOBACTAM 2.25 G: 2; .25 INJECTION, POWDER, FOR SOLUTION INTRAVENOUS at 21:13

## 2019-03-17 RX ADMIN — FUROSEMIDE 40 MG: 10 INJECTION, SOLUTION INTRAVENOUS at 08:09

## 2019-03-17 RX ADMIN — SUCRALFATE 1 G: 1 SUSPENSION ORAL at 06:30

## 2019-03-17 RX ADMIN — FERROUS SULFATE TAB 325 MG (65 MG ELEMENTAL FE) 325 MG: 325 (65 FE) TAB at 18:15

## 2019-03-17 RX ADMIN — PIPERACILLIN AND TAZOBACTAM 2.25 G: 2; .25 INJECTION, POWDER, FOR SOLUTION INTRAVENOUS at 03:41

## 2019-03-17 RX ADMIN — PIPERACILLIN AND TAZOBACTAM 2.25 G: 2; .25 INJECTION, POWDER, FOR SOLUTION INTRAVENOUS at 12:05

## 2019-03-17 RX ADMIN — FERROUS SULFATE TAB 325 MG (65 MG ELEMENTAL FE) 325 MG: 325 (65 FE) TAB at 08:10

## 2019-03-17 RX ADMIN — SUCRALFATE 1 G: 1 SUSPENSION ORAL at 18:15

## 2019-03-17 RX ADMIN — PANTOPRAZOLE SODIUM 40 MG: 40 TABLET, DELAYED RELEASE ORAL at 06:30

## 2019-03-17 RX ADMIN — POTASSIUM CHLORIDE 20 MEQ: 1500 TABLET, EXTENDED RELEASE ORAL at 08:10

## 2019-03-17 RX ADMIN — DIPHENOXYLATE HYDROCHLORIDE AND ATROPINE SULFATE 1 TABLET: 2.5; .025 TABLET ORAL at 12:54

## 2019-03-17 RX ADMIN — OXYCODONE HYDROCHLORIDE 5 MG: 5 TABLET ORAL at 10:54

## 2019-03-17 RX ADMIN — OXYCODONE HYDROCHLORIDE 5 MG: 5 TABLET ORAL at 22:02

## 2019-03-17 ASSESSMENT — ACTIVITIES OF DAILY LIVING (ADL)
ADLS_ACUITY_SCORE: 14
ADLS_ACUITY_SCORE: 13
ADLS_ACUITY_SCORE: 14
ADLS_ACUITY_SCORE: 14

## 2019-03-17 NOTE — PLAN OF CARE
Pt is A&O. Received PRN oxycodone x1 for pain at RUQ drain site. Mild HUNTLEY noted. Diuresing well after IV Lasix. Up to BSC frequently with SBA. Poor PO intake. Minimal drainage from BILL drain. Will discharge to TCU in about 2 days.

## 2019-03-17 NOTE — PROGRESS NOTES
[facility]    Hospitalist Progress Note    Interval History   -Disappointed this morning that her hemoglobin drops.  Has some dark colored stool this morning.  Abdominal pain is in good control, denies any fever chills nausea vomiting headache dizziness lightheadedness.    No other significant events overnight      Assessment & Plan      Summary: Vanessa Huffman is a 68 year old female with PMH gastric bypass, chronic diarrhea, HTN, migraines who was admitted on 3/1/2019 with sepsis and found to have ELISE, liver abscess, and Klebsiella bacteremia. Liver biopsy negative for malignancy. S/p drain placement 3/8/2019. Symptomatically improving now.    Sepsis secondary to liver abscess/mass  Klebsiella bacteremia  Persistent leukocytosis, improving  Patient presented with procal 60, , hypotension. CT abd pelvis showed large liver mass. Started on Vanc and Zosyn. MRCP liver didn't give any further info on the mass due to lack of contrast  Liver biopsy 3/4 showed hemorrhagic necrosis with acute inflammatory changes, no malignant cells identified, this is likely primarily a liver abscess. Blood cultures also positive for Klebsiella, source likely from abscess. WBC up to 55k this admission, however improving since 3/12 now down to 16k as of 3/14. ID and GI consulted, lower concern for cholangitis. S/p drain placement by interventional radiology on 3/7/2019. Repeat biopsy 3/7 negative for malignancy as well. Malignancy cannot be completely ruled out.   Repeat CT abdomen 3/13 showed unchanged size of liver abscess. However, given her clinical improvement, plan is now to continue current management with drainage and IV antibiotics.  - ID consulted, appreciate recs   - IV Zosyn for now, IV ceftriaxone at discharge  - General surgery following, appreciate recs   - No further surgical management at this time  - Startrd on Lasix 20mg PO daily on 3/15/2019 for persistent BLE pitting edema which is new since  her illness, may be related to liver dysfunction.  Patient was in ATN receive IV fluids have low albumin abnormal liver function, all adding her to have fluid retention as well as she is about 7.5 L positive balance on 3/16/2090.  Her ATN is improving, I  stop the oral Lasix and gave her a dose of IV Lasix 60 mg times once on 3/16/2019, the patient responded very well to the dose of Lasix, she had good urine output and a greater than improve as well.  I will start her on Lasix 40 mg twice daily today on 3/17/2019, therapy is consulted for lymphedema wrap.  - Consider CT or MRI abdomen with contrast once creatinine reaches a certain threshold (1.5? 1.2? to be determined) that can be done as an outpatient.    ELISE, likely ATN: Baseline Cr ~0.7, on admission Cr was 4.27. Improved slowly with hydration. Nephrology consulted, suspect primarily ATN. Creatinine gradually improved to 1.9 as of 3/14/2019.  - Avoid nephrotoxic drugs, started on IV Lasix gave a dose of 60 mg times once 3/16/2019, she responded very well to the treatments have good urine output and a creatinine improved.  I will start on IV Lasix 40 mm twice daily from today 3/17/2019..      Acute GI bleeding secondary to anastomotic ulcer, improved  Acute blood loss anemia secondary to acute GI bleed  Hgb drop 12-->6 while inpatient. Was given 2 units of packed RBCs on 3/8/2019. Patient had a EGD done on 3/8/2019, showed crater ulcer with visible vessel and actively bleeding. Treated with injection 1: 10,000 epinephrine and Hemoclip. Hgb downtrending slightly to 7.1 on 3/14/2019, but stools have been brown.  - GI consulted, appreciate recs, signed off 3/13  - PPI PO + Carafate  - Transfuse for Hgb  < 7.0    Her hemoglobin dropped to 6.8 this morning, has some dark colored stool this morning as well.  I will go ahead and check for stool for occult blood, give her 2 units of packed RBCs on 3/17/2019 and reconsult the GI to evaluate the patient given the high risk  of anastomotic ulcer.    Hx gastric bypass  Chronic diarrhea  PTA on high dose Immodium--15 tabs a day. Currently on oxycodone   - Lomotil started TID WM, stop if any evidence of bleeding bad    Thrombocytopenia secondary to sepsis, resolved: HIT ruled out. Hemolysis also evaluated and ruled out.    DVT Prophylaxis: Pneumatic Compression Devices. Avoid subcutaneous heparin while Hgb is low in 7s  Code Status: Full Code  PT/OT: Ordered    Disposition: Expected discharge around 3/18 TCU (already accepted), surgery no further management, dispo per ID    Patient is doing well at this time, continue with the drain in the right liver abscess.  Most likely will need a abscessogram as an outpatient, continue with IV Zosyn while she is in the hospital.  Switch to IV ceftriaxone discharge  Started on IV Lasix 40 mg twice daily.  Transfused 2 units of packed RBCs.  Consult GI to evaluate the patient.  Therapy for lymphedema wrap  PT and OT consult    Ruben Barksdale MD  Hospitalist/Internal Medicine.   -Data reviewed today: I reviewed all new labs and imaging results over the last 24 hours.    Physical Exam   Temp: 96.6  F (35.9  C) Temp src: Axillary BP: 134/52 Pulse: 76 Heart Rate: 94 Resp: 16 SpO2: 98 % O2 Device: None (Room air) Oxygen Delivery: Other (Comments)  Vitals:    03/01/19 1845 03/09/19 0600 03/16/19 0552   Weight: 99.6 kg (219 lb 9.3 oz) 111.2 kg (245 lb 2.4 oz) 115.7 kg (255 lb)     Vital Signs with Ranges  Temp:  [96.3  F (35.7  C)-97.6  F (36.4  C)] 96.6  F (35.9  C)  Pulse:  [76] 76  Heart Rate:  [70-94] 94  Resp:  [16-18] 16  BP: (132-139)/(47-61) 134/52  SpO2:  [98 %-100 %] 98 %  I/O last 3 completed shifts:  In: 570 [P.O.:530; I.V.:10; Other:30]  Out: 4335 [Urine:4275; Drains:60]  O2 requirements: none    Constitutional: Obese female in NAD  Cardiovascular: RRR, normal S1/2, no m/r/g  Respiratory: CTAB  Vascular: 2+ BLE pitting edema  GI: Normoactive bowel sounds, nontender left side, right side BILL drain  noted with purulent draining material  Neuro/Psych: Appropriate affect and mood. A&Ox3, moves all extremities    Medications     - MEDICATION INSTRUCTIONS -         diphenoxylate-atropine  1 tablet Oral TID w/meals     ferrous sulfate  325 mg Oral BID w/meals     furosemide  40 mg Intravenous BID     latanoprost  1 drop Both Eyes At Bedtime     pantoprazole  40 mg Oral BID AC     piperacillin-tazobactam  2.25 g Intravenous Q6H     sodium chloride (PF)  10 mL Irrigation Q8H     sodium chloride (PF)  10 mL Intracatheter Q8H     sucralfate  1 g Oral BID AC       Data   Recent Labs   Lab 03/17/19  0600 03/16/19  0630 03/15/19  1640 03/15/19  0523   WBC 7.9 12.4*  --  16.4*   HGB 6.8* 7.4*  --  7.2*   MCV 93 91  --  89    383  --  377    141  --  143   POTASSIUM 3.3* 3.8 4.0 3.2*   CHLORIDE 111* 114*  --  116*   CO2 23 19*  --  18*   BUN 29 32*  --  33*   CR 1.74* 1.90*  --  1.94*   ANIONGAP 7 8  --  9   DANNA 7.6* 7.8*  --  7.0*   GLC 86 119*  --  82   ALBUMIN 1.5* 1.5*  --  1.3*   PROTTOTAL  --  5.5*  --  5.0*   BILITOTAL  --  1.8*  --  1.8*   ALKPHOS  --  473*  --  443*   ALT  --  45  --  25   AST  --  78*  --  42       Imaging:   No results found for this or any previous visit (from the past 24 hour(s)).

## 2019-03-17 NOTE — PROGRESS NOTES
"General Surgery Progress Note    Subjective: pt having bowel movement at time of my visit. hgb 6.8 this am (7.4yesterday, 7.1 day prior). Few dark bms. Vitally stable. Hepatic drain with purulent output.     Objective: /64 (BP Location: Left arm)   Pulse 76   Temp 96.9  F (36.1  C) (Oral)   Resp 18   Ht 1.626 m (5' 4\")   Wt 115.7 kg (255 lb)   SpO2 99%   BMI 43.77 kg/m    Gen: alert, no distress  CV: RRR  Pulm: nonlabored breathing  Abd: soft, osmar with purulent fluid in bulb  Ext: WWP    Assessment/Plan:   Vanessa Huffman  is a 68 year old female with hepatic abscess and GI bleed s/p endoscopy and clipping. hgb down slightly, agree with transfusion. Follow up hgb this afternoon after transfusion.   - continue drain  - no further recommendations    Leyda Borja MD  [facility]  [phone]              "

## 2019-03-17 NOTE — PLAN OF CARE
RN:  Patient A/O x3 with periods of forgetfulness.  VSS on RA.  Receiving a total of 2 units of blood for a hemoglobin of 6.8.  No signs of active bleeding.  GI consult placed.  Tolerating diet.  Up with SBA/walker to the BSC.  Frequently voiding--at least once/hour.  Small BM today.  BILL drain in right abdominal quadrant patent with milky/tan contents. Wound care to abdominal areas completed this afternoon per orders.  No new open areas assessed.  Oxycodone x1 for complaints of pain at BILL site with relief.  Patient able to call and state needs.  Discharge pending progress and dispo needs of the patient.

## 2019-03-17 NOTE — PLAN OF CARE
OT: Order received, chart reviewed. Attempted eval x2 this morning; however, pt with nursing for cares and then MD visit. Noted in chart, pt with low HGB (6.8) and receiving blood transfusion. Per chart, possible discharge to TCU tomorrow.

## 2019-03-17 NOTE — PLAN OF CARE
A/ox4, pleasant. VSS on RA. C/o RUQ pain, PRN Oxycodone given x1. BILL drain to RUQ with milky, yellow output, irrigated per orders with 15mL output. Red rash to abdomen and abdominal folds, skin care completed this evening per orders. BLE +3, elevated with pillows, Lymph consulted. HUNTLEY. Regular diet, poor appetite, up in chair for meals. Up with SBA and walker to BSC. Voiding frequently this evening, urine  yellow/minesh colored. Small BMs each time to BSC, on scheduled Lomotil. L PICC with intermittent abx, red lumen with brisk blood return and purple lumen unable to flush. Plan to discharge to TCU on Monday. Nursing to continue to monitor.

## 2019-03-17 NOTE — PLAN OF CARE
Discharge Planner PT   Patient plan for discharge: TCU  Current status: Patient on BSC upon arrival. Assisted patient with clothing management and nikita-care. Sit <> stand transfer x 2 to FWW completed with SBA. Patient agreeable to ambulation in room only due to frequency of voiding. Ambulated 15 ft x 2 with FWW and SBA. Seated rest break to converse with MD. Up in chair at end of session. LEs elevated.  Barriers to return to prior living situation: Fall risk, Level of assistance needed, Decreased strength and activity tolerance.   Recommendations for discharge: TCU per plan established by the PT.  Rationale for recommendations: Pt will benefit from continued skilled PT at a TCU improve safety and IND with functional mobility and to decrease burden of care.  Entered by: María Barrientos 03/17/2019 8:59 AM

## 2019-03-17 NOTE — PLAN OF CARE
Pt is A&Ox4. VSS on room air. C/o right sided abdominal pain, managed with PRN Oxycodone. Regular diet, denied nausea. HUNTLEY. Right BILL drain, irrigated per orders, yellow milky output. BLE +2 edema, elevated on pillows. Redness under abdominal folds, interdry and miconazole powder used. Assist of 1+walker to BSC. R PICC SL. Rested well overnight.

## 2019-03-18 ENCOUNTER — APPOINTMENT (OUTPATIENT)
Dept: OCCUPATIONAL THERAPY | Facility: CLINIC | Age: 69
DRG: 871 | End: 2019-03-18
Attending: INTERNAL MEDICINE
Payer: MEDICARE

## 2019-03-18 ENCOUNTER — APPOINTMENT (OUTPATIENT)
Dept: CT IMAGING | Facility: CLINIC | Age: 69
DRG: 871 | End: 2019-03-18
Attending: INTERNAL MEDICINE
Payer: MEDICARE

## 2019-03-18 LAB
ALBUMIN SERPL-MCNC: 1.6 G/DL (ref 3.4–5)
ALP SERPL-CCNC: 401 U/L (ref 40–150)
ALT SERPL W P-5'-P-CCNC: 88 U/L (ref 0–50)
ANION GAP SERPL CALCULATED.3IONS-SCNC: 9 MMOL/L (ref 3–14)
ANION GAP SERPL CALCULATED.3IONS-SCNC: 9 MMOL/L (ref 3–14)
AST SERPL W P-5'-P-CCNC: 131 U/L (ref 0–45)
BASOPHILS # BLD AUTO: 0.1 10E9/L (ref 0–0.2)
BASOPHILS NFR BLD AUTO: 1.1 %
BILIRUB DIRECT SERPL-MCNC: 1.2 MG/DL (ref 0–0.2)
BILIRUB SERPL-MCNC: 1.5 MG/DL (ref 0.2–1.3)
BUN SERPL-MCNC: 28 MG/DL (ref 7–30)
BUN SERPL-MCNC: 29 MG/DL (ref 7–30)
CALCIUM SERPL-MCNC: 7.6 MG/DL (ref 8.5–10.1)
CALCIUM SERPL-MCNC: 7.8 MG/DL (ref 8.5–10.1)
CHLORIDE SERPL-SCNC: 106 MMOL/L (ref 94–109)
CHLORIDE SERPL-SCNC: 107 MMOL/L (ref 94–109)
CO2 SERPL-SCNC: 24 MMOL/L (ref 20–32)
CO2 SERPL-SCNC: 24 MMOL/L (ref 20–32)
CREAT SERPL-MCNC: 1.59 MG/DL (ref 0.52–1.04)
CREAT SERPL-MCNC: 1.69 MG/DL (ref 0.52–1.04)
DIFFERENTIAL METHOD BLD: ABNORMAL
EOSINOPHIL # BLD AUTO: 0.2 10E9/L (ref 0–0.7)
EOSINOPHIL NFR BLD AUTO: 2.7 %
ERYTHROCYTE [DISTWIDTH] IN BLOOD BY AUTOMATED COUNT: 17.4 % (ref 10–15)
GFR SERPL CREATININE-BSD FRML MDRD: 31 ML/MIN/{1.73_M2}
GFR SERPL CREATININE-BSD FRML MDRD: 33 ML/MIN/{1.73_M2}
GLUCOSE SERPL-MCNC: 84 MG/DL (ref 70–99)
GLUCOSE SERPL-MCNC: 90 MG/DL (ref 70–99)
HCT VFR BLD AUTO: 28.1 % (ref 35–47)
HGB BLD-MCNC: 9.4 G/DL (ref 11.7–15.7)
IMM GRANULOCYTES # BLD: 0 10E9/L (ref 0–0.4)
IMM GRANULOCYTES NFR BLD: 0.1 %
LYMPHOCYTES # BLD AUTO: 0.8 10E9/L (ref 0.8–5.3)
LYMPHOCYTES NFR BLD AUTO: 10.3 %
MAGNESIUM SERPL-MCNC: 1.6 MG/DL (ref 1.6–2.3)
MCH RBC QN AUTO: 29.6 PG (ref 26.5–33)
MCHC RBC AUTO-ENTMCNC: 33.5 G/DL (ref 31.5–36.5)
MCV RBC AUTO: 88 FL (ref 78–100)
MONOCYTES # BLD AUTO: 0.4 10E9/L (ref 0–1.3)
MONOCYTES NFR BLD AUTO: 4.9 %
NEUTROPHILS # BLD AUTO: 6 10E9/L (ref 1.6–8.3)
NEUTROPHILS NFR BLD AUTO: 80.9 %
NRBC # BLD AUTO: 0 10*3/UL
NRBC BLD AUTO-RTO: 0 /100
PHOSPHATE SERPL-MCNC: 4.2 MG/DL (ref 2.5–4.5)
PLATELET # BLD AUTO: 281 10E9/L (ref 150–450)
POTASSIUM SERPL-SCNC: 3.1 MMOL/L (ref 3.4–5.3)
POTASSIUM SERPL-SCNC: 3.2 MMOL/L (ref 3.4–5.3)
POTASSIUM SERPL-SCNC: 3.2 MMOL/L (ref 3.4–5.3)
PROT SERPL-MCNC: 5.5 G/DL (ref 6.8–8.8)
RBC # BLD AUTO: 3.18 10E12/L (ref 3.8–5.2)
SODIUM SERPL-SCNC: 139 MMOL/L (ref 133–144)
SODIUM SERPL-SCNC: 140 MMOL/L (ref 133–144)
WBC # BLD AUTO: 7.4 10E9/L (ref 4–11)

## 2019-03-18 PROCEDURE — 80048 BASIC METABOLIC PNL TOTAL CA: CPT | Performed by: INTERNAL MEDICINE

## 2019-03-18 PROCEDURE — 40000239 ZZH STATISTIC VAT ROUNDS

## 2019-03-18 PROCEDURE — 97165 OT EVAL LOW COMPLEX 30 MIN: CPT | Mod: GO | Performed by: OCCUPATIONAL THERAPIST

## 2019-03-18 PROCEDURE — A9270 NON-COVERED ITEM OR SERVICE: HCPCS | Mod: GY | Performed by: INTERNAL MEDICINE

## 2019-03-18 PROCEDURE — 25000128 H RX IP 250 OP 636: Performed by: INTERNAL MEDICINE

## 2019-03-18 PROCEDURE — 25000128 H RX IP 250 OP 636

## 2019-03-18 PROCEDURE — 84155 ASSAY OF PROTEIN SERUM: CPT | Performed by: INTERNAL MEDICINE

## 2019-03-18 PROCEDURE — 84132 ASSAY OF SERUM POTASSIUM: CPT | Performed by: INTERNAL MEDICINE

## 2019-03-18 PROCEDURE — 84460 ALANINE AMINO (ALT) (SGPT): CPT | Performed by: INTERNAL MEDICINE

## 2019-03-18 PROCEDURE — 85025 COMPLETE CBC W/AUTO DIFF WBC: CPT | Performed by: INTERNAL MEDICINE

## 2019-03-18 PROCEDURE — 99232 SBSQ HOSP IP/OBS MODERATE 35: CPT | Performed by: INTERNAL MEDICINE

## 2019-03-18 PROCEDURE — 84450 TRANSFERASE (AST) (SGOT): CPT | Performed by: INTERNAL MEDICINE

## 2019-03-18 PROCEDURE — 82247 BILIRUBIN TOTAL: CPT | Performed by: INTERNAL MEDICINE

## 2019-03-18 PROCEDURE — 80069 RENAL FUNCTION PANEL: CPT | Performed by: INTERNAL MEDICINE

## 2019-03-18 PROCEDURE — 84075 ASSAY ALKALINE PHOSPHATASE: CPT | Performed by: INTERNAL MEDICINE

## 2019-03-18 PROCEDURE — 74176 CT ABD & PELVIS W/O CONTRAST: CPT

## 2019-03-18 PROCEDURE — 25000132 ZZH RX MED GY IP 250 OP 250 PS 637: Mod: GY | Performed by: INTERNAL MEDICINE

## 2019-03-18 PROCEDURE — 97140 MANUAL THERAPY 1/> REGIONS: CPT | Mod: GO | Performed by: OCCUPATIONAL THERAPIST

## 2019-03-18 PROCEDURE — 83735 ASSAY OF MAGNESIUM: CPT | Performed by: INTERNAL MEDICINE

## 2019-03-18 PROCEDURE — 12000000 ZZH R&B MED SURG/OB

## 2019-03-18 PROCEDURE — 82248 BILIRUBIN DIRECT: CPT | Performed by: INTERNAL MEDICINE

## 2019-03-18 RX ORDER — PIPERACILLIN SODIUM, TAZOBACTAM SODIUM 3; .375 G/15ML; G/15ML
3.38 INJECTION, POWDER, LYOPHILIZED, FOR SOLUTION INTRAVENOUS EVERY 6 HOURS
Status: DISCONTINUED | OUTPATIENT
Start: 2019-03-18 | End: 2019-03-18

## 2019-03-18 RX ORDER — DIPHENOXYLATE HCL/ATROPINE 2.5-.025MG
1 TABLET ORAL 4 TIMES DAILY PRN
Status: DISCONTINUED | OUTPATIENT
Start: 2019-03-18 | End: 2019-03-19 | Stop reason: HOSPADM

## 2019-03-18 RX ORDER — POTASSIUM CHLORIDE 1500 MG/1
40 TABLET, EXTENDED RELEASE ORAL DAILY
Status: DISCONTINUED | OUTPATIENT
Start: 2019-03-18 | End: 2019-03-19 | Stop reason: HOSPADM

## 2019-03-18 RX ORDER — FUROSEMIDE 10 MG/ML
INJECTION INTRAMUSCULAR; INTRAVENOUS
Status: COMPLETED
Start: 2019-03-18 | End: 2019-03-18

## 2019-03-18 RX ORDER — POTASSIUM CHLORIDE 1500 MG/1
20 TABLET, EXTENDED RELEASE ORAL ONCE
Status: COMPLETED | OUTPATIENT
Start: 2019-03-18 | End: 2019-03-18

## 2019-03-18 RX ORDER — FUROSEMIDE 10 MG/ML
20 INJECTION INTRAMUSCULAR; INTRAVENOUS ONCE
Status: COMPLETED | OUTPATIENT
Start: 2019-03-18 | End: 2019-03-18

## 2019-03-18 RX ORDER — CEFTRIAXONE 2 G/1
2 INJECTION, POWDER, FOR SOLUTION INTRAMUSCULAR; INTRAVENOUS EVERY 24 HOURS
Status: DISCONTINUED | OUTPATIENT
Start: 2019-03-18 | End: 2019-03-19 | Stop reason: HOSPADM

## 2019-03-18 RX ORDER — FUROSEMIDE 10 MG/ML
60 INJECTION INTRAMUSCULAR; INTRAVENOUS DAILY
Status: DISCONTINUED | OUTPATIENT
Start: 2019-03-19 | End: 2019-03-19

## 2019-03-18 RX ADMIN — OXYCODONE HYDROCHLORIDE 5 MG: 5 TABLET ORAL at 01:01

## 2019-03-18 RX ADMIN — FUROSEMIDE 20 MG: 10 INJECTION INTRAMUSCULAR; INTRAVENOUS at 08:55

## 2019-03-18 RX ADMIN — OXYCODONE HYDROCHLORIDE 5 MG: 5 TABLET ORAL at 06:49

## 2019-03-18 RX ADMIN — POTASSIUM CHLORIDE 40 MEQ: 1500 TABLET, EXTENDED RELEASE ORAL at 17:34

## 2019-03-18 RX ADMIN — FUROSEMIDE 40 MG: 10 INJECTION, SOLUTION INTRAVENOUS at 08:26

## 2019-03-18 RX ADMIN — FERROUS SULFATE TAB 325 MG (65 MG ELEMENTAL FE) 325 MG: 325 (65 FE) TAB at 17:33

## 2019-03-18 RX ADMIN — POTASSIUM CHLORIDE 20 MEQ: 1500 TABLET, EXTENDED RELEASE ORAL at 12:05

## 2019-03-18 RX ADMIN — POTASSIUM CHLORIDE 40 MEQ: 1500 TABLET, EXTENDED RELEASE ORAL at 23:03

## 2019-03-18 RX ADMIN — DIPHENOXYLATE HYDROCHLORIDE AND ATROPINE SULFATE 1 TABLET: 2.5; .025 TABLET ORAL at 08:26

## 2019-03-18 RX ADMIN — OXYCODONE HYDROCHLORIDE 5 MG: 5 TABLET ORAL at 19:53

## 2019-03-18 RX ADMIN — OXYCODONE HYDROCHLORIDE 5 MG: 5 TABLET ORAL at 16:14

## 2019-03-18 RX ADMIN — SUCRALFATE 1 G: 1 SUSPENSION ORAL at 06:40

## 2019-03-18 RX ADMIN — OXYCODONE HYDROCHLORIDE 5 MG: 5 TABLET ORAL at 23:03

## 2019-03-18 RX ADMIN — SUCRALFATE 1 G: 1 SUSPENSION ORAL at 16:14

## 2019-03-18 RX ADMIN — POTASSIUM CHLORIDE 20 MEQ: 1500 TABLET, EXTENDED RELEASE ORAL at 19:25

## 2019-03-18 RX ADMIN — PANTOPRAZOLE SODIUM 40 MG: 40 TABLET, DELAYED RELEASE ORAL at 06:40

## 2019-03-18 RX ADMIN — FUROSEMIDE 20 MG: 10 INJECTION, SOLUTION INTRAVENOUS at 08:55

## 2019-03-18 RX ADMIN — PANTOPRAZOLE SODIUM 40 MG: 40 TABLET, DELAYED RELEASE ORAL at 16:14

## 2019-03-18 RX ADMIN — LATANOPROST 1 DROP: 50 SOLUTION/ DROPS OPHTHALMIC at 21:51

## 2019-03-18 RX ADMIN — TEMAZEPAM 7.5 MG: 7.5 CAPSULE ORAL at 21:51

## 2019-03-18 RX ADMIN — DIPHENOXYLATE HYDROCHLORIDE AND ATROPINE SULFATE 1 TABLET: 2.5; .025 TABLET ORAL at 13:10

## 2019-03-18 RX ADMIN — PIPERACILLIN AND TAZOBACTAM 2.25 G: 2; .25 INJECTION, POWDER, FOR SOLUTION INTRAVENOUS at 04:05

## 2019-03-18 RX ADMIN — Medication 1 MG: at 21:51

## 2019-03-18 RX ADMIN — CEFTRIAXONE SODIUM 2 G: 2 INJECTION, POWDER, FOR SOLUTION INTRAMUSCULAR; INTRAVENOUS at 10:55

## 2019-03-18 RX ADMIN — POTASSIUM CHLORIDE 40 MEQ: 1500 TABLET, EXTENDED RELEASE ORAL at 08:26

## 2019-03-18 RX ADMIN — FERROUS SULFATE TAB 325 MG (65 MG ELEMENTAL FE) 325 MG: 325 (65 FE) TAB at 08:26

## 2019-03-18 ASSESSMENT — ACTIVITIES OF DAILY LIVING (ADL)
ADLS_ACUITY_SCORE: 14

## 2019-03-18 NOTE — PLAN OF CARE
A/ox4, forgetful at times. VSS on RA. C/o RUQ abdominal pain, PRN Oxycodone given x2. BILL drain to RUQ with milky, tan output. Red rash to abdomen and abdominal folds, skin care not completed this evening per pt request. BLE +2-3, legs elevated while in bed and patient started on scheduled IV Lasix BID today. Regular diet, poor appetite but good fluid intake. Up to BSC with SBA and walker with urinary frequency. Small/smear BMs with each void, dark brown in color, unable to obtain stool sample for occult blood rule out.  Hgb recheck this evening 10.9. K 3.3, one time order given today, recheck with morning labs. R PICC with double lumen, red flush adequatley with brisk blood return, purple lumen occluded - MD aware. Discharge pending to TCU. Nursing to continue to monitor

## 2019-03-18 NOTE — PROGRESS NOTES
03/18/19 1345   General Information   Discipline OT   Onset of Edema 03/01/19   Affected Body Part(s) Left LE;Right LE   Edema Etiology Infection  (acute kidney injury)   Pertinent history of current problem (PT: include personal factors and/or comorbidities that impact the POC; OT: include additional occupational profile info) Vanessa Huffman is a 68 year old female with PMH gastric bypass, chronic diarrhea, HTN, migraines who was admitted on 3/1/2019 with sepsis and found to have ELISE, liver abscess, and Klebsiella bacteremia. Liver biopsy negative for malignancy. S/p drain placement 3/8/2019, now having BLE lymphedema.   Edema Precaution Comments Creatinine trending down   Pain   Patient currently in pain No   Edema Examination / Assessment   Skin Condition Pitting   Capillary Refill Symmetrical   Stemmer Sign Negative   ROM   Range of Motion (WFL) no deficits were identified   Strength   Strength (WFL) no deficits were identified   Sensation   Sensation (WFL) no deficits were identified   Assessment/Plan   Patient presents with Stage 2 Lymphedema   Assessment Patient presents with ELISE and BLE lymphedema and is appropriate for gradient compression wrapping to reduce BLE girth and allow for easier movement and ADL.   Assessment of Occupational Performance 1-3 Performance Deficits   Identified Performance Deficits ADL, IADL, Mobility   Clinical Decision Making (Complexity) Low complexity   Planned Edema Interventions Gradient compression bandaging;Exercises;Precautions to prevent infection/exacerbation;Education;ADL training   Treatment Frequency daily   Treatment Duration 5 days   Patient, Family and/or Staff in agreement with plan of care. Yes   Risks and benefits of treatment have been explained. Yes   Total Evaluation Time   Total Evaluation Time (Minutes) 8

## 2019-03-18 NOTE — PROGRESS NOTES
"GASTROENTEROLOGY PROGRESS NOTE     SUBJECTIVE: Feeling well. Denies nausea, vomiting. Still with some pain around drain placement. Stools appear brown and loose which is her baseline.      OBJECTIVE:   /45   Pulse 78   Temp 96.6  F (35.9  C)   Resp 16   Ht 1.626 m (5' 4\")   Wt 115.7 kg (255 lb)   SpO2 98%   BMI 43.77 kg/m     Temp (24hrs), Av.3  F (36.3  C), Min:96.6  F (35.9  C), Max:98.4  F (36.9  C)     Patient Vitals for the past 72 hrs:   Weight   19 0552 115.7 kg (255 lb)      Intake/Output Summary (Last 24 hours) at 3/18/2019 0908  Last data filed at 3/18/2019 0851  Gross per 24 hour   Intake 1760 ml   Output 4225 ml   Net -2465 ml      PHYSICAL EXAM   Constitutional: Age appropriate, up in chair, no acute distress  Abdomen: Soft, non-tender, non-distended, normally active bowel sounds. No masses or hepatosplenomegaly appreciated. No guarding or rebound tenderness. Drain in place.     Additional Comments:   ROS, FH, SH: See initial GI consult for details.     I have reviewed the patient's new clinical lab results:   Recent Labs   Lab Test 19  0619  1835 19  0619  0630  19  0505  19  0830  19  1439   WBC 7.4  --  7.9 12.4*   < > 54.8*  --   --    < > 17.9*   HGB 9.4* 10.9* 6.8* 7.4*   < > 7.4*   < >  --    < > 13.2   MCV 88  --  93 91   < > 84  --   --    < > 87     --  382 383   < > 114*  --   --    < > 109*   INR  --   --   --   --   --  1.37*  --  1.58*  --  1.01    < > = values in this interval not displayed.      Recent Labs   Lab Test 19  0619  0619  0630    141 141   POTASSIUM 3.1* 3.3* 3.8   CHLORIDE 107 111* 114*   CO2 24 23 19*   BUN 28 29 32*   CR 1.69* 1.74* 1.90*   ANIONGAP 9 7 8   DANNA 7.6* 7.6* 7.8*      Recent Labs   Lab Test 19  0600 19  0600 19  0630 03/15/19  0523  19  1925  19  1355  18  0645   ALBUMIN 1.6* 1.5* 1.5* 1.3*   < >  --    < >  --    < > " 4.1   BILITOTAL 1.5*  --  1.8* 1.8*   < >  --    < >  --    < > 0.7   ALT 88*  --  45 25   < >  --    < >  --    < > 48   *  --  78* 42   < >  --    < >  --    < > 44   ALKPHOS 401*  --  473* 443*   < >  --    < >  --    < > 146   PROTEIN  --   --   --   --   --  30*  --  100*  --   --    LIPASE  --   --   --   --   --   --   --   --   --  133    < > = values in this interval not displayed.     3/8/19 EGD ([facility])  Findings:        LA Grade B (one or more mucosal breaks greater than 5 mm, not extending        between the tops of two mucosal folds) esophagitis with no bleeding was        found.        Evidence of a gastric bypass was found. A gastric pouch was found. The        gastrojejunal anastomosis was characterized by cratered ulcer, with        actively bleeding visible vessel. This is on the distal side of the        anastomosis. The jejunojejunal anastomosis was characterized by healthy        appearing mucosa. The ulcer was unsuccessfully injected with of a        1:10,000 solution of epinephrine for hemostasis. These did slow the        bleeding, but it persisted. For hemostasis, one hemostatic clip was        successfully placed. There was no bleeding at the end of the procedure.        The examined jejunum was normal.      Assessment:  68 year old female presenting with abdominal pain and elevated LFTs. Found to have klebsiella bacteremia and possible liver mass. MRCP showed no biliary dilation or obstruction. Mass thought to be liver abscess. Liver biopsy 3/4 showed hemorrhagic necrosis and acute inflammation. Repeat CT showed increasing size of the abscess so drain was placed by IR. Pt then developed melena. Underwent EGD 3/8. Hgb drifted down slowly about 1 gram over the next week. Yesterday dropped to 6.8 from 7.4. Was transferred 2 units and hgb improved to 10.9 and overnight to 9.4. Pt reports stools look brown and loose. No change in frequency. Doubt recurrent bleeding at this time, though  she did have a lesion that is at a higher risk for re-bleeding.     Plan:    -Diet as tolerated  -Continue PPI BID  -Follow hgb and stool output  -If hgb drops further can proceed with EGD tomorrow  -Abx per ID  -GI following. Discussed with Dr Barksdale. Will update Dr Harley, GI staff.     Kevin Mckeon PA-C  [facility]  Cell:  [phone] Monday through Friday 4592-0087  Office: [phone]

## 2019-03-18 NOTE — PROGRESS NOTES
[facility]    Infectious Disease Progress Note    Date of Service (when I saw the patient): 03/18/2019     Assessment & Plan   Vanessa Huffman is a 68 year old female who was admitted on 3/1/2019.     ASSESSMENT:  1. KLEBSIELLA PNEUMONIAE BACTEREMIA-? Enteric source v cholangitis,   2. LIVER MASS/DENSITY  3. PROBABLE HEPATIC ABSCESS  4. ABNORMAL LFTS-C/W Liver abscess, ? Tumor, ? Cholangitis  5. Melena. Bleeding ulcer at the anastomosis.   6. Leucocytosis.   7. c diff PCR negative.         REC  1. On Zosyn blood cultures and cultures from the liver abscesses positive for Klebsiella. Will switch to IV ceftriaxone, no other positive cultures.   2. Liver abscesses s/p drain placement, draining thick pus now.    3. Follow LFT, WBC   4. Repeat Imaging perhaps non contrast CT soon, most likely home on weeks of IV antibiotics + drain.            Meghan Guadarrama MD    Interval History   WBC normal now  Afebrile   Eating, can eat only small amounts   No nausea, abdominal fullness better       Physical Exam   Temp: 96.6  F (35.9  C) Temp src: Oral BP: 128/45 Pulse: 78 Heart Rate: 73 Resp: 16 SpO2: 98 % O2 Device: None (Room air) Oxygen Delivery: Other (Comments)  Vitals:    03/01/19 1845 03/09/19 0600 03/16/19 0552   Weight: 99.6 kg (219 lb 9.3 oz) 111.2 kg (245 lb 2.4 oz) 115.7 kg (255 lb)     Vital Signs with Ranges  Temp:  [96.6  F (35.9  C)-98.4  F (36.9  C)] 96.6  F (35.9  C)  Pulse:  [72-94] 78  Heart Rate:  [69-83] 73  Resp:  [16-18] 16  BP: (115-149)/(45-69) 128/45  SpO2:  [96 %-99 %] 98 %    Constitutional: sitting up in a chair   Lungs: Clear to auscultation bilaterally, no crackles or wheezing  Cardiovascular: Regular rate and rhythm, normal S1 and S2, and no murmur noted  Abdomen: drain in the liver abscess  Skin: No rashes, no cyanosis, no edema  Other:    Medications     - MEDICATION INSTRUCTIONS -         diphenoxylate-atropine  1 tablet Oral TID w/meals     ferrous sulfate  325 mg Oral BID  w/meals     [START ON 3/19/2019] furosemide  60 mg Intravenous Daily     latanoprost  1 drop Both Eyes At Bedtime     pantoprazole  40 mg Oral BID AC     piperacillin-tazobactam  3.375 g Intravenous Q6H     potassium chloride  40 mEq Oral Daily     sodium chloride (PF)  10 mL Irrigation Q8H     sodium chloride (PF)  10 mL Intracatheter Q8H     sucralfate  1 g Oral BID AC       Data   All microbiology laboratory data reviewed.  Recent Labs   Lab Test 03/18/19  0600 03/17/19  1835 03/17/19  0600 03/16/19  0630   WBC 7.4  --  7.9 12.4*   HGB 9.4* 10.9* 6.8* 7.4*   HCT 28.1*  --  21.8* 22.1*   MCV 88  --  93 91     --  382 383     Recent Labs   Lab Test 03/18/19  0600 03/17/19  0600 03/16/19  0630   CR 1.69* 1.74* 1.90*     Recent Labs   Lab Test 03/01/19  1130   SED 79*     Recent Labs   Lab Test 03/07/19  1050 03/04/19  1410 03/04/19  1355 03/03/19  1925 03/02/19  1043 03/01/19  2148 03/01/19  2145 03/01/19  1409 03/01/19  1355   CULT Culture negative after 1 week  No anaerobes isolated  Moderate growth  Klebsiella pneumoniae  Susceptibility testing done on previous specimen  * No anaerobes isolated  No growth No anaerobes isolated  Culture negative after 1 week  Light growth  Klebsiella pneumoniae  * <10,000 colonies/mL  urogenital shannon  Susceptibility testing not routinely done    Canceled, Test credited  Duplicate request   No growth No growth No growth No growth No growth

## 2019-03-18 NOTE — PLAN OF CARE
Discharge Planner OT   Patient plan for discharge: TCU  Current status: OT/Lymph eval completed and treatment initiated. Patient mildly SOB at rest before wraps applied. Patient presents with stage 2 lymphedema with 2+ pitting. No sores or pressure injury areas noted on legs. OT washed, dried, lotioned BLE and applied BLE gradient compression wraps from toes to knees. Instructed patient to remove wraps if unable to tolerate- if SOB, painful, develops blue toes, numbness. Educated on lymphedema and ways to improve it such as wrapping, elevation, exercise. Patient was given handouts for reference and was instructed to perform BLE lymphedema exercises tonight on her own.   Barriers to return to prior living situation: Falls risk, impaired endurance, lymphedema,   Recommendations for discharge: TCU  Rationale for recommendations: Patient would benefit from continued therapies at TCU to maximize endurance, (I), and safety with ADL, IADL and mobility and to continue to receive lymphedema treatment. At baseline, reports being (I) without use of AD at home with her spouse.       Entered by: Marlena Cross 03/18/2019 2:51 PM        *NURSING: Please remove wraps at 12:30 PM Tuesday, and wash, dry, lotion LEs so that OT can return at 1:30 PM on Tuesday to re-apply wraps.

## 2019-03-18 NOTE — PLAN OF CARE
Pt is A&Ox4. VSS on room air. C/o right abdominal pain managed with PRN Oxycodone. HUNTLEY. Right BILL drain, irrigated per orders, yellow milky output. Redness under abdominal folds, interdry and miconazole powder used. Assist of 1 +walker to BSC, unable to get stool sample. Regular diet, denied nausea. PICC on right arm SL, blood return from red lumen.

## 2019-03-18 NOTE — PROGRESS NOTES
Vanessa Huffman is a 68 year old woman with a PMH of obesity, arthritis, hypertension and depression who was admitted on 3/1/19 with abnormal liver labs, acute renal failure and 3 days of headache, nausea and fevers. She had klebsiella bacteremia and was treated with IV antibiotics. She was found to have a large liver abscess and is s/p a US guided drain placement. She also had a drop in hemoglobin from 12 to 6, was transfused and an endoscopy was done and active bleeding was noted at the GJ junction and clip placed over a bleeding vessel.      She has continued to improve and WBC has gone from in the 50s to 7.4 today 3/18/19, renal function slowly normalizing. Hemoglobin stable.     S: I'm feeling better and so grateful that I am. Still not much appetite, eat small amounts and fill up quickly. Strength is improving. Still hard to get up on my own. Took shower this morning and back in bed now. I had to get some blood over the weekend because my hemoglobin was low.     O: Temp:  [96.6  F (35.9  C)-98.4  F (36.9  C)] 96.6  F (35.9  C)  Pulse:  [72-94] 78  Heart Rate:  [69-83] 73  Resp:  [16-18] 16  BP: (115-149)/(45-69) 128/45  SpO2:  [96 %-99 %] 98 %    Labs, notes, imaging, IOs and VSs reviewed    ROUTINE ICU LABS (Last four results)  CMP  Recent Labs   Lab 03/18/19  0600 03/17/19  0600 03/16/19  0630 03/15/19  1640 03/15/19  0523    141 141  --  143   POTASSIUM 3.1* 3.3* 3.8 4.0 3.2*   CHLORIDE 107 111* 114*  --  116*   CO2 24 23 19*  --  18*   ANIONGAP 9 7 8  --  9   GLC 84 86 119*  --  82   BUN 28 29 32*  --  33*   CR 1.69* 1.74* 1.90*  --  1.94*   GFRESTIMATED 31* 30* 27*  --  26*   GFRESTBLACK 36* 34* 31*  --  30*   DANNA 7.6* 7.6* 7.8*  --  7.0*   PHOS 4.2 4.0  --   --   --    PROTTOTAL 5.5*  --  5.5*  --  5.0*   ALBUMIN 1.6* 1.5* 1.5*  --  1.3*   BILITOTAL 1.5*  --  1.8*  --  1.8*   ALKPHOS 401*  --  473*  --  443*   *  --  78*  --  42   ALT 88*  --  45  --  25     CBC  Recent Labs   Lab  03/18/19  0600 03/17/19  1835 03/17/19  0600 03/16/19  0630 03/15/19  0523   WBC 7.4  --  7.9 12.4* 16.4*   RBC 3.18*  --  2.34* 2.43* 2.40*   HGB 9.4* 10.9* 6.8* 7.4* 7.2*   HCT 28.1*  --  21.8* 22.1* 21.3*   MCV 88  --  93 91 89   MCH 29.6  --  29.1 30.5 30.0   MCHC 33.5  --  31.2* 33.5 33.8   RDW 17.4*  --  18.5* 18.5* 17.7*     --  382 383 377     INRNo lab results found in last 7 days.  Arterial Blood GasNo lab results found in last 7 days.     General-Alert, oriented, vety pleasant woman in NAD, lying in bed  RUQ dressing-CD&I  Drainage-Milky tan, with yellow tinge on the tubing   Outputs-3/6 10 mL; 3/7 95 mL; 3/8 135 mL; 3/9 135 mL; 3/10 150 mL; 3/11 135ml; 3/12 120 mL; 3/13 85 mL; 3/14 85 mL; 3/15 80 mL; 3/16 60 mL; 3/17 50 mL; 3/18 BILL not emptied yet today.   Drain flushed with 20 mL total and aspirated easily what was flushed in without pain or leaking.   Cultures-Moderate growth Klebsiella pneumoniae    A/P: Vanessa Huffman is a 68 year old woman with a PMH of obesity, arthritis, hypertension and depression who was admitted on 3/1/19 with abnormal liver labs, acute renal failure and 3 days of headache, nausea and fevers. She had klebsiella bacteremia and was treated with IV antibiotics. She was found to have a large liver abscess and is s/p a US guided drain placement. She also had a drop in hemoglobin from 12 to 6, was transfused and an endoscopy was done and active bleeding was noted at the GJ junction and clip placed over a bleeding vessel.     Continues to improve clinically. Wbc down to 7.4 today! Her hemoglobin was 6.8 yesterday and she did get a unit of RBCs but it was felt she did not have signs or symptoms of active bleeding. Drain outputs are decreasing appropriately.     D/w Dr Guadarrama who will order a non contrast CT to check abscess size and drain position. Also d/w radiologist Dr Esparza today.     Will follow  Thanks [facility] Interventional Radiology CNP ([phone]) (phone  [phone])

## 2019-03-18 NOTE — PROGRESS NOTES
[facility]    Hospitalist Progress Note    Interval History   -Feeling much better this morning, denies any chest pain shortness of breath fever chills headache dizziness lightheadedness.  No abdominal pain.  No diarrhea at this time.    No other significant events overnight      Assessment & Plan      Summary: Vanessa Huffman is a 68 year old female with PMH gastric bypass, chronic diarrhea, HTN, migraines who was admitted on 3/1/2019 with sepsis and found to have ELISE, liver abscess, and Klebsiella bacteremia. Liver biopsy negative for malignancy. S/p drain placement 3/8/2019. Symptomatically improving now.    Sepsis secondary to liver abscess/mass  Klebsiella bacteremia  Persistent leukocytosis, improving  Patient presented with procal 60, , hypotension. CT abd pelvis showed large liver mass. Started on Vanc and Zosyn. MRCP liver didn't give any further info on the mass due to lack of contrast  Liver biopsy 3/4 showed hemorrhagic necrosis with acute inflammatory changes, no malignant cells identified, this is likely primarily a liver abscess. Blood cultures also positive for Klebsiella, source likely from abscess. WBC up to 55k this admission, however improving since 3/12 now down to 16k as of 3/14. ID and GI consulted, lower concern for cholangitis. S/p drain placement by interventional radiology on 3/7/2019. Repeat biopsy 3/7 negative for malignancy as well. Malignancy cannot be completely ruled out.   Repeat CT abdomen 3/13 showed unchanged size of liver abscess. However, given her clinical improvement, plan is now to continue current management with drainage and IV antibiotics.  - ID consulted, appreciate recs   - IV Zosyn for now, ID switch the antibiotic to IV ceftriaxone today on 3/18/2019.  - General surgery following, appreciate recs   - No further surgical management at this time  - Startrd on Lasix 20mg PO daily on 3/15/2019 for persistent BLE pitting edema which is new since her  illness, may be related to liver dysfunction.  Patient was in ATN receive IV fluids have low albumin abnormal liver function, all adding her to have fluid retention as well as she is about 7.5 L positive balance on 3/16/2090.  Her ATN is improving, I  stop the oral Lasix and gave her a dose of IV Lasix 60 mg times once on 3/16/2019, the patient responded very well to the dose of Lasix, she had good urine output and a greater than improve as well.  I will started her on Lasix 40 mg twice daily  on 3/17/2019, therapy is consulted for lymphedema wrap.  Patient requesting once a day of Lasix I will switch her to 60 mg daily of Lasix from today.  - Consider CT or MRI abdomen with contrast once creatinine reaches a certain threshold (1.5? 1.2? to be determined) that can be done as an outpatient.    ELISE, likely ATN: Baseline Cr ~0.7, on admission Cr was 4.27. Improved slowly with hydration. Nephrology consulted, suspect primarily ATN. Creatinine gradually improved to 1.9 as of 3/14/2019.  - Avoid nephrotoxic drugs, started on IV Lasix gave a dose of 60 mg times once 3/16/2019, she responded very well to the treatments have good urine output and a creatinine improved.  Started her on IV Lasix 40 mm twice daily from 3/17/2019,  Creatinine is improving, she is making good urine, requesting once a day of Lasix as she has to wake up at night.  I will change the Lasix to 60 mg once a day from today.    Acute GI bleeding secondary to anastomotic ulcer, improved  Acute blood loss anemia secondary to acute GI bleed  Hgb drop 12-->6 while inpatient. Was given 2 units of packed RBCs on 3/8/2019. Patient had a EGD done on 3/8/2019, showed crater ulcer with visible vessel and actively bleeding. Treated with injection 1: 10,000 epinephrine and Hemoclip. Hgb downtrending slightly to 7.1 on 3/14/2019, but stools have been brown.  - GI consulted, appreciate recs, signed off 3/13  - PPI PO + Carafate  - Transfuse for Hgb  < 7.0    Her  hemoglobin dropped to 6.8 on 3/17/2019, has some dark colored stool  as well.  Check a stool for occult blood, gave Her 2 units of packed RBCs on 3/17/2019 and reconsult the GI to evaluate the patient given the high risk of anastomotic ulcer.  Hemoglobin improved to 9.4 today.    Hx gastric bypass  Chronic diarrhea  PTA on high dose Immodium--. Currently on oxycodone   - Lomotil started TID, no more diarrhea at this time.  We will switch to as needed at this time.    Thrombocytopenia secondary to sepsis, now resolved: HIT ruled out. Hemolysis also evaluated and ruled out.    DVT Prophylaxis: Pneumatic Compression Devices. Avoid subcutaneous heparin while Hgb is low in 7s  Code Status: Full Code  PT/OT: Ordered    Disposition: Expected discharge around 3/18 TCU (already accepted), surgery no further management, dispo per ID    Patient is doing well at this time, continue with the drain in the right liver abscess.  Most likely will need a abscessogram as an outpatient,  Switch to IV ceftriaxone on 3/18/2019, is only culture positive is considered in the morning.  Agree with that at this time.  Started on IV Lasix 60 mg daily.  Transfused 2 units of packed RBCs on 3/17/2019..  GI consulted to evaluate the patient because of  Drop in hemoglobin and melanotic stools.  Therapy for lymphedema wrap  PT and OT consult  Hypokalemia we will replace electrolytes orally today.    Ruben Barksdale MD  Hospitalist/Internal Medicine.   -Data reviewed today: I reviewed all new labs and imaging results over the last 24 hours.    Physical Exam   Temp: 96.6  F (35.9  C) Temp src: Oral BP: 128/45 Pulse: 78 Heart Rate: 73 Resp: 16 SpO2: 98 % O2 Device: None (Room air)    Vitals:    03/01/19 1845 03/09/19 0600 03/16/19 0552   Weight: 99.6 kg (219 lb 9.3 oz) 111.2 kg (245 lb 2.4 oz) 115.7 kg (255 lb)     Vital Signs with Ranges  Temp:  [96.6  F (35.9  C)-98.4  F (36.9  C)] 96.6  F (35.9  C)  Pulse:  [72-94] 78  Heart Rate:  [69-83] 73  Resp:   [16-18] 16  BP: (115-149)/(45-69) 128/45  SpO2:  [96 %-99 %] 98 %  I/O last 3 completed shifts:  In: 1510 [P.O.:1180; Other:30]  Out: 4575 [Urine:4525; Drains:50]  O2 requirements: none    Constitutional: Obese female in NAD  Cardiovascular: RRR, normal S1/2, no m/r/g  Respiratory: CTAB  Vascular: 2+ BLE pitting edema  GI: Normoactive bowel sounds, nontender left side, right side BILL drain noted with purulent draining material  Neuro/Psych: Appropriate affect and mood. A&Ox3, moves all extremities    Medications     - MEDICATION INSTRUCTIONS -         cefTRIAXone  2 g Intravenous Q24H     ferrous sulfate  325 mg Oral BID w/meals     [START ON 3/19/2019] furosemide  60 mg Intravenous Daily     latanoprost  1 drop Both Eyes At Bedtime     pantoprazole  40 mg Oral BID AC     potassium chloride  20 mEq Oral Once     potassium chloride  40 mEq Oral Daily     sodium chloride (PF)  10 mL Irrigation Q12H     sodium chloride (PF)  10 mL Intracatheter Q8H     sucralfate  1 g Oral BID AC       Data   Recent Labs   Lab 03/18/19  0600 03/17/19  1835 03/17/19  0600 03/16/19  0630   WBC 7.4  --  7.9 12.4*   HGB 9.4* 10.9* 6.8* 7.4*   MCV 88  --  93 91     --  382 383     --  141 141   POTASSIUM 3.1*  --  3.3* 3.8   CHLORIDE 107  --  111* 114*   CO2 24  --  23 19*   BUN 28  --  29 32*   CR 1.69*  --  1.74* 1.90*   ANIONGAP 9  --  7 8   DANNA 7.6*  --  7.6* 7.8*   GLC 84  --  86 119*   ALBUMIN 1.6*  --  1.5* 1.5*   PROTTOTAL 5.5*  --   --  5.5*   BILITOTAL 1.5*  --   --  1.8*   ALKPHOS 401*  --   --  473*   ALT 88*  --   --  45   *  --   --  78*       Imaging:   No results found for this or any previous visit (from the past 24 hour(s)).

## 2019-03-18 NOTE — PLAN OF CARE
PT:  Attempted to see patient for PT this PM. Patient pleasantly requesting to rest at this time. States she plans on getting up to the chair for dinner tonight, completing her exercises in the chair, and possibly going for a walk.

## 2019-03-18 NOTE — PROGRESS NOTES
MARYAM  I: MARYAM called [facility] to follow up on referral. SW left message. SW awaits a c/b.     ADDENDUM  I: SW spoke with patient and she reported it would be ok to d.c to [facility] as long as she has a private room and bathroom. [facility] will accept patient into a private room. MARYAM updated daughter per patient request. Daughter was agreeable to plan and is ok with $45 private room fee.     ADDENDUM  I: SW was updated by [facility] admissions that they can accept patient. MARYAM discussed with patient. Patient would like [facility]. Patient is ok with $36 per day fee. MARYAM updated [facility].    P: SW will continue to follow and assist as needed.    Radha Grissom, THOM   *[phone]

## 2019-03-18 NOTE — PROGRESS NOTES
"General Surgery Progress Note    Subjective: pt doing fine. Minimal pain at drain site. No nausea.     Objective: /45   Pulse 78   Temp 96.6  F (35.9  C)   Resp 16   Ht 1.626 m (5' 4\")   Wt 115.7 kg (255 lb)   SpO2 98%   BMI 43.77 kg/m    Gen: alert, no distress  CV: RRR  Pulm: nonlabored breathing  Abd: soft, obese, BILL in RUQ secured with suture at skin, cloudy fluid in bulb  Ext: WWP    Assessment/Plan:   Vanessa Huffman  is a 68 year old female admitted with hepatic abscess which is improving with drain and abx. Agree with repeat imaging today.     Leyda Borja MD  [facility]  [phone]            "

## 2019-03-19 VITALS
TEMPERATURE: 97.8 F | OXYGEN SATURATION: 96 % | RESPIRATION RATE: 18 BRPM | BODY MASS INDEX: 41.18 KG/M2 | HEIGHT: 64 IN | SYSTOLIC BLOOD PRESSURE: 130 MMHG | WEIGHT: 241.2 LBS | HEART RATE: 78 BPM | DIASTOLIC BLOOD PRESSURE: 57 MMHG

## 2019-03-19 LAB
ALBUMIN SERPL-MCNC: 1.6 G/DL (ref 3.4–5)
ANION GAP SERPL CALCULATED.3IONS-SCNC: 7 MMOL/L (ref 3–14)
BASOPHILS # BLD AUTO: 0.1 10E9/L (ref 0–0.2)
BASOPHILS NFR BLD AUTO: 0.7 %
BUN SERPL-MCNC: 28 MG/DL (ref 7–30)
CALCIUM SERPL-MCNC: 7.7 MG/DL (ref 8.5–10.1)
CHLORIDE SERPL-SCNC: 108 MMOL/L (ref 94–109)
CO2 SERPL-SCNC: 26 MMOL/L (ref 20–32)
CREAT SERPL-MCNC: 1.58 MG/DL (ref 0.52–1.04)
DIFFERENTIAL METHOD BLD: ABNORMAL
EOSINOPHIL # BLD AUTO: 0.4 10E9/L (ref 0–0.7)
EOSINOPHIL NFR BLD AUTO: 4.7 %
ERYTHROCYTE [DISTWIDTH] IN BLOOD BY AUTOMATED COUNT: 17.4 % (ref 10–15)
GFR SERPL CREATININE-BSD FRML MDRD: 33 ML/MIN/{1.73_M2}
GLUCOSE SERPL-MCNC: 87 MG/DL (ref 70–99)
HCT VFR BLD AUTO: 28.7 % (ref 35–47)
HGB BLD-MCNC: 9.5 G/DL (ref 11.7–15.7)
IMM GRANULOCYTES # BLD: 0 10E9/L (ref 0–0.4)
IMM GRANULOCYTES NFR BLD: 0.3 %
LYMPHOCYTES # BLD AUTO: 1.2 10E9/L (ref 0.8–5.3)
LYMPHOCYTES NFR BLD AUTO: 15.8 %
MAGNESIUM SERPL-MCNC: 1.6 MG/DL (ref 1.6–2.3)
MCH RBC QN AUTO: 29.3 PG (ref 26.5–33)
MCHC RBC AUTO-ENTMCNC: 33.1 G/DL (ref 31.5–36.5)
MCV RBC AUTO: 89 FL (ref 78–100)
MONOCYTES # BLD AUTO: 0.7 10E9/L (ref 0–1.3)
MONOCYTES NFR BLD AUTO: 8.9 %
NEUTROPHILS # BLD AUTO: 5.3 10E9/L (ref 1.6–8.3)
NEUTROPHILS NFR BLD AUTO: 69.6 %
NRBC # BLD AUTO: 0 10*3/UL
NRBC BLD AUTO-RTO: 0 /100
PHOSPHATE SERPL-MCNC: 3.4 MG/DL (ref 2.5–4.5)
PLATELET # BLD AUTO: 243 10E9/L (ref 150–450)
POTASSIUM SERPL-SCNC: 4.1 MMOL/L (ref 3.4–5.3)
RBC # BLD AUTO: 3.24 10E12/L (ref 3.8–5.2)
SODIUM SERPL-SCNC: 141 MMOL/L (ref 133–144)
WBC # BLD AUTO: 7.6 10E9/L (ref 4–11)

## 2019-03-19 PROCEDURE — 85025 COMPLETE CBC W/AUTO DIFF WBC: CPT | Performed by: INTERNAL MEDICINE

## 2019-03-19 PROCEDURE — 25000132 ZZH RX MED GY IP 250 OP 250 PS 637: Mod: GY | Performed by: INTERNAL MEDICINE

## 2019-03-19 PROCEDURE — 25000128 H RX IP 250 OP 636: Performed by: INTERNAL MEDICINE

## 2019-03-19 PROCEDURE — 99239 HOSP IP/OBS DSCHRG MGMT >30: CPT | Performed by: INTERNAL MEDICINE

## 2019-03-19 PROCEDURE — 80069 RENAL FUNCTION PANEL: CPT | Performed by: INTERNAL MEDICINE

## 2019-03-19 PROCEDURE — A9270 NON-COVERED ITEM OR SERVICE: HCPCS | Mod: GY | Performed by: INTERNAL MEDICINE

## 2019-03-19 PROCEDURE — 40000239 ZZH STATISTIC VAT ROUNDS

## 2019-03-19 PROCEDURE — P9047 ALBUMIN (HUMAN), 25%, 50ML: HCPCS | Performed by: INTERNAL MEDICINE

## 2019-03-19 PROCEDURE — 83735 ASSAY OF MAGNESIUM: CPT | Performed by: INTERNAL MEDICINE

## 2019-03-19 RX ORDER — FERROUS SULFATE 325(65) MG
325 TABLET ORAL
Qty: 30 TABLET | Refills: 0 | Status: SHIPPED | OUTPATIENT
Start: 2019-03-19

## 2019-03-19 RX ORDER — ALBUMIN (HUMAN) 12.5 G/50ML
25 SOLUTION INTRAVENOUS EVERY 8 HOURS
Status: DISCONTINUED | OUTPATIENT
Start: 2019-03-19 | End: 2019-03-19 | Stop reason: HOSPADM

## 2019-03-19 RX ORDER — DIPHENOXYLATE HCL/ATROPINE 2.5-.025MG
1 TABLET ORAL 4 TIMES DAILY PRN
Qty: 60 TABLET | Refills: 1 | Status: SHIPPED | OUTPATIENT
Start: 2019-03-19 | End: 2019-03-27

## 2019-03-19 RX ORDER — PANTOPRAZOLE SODIUM 40 MG/1
40 TABLET, DELAYED RELEASE ORAL
Qty: 60 TABLET | Refills: 0 | Status: SHIPPED | OUTPATIENT
Start: 2019-03-19 | End: 2019-04-11

## 2019-03-19 RX ORDER — SUCRALFATE ORAL 1 G/10ML
1 SUSPENSION ORAL
Qty: 600 ML | Refills: 0 | Status: SHIPPED | OUTPATIENT
Start: 2019-03-19

## 2019-03-19 RX ORDER — OXYCODONE HYDROCHLORIDE 5 MG/1
5 TABLET ORAL EVERY 6 HOURS PRN
Qty: 20 TABLET | Refills: 0 | Status: ON HOLD | OUTPATIENT
Start: 2019-03-19 | End: 2019-03-27

## 2019-03-19 RX ORDER — FUROSEMIDE 10 MG/ML
40 INJECTION INTRAMUSCULAR; INTRAVENOUS DAILY
Status: DISCONTINUED | OUTPATIENT
Start: 2019-03-19 | End: 2019-03-19 | Stop reason: HOSPADM

## 2019-03-19 RX ORDER — CEFTRIAXONE 1 G/1
2000 INJECTION, POWDER, FOR SOLUTION INTRAMUSCULAR; INTRAVENOUS DAILY
Qty: 600 ML | Refills: 0 | Status: ON HOLD | DISCHARGE
Start: 2019-03-19 | End: 2019-03-27

## 2019-03-19 RX ORDER — POTASSIUM CHLORIDE 1500 MG/1
20 TABLET, EXTENDED RELEASE ORAL DAILY
Qty: 30 TABLET | Refills: 0 | Status: ON HOLD | OUTPATIENT
Start: 2019-03-20 | End: 2019-03-27

## 2019-03-19 RX ORDER — DIPHENOXYLATE HCL/ATROPINE 2.5-.025MG
1 TABLET ORAL 4 TIMES DAILY PRN
Qty: 60 TABLET | Refills: 1 | Status: SHIPPED | OUTPATIENT
Start: 2019-03-19 | End: 2019-03-19

## 2019-03-19 RX ORDER — FUROSEMIDE 40 MG
40 TABLET ORAL DAILY
Qty: 30 TABLET | Refills: 0 | Status: ON HOLD | OUTPATIENT
Start: 2019-03-19 | End: 2019-03-27

## 2019-03-19 RX ADMIN — OXYCODONE HYDROCHLORIDE 5 MG: 5 TABLET ORAL at 02:07

## 2019-03-19 RX ADMIN — POTASSIUM CHLORIDE 40 MEQ: 1500 TABLET, EXTENDED RELEASE ORAL at 08:58

## 2019-03-19 RX ADMIN — FUROSEMIDE 40 MG: 10 INJECTION, SOLUTION INTRAVENOUS at 08:59

## 2019-03-19 RX ADMIN — SUCRALFATE 1 G: 1 SUSPENSION ORAL at 06:35

## 2019-03-19 RX ADMIN — FERROUS SULFATE TAB 325 MG (65 MG ELEMENTAL FE) 325 MG: 325 (65 FE) TAB at 08:58

## 2019-03-19 RX ADMIN — PANTOPRAZOLE SODIUM 40 MG: 40 TABLET, DELAYED RELEASE ORAL at 06:35

## 2019-03-19 RX ADMIN — POTASSIUM CHLORIDE 20 MEQ: 1500 TABLET, EXTENDED RELEASE ORAL at 02:07

## 2019-03-19 RX ADMIN — DIPHENOXYLATE HYDROCHLORIDE AND ATROPINE SULFATE 1 TABLET: 2.5; .025 TABLET ORAL at 08:57

## 2019-03-19 RX ADMIN — ALBUMIN HUMAN 25 G: 0.25 SOLUTION INTRAVENOUS at 10:18

## 2019-03-19 RX ADMIN — OXYCODONE HYDROCHLORIDE 5 MG: 5 TABLET ORAL at 05:08

## 2019-03-19 RX ADMIN — CEFTRIAXONE SODIUM 2 G: 2 INJECTION, POWDER, FOR SOLUTION INTRAMUSCULAR; INTRAVENOUS at 09:01

## 2019-03-19 RX ADMIN — OXYCODONE HYDROCHLORIDE 5 MG: 5 TABLET ORAL at 08:01

## 2019-03-19 ASSESSMENT — ACTIVITIES OF DAILY LIVING (ADL)
ADLS_ACUITY_SCORE: 14

## 2019-03-19 ASSESSMENT — MIFFLIN-ST. JEOR: SCORE: 1614.08

## 2019-03-19 NOTE — PLAN OF CARE
Occupational Therapy Discharge Summary    Reason for therapy discharge:    Discharged to transitional care facility.    Progress towards therapy goal(s). See goals on Care Plan in [facility] electronic health record for goal details.  Goals not met.  Barriers to achieving goals:   discharge from facility.    Therapy recommendation(s):    Continued therapy is recommended.  Rationale/Recommendations:  OT at TCU to increase ADL and functional mobility I and safety to PLOF and continued B LE lymphedema bandaging and mgmt.

## 2019-03-19 NOTE — PLAN OF CARE
[id number]  Pt A&Ox4, VSS on R. C/o R abdominal pain, PRN oxycodone given x1, effective. LS dim/clear, experiences HUNTLEY. Redness under abdominal folds and perineal area, interdry in place. BILL drain with tan-milky OP, irrigating Q12H, dressing CDI. Lymph wraps on BLE, nursing to remove tomorrow. Up Ax1 w/ GB+walker to BSC. PICC SL on RA, good blood return to red lumen. K 3.2, replacement started. Discharge pending to TCU, nursing to continue to monitor.

## 2019-03-19 NOTE — PROGRESS NOTES
MARYAM  I: SW was update that patient torsten be ready for d.c. SW called patient's son, Buck, to discuss transportation needs. Patient's son will drive her. MARYAM updated PHB that patient will d.c between 5030-2666. MARYAM faxed orders @ 1105. SW will fax scripts and PAS when complete.     PAS-RR    D: Per DHS regulation, MARYAM completed and submitted PAS-RR to [place] [employer] Direct Connect via the Senior LinkAge Line.  PAS-RR confirmation # is : [id number]      I: MARYAM spoke with pt and they are aware a PAS-RR has been submitted.  MARYAM reviewed with pt that they may be contacted for a follow up appointment within 10 days of hospital discharge if their SNF stay is < 30 days.  Contact information for Senior LinkAge Line was also provided.    A: pt verbalized understanding.    P: Further questions may be directed to [facility] LinkAge Line at #[phone], option #4 for PAS-RR staff.      P: SW will continue to follow and assist as needed.    Radha Grissom, THOM   *[phone]

## 2019-03-19 NOTE — PROGRESS NOTES
"General Surgery  Admission Date: 3/1/2019  Today's Date: 3/19/2019      SUBJECTIVE  No fevers, patient feeling fine today. Pain under control, no nausea. Lomotil has been very helpful for diarrhea, pt wants to make sure she can continue this at discharge. Drain with 60cc out past 24 hours. Repeat CT performed yesterday. Hemoglobin stable x 2 days.        OBJECTIVE  /57   Pulse 78   Temp 97.8  F (36.6  C) (Oral)   Resp 18   Ht 1.626 m (5' 4\")   Wt 109.4 kg (241 lb 3.2 oz)   SpO2 96%   BMI 41.40 kg/m    I/O last 3 completed shifts:  In: 1100 [P.O.:1090; Other:10]  Out: 4060 [Urine:4000; Drains:60]  General: awake, alert, NAD, pleasant and appropriate  Respiratory: non-labored breathing  Abdomen: obese, soft, nontender to palpation. Drain in place with cloudy yellow/green fluid in bulb    CT Abdomen and Pelvis without contrast 3/18/19  IMPRESSION:  1. Tube remains in place, minimal decrease in size in the liver lesion  since the comparison study. (11.2cm --> 10.3cm)  2. Increased right pleural effusion and associated compressive  atelectasis and/or infiltrate.        ASSESSMENT/PLAN  Vanessa Huffman is a 68 year old female admitted with hepatic abscess s/p drain placement. Cultures growing Klebsiella, liver biopsy 3/4 with hemorrhagic necrosis and acute inflammation. Patient is improving with antibiotics and drain placement, size of liver lesion has decreased only slightly  - Discharging to TCU today. No new recommendations from surgery, please call with questions.  - Drain management, length of antibiotic therapy per primary team, IR, ID  - Lomotil ordered for discharge  - No need for follow-up with surgery. I did provide the patient with our information in her AVS should she have any questions for us after leaving the hospital    ------------------------------      ENEDELIA HutchinsonC  [facility]  [phone]  "

## 2019-03-19 NOTE — PROGRESS NOTES
[facility]    Infectious Disease Progress Note    Date of Service (when I saw the patient): 03/19/2019     Assessment & Plan   Vanessa Huffman is a 68 year old female who was admitted on 3/1/2019.     ASSESSMENT:  1. KLEBSIELLA PNEUMONIAE BACTEREMIA-? Enteric source v cholangitis,   2. LIVER MASS/DENSITY  3. PROBABLE HEPATIC ABSCESS  4. ABNORMAL LFTS-C/W Liver abscess, ? Tumor, ? Cholangitis  5. Melena. Bleeding ulcer at the anastomosis.   6. Leucocytosis.   7. C diff PCR negative.         REC  1. Repeat CT scan noted, anticipate weeks of IV antibiotics, Ceftriaxone, orders in the chart.   2. Repeat CT scan in 2 weeks.   3. FOLLOW UP with me in the clinic in 4 weeks.          Meghan Guadarrama MD    Interval History   WBC normal now  Afebrile     Physical Exam   Temp: 97.8  F (36.6  C) Temp src: Oral BP: 130/57   Heart Rate: 87 Resp: 18 SpO2: 96 % O2 Device: None (Room air)    Vitals:    03/09/19 0600 03/16/19 0552 03/19/19 0809   Weight: 111.2 kg (245 lb 2.4 oz) 115.7 kg (255 lb) 109.4 kg (241 lb 3.2 oz)     Vital Signs with Ranges  Temp:  [95.8  F (35.4  C)-97.9  F (36.6  C)] 97.8  F (36.6  C)  Heart Rate:  [61-90] 87  Resp:  [16-18] 18  BP: (127-140)/(42-67) 130/57  SpO2:  [96 %-98 %] 96 %    Constitutional: sitting up in a chair   Lungs: Clear to auscultation bilaterally, no crackles or wheezing  Cardiovascular: Regular rate and rhythm, normal S1 and S2, and no murmur noted  Abdomen: drain in the liver abscess  Skin: No rashes, no cyanosis, no edema  Other:    Medications     - MEDICATION INSTRUCTIONS -         albumin human  25 g Intravenous Q8H     cefTRIAXone  2 g Intravenous Q24H     ferrous sulfate  325 mg Oral BID w/meals     furosemide  40 mg Intravenous Daily     latanoprost  1 drop Both Eyes At Bedtime     pantoprazole  40 mg Oral BID AC     potassium chloride  40 mEq Oral Daily     sodium chloride (PF)  10 mL Irrigation Q12H     sodium chloride (PF)  10 mL Intracatheter Q8H     sucralfate   1 g Oral BID AC       Data   All microbiology laboratory data reviewed.  Recent Labs   Lab Test 03/19/19  0520 03/18/19  0600 03/17/19  1835 03/17/19  0600   WBC 7.6 7.4  --  7.9   HGB 9.5* 9.4* 10.9* 6.8*   HCT 28.7* 28.1*  --  21.8*   MCV 89 88  --  93    281  --  382     Recent Labs   Lab Test 03/19/19  0520 03/18/19  1620 03/18/19  0600   CR 1.58* 1.59* 1.69*     Recent Labs   Lab Test 03/01/19  1130   SED 79*     Recent Labs   Lab Test 03/07/19  1050 03/04/19  1410 03/04/19  1355 03/03/19  1925 03/02/19  1043 03/01/19  2148 03/01/19  2145 03/01/19  1409 03/01/19  1355   CULT Culture negative after 1 week  No anaerobes isolated  Moderate growth  Klebsiella pneumoniae  Susceptibility testing done on previous specimen  * No anaerobes isolated  No growth Culture negative after 2 weeks  No anaerobes isolated  Light growth  Klebsiella pneumoniae  * <10,000 colonies/mL  urogenital shannon  Susceptibility testing not routinely done    Canceled, Test credited  Duplicate request   No growth No growth No growth No growth No growth

## 2019-03-19 NOTE — PROGRESS NOTES
"GASTROENTEROLOGY PROGRESS NOTE    CC:     SUBJECTIVE:  No new complaints. Eating ok. No melena. Drain site pain improved    OBJECTIVE:  General Appearance:  Awake alert NAD  /57   Pulse 78   Temp 97.8  F (36.6  C) (Oral)   Resp 18   Ht 1.626 m (5' 4\")   Wt 109.4 kg (241 lb 3.2 oz)   SpO2 96%   BMI 41.40 kg/m    Temp (24hrs), Av.9  F (36.1  C), Min:95.8  F (35.4  C), Max:97.9  F (36.6  C)    Patient Vitals for the past 72 hrs:   Weight   19 0809 109.4 kg (241 lb 3.2 oz)       Intake/Output Summary (Last 24 hours) at 3/19/2019 0852  Last data filed at 3/19/2019 0757  Gross per 24 hour   Intake 850 ml   Output 4235 ml   Net -3385 ml       PHYSICAL EXAM    Cor: RRR  Abd: S NT ND +bs drain in place with green purulent fluid        Additional Comments:  ROS, FH, SH: See initial GI consult for details.    I have reviewed the patient's new clinical lab results:    Recent Labs   Lab Test 19  0520 19  0600 19  1835 19  0600  19  0505  19  0830  19  1439   WBC 7.6 7.4  --  7.9   < > 54.8*  --   --    < > 17.9*   HGB 9.5* 9.4* 10.9* 6.8*   < > 7.4*   < >  --    < > 13.2   MCV 89 88  --  93   < > 84  --   --    < > 87    281  --  382   < > 114*  --   --    < > 109*   INR  --   --   --   --   --  1.37*  --  1.58*  --  1.01    < > = values in this interval not displayed.     Recent Labs   Lab Test 19  0520 19  2145 19  1620 19  0600   POTASSIUM 4.1 3.2* 3.2* 3.1*   CHLORIDE 108  --  106 107   CO2 26  --  24 24   BUN 28  --  29 28   ANIONGAP 7  --  9 9     Recent Labs   Lab Test 19  0520 19  0600 19  0600 19  0630 03/15/19  0523  19  1925  19  1355  18  0645   ALBUMIN 1.6* 1.6* 1.5* 1.5* 1.3*   < >  --    < >  --    < > 4.1   BILITOTAL  --  1.5*  --  1.8* 1.8*   < >  --    < >  --    < > 0.7   ALT  --  88*  --  45 25   < >  --    < >  --    < > 48   AST  --  131*  --  78* 42   < >  --    < >  --  "   < > 44   PROTEIN  --   --   --   --   --   --  30*  --  100*  --   --    LIPASE  --   --   --   --   --   --   --   --   --   --  133    < > = values in this interval not displayed.         Active Problems:  1. Anastomotic ulcer: Hgb stable over night. Stools recorded as brown or green. No evidence of ongoing bleeding  -Carafate x 3 weeks  -PPI  -No need for EGD at this time    2. Liver abscess: management per surgery, IR, and ID    Will sign off. Call with questions.     Ashley Cordoba MD  [facility]  Pager: [phone]  Office: [phone]

## 2019-03-19 NOTE — DISCHARGE SUMMARY
[facility]    Discharge Summary  Hospitalist    Date of Admission:  3/1/2019  Date of Discharge:  3/19/2019 12:44 PM  Discharging Provider: Ruben Barksdale MD  Date of Service (when I saw the patient): 03/19/19    Discharge Diagnoses   Large liver abscess   Klebsiella bacteremia  Acute renal failure  Acute blood loss anemia  Acute upper GI bleeding secondary to anastomotic ulcer  Sepsis secondary liver abscess  Thrombocytopenia secondary to sepsis now resolved  Elevated liver enzymes secondary liver abscess improved    History of Present Illness   Vanessa Huffman is an 68 year old female who presented with headaches, nausea and vomiting, and subjective fevers    Hospital Course       Vanessa Huffman is a 68 year old female with PMH gastric bypass, chronic diarrhea, HTN, migraines who was admitted on 3/1/2019 with sepsis and found to have ELISE, liver abscess, and Klebsiella bacteremia. Liver biopsy negative for malignancy. S/p drain placement 3/8/2019. Symptomatically improving now.    Final discharge diagnoses and hospital course     Sepsis secondary to liver abscess/mass  Klebsiella bacteremia  Persistent leukocytosis, improving  Patient presented with procal 60, , hypotension. CT abd pelvis showed large liver mass. Started on Vanc and Zosyn. MRCP liver didn't give any further info on the mass due to lack of contrast  Liver biopsy 3/4 showed hemorrhagic necrosis with acute inflammatory changes, no malignant cells identified, this is likely primarily a liver abscess. Blood cultures also positive for Klebsiella, source likely from abscess. WBC up to 55k this admission, however improving since 3/12 now down to 16k as of 3/14 and now normal on discharge.. ID and GI consulted, lower concern for cholangitis. S/p drain placement by interventional radiology on 3/7/2019. Repeat biopsy 3/7 negative for malignancy as well.               Repeat CT abdomen 3/13 showed unchanged size of liver abscess.  However, given her clinical improvement, plan is now to continue current management with drainage and IV antibiotics.  - ID consulted, appreciate recs              - IV Zosyn for now, ID switch the antibiotic to IV ceftriaxone on 3/18/2019.  - General surgery following, appreciate recs              - No  surgical management at this time  - Startrd on Lasix 20mg PO daily on 3/15/2019 for persistent BLE pitting edema which is new since her illness, may be related to liver dysfunction.  Patient was in ATN, receive IV fluids.  have low albumin,  abnormal liver function, all adding her to have fluid retention as well as she was about 7.5 L positive balance on 3/16/2090.  Her ATN is improving, I  stop the oral Lasix and gave her a dose of IV Lasix 60 mg times once on 3/16/2019, the patient responded very well to the dose of Lasix, she had good urine output.  I started her on IV Lasix 40 mg twice daily  on 3/17/2019, therapy is consulted for lymphedema wrap.  Patient requesting once a day of Lasix so switch her to 60 mg daily on 3/18/2019.  At the time of discharge the patient is more than 6 L negative balance.      Patient is feeling much better now, denies any chest pain shortness of breath orthopnea PND palpitation no fever chills or cough no abdominal pain back pain at this time.  She is up and about has been walking.  And eating well.  Lower extremity edema is remarkably improved at this time.       ELISE, likely ATN: Baseline Cr ~0.7, on admission Cr was 4.27. Improved slowly with hydration. Nephrology consulted, suspect primarily ATN. Creatinine gradually improved to 1.9 as of 3/14/2019.  - Avoid nephrotoxic drugs, started on IV Lasix gave a dose of 60 mg times once 3/16/2019, she responded very well to the treatments have good urine output and a creatinine improved.  Started her on IV Lasix 40 mm twice daily from 3/17/2019,  Creatinine is improving, she is making good urine, requesting once a day of Lasix as she has  to wake up at night.  I will change the Lasix to 40 mg once a day by mouth on discharge.  Follow with the PCP in 1 week and check creatinine in few days.     Acute GI bleeding secondary to anastomotic ulcer, improved  Acute blood loss anemia secondary to acute GI bleed  Hgb drop 12-->6 while inpatient. Was given 2 units of packed RBCs on 3/8/2019. Patient had a EGD done on 3/8/2019, showed crater ulcer with visible vessel and actively bleeding. Treated with injection 1: 10,000 epinephrine and Hemoclip. Hgb downtrending slightly to 7.1 on 3/14/2019, but stools have been brown.  - GI consulted, louie monsalve, signed off 3/13  - PPI PO + Carafate  - Transfuse for Hgb  < 7.0     Her hemoglobin dropped to 6.8 on 3/17/2019, has some dark colored stool  as well.  Check a stool for occult blood, gave Her 2 units of packed RBCs on 3/17/2019 and reconsult the GI to evaluate the patient given the high risk of anastomotic ulcer.  This does not think the patient is actively bleeding does not recommend any pain EGD at this time.  Hemoglobin improved to 9.4 and remained stable at the time of discharge her hemoglobin was 9.5.  Patient discharged on Protonix 40 twice daily     Hx gastric bypass  Chronic diarrhea  PTA on high dose Immodium--. Currently on oxycodone   - Lomotil started TID, no more diarrhea at this time.  We will switch to as needed at this time.     Thrombocytopenia secondary to sepsis, now resolved: HIT ruled out. Hemolysis also evaluated and ruled out.        Disposition: Expected discharge to TCU today in stable condition.    Patient is to continue taking the IV ceftriaxone, final duration of antibiotics will be decided by the infectious disease.  Follow with the PCP in 1 week  With ID in 1 week  Check BMP in few days.       Ruben Barksdale MD    Significant Results and Procedures    liver biopsy, and drain placement by IR    Pending Results   These results will be followed up by PCP  Unresulted Labs Ordered in  the Past 30 Days of this Admission     Date and Time Order Name Status Description    3/7/2019 1122 Fungus culture Preliminary     3/4/2019 1355 Fungus Culture, non-blood Preliminary     3/3/2019 1425 Blood Morphology Pathologist Review In process     3/3/2019 0740 Comprehensive metabolic panel In process           Code Status   Full Code       Primary Care Physician   Emily Najera    Physical Exam   Temp: 97.8  F (36.6  C) Temp src: Oral BP: 130/57   Heart Rate: 87 Resp: 18 SpO2: 96 % O2 Device: None (Room air)    Vitals:    03/09/19 0600 03/16/19 0552 03/19/19 0809   Weight: 111.2 kg (245 lb 2.4 oz) 115.7 kg (255 lb) 109.4 kg (241 lb 3.2 oz)     Vital Signs with Ranges  Temp:  [96.5  F (35.8  C)-97.9  F (36.6  C)] 97.8  F (36.6  C)  Heart Rate:  [61-90] 87  Resp:  [17-18] 18  BP: (127-140)/(42-67) 130/57  SpO2:  [96 %-98 %] 96 %  I/O last 3 completed shifts:  In: 610 [P.O.:600; Other:10]  Out: 3030 [Urine:3000; Drains:30]    Constitutional: awake, alert, cooperative, no apparent distress, and appears stated age  Eyes: Lids and lashes normal, pupils equal, round and reactive to light, extra ocular muscles intact, sclera clear, conjunctiva normal  Respiratory: No increased work of breathing, good air exchange, clear to auscultation bilaterally, no crackles or wheezing  Cardiovascular: Normal apical impulse, regular rate and rhythm, normal S1 and S2, no S3 or S4, and no murmur noted  GI: No scars, normal bowel sounds, soft, non-distended, non-tender, no masses palpated, no hepatosplenomegally  Skin: no bruising or bleeding  Musculoskeletal: 2+ lower extremity pitting edema present  Neurologic: No focal deficit    Discharge Disposition   Discharged to TCU  Condition at discharge: Stable    Consultations This Hospital Stay   GASTROENTEROLOGY IP CONSULT  PHARMACY TO DOSE VANCO  INFECTIOUS DISEASES IP CONSULT  NEPHROLOGY IP CONSULT  HEMATOLOGY & ONCOLOGY IP CONSULT  NUTRITION SERVICES ADULT IP CONSULT  PHYSICAL THERAPY  ADULT IP CONSULT  SURGERY GENERAL IP CONSULT  VASCULAR ACCESS ADULT IP CONSULT  NUTRITION SERVICES ADULT IP CONSULT  CARE TRANSITION RN/SW IP CONSULT  WOUND OSTOMY CONTINENCE NURSE  IP CONSULT  SPEECH LANGUAGE PATH ADULT IP CONSULT  LYMPHEDEMA THERAPY IP CONSULT  PHYSICAL THERAPY ADULT IP CONSULT  OCCUPATIONAL THERAPY ADULT IP CONSULT  GASTROENTEROLOGY IP CONSULT  PHYSICAL THERAPY ADULT IP CONSULT  OCCUPATIONAL THERAPY ADULT IP CONSULT    Time Spent on this Encounter   Ruben NAYAK, personally saw the patient today and spent greater than 30 minutes discharging this patient.    Discharge Orders      General info for SNF    Length of Stay Estimate: Short Term Care: Estimated # of Days <30  Condition at Discharge: Improving  Level of care:skilled   Rehabilitation Potential: Excellent  Admission H&P remains valid and up-to-date: Yes  Recent Chemotherapy: N/A  Use Nursing Home Standing Orders: Yes     Mantoux instructions    Give two-step Mantoux (PPD) Per Facility Policy Yes     Reason for your hospital stay    Liver abscess     Intake and output    Every shift     Bladder scan    X 2 for post void residual     Additional Discharge Instructions    No NSAID's     Activity - Up ad abraham     Activity - Up with assistive device     Follow-up and recommended labs and tests     Follow up with primary care provider, Emily Najera, within 7 days for hospital follow- up.  The following labs/tests are recommended: CBC, CMP.      Follow with ID, Dr Guadarrama in 1 week.     Follow-up and recommended labs and tests     You were seen by Dr. Borja and her partners from [facility]. We are the general surgery team. You do not need to follow-up with us after your hospital stay. However, if you have any questions or concerns for our team specifically, you may call our office at [phone].   Our clinic's name is [facility]. The address is [address] [place], [place] [address], [address], [place], [place], [address]     Full Code     Physical  Therapy Adult Consult    Evaluate and treat as clinically indicated.    Reason:  Deconditioning and lymphedema wrap     Occupational Therapy Adult Consult    Evaluate and treat as clinically indicated.    Reason:  Deconditioning and lymphedema wrap     Advance Diet as Tolerated    Follow this diet upon discharge: Orders Placed This Encounter      Room Service      Snacks/Supplements Adult: Other; Gelatein PLUS at 10am and 2pm  (RD); Between Meals      Regular Diet Adult     Discharge Medications   Discharge Medication List as of 3/19/2019 11:17 AM      START taking these medications    Details   cefTRIAXone (ROCEPHIN) 1 GM vial Inject 2 g (2,000 mg) into the vein daily CBC with differential, creatinine, SGOT weekly while on this medication to be faxed to Dr. Israel office., Disp-600 mL, R-0, Transitional      ferrous sulfate (FEROSUL) 325 (65 Fe) MG tablet Take 1 tablet (325 mg) by mouth daily (with breakfast), Disp-30 tablet, R-0, E-Prescribe      furosemide (LASIX) 40 MG tablet Take 1 tablet (40 mg) by mouth daily, Disp-30 tablet, R-0, E-Prescribe      oxyCODONE (ROXICODONE) 5 MG tablet Take 1 tablet (5 mg) by mouth every 6 hours as needed for moderate to severe pain, Disp-20 tablet, R-0, Local Print      pantoprazole (PROTONIX) 40 MG EC tablet Take 1 tablet (40 mg) by mouth 2 times daily (before meals), Disp-60 tablet, R-0, E-Prescribe      potassium chloride ER (K-DUR/KLOR-CON M) 20 MEQ CR tablet Take 1 tablet (20 mEq) by mouth daily, Disp-30 tablet, R-0, E-Prescribe      sucralfate (CARAFATE) 1 GM/10ML suspension Take 10 mLs (1 g) by mouth 2 times daily (before meals), Disp-600 mL, R-0, E-Prescribe      diphenoxylate-atropine (LOMOTIL) 2.5-0.025 MG tablet Take 1 tablet by mouth 4 times daily as needed for diarrhea, Disp-60 tablet, R-1, Local Print         CONTINUE these medications which have NOT CHANGED    Details   amLODIPine (NORVASC) 2.5 MG tablet Take 1 tablet (2.5 mg) by mouth daily, Disp-30 tablet, R-1,  E-Prescribe      CYCLOBENZAPRINE HCL PO Take 10 mg by mouth At Bedtime, Historical      hydrOXYzine (ATARAX) 10 MG tablet Take 1 10-mg tablet every 6 hours as needed for muscle relaxation and to supplement your pain medication., Disp-40 tablet, R-0, E-Prescribe      latanoprost (XALATAN) 0.005 % ophthalmic solution Place 1 drop into both eyes At Bedtime, Historical      loperamide (IMODIUM) 2 MG capsule Take 2 mg by mouth 4 times daily as needed for diarrhea, Historical      MEDICATION INSTRUCTION Hold Lisinopril and Norvasc if your SBP is <110 and DBP<70, Disp-1 each, R-0, Local Print      nystatin (MYCOSTATIN) 504122 UNIT/GM POWD Apply topically 2 times daily as needed (affected area)Historical      SIMVASTATIN PO Take 40 mg by mouth At Bedtime, Historical      SUMATRIPTAN SUCCINATE PO Take 50 mg by mouth every 2 hours as needed for migraine (max of 200mg q 24hr), Historical      TEMAZEPAM PO Take 15 mg by mouth nightly as needed for sleep, Historical         STOP taking these medications       LISINOPRIL PO Comments:   Reason for Stopping:         oxyCODONE-acetaminophen (PERCOCET) 5-325 MG per tablet Comments:   Reason for Stopping:         oxyCODONE-acetaminophen (PERCOCET) 5-325 MG per tablet Comments:   Reason for Stopping:         oxyCODONE-acetaminophen (PERCOCET) 5-325 MG per tablet Comments:   Reason for Stopping:         TRAMADOL HCL PO Comments:   Reason for Stopping:             Allergies   Allergies   Allergen Reactions     Contrast Dye Shortness Of Breath     Codeine      Zolpidem Other (See Comments)     Patient reports sleep walking.     Diatrizoate Itching     itchy throat that makes her want to cough     Data   Most Recent 3 CBC's:  Recent Labs   Lab Test 03/19/19  0520 03/18/19  0600 03/17/19  1835 03/17/19  0600   WBC 7.6 7.4  --  7.9   HGB 9.5* 9.4* 10.9* 6.8*   MCV 89 88  --  93    281  --  382      Most Recent 3 BMP's:  Recent Labs   Lab Test 03/19/19  0520 03/18/19  7389  03/18/19  1620 03/18/19  0600     --  139 140   POTASSIUM 4.1 3.2* 3.2* 3.1*   CHLORIDE 108  --  106 107   CO2 26  --  24 24   BUN 28  --  29 28   CR 1.58*  --  1.59* 1.69*   ANIONGAP 7  --  9 9   DANNA 7.7*  --  7.8* 7.6*   GLC 87  --  90 84     Most Recent 2 LFT's:  Recent Labs   Lab Test 03/18/19  0600 03/16/19  0630   * 78*   ALT 88* 45   ALKPHOS 401* 473*   BILITOTAL 1.5* 1.8*     Most Recent INR's and Anticoagulation Dosing History:  Anticoagulation Dose History     Recent Dosing and Labs Latest Ref Rng & Units 3/3/2019 3/8/2019 3/9/2019    INR 0.86 - 1.14 1.01 1.58(H) 1.37(H)        Most Recent 3 Troponin's:No lab results found.  Most Recent Cholesterol Panel:No lab results found.  Most Recent 6 Bacteria Isolates From Any Culture (See EPIC Reports for Culture Details):  Recent Labs   Lab Test 03/07/19  1050 03/04/19  1410 03/04/19  1355 03/03/19  1925 03/02/19  1043 03/01/19  2148   CULT Culture negative after 1 week  No anaerobes isolated  Moderate growth  Klebsiella pneumoniae  Susceptibility testing done on previous specimen  * No anaerobes isolated  No growth Culture negative after 2 weeks  No anaerobes isolated  Light growth  Klebsiella pneumoniae  * <10,000 colonies/mL  urogenital shannon  Susceptibility testing not routinely done    Canceled, Test credited  Duplicate request   No growth No growth     Most Recent TSH, T4 and A1c Labs:No lab results found.  Results for orders placed or performed during the hospital encounter of 03/01/19   CT Head w/o Contrast    Narrative    CT SCAN OF THE HEAD WITHOUT CONTRAST   3/1/2019 1:14 PM     HISTORY: Headache, acute, severe, worst headache of life.    TECHNIQUE:  Axial images of the head and coronal reformations without  IV contrast material.  Radiation dose for this scan was reduced using  automated exposure control, adjustment of the mA and/or kV according  to patient size, or iterative reconstruction technique.    COMPARISON: None.    FINDINGS:   The ventricles are normal in size, shape and configuration.   The brain parenchyma and subarachnoid spaces are normal. There is no  evidence of intracranial hemorrhage, mass, acute infarct or anomaly.     The visualized portions of the sinuses and mastoids appear normal.  There is no evidence of trauma.      Impression    IMPRESSION:   Normal CT scan of the head.     JACQUELINE AGUAYO MD   US Abdomen Limited (RUQ)    Narrative    RIGHT UPPER QUADRANT ULTRASOUND 3/1/2019 4:46 PM    HISTORY:  abnormal LFTs, s/p cholecystectomy    COMPARISON: Noncontrast CT abdomen pelvis 3/19/2018    FINDINGS:    Pancreas is mostly obscured. Liver 17 cm in length, there is an  irregularly shaped area of increased echogenicity in the right lobe of  the liver which is indeterminate but may represent focal fatty  infiltration. Patient is status post cholecystectomy. 1.2 cm common  bile duct. Normal appearing right kidney 10.4 cm in length.      Impression    IMPRESSION:  1. Irregularly shaped area of increased echogenicity in the right lobe  of the liver, indeterminate but possibly fatty infiltration. Recommend  further evaluation to rule out a hepatic mass and liver MRI would be  helpful for further evaluation.  2. Common bile duct 1.2 cm in diameter may be normal in this  postcholecystectomy patient.    MONICA HUIZAR MD   CT Abdomen Pelvis w/o Contrast    Narrative    CT ABDOMEN AND PELVIS WITHOUT CONTRAST 3/1/2019 8:37 PM    TECHNIQUE: Images from diaphragm to pubic symphysis noncontrast  technique. Radiation dose for this scan was reduced using automated  exposure control, adjustment of the mA and/or kV according to patient  size, or iterative reconstruction technique.    HISTORY: Nausea, vomiting.    COMPARISON: 3/19/2018 CT abdomen and pelvis    FINDINGS:   Abdomen and Pelvis: There is a round low-attenuation lesion in the  dome of the liver approximately 8 cm in diameter, new since 3/19/2018.  This is indeterminate and MRI is  recommended for further evaluation of  liver lesion versus unusual-appearing focal fatty infiltration. The  lung bases are clear.    The spleen, adrenal glands and kidneys appear grossly normal within  the limits of a noncontrast exam. Nonvisualized gallbladder. Pancreas  grossly normal. No periaortic or pelvic adenopathy. Postoperative  gastric surgery changes.    Absent uterus. No free fluid. The appendix is not confidently  identified. No acute-appearing bowel abnormality. No aggressive bone  lesions.      Impression    IMPRESSION: Indeterminate 8 cm hypodense lesion in the upper right  lobe of the liver. Recommend MRI for further evaluation.               MONICA HUIZAR MD   MR Abdomen w/o Contrast    Narrative    MR ABDOMEN WITHOUT CONTRAST  3/2/2019 5:34 PM     HISTORY: Liver mass. Elevated liver enzymes.    TECHNIQUE: Multisequence, multiplanar imaging of the abdomen was  performed without IV gadolinium contrast. MRCP imaging was also  performed.    COMPARISON: CT of the abdomen and pelvis performed 3/1/2019.    FINDINGS: A mass lesion in the posterior segment of the right hepatic  lobe superiorly measures 10.3 x 7 cm, and demonstrates heterogeneous  T2 hyperintensity and heterogeneous T1 hypointensity, with no  associated loss of signal on the out of phase images. Evaluation of  this liver lesion is quite limited due to the lack of IV contrast. No  evidence for fatty infiltration of the liver.     The gallbladder is not seen, consistent with history of prior  cholecystectomy. The common duct is dilated, measuring up to 1.8 cm in  diameter. There is mild intrahepatic biliary dilatation. No biliary  filling defects to suggest choledocholithiasis. No evidence for  pancreatic ductal dilatation.     The spleen, pancreas, adrenal glands, and kidneys have unremarkable  noncontrast appearances. Visualized loops of small bowel and colon are  of normal caliber. No enlarged lymph nodes are identified in the  abdomen.  A small amount of ascites about the liver is new since  3/1/2019. A small right pleural effusion is also new since the  previous exam.       Impression    IMPRESSION:   1. Intra and extrahepatic biliary dilatation could be related to the  patient's age and postcholecystectomy state. No other cause for  biliary dilatation is identified.  2. Indeterminate 10.3 cm right hepatic lesion. Malignancy cannot be  excluded from this appearance. Evaluation of this lesion is limited by  lack of IV contrast.  3. A small amount of ascites in the right upper quadrant and a small  right pleural effusion are new since the previous exam.    CHRISTIANO PISANO MD   US Abdomen Limited w Abd/Pelvis Duplex Complete    Narrative    HEPATIC DOPPLER ULTRASOUND    PROCEDURE DATE:  3/2/2019 4:03 PM     CLINICAL HISTORY/INDICATION:   elevated LFTs, assess liver vasculature    COMPARISON:   MR abdomen 3/2/2019    TECHNIQUE:   Grayscale, color and spectral ultrasound of the liver.    FINDINGS:  The portal venous system is patent with normal antegrade flow.    The hepatic artery is patent with normal arterial spectral waveform.    The visualized hepatic veins are patent with antegrade flow. The right  hepatic vein is not well seen. Visualized portions of the intrahepatic  IVC are unremarkable.         Impression    IMPRESSION:   Lack of visualization of the right hepatic vein, the hepatic vessels  are otherwise unremarkable.    VIOLET SANTIZO MD   CT Liver Biopsy    Narrative    CT BIOPSY LIVER PERCUTANEOUS  3/4/2019 2:24 PM    HISTORY: Abdominal pain, fever, abscess suspected; liver mass present,  not sure abscess versus malignancy.    COMPARISON: None.    TECHNIQUE: Volumetric helical acquisition of CT images without  contrast. Radiation dose for this scan was reduced using automated  exposure control, adjustment of the mA and/or kV according to patient  size, or iterative reconstruction technique.    MEDICATIONS:  10 mL Lidocaine 1%, 2 mg  Versed, 50 mcg Fentanyl given  over 25 minutes. RN: John Torres    FINDINGS: After written and oral informed consent was obtained and a  pause for the cause procedure verifying the correct procedure and the  correct patient, the area was prepped and draped in the usual sterile  fashion. The overlying soft tissues were anesthetized with 10 mL of 1%  subcutaneous lidocaine. Utilizing CT guidance, a needle was advanced  into the lesion in the right lobe of liver and aspiration was  attempted yielding no significant fluid. Subsequently six passes were  made into the lesion in the right lobe of the liver with an 18 gauge  biopsy device and the tissue was submitted to pathology. There were no  immediate complications.     CONSCIOUS SEDATION NOTE: After obtaining consent for sedation,  conscious sedation was induced using IV Versed and IV fentanyl.  Patient was monitored by nurse under my direct supervision throughout  the exam. There were no complications of the sedation. 15 minutes  face-to-face time.      Impression    IMPRESSION:   1. Technically successful liver lesion biopsy.   2. Conscious sedation.    VICTOR HUGO CALLOWAY MD   CT Chest Abdomen Pelvis w/o Contrast    Narrative    PROCEDURE:  CT of the chest, abdomen and pelvis without contrast    DATE OF PROCEDURE:  3/6/2019 12:32 PM    DOSE:  1664 mGy-cm    CLINICAL HISTORY/INDICATION:  Sepsis; ; liver abscess s/p biopsy, rule out pleural effusion, post  biopsy complications or worsening abscess    COMPARISON:  CT 3/1/19    TECHNIQUE:  CT of the chest, abdomen and pelvis was performed without the  administration of intravenous contrast. Coronal and axial MIP  reformats were performed. Radiation dose for this scan was reduced  using automated exposure control, adjustment of the mA and/or kV  according to patient size, or iterative reconstruction technique.     FINDINGS:  Chest:   Nodular consolidation in the left lower lobe is new from arch third  2019 inferior versus  infectious/inflammatory process. Worsening right  lower lobe consolidation with air bronchograms again favoring  infectious versus inflammatory process. Small right pleural effusion  is new from March 3, 2019 but present at time of abscess aspiration  3/4/2019. The heart is not enlarged. Trace pericardial effusion.    Abdomen/Pelvis:  The liver is noncirrhotic in morphology. Hypodense gas containing  masslike lesion/collection involving the majority of the right hepatic  lobe measuring at least 12.5 x 10.5 x 9.6 cm AP by transverse by long.  Surrounding perihepatic ascites. The spleen, bilateral adrenal glands  and pancreas are unremarkable. Surgical staple line along the lesser  curvature of the stomach. No hydronephrosis. No large renal stone.  Large and small bowel are nondilated without evidence of obstruction.  Trace pelvic ascites. Diffuse soft tissue edema.      Impression    IMPRESSION:   1.  Small right pleural effusion not significantly changed from March 4, 2019.  2.  12.5 x 10.5 x 9.6 cm gas-containing right hepatic mass/fluid  collection. This is increased from prior exams. The gas may relate to  recent biopsy versus infection.  3.  Trace perihepatic and pelvic ascites    VIOLET SANTIZO MD   US Abscess Drain Peritoneal    Narrative    ULTRASOUND-GUIDED ABDOMINAL DRAIN PLACEMENT 3/7/2019 12:00 PM    HISTORY: Hepatic mass, previously this was biopsied and only necrotic  tissue was obtained. The patient has elevated white count, concerning  for necrotic mass versus liver abscess.    COMPARISON: CT 3/6/2019, 3/4/2019    MEDICATIONS: 10 mL 1% lidocaine, 50 mcg fentanyl IV and 1 mg Versed  IV.    PROCEDURE AND FINDINGS:  Following a discussion of the risks, benefits, indications and  alternatives to treatment, appropriate informed consent was obtained.   The patient was brought to the interventional radiology suite and  placed supine on the table. The abdomen was prepped and draped in the  usual sterile  fashion. A timeout was performed per universal protocol  policy to confirm the correct patient, site and procedure to be  performed.    A route for biopsy was chosen. Overlying skin was prepped and draped  in a sterile fashion. 1% lidocaine was used for local anesthesia.  Under ultrasound guidance, 17-gauge access needle was advanced into  hepatic mass. Through this access needle, a 18-gauge Temno needle was  used to obtain 4 core biopsy specimens. These specimens were submitted  to pathology.     The needle was advanced more centrally into the hepatic mass and 10 mL  of bloody purulent fluid was aspirated. The decision was made to place  a drain. Through the existing needle a 0.035 wire was advanced through  the catheter. The tract was serially dilated and a 10 Finnish locking  pigtail drain was advanced into the fluid collection. The pigtail was  locked and connected to a BILL bulb. Drain was sutured in with 2-0  Ethilon suture. Sterile dressing was applied.    Throughout the procedure, the patient was monitored by a radiology  nurse for cardiac rhythm and oxygen saturation which remained stable.  Total sedation time was 20 minutes minutes. The patient tolerated the  procedure well and left interventional radiology in stable condition.      Impression    IMPRESSION: Successful liver mass biopsy and placement of a 10 Finnish  drain into a hepatic mass/abscess. 10 mL of red purulent fluid was  aspirated and sent to lab for cultures.    YON CERVANTES DO   XR Chest Port 1 View    Narrative    XR CHEST PORT 1 VW 3/9/2019 5:22 AM    HISTORY: Sepsis.     COMPARISON: None.      Impression    IMPRESSION: A right PICC terminates in the region of the superior vena  cava. There is elevation of the right hemidiaphragm with likely  associated right pleural effusion. The left lung appears clear. No  pneumothorax.    JULIANNA CAMARGO MD   CT Abdomen Pelvis w/o Contrast    Narrative    PROCEDURE:  CT of the abdomen and pelvis  without contrast    DATE OF PROCEDURE:  3/13/2019 8:43 AM    CLINICAL HISTORY/INDICATION:  Liver abscess follow up    COMPARISON:  CT 3/6/2019    TECHNIQUE:  CT of the abdomen pelvis was performed without intravenous contrast.  Coronal reformats were performed. Radiation dose for this scan was  reduced using automated exposure control, adjustment of the mA and/or  kV according to patient size, or iterative reconstruction technique.    DOSE:  DLP: 1300 mGy-cm    FINDINGS:  Lower chest:   Right pleural effusion has increased in size from prior examination.  Right lower lobe atelectasis. No left pleural effusion. No new  pulmonary nodules within the visualized lung fields.     Abdomen/pelvis:  Interval placement of a pigtail drainage catheter into the large right  hepatic fluid collection. No significant change in overall size of the  fluid collection. Perihepatic fluid is unchanged. The spleen, adrenal  glands, kidneys and pancreas are unremarkable. Large and small bowel  are nondilated without evidence of obstruction. Trace pelvic free  fluid.      Impression    IMPRESSION:  1.  No significant change in size of large right hepatic fluid  collection/mass status post percutaneous drain placement. If drain  output is low, would consider attempt TPA instillation before  considering drain upsize/repositioning.  2.  Increase in size of small right pleural effusion.    VIOLET SANTIZO MD   CT Abdomen Pelvis w/o Contrast    Narrative    CT ABDOMEN AND PELVIS WITHOUT CONTRAST 3/18/2019 3:04 PM     HISTORY: Abdominal pain, fever, abscess suspected.    COMPARISON: March 13, 2019    TECHNIQUE: Volumetric helical acquisition of CT images from the lung  bases through the symphysis pubis without intravenous contrast.  Radiation dose for this scan was reduced using automated exposure  control, adjustment of the mA and/or kV according to patient size, or  iterative reconstruction technique.    FINDINGS: Drain remains in place. Liver  lesion measures 10.3 cm in  diameter previously 11.2 cm. Small amount of free fluid. Question some  subcapsular fluid as well around the liver. Kidneys, adrenal glands,  spleen, and pancreas demonstrate no worrisome focal lesion in the  absence of contrast. There are no dilated loops of small intestine or  large bowel to suggest ileus or obstruction. No free air. Survey of  the visualized bony structures demonstrates no destructive bony  lesions. Moderate pleural effusion at the right base is increased  compared to previous. Increased compressive atelectasis. Left lung  clear.      Impression    IMPRESSION:  1. Tube remains in place, minimal decrease in size in the liver lesion  since the comparison study.  2. Increased right pleural effusion and associated compressive  atelectasis and/or infiltrate.    VICTOR HUGO CALLOWAY MD     Most Recent 3 CBC's:  Recent Labs   Lab Test 03/19/19  0520 03/18/19  0600 03/17/19  1835 03/17/19  0600   WBC 7.6 7.4  --  7.9   HGB 9.5* 9.4* 10.9* 6.8*   MCV 89 88  --  93    281  --  382     Most Recent 3 BMP's:  Recent Labs   Lab Test 03/19/19  0520 03/18/19  2145 03/18/19  1620 03/18/19  0600     --  139 140   POTASSIUM 4.1 3.2* 3.2* 3.1*   CHLORIDE 108  --  106 107   CO2 26  --  24 24   BUN 28  --  29 28   CR 1.58*  --  1.59* 1.69*   ANIONGAP 7  --  9 9   DANNA 7.7*  --  7.8* 7.6*   GLC 87  --  90 84

## 2019-03-21 ENCOUNTER — RECORDS - HEALTHEAST (OUTPATIENT)
Dept: LAB | Facility: CLINIC | Age: 69
End: 2019-03-21

## 2019-03-22 LAB
C DIFF TOX B STL QL: NEGATIVE
CREAT SERPL-MCNC: 1.1 MG/DL (ref 0.6–1.1)
GFR SERPL CREATININE-BSD FRML MDRD: 50 ML/MIN/1.73M2
POTASSIUM BLD-SCNC: 3.9 MMOL/L (ref 3.5–5)
RIBOTYPE 027/NAP1/BI: NORMAL

## 2019-03-24 ENCOUNTER — APPOINTMENT (OUTPATIENT)
Dept: ULTRASOUND IMAGING | Facility: CLINIC | Age: 69
DRG: 553 | End: 2019-03-24
Attending: EMERGENCY MEDICINE
Payer: MEDICARE

## 2019-03-24 ENCOUNTER — HOSPITAL ENCOUNTER (INPATIENT)
Facility: CLINIC | Age: 69
LOS: 3 days | Discharge: NURSING, REHAB OR LONG-TERM CARE | DRG: 553 | End: 2019-03-27
Attending: EMERGENCY MEDICINE | Admitting: INTERNAL MEDICINE
Payer: MEDICARE

## 2019-03-24 ENCOUNTER — APPOINTMENT (OUTPATIENT)
Dept: GENERAL RADIOLOGY | Facility: CLINIC | Age: 69
DRG: 553 | End: 2019-03-24
Attending: EMERGENCY MEDICINE
Payer: MEDICARE

## 2019-03-24 ENCOUNTER — APPOINTMENT (OUTPATIENT)
Dept: CT IMAGING | Facility: CLINIC | Age: 69
DRG: 553 | End: 2019-03-24
Attending: EMERGENCY MEDICINE
Payer: MEDICARE

## 2019-03-24 DIAGNOSIS — K75.0 LIVER ABSCESS: ICD-10-CM

## 2019-03-24 DIAGNOSIS — M25.562 ACUTE PAIN OF LEFT KNEE: Primary | ICD-10-CM

## 2019-03-24 DIAGNOSIS — E87.6 HYPOKALEMIA: ICD-10-CM

## 2019-03-24 DIAGNOSIS — G47.00 INSOMNIA, UNSPECIFIED TYPE: ICD-10-CM

## 2019-03-24 DIAGNOSIS — A41.9 SEPSIS, DUE TO UNSPECIFIED ORGANISM: ICD-10-CM

## 2019-03-24 PROBLEM — B96.1 BACTEREMIA DUE TO KLEBSIELLA PNEUMONIAE: Status: ACTIVE | Noted: 2019-03-24

## 2019-03-24 PROBLEM — D62 ANEMIA DUE TO BLOOD LOSS, ACUTE: Status: ACTIVE | Noted: 2019-03-24

## 2019-03-24 PROBLEM — I10 ESSENTIAL HYPERTENSION: Status: ACTIVE | Noted: 2019-03-24

## 2019-03-24 PROBLEM — D69.6 THROMBOCYTOPENIA (H): Status: ACTIVE | Noted: 2019-03-24

## 2019-03-24 PROBLEM — R79.89 ABNORMAL LFTS: Status: ACTIVE | Noted: 2019-03-24

## 2019-03-24 PROBLEM — R78.81 BACTEREMIA DUE TO KLEBSIELLA PNEUMONIAE: Status: ACTIVE | Noted: 2019-03-24

## 2019-03-24 PROBLEM — K92.2 UPPER GI BLEED: Status: ACTIVE | Noted: 2019-03-24

## 2019-03-24 PROBLEM — R50.9 FEVER: Status: ACTIVE | Noted: 2019-03-24

## 2019-03-24 LAB
ALBUMIN SERPL-MCNC: 1.9 G/DL (ref 3.4–5)
ALBUMIN UR-MCNC: 30 MG/DL
ALP SERPL-CCNC: 453 U/L (ref 40–150)
ALT SERPL W P-5'-P-CCNC: 46 U/L (ref 0–50)
ANION GAP SERPL CALCULATED.3IONS-SCNC: 12 MMOL/L (ref 3–14)
APPEARANCE FLD: NORMAL
APPEARANCE UR: CLEAR
AST SERPL W P-5'-P-CCNC: 46 U/L (ref 0–45)
BILIRUB SERPL-MCNC: 2.4 MG/DL (ref 0.2–1.3)
BILIRUB UR QL STRIP: NEGATIVE
BUN SERPL-MCNC: 18 MG/DL (ref 7–30)
CALCIUM SERPL-MCNC: 7.9 MG/DL (ref 8.5–10.1)
CHLORIDE SERPL-SCNC: 95 MMOL/L (ref 94–109)
CO2 SERPL-SCNC: 28 MMOL/L (ref 20–32)
COLOR FLD: YELLOW
COLOR UR AUTO: YELLOW
CREAT SERPL-MCNC: 1.3 MG/DL (ref 0.52–1.04)
CRP SERPL-MCNC: 263 MG/L (ref 0–8)
CRYSTALS SNV MICRO: NORMAL
EOSINOPHIL NFR FLD MANUAL: 1 %
ERYTHROCYTE [DISTWIDTH] IN BLOOD BY AUTOMATED COUNT: 15.9 % (ref 10–15)
ERYTHROCYTE [SEDIMENTATION RATE] IN BLOOD BY WESTERGREN METHOD: 130 MM/H (ref 0–30)
FLUAV+FLUBV AG SPEC QL: NEGATIVE
FLUAV+FLUBV AG SPEC QL: NEGATIVE
GFR SERPL CREATININE-BSD FRML MDRD: 42 ML/MIN/{1.73_M2}
GLUCOSE FLD-MCNC: 6 MG/DL
GLUCOSE SERPL-MCNC: 99 MG/DL (ref 70–99)
GLUCOSE UR STRIP-MCNC: NEGATIVE MG/DL
HCT VFR BLD AUTO: 29.4 % (ref 35–47)
HGB BLD-MCNC: 9.9 G/DL (ref 11.7–15.7)
HGB UR QL STRIP: ABNORMAL
KETONES UR STRIP-MCNC: NEGATIVE MG/DL
LACTATE BLD-SCNC: 0.8 MMOL/L (ref 0.7–2)
LEUKOCYTE ESTERASE UR QL STRIP: NEGATIVE
LYMPHOCYTES NFR FLD MANUAL: 47 %
MCH RBC QN AUTO: 29.6 PG (ref 26.5–33)
MCHC RBC AUTO-ENTMCNC: 33.7 G/DL (ref 31.5–36.5)
MCV RBC AUTO: 88 FL (ref 78–100)
NEUTS BAND NFR FLD MANUAL: 36 %
NITRATE UR QL: NEGATIVE
OTHER CELLS FLD MANUAL: 16 %
PH UR STRIP: 5.5 PH (ref 5–7)
PLATELET # BLD AUTO: 197 10E9/L (ref 150–450)
POTASSIUM SERPL-SCNC: 2.8 MMOL/L (ref 3.4–5.3)
PROT FLD-MCNC: 3 G/DL
PROT SERPL-MCNC: 6.3 G/DL (ref 6.8–8.8)
RBC # BLD AUTO: 3.34 10E12/L (ref 3.8–5.2)
RBC #/AREA URNS AUTO: 3 /HPF (ref 0–2)
SODIUM SERPL-SCNC: 135 MMOL/L (ref 133–144)
SOURCE: ABNORMAL
SP GR UR STRIP: 1.01 (ref 1–1.03)
SPECIMEN SOURCE FLD: NORMAL
SPECIMEN SOURCE: NORMAL
SQUAMOUS #/AREA URNS AUTO: 1 /HPF (ref 0–1)
UROBILINOGEN UR STRIP-MCNC: NORMAL MG/DL (ref 0–2)
WBC # BLD AUTO: 2.8 10E9/L (ref 4–11)
WBC # FLD AUTO: NORMAL /UL
WBC #/AREA URNS AUTO: 2 /HPF (ref 0–5)

## 2019-03-24 PROCEDURE — 87075 CULTR BACTERIA EXCEPT BLOOD: CPT | Performed by: EMERGENCY MEDICINE

## 2019-03-24 PROCEDURE — 89060 EXAM SYNOVIAL FLUID CRYSTALS: CPT | Performed by: EMERGENCY MEDICINE

## 2019-03-24 PROCEDURE — 12000000 ZZH R&B MED SURG/OB

## 2019-03-24 PROCEDURE — 96375 TX/PRO/DX INJ NEW DRUG ADDON: CPT

## 2019-03-24 PROCEDURE — 93971 EXTREMITY STUDY: CPT

## 2019-03-24 PROCEDURE — 83605 ASSAY OF LACTIC ACID: CPT | Performed by: EMERGENCY MEDICINE

## 2019-03-24 PROCEDURE — 99207 ZZC CDG-CHARGE REQUIRED MANUAL ENTRY: CPT | Performed by: INTERNAL MEDICINE

## 2019-03-24 PROCEDURE — 25800030 ZZH RX IP 258 OP 636: Performed by: EMERGENCY MEDICINE

## 2019-03-24 PROCEDURE — 87205 SMEAR GRAM STAIN: CPT | Performed by: EMERGENCY MEDICINE

## 2019-03-24 PROCEDURE — 96376 TX/PRO/DX INJ SAME DRUG ADON: CPT

## 2019-03-24 PROCEDURE — 96365 THER/PROPH/DIAG IV INF INIT: CPT

## 2019-03-24 PROCEDURE — 82945 GLUCOSE OTHER FLUID: CPT | Performed by: EMERGENCY MEDICINE

## 2019-03-24 PROCEDURE — 85652 RBC SED RATE AUTOMATED: CPT | Performed by: EMERGENCY MEDICINE

## 2019-03-24 PROCEDURE — 25000128 H RX IP 250 OP 636: Performed by: EMERGENCY MEDICINE

## 2019-03-24 PROCEDURE — 87070 CULTURE OTHR SPECIMN AEROBIC: CPT | Performed by: EMERGENCY MEDICINE

## 2019-03-24 PROCEDURE — 89051 BODY FLUID CELL COUNT: CPT | Performed by: EMERGENCY MEDICINE

## 2019-03-24 PROCEDURE — 0S9D3ZZ DRAINAGE OF LEFT KNEE JOINT, PERCUTANEOUS APPROACH: ICD-10-PCS | Performed by: EMERGENCY MEDICINE

## 2019-03-24 PROCEDURE — 85027 COMPLETE CBC AUTOMATED: CPT | Performed by: EMERGENCY MEDICINE

## 2019-03-24 PROCEDURE — 96367 TX/PROPH/DG ADDL SEQ IV INF: CPT

## 2019-03-24 PROCEDURE — A9270 NON-COVERED ITEM OR SERVICE: HCPCS | Mod: GY | Performed by: EMERGENCY MEDICINE

## 2019-03-24 PROCEDURE — 93005 ELECTROCARDIOGRAM TRACING: CPT

## 2019-03-24 PROCEDURE — 99223 1ST HOSP IP/OBS HIGH 75: CPT | Mod: AI | Performed by: INTERNAL MEDICINE

## 2019-03-24 PROCEDURE — 74176 CT ABD & PELVIS W/O CONTRAST: CPT

## 2019-03-24 PROCEDURE — 73560 X-RAY EXAM OF KNEE 1 OR 2: CPT | Mod: LT

## 2019-03-24 PROCEDURE — 86140 C-REACTIVE PROTEIN: CPT | Performed by: EMERGENCY MEDICINE

## 2019-03-24 PROCEDURE — 80053 COMPREHEN METABOLIC PANEL: CPT | Performed by: EMERGENCY MEDICINE

## 2019-03-24 PROCEDURE — 87015 SPECIMEN INFECT AGNT CONCNTJ: CPT | Performed by: EMERGENCY MEDICINE

## 2019-03-24 PROCEDURE — 83735 ASSAY OF MAGNESIUM: CPT | Performed by: EMERGENCY MEDICINE

## 2019-03-24 PROCEDURE — 81001 URINALYSIS AUTO W/SCOPE: CPT | Performed by: EMERGENCY MEDICINE

## 2019-03-24 PROCEDURE — 87804 INFLUENZA ASSAY W/OPTIC: CPT | Performed by: EMERGENCY MEDICINE

## 2019-03-24 PROCEDURE — 84157 ASSAY OF PROTEIN OTHER: CPT | Performed by: EMERGENCY MEDICINE

## 2019-03-24 PROCEDURE — 87040 BLOOD CULTURE FOR BACTERIA: CPT | Performed by: EMERGENCY MEDICINE

## 2019-03-24 PROCEDURE — 96366 THER/PROPH/DIAG IV INF ADDON: CPT

## 2019-03-24 PROCEDURE — 25000132 ZZH RX MED GY IP 250 OP 250 PS 637: Mod: GY | Performed by: EMERGENCY MEDICINE

## 2019-03-24 PROCEDURE — 99285 EMERGENCY DEPT VISIT HI MDM: CPT | Mod: 25

## 2019-03-24 RX ORDER — PIPERACILLIN SODIUM, TAZOBACTAM SODIUM 3; .375 G/15ML; G/15ML
3.38 INJECTION, POWDER, LYOPHILIZED, FOR SOLUTION INTRAVENOUS ONCE
Status: COMPLETED | OUTPATIENT
Start: 2019-03-24 | End: 2019-03-24

## 2019-03-24 RX ORDER — AMOXICILLIN 250 MG
2 CAPSULE ORAL 2 TIMES DAILY PRN
Status: DISCONTINUED | OUTPATIENT
Start: 2019-03-24 | End: 2019-03-27 | Stop reason: HOSPADM

## 2019-03-24 RX ORDER — ACETAMINOPHEN 650 MG/1
650 SUPPOSITORY RECTAL EVERY 4 HOURS PRN
Status: DISCONTINUED | OUTPATIENT
Start: 2019-03-24 | End: 2019-03-27 | Stop reason: HOSPADM

## 2019-03-24 RX ORDER — BISACODYL 10 MG
10 SUPPOSITORY, RECTAL RECTAL DAILY PRN
Status: DISCONTINUED | OUTPATIENT
Start: 2019-03-24 | End: 2019-03-27 | Stop reason: HOSPADM

## 2019-03-24 RX ORDER — ONDANSETRON 2 MG/ML
4 INJECTION INTRAMUSCULAR; INTRAVENOUS EVERY 6 HOURS PRN
Status: DISCONTINUED | OUTPATIENT
Start: 2019-03-24 | End: 2019-03-27 | Stop reason: HOSPADM

## 2019-03-24 RX ORDER — SIMVASTATIN 20 MG
20 TABLET ORAL AT BEDTIME
Status: DISCONTINUED | OUTPATIENT
Start: 2019-03-25 | End: 2019-03-27 | Stop reason: HOSPADM

## 2019-03-24 RX ORDER — SUCRALFATE ORAL 1 G/10ML
1 SUSPENSION ORAL
Status: DISCONTINUED | OUTPATIENT
Start: 2019-03-25 | End: 2019-03-27 | Stop reason: HOSPADM

## 2019-03-24 RX ORDER — PIPERACILLIN SODIUM, TAZOBACTAM SODIUM 3; .375 G/15ML; G/15ML
3.38 INJECTION, POWDER, LYOPHILIZED, FOR SOLUTION INTRAVENOUS EVERY 6 HOURS
Status: DISCONTINUED | OUTPATIENT
Start: 2019-03-25 | End: 2019-03-25

## 2019-03-24 RX ORDER — SODIUM CHLORIDE 9 MG/ML
INJECTION, SOLUTION INTRAVENOUS CONTINUOUS
Status: ACTIVE | OUTPATIENT
Start: 2019-03-25 | End: 2019-03-25

## 2019-03-24 RX ORDER — POTASSIUM CL/LIDO/0.9 % NACL 10MEQ/0.1L
10 INTRAVENOUS SOLUTION, PIGGYBACK (ML) INTRAVENOUS
Status: DISCONTINUED | OUTPATIENT
Start: 2019-03-24 | End: 2019-03-25

## 2019-03-24 RX ORDER — PANTOPRAZOLE SODIUM 40 MG/1
40 TABLET, DELAYED RELEASE ORAL
Status: DISCONTINUED | OUTPATIENT
Start: 2019-03-25 | End: 2019-03-27 | Stop reason: HOSPADM

## 2019-03-24 RX ORDER — ACETAMINOPHEN 325 MG/1
650 TABLET ORAL EVERY 4 HOURS PRN
Status: DISCONTINUED | OUTPATIENT
Start: 2019-03-24 | End: 2019-03-27 | Stop reason: HOSPADM

## 2019-03-24 RX ORDER — ACETAMINOPHEN 325 MG/1
650 TABLET ORAL ONCE
Status: COMPLETED | OUTPATIENT
Start: 2019-03-24 | End: 2019-03-24

## 2019-03-24 RX ORDER — MORPHINE SULFATE 2 MG/ML
2 INJECTION, SOLUTION INTRAMUSCULAR; INTRAVENOUS ONCE
Status: COMPLETED | OUTPATIENT
Start: 2019-03-24 | End: 2019-03-24

## 2019-03-24 RX ORDER — LATANOPROST 50 UG/ML
1 SOLUTION/ DROPS OPHTHALMIC AT BEDTIME
Status: DISCONTINUED | OUTPATIENT
Start: 2019-03-25 | End: 2019-03-27 | Stop reason: HOSPADM

## 2019-03-24 RX ORDER — NALOXONE HYDROCHLORIDE 0.4 MG/ML
.1-.4 INJECTION, SOLUTION INTRAMUSCULAR; INTRAVENOUS; SUBCUTANEOUS
Status: DISCONTINUED | OUTPATIENT
Start: 2019-03-24 | End: 2019-03-27 | Stop reason: HOSPADM

## 2019-03-24 RX ORDER — HYDROMORPHONE HYDROCHLORIDE 1 MG/ML
0.2 INJECTION, SOLUTION INTRAMUSCULAR; INTRAVENOUS; SUBCUTANEOUS EVERY 4 HOURS PRN
Status: DISCONTINUED | OUTPATIENT
Start: 2019-03-24 | End: 2019-03-25

## 2019-03-24 RX ORDER — PROCHLORPERAZINE 25 MG
12.5 SUPPOSITORY, RECTAL RECTAL EVERY 12 HOURS PRN
Status: DISCONTINUED | OUTPATIENT
Start: 2019-03-24 | End: 2019-03-27 | Stop reason: HOSPADM

## 2019-03-24 RX ORDER — AMOXICILLIN 250 MG
1 CAPSULE ORAL 2 TIMES DAILY PRN
Status: DISCONTINUED | OUTPATIENT
Start: 2019-03-24 | End: 2019-03-27 | Stop reason: HOSPADM

## 2019-03-24 RX ORDER — PROCHLORPERAZINE MALEATE 5 MG
5 TABLET ORAL EVERY 6 HOURS PRN
Status: DISCONTINUED | OUTPATIENT
Start: 2019-03-24 | End: 2019-03-27 | Stop reason: HOSPADM

## 2019-03-24 RX ORDER — ONDANSETRON 4 MG/1
4 TABLET, ORALLY DISINTEGRATING ORAL EVERY 6 HOURS PRN
Status: DISCONTINUED | OUTPATIENT
Start: 2019-03-24 | End: 2019-03-27 | Stop reason: HOSPADM

## 2019-03-24 RX ORDER — FERROUS SULFATE 325(65) MG
325 TABLET ORAL
Status: DISCONTINUED | OUTPATIENT
Start: 2019-03-25 | End: 2019-03-27 | Stop reason: HOSPADM

## 2019-03-24 RX ORDER — POLYETHYLENE GLYCOL 3350 17 G/17G
17 POWDER, FOR SOLUTION ORAL DAILY PRN
Status: DISCONTINUED | OUTPATIENT
Start: 2019-03-24 | End: 2019-03-27 | Stop reason: HOSPADM

## 2019-03-24 RX ADMIN — MORPHINE SULFATE 2 MG: 2 INJECTION, SOLUTION INTRAMUSCULAR; INTRAVENOUS at 23:01

## 2019-03-24 RX ADMIN — ACETAMINOPHEN 650 MG: 325 TABLET, FILM COATED ORAL at 19:20

## 2019-03-24 RX ADMIN — SODIUM CHLORIDE 500 ML: 9 INJECTION, SOLUTION INTRAVENOUS at 18:22

## 2019-03-24 RX ADMIN — MORPHINE SULFATE 2 MG: 2 INJECTION, SOLUTION INTRAMUSCULAR; INTRAVENOUS at 21:23

## 2019-03-24 RX ADMIN — Medication 10 MEQ: at 18:21

## 2019-03-24 RX ADMIN — PIPERACILLIN SODIUM,TAZOBACTAM SODIUM 3.38 G: 3; .375 INJECTION, POWDER, FOR SOLUTION INTRAVENOUS at 20:24

## 2019-03-24 RX ADMIN — SODIUM CHLORIDE: 9 INJECTION, SOLUTION INTRAVENOUS at 23:22

## 2019-03-24 RX ADMIN — VANCOMYCIN HYDROCHLORIDE 2500 MG: 5 INJECTION, POWDER, LYOPHILIZED, FOR SOLUTION INTRAVENOUS at 20:59

## 2019-03-24 RX ADMIN — MORPHINE SULFATE 2 MG: 2 INJECTION, SOLUTION INTRAMUSCULAR; INTRAVENOUS at 18:22

## 2019-03-24 ASSESSMENT — ENCOUNTER SYMPTOMS
VOMITING: 0
ABDOMINAL PAIN: 0
DIARRHEA: 1
SHORTNESS OF BREATH: 1
COLOR CHANGE: 1
HEADACHES: 0
BACK PAIN: 0
FEVER: 1

## 2019-03-24 ASSESSMENT — MIFFLIN-ST. JEOR: SCORE: 1701.62

## 2019-03-24 NOTE — ED PROVIDER NOTES
History     Chief Complaint:  Leg Pain    HPI   Vanessa Huffman is a 68 year old female who presents with leg pain. Per chart review, the patient was admitted to the hospital on 3/1/19 for nausea, vomiting, and headaches of which it was found that she has a liver abscess and klebsiella bacteremia in her blood, with other diagnosis'. She was discharged on 3/19/19 then sent to TCU. The patient's daughter reports that the patient has been at TCU for the past 3 days has been primarily independent with being able to transfer herself with a walker. Last night, the patient details that she started to experience left leg pain that has continued into today and she is no longer able to walk on her own or ambulate without pain. She has associated slight shortness of breath.The patient's daughter annotates that the left leg appears red and swollen. She notes that there was no recorded fever at the TCU however, when EMS arrived today they recorded a fever. The patient denies chest pain, abdominal pain, headache, back pain, vomiting, or use of blood thinners. Lastly, she has chronic diarrhea, however this has not change or worsened.     Allergies:  Contrast dye; shortness of breath  Codeine  Zolpidem   Diatrizoate; itching      Medications:    Norvasc  Rocephin  Cyclobenzaprine  Lomotil  Ferosul  Lasix  Atarax  Xalatan  Imodium  Mycostain  Roxicodone  Protonix  K-Dur/Klor-Con  Simvastatin  Carafate  Sumatriptan Succinate  Temazepam     Past Medical History:    Arthritis  depression  Hypertension  Obesity  Hematochezia  Acute kidney injury     Past Surgical History:    Appendectomy  Arthoplasty carpometacarpal  Cholecystectomy  Gastric bypass  Hysterectomy  Bilateral bunionectomies     Family History:    Father: Coronary Artery Disease   Sister: Diabetes    Social History:  The patient was accompanied to the ED by  and daughter.  Smoking Status: Never Smoker  Smokeless Tobacco: Never Used  Alcohol Use: Positive  Drug Use:  "Negative  PCP: Emily Najera   Marital Status:        Review of Systems   Constitutional: Positive for fever.   Respiratory: Positive for shortness of breath.    Cardiovascular: Positive for leg swelling. Negative for chest pain.   Gastrointestinal: Positive for diarrhea (chronic). Negative for abdominal pain and vomiting.   Musculoskeletal: Negative for back pain.        Leg pain   Skin: Positive for color change (red).   Neurological: Negative for headaches.   All other systems reviewed and are negative.    Physical Exam     Patient Vitals for the past 24 hrs:   BP Temp Temp src Pulse Heart Rate Resp SpO2 Height Weight   03/24/19 2130 122/70 -- -- -- -- -- 95 % -- --   03/24/19 2100 123/60 -- -- -- -- -- 96 % -- --   03/24/19 2000 128/69 -- -- 110 111 -- 97 % -- --   03/24/19 1950 -- -- -- 113 -- -- 97 % -- --   03/24/19 1945 132/68 -- -- -- 114 -- 97 % -- --   03/24/19 1900 143/76 101.2  F (38.4  C) Oral 113 112 21 -- -- --   03/24/19 1700 146/54 -- -- -- 111 23 94 % -- --   03/24/19 1647 190/79 98.4  F (36.9  C) Oral 94 -- 18 94 % 1.702 m (5' 7\") 113.4 kg (250 lb)      Physical Exam    Physical Exam   General:  Sitting on bed with  and daughter at bedside.   HENT:  No obvious trauma to head  Right Ear:  External ear normal.   Left Ear:  External ear normal.   Nose:  Nose normal.   Eyes:  Conjunctivae and EOM are normal. Pupils are equal, round, and reactive.   Neck: Normal range of motion. Neck supple. No tracheal deviation present.   CV:  Normal heart sounds. No murmur heard.  Pulm/Chest: Effort normal and breath sounds normal.   Abd: Soft. No distension. Minimal diffuse abdominal tenderness. There is no rigidity, no rebound and no guarding.   M/S: Normal range of motion. Bilateral lower extemities 2+ pedal edema. DP pulses 2+. Equal erythema to the bilateral upper legs and extending down the left leg as well to mid shin. No petechiae, no pupera.  No crepitus and no necrosis.  Neuro: Alert. GCS " 15.  Skin: Skin is warm and dry. No rash noted. Not diaphoretic. JPdrain is present from the abdominal wall with with scant serous drainage.   Psych: Normal mood and affect. Behavior is normal.     Emergency Department Course     ECG:  ECG taken at 1652, ECG read at 1707  Sinus tachycardia with premature atrial complexes  ST & T wave abnormalities, consider anterior ischemia  Abnormal ECG  Rate 18 bpm. FL interval 186 ms. QRS duration 80 ms. QT/QTc 304/426 ms. P-R-T axes 39 68 20.    Imaging:  Radiology findings were communicated with the patient and family who voiced understanding of the findings.    XR Knee Left 1/2 Views   Final Result   IMPRESSION: No acute abnormality. Osteoarthritis.      AUDREY PARMAR MD      CT Abdomen pelvis - oral contrast only   Final Result   IMPRESSION:   1. Continued decrease in size in the liver abscess since the   comparison study.   2. Moderate right pleural effusion and associated compressive   atelectasis and/or infiltrate again noted.   3. No definite new findings.      VICTOR HUGO CALLOWAY MD      US Lower Ext Venous Duplex Limited Bilat   Final Result   IMPRESSION: No DVT demonstrated.      VICTOR HUGO CALLOWAY MD        Laboratory:  Laboratory findings were communicated with the patient and family who voiced understanding of the findings.    Blood Culture x2: Pending   Influenza A/B antigen: Negative  UA: Blood trace (A), Protein Albumin 30 (A), RBC 3 (H), o/w WNL.   CBC: WBC 2.8 (L), HGB 29.4 (L),   CMP: Potassium 2.8 (L), GFR 42 (L), Calcium 7.9 (L), Bilirubin 2.4 (H), Albumin 1.9 (L), Protein Total 6.3 (L), AlKPHOS 453 (H), AST 46 (H) o/w WNL (Creatinine 1.30 (H)  Lactic Acid (Resulted: 1651): 0.8   CRP: 263  ESR: 130  Synovial fluid cell count: pending  Synovial fluid glucose: Pending  Synovial fluid protein: Pending  Synovial fluid crystal ID: Pending  Synovial fluid Gram stain: Pending  Synovial fluid culture: Pending    Interventions:  1821 Potassium chloride 10 mEq IV  1822  Morphine 2 mg IV  1822  mL IV  1920 Tylenol 650 mg PO  Zosyn 3.375 gm IV  Vancomycin PTD IV    Procedure:  Arthrocentesis:  Consent: After reviewing in great detail all the risks and benefits of performing an arthrocentesis written consent was obtained.  Anesthesia: Lidocaine 1% plain was used as local anesthetic.   Technique: a sterile field was created with chlor prep and sterile drapes.The joint was entered and approximately  20 ml of yellow colored fluid was withdrawn and sent for labs as noted above.  Outcome: The procedure was well tolerated.  The patient is asked to continue to rest the joint for a few more days before resuming regular activities.  It may be more painful for the first 1-2 days.  Watch for fever, or increased swelling or persistent pain in the joint.       Emergency Department Course:     Nursing notes and vitals reviewed.    1651 IV was inserted and blood was drawn for laboratory testing, results above.     1652 EKG obtained as noted above.     1713 I performed an exam of the patient as documented above.     1748 The patient was sent for a US while in the emergency department, results above.      1828 Patient rechecked and updated.      1840 The patient was sent for a CT while in the emergency department, results above.      1945 I spoke with Dr. Da Silva of the hospitalits service from [facility] regarding patient's presentation, findings, and plan of care.      I personally reviewed the lab, imaging, and EKG results with the patient and family and answered all related questions prior to admission.    Impression & Plan      Medical Decision Making:  Vanessa Huffman is a very pleasant 68 year old female who presents to the emergency department today for evaluation of left leg pain and erythema.  Patient has a complicated history, please see her discharge summary from a few days ago.  Essentially she was admitted with sepsis and found to have bacteremia from Klebsiella and a  liver abscess.  She is currently on ceftriaxone 2 g daily.  She has a PICC line in place.  Bilateral lower extremity ultrasounds showed no evidence of DVT.  She does report slight diffuse abdominal tenderness but improved from prior.  She has a contrast allergy and a CT scan without contrast shows that the liver abscess is showing improvement but there is incidental finding of a pleural effusion as seen by CT of the abdomen and pelvis.  The patient has no cough, no respiratory distress, no hypoxia.  An elective thoracentesis may be of benefit during the hospitalization.  Her main concern is a left leg pain.  Labs show the above findings, particularly a lactate is normal.  She is leukopenic as well.  She is also found to be hypokalemic.  She is provided IV potassium at this time.  Patient is given a dose of Zosyn along with vancomycin which was ordered per pharmacy dosing.  I spoke to the hospitalist, Dr. Da Silva, who is agreed to admit the patient for continued evaluation and treatment.  Other diagnoses considered was a septic joint of the knee or hip.  Given the diffuse erythema I suspect that this is more likely a cellulitic process the patient was followed by ID and she had outpatient appointment on Wednesday Dr. Da Silva agrees to involve ID during the hospitalization.    Addendum: Further discussion with Dr. Da Silva, he requested that I performed arthrocentesis.  Procedure was done as noted above with success.  Yellow fluid was obtained.  This is sent down to the lab and he agrees to follow-up on the results of this.    Diagnosis:    ICD-10-CM    1. Sepsis, due to unspecified organism (H) A41.9 Blood culture     Blood culture     CRP inflammation     CRP inflammation     Erythrocyte sedimentation rate auto     Erythrocyte sedimentation rate auto     Crystal ID joint fluid     Cell count with differential fluid     Glucose fluid     Protein fluid     Gram stain     Fluid Culture Aerobic Bacterial     Anaerobic  bacterial culture     CANCELED: CRP inflammation     CANCELED: Erythrocyte sedimentation rate auto   2. Hypokalemia E87.6         Disposition:   The patient is admitted into the care of Dr. Da Silva.     Scribe Disclosure:  I, Orla Severson, am serving as a scribe at 5:17 PM on 3/24/2019 to document services personally performed by Donald Baron DO based on my observations and the provider's statements to me.      EMERGENCY DEPARTMENT         Donald Baron DO  03/24/19 2009       Donald Baron DO  03/24/19 [id number]

## 2019-03-24 NOTE — ED NOTES
Bed: ED03  Expected date:   Expected time:   Means of arrival:   Comments:  516  58 F increased SOB/ L leg pain  1627

## 2019-03-25 ENCOUNTER — APPOINTMENT (OUTPATIENT)
Dept: GENERAL RADIOLOGY | Facility: CLINIC | Age: 69
DRG: 553 | End: 2019-03-25
Attending: PHYSICIAN ASSISTANT
Payer: MEDICARE

## 2019-03-25 LAB
ALBUMIN SERPL-MCNC: 1.7 G/DL (ref 3.4–5)
ALP SERPL-CCNC: 370 U/L (ref 40–150)
ALT SERPL W P-5'-P-CCNC: 35 U/L (ref 0–50)
ANION GAP SERPL CALCULATED.3IONS-SCNC: 9 MMOL/L (ref 3–14)
ANION GAP SERPL CALCULATED.3IONS-SCNC: 9 MMOL/L (ref 3–14)
AST SERPL W P-5'-P-CCNC: 33 U/L (ref 0–45)
BASOPHILS # BLD AUTO: 0 10E9/L (ref 0–0.2)
BASOPHILS NFR BLD AUTO: 1 %
BILIRUB DIRECT SERPL-MCNC: 1.9 MG/DL (ref 0–0.2)
BILIRUB SERPL-MCNC: 2.4 MG/DL (ref 0.2–1.3)
BUN SERPL-MCNC: 17 MG/DL (ref 7–30)
BUN SERPL-MCNC: 17 MG/DL (ref 7–30)
CALCIUM SERPL-MCNC: 7.3 MG/DL (ref 8.5–10.1)
CALCIUM SERPL-MCNC: 7.7 MG/DL (ref 8.5–10.1)
CHLORIDE SERPL-SCNC: 100 MMOL/L (ref 94–109)
CHLORIDE SERPL-SCNC: 99 MMOL/L (ref 94–109)
CO2 SERPL-SCNC: 26 MMOL/L (ref 20–32)
CO2 SERPL-SCNC: 27 MMOL/L (ref 20–32)
CREAT SERPL-MCNC: 1.21 MG/DL (ref 0.52–1.04)
CREAT SERPL-MCNC: 1.29 MG/DL (ref 0.52–1.04)
DIFFERENTIAL METHOD BLD: ABNORMAL
EOSINOPHIL # BLD AUTO: 0.2 10E9/L (ref 0–0.7)
EOSINOPHIL NFR BLD AUTO: 5 %
GFR SERPL CREATININE-BSD FRML MDRD: 43 ML/MIN/{1.73_M2}
GFR SERPL CREATININE-BSD FRML MDRD: 46 ML/MIN/{1.73_M2}
GLUCOSE SERPL-MCNC: 76 MG/DL (ref 70–99)
GLUCOSE SERPL-MCNC: 83 MG/DL (ref 70–99)
GRAM STN SPEC: NORMAL
LACTATE BLD-SCNC: 0.7 MMOL/L (ref 0.7–2)
LYMPHOCYTES # BLD AUTO: 0.7 10E9/L (ref 0.8–5.3)
LYMPHOCYTES NFR BLD AUTO: 19 %
MAGNESIUM SERPL-MCNC: 1.2 MG/DL (ref 1.6–2.3)
MAGNESIUM SERPL-MCNC: 1.3 MG/DL (ref 1.6–2.3)
MAGNESIUM SERPL-MCNC: 2.1 MG/DL (ref 1.6–2.3)
MONOCYTES # BLD AUTO: 1.3 10E9/L (ref 0–1.3)
MONOCYTES NFR BLD AUTO: 36 %
MYELOCYTES # BLD: 0 10E9/L
MYELOCYTES NFR BLD MANUAL: 1 %
NEUTROPHILS # BLD AUTO: 1.4 10E9/L (ref 1.6–8.3)
NEUTROPHILS NFR BLD AUTO: 38 %
POTASSIUM SERPL-SCNC: 2.6 MMOL/L (ref 3.4–5.3)
POTASSIUM SERPL-SCNC: 3.3 MMOL/L (ref 3.4–5.3)
POTASSIUM SERPL-SCNC: 3.4 MMOL/L (ref 3.4–5.3)
PROT SERPL-MCNC: 5.9 G/DL (ref 6.8–8.8)
RBC MORPH BLD: ABNORMAL
SODIUM SERPL-SCNC: 135 MMOL/L (ref 133–144)
SODIUM SERPL-SCNC: 135 MMOL/L (ref 133–144)
SPECIMEN SOURCE: NORMAL
WBC # BLD AUTO: 3.6 10E9/L (ref 4–11)

## 2019-03-25 PROCEDURE — 25000128 H RX IP 250 OP 636: Performed by: HOSPITALIST

## 2019-03-25 PROCEDURE — 83605 ASSAY OF LACTIC ACID: CPT | Performed by: INTERNAL MEDICINE

## 2019-03-25 PROCEDURE — 25000131 ZZH RX MED GY IP 250 OP 636 PS 637: Mod: GY | Performed by: PHYSICIAN ASSISTANT

## 2019-03-25 PROCEDURE — 99207 ZZC CDG-MDM COMPONENT: MEETS LOW - DOWN CODED: CPT | Performed by: INTERNAL MEDICINE

## 2019-03-25 PROCEDURE — 83735 ASSAY OF MAGNESIUM: CPT | Performed by: INTERNAL MEDICINE

## 2019-03-25 PROCEDURE — 72100 X-RAY EXAM L-S SPINE 2/3 VWS: CPT

## 2019-03-25 PROCEDURE — 25000128 H RX IP 250 OP 636: Performed by: INTERNAL MEDICINE

## 2019-03-25 PROCEDURE — A9270 NON-COVERED ITEM OR SERVICE: HCPCS | Mod: GY | Performed by: INTERNAL MEDICINE

## 2019-03-25 PROCEDURE — 25800030 ZZH RX IP 258 OP 636: Performed by: INTERNAL MEDICINE

## 2019-03-25 PROCEDURE — 25000132 ZZH RX MED GY IP 250 OP 250 PS 637: Mod: GY | Performed by: INTERNAL MEDICINE

## 2019-03-25 PROCEDURE — 40000239 ZZH STATISTIC VAT ROUNDS

## 2019-03-25 PROCEDURE — 85048 AUTOMATED LEUKOCYTE COUNT: CPT | Performed by: INTERNAL MEDICINE

## 2019-03-25 PROCEDURE — 85004 AUTOMATED DIFF WBC COUNT: CPT | Performed by: INTERNAL MEDICINE

## 2019-03-25 PROCEDURE — 99232 SBSQ HOSP IP/OBS MODERATE 35: CPT | Performed by: INTERNAL MEDICINE

## 2019-03-25 PROCEDURE — 12000000 ZZH R&B MED SURG/OB

## 2019-03-25 PROCEDURE — 80076 HEPATIC FUNCTION PANEL: CPT | Performed by: INTERNAL MEDICINE

## 2019-03-25 PROCEDURE — 80048 BASIC METABOLIC PNL TOTAL CA: CPT | Performed by: INTERNAL MEDICINE

## 2019-03-25 PROCEDURE — 36415 COLL VENOUS BLD VENIPUNCTURE: CPT | Performed by: INTERNAL MEDICINE

## 2019-03-25 PROCEDURE — 84132 ASSAY OF SERUM POTASSIUM: CPT | Performed by: INTERNAL MEDICINE

## 2019-03-25 RX ORDER — POTASSIUM CHLORIDE 1.5 G/1.58G
20-40 POWDER, FOR SOLUTION ORAL
Status: DISCONTINUED | OUTPATIENT
Start: 2019-03-25 | End: 2019-03-27 | Stop reason: HOSPADM

## 2019-03-25 RX ORDER — LEVOFLOXACIN 5 MG/ML
500 INJECTION, SOLUTION INTRAVENOUS EVERY 24 HOURS
Status: DISCONTINUED | OUTPATIENT
Start: 2019-03-25 | End: 2019-03-26

## 2019-03-25 RX ORDER — METHYLPREDNISOLONE 4 MG/1
4 TABLET ORAL ONCE
Status: COMPLETED | OUTPATIENT
Start: 2019-03-25 | End: 2019-03-25

## 2019-03-25 RX ORDER — TEMAZEPAM 15 MG/1
15 CAPSULE ORAL
Status: DISCONTINUED | OUTPATIENT
Start: 2019-03-25 | End: 2019-03-27 | Stop reason: HOSPADM

## 2019-03-25 RX ORDER — METHYLPREDNISOLONE 4 MG/1
4 TABLET ORAL
Status: DISCONTINUED | OUTPATIENT
Start: 2019-03-26 | End: 2019-03-27 | Stop reason: HOSPADM

## 2019-03-25 RX ORDER — HYDROMORPHONE HYDROCHLORIDE 1 MG/ML
0.2 INJECTION, SOLUTION INTRAMUSCULAR; INTRAVENOUS; SUBCUTANEOUS
Status: DISCONTINUED | OUTPATIENT
Start: 2019-03-25 | End: 2019-03-27 | Stop reason: HOSPADM

## 2019-03-25 RX ORDER — POTASSIUM CL/LIDO/0.9 % NACL 10MEQ/0.1L
10 INTRAVENOUS SOLUTION, PIGGYBACK (ML) INTRAVENOUS
Status: DISCONTINUED | OUTPATIENT
Start: 2019-03-25 | End: 2019-03-27 | Stop reason: HOSPADM

## 2019-03-25 RX ORDER — POTASSIUM CHLORIDE 1500 MG/1
20-40 TABLET, EXTENDED RELEASE ORAL
Status: DISCONTINUED | OUTPATIENT
Start: 2019-03-25 | End: 2019-03-27 | Stop reason: HOSPADM

## 2019-03-25 RX ORDER — METHYLPREDNISOLONE 4 MG/1
8 TABLET ORAL ONCE
Status: COMPLETED | OUTPATIENT
Start: 2019-03-25 | End: 2019-03-25

## 2019-03-25 RX ORDER — METHYLPREDNISOLONE 4 MG/1
8 TABLET ORAL AT BEDTIME
Status: COMPLETED | OUTPATIENT
Start: 2019-03-25 | End: 2019-03-26

## 2019-03-25 RX ORDER — METHYLPREDNISOLONE 4 MG/1
4 TABLET ORAL AT BEDTIME
Status: DISCONTINUED | OUTPATIENT
Start: 2019-03-27 | End: 2019-03-27 | Stop reason: HOSPADM

## 2019-03-25 RX ORDER — MAGNESIUM SULFATE HEPTAHYDRATE 40 MG/ML
4 INJECTION, SOLUTION INTRAVENOUS EVERY 4 HOURS PRN
Status: DISCONTINUED | OUTPATIENT
Start: 2019-03-25 | End: 2019-03-27 | Stop reason: HOSPADM

## 2019-03-25 RX ORDER — OXYCODONE HYDROCHLORIDE 5 MG/1
5 TABLET ORAL EVERY 4 HOURS PRN
Status: DISCONTINUED | OUTPATIENT
Start: 2019-03-25 | End: 2019-03-27 | Stop reason: HOSPADM

## 2019-03-25 RX ORDER — POTASSIUM CHLORIDE 7.45 MG/ML
10 INJECTION INTRAVENOUS
Status: COMPLETED | OUTPATIENT
Start: 2019-03-25 | End: 2019-03-25

## 2019-03-25 RX ORDER — AMLODIPINE BESYLATE 2.5 MG/1
2.5 TABLET ORAL DAILY
Status: DISCONTINUED | OUTPATIENT
Start: 2019-03-25 | End: 2019-03-27 | Stop reason: HOSPADM

## 2019-03-25 RX ORDER — POTASSIUM CHLORIDE 7.45 MG/ML
10 INJECTION INTRAVENOUS
Status: DISCONTINUED | OUTPATIENT
Start: 2019-03-25 | End: 2019-03-27 | Stop reason: HOSPADM

## 2019-03-25 RX ORDER — LOPERAMIDE HCL 2 MG
2 CAPSULE ORAL 4 TIMES DAILY PRN
Status: DISCONTINUED | OUTPATIENT
Start: 2019-03-25 | End: 2019-03-27 | Stop reason: HOSPADM

## 2019-03-25 RX ORDER — POTASSIUM CHLORIDE 29.8 MG/ML
20 INJECTION INTRAVENOUS
Status: DISCONTINUED | OUTPATIENT
Start: 2019-03-25 | End: 2019-03-27 | Stop reason: HOSPADM

## 2019-03-25 RX ADMIN — OXYCODONE HYDROCHLORIDE 5 MG: 5 TABLET ORAL at 21:08

## 2019-03-25 RX ADMIN — ACETAMINOPHEN 650 MG: 325 TABLET, FILM COATED ORAL at 08:40

## 2019-03-25 RX ADMIN — SIMVASTATIN 20 MG: 20 TABLET, FILM COATED ORAL at 21:08

## 2019-03-25 RX ADMIN — LATANOPROST 1 DROP: 50 SOLUTION OPHTHALMIC at 21:08

## 2019-03-25 RX ADMIN — POTASSIUM CHLORIDE 40 MEQ: 1500 TABLET, EXTENDED RELEASE ORAL at 10:04

## 2019-03-25 RX ADMIN — AMLODIPINE BESYLATE 2.5 MG: 2.5 TABLET ORAL at 10:04

## 2019-03-25 RX ADMIN — POTASSIUM CHLORIDE 10 MEQ: 10 INJECTION, SOLUTION INTRAVENOUS at 02:55

## 2019-03-25 RX ADMIN — Medication 0.2 MG: at 00:09

## 2019-03-25 RX ADMIN — POTASSIUM CHLORIDE 10 MEQ: 10 INJECTION, SOLUTION INTRAVENOUS at 03:57

## 2019-03-25 RX ADMIN — SIMVASTATIN 20 MG: 20 TABLET, FILM COATED ORAL at 00:17

## 2019-03-25 RX ADMIN — METHYLPREDNISOLONE 4 MG: 4 TABLET ORAL at 21:07

## 2019-03-25 RX ADMIN — LATANOPROST 1 DROP: 50 SOLUTION OPHTHALMIC at 00:24

## 2019-03-25 RX ADMIN — PANTOPRAZOLE SODIUM 40 MG: 40 TABLET, DELAYED RELEASE ORAL at 16:09

## 2019-03-25 RX ADMIN — Medication 1 MG: at 21:48

## 2019-03-25 RX ADMIN — METHYLPREDNISOLONE 8 MG: 4 TABLET ORAL at 16:08

## 2019-03-25 RX ADMIN — OXYCODONE HYDROCHLORIDE 5 MG: 5 TABLET ORAL at 13:35

## 2019-03-25 RX ADMIN — METHYLPREDNISOLONE 4 MG: 4 TABLET ORAL at 16:09

## 2019-03-25 RX ADMIN — POTASSIUM CHLORIDE 10 MEQ: 10 INJECTION, SOLUTION INTRAVENOUS at 04:58

## 2019-03-25 RX ADMIN — PIPERACILLIN SODIUM,TAZOBACTAM SODIUM 3.38 G: 3; .375 INJECTION, POWDER, FOR SOLUTION INTRAVENOUS at 01:14

## 2019-03-25 RX ADMIN — METHYLPREDNISOLONE 8 MG: 4 TABLET ORAL at 21:08

## 2019-03-25 RX ADMIN — POTASSIUM CHLORIDE 20 MEQ: 1500 TABLET, EXTENDED RELEASE ORAL at 11:56

## 2019-03-25 RX ADMIN — MAGNESIUM SULFATE IN WATER 4 G: 40 INJECTION, SOLUTION INTRAVENOUS at 11:14

## 2019-03-25 RX ADMIN — OXYCODONE HYDROCHLORIDE 5 MG: 5 TABLET ORAL at 17:32

## 2019-03-25 RX ADMIN — Medication 1 MG: at 00:17

## 2019-03-25 RX ADMIN — PIPERACILLIN SODIUM,TAZOBACTAM SODIUM 3.38 G: 3; .375 INJECTION, POWDER, FOR SOLUTION INTRAVENOUS at 08:33

## 2019-03-25 RX ADMIN — SODIUM CHLORIDE: 9 INJECTION, SOLUTION INTRAVENOUS at 00:11

## 2019-03-25 RX ADMIN — Medication 0.2 MG: at 10:00

## 2019-03-25 RX ADMIN — SUCRALFATE 1 G: 1 SUSPENSION ORAL at 16:07

## 2019-03-25 RX ADMIN — PANTOPRAZOLE SODIUM 40 MG: 40 TABLET, DELAYED RELEASE ORAL at 06:43

## 2019-03-25 RX ADMIN — LEVOFLOXACIN 500 MG: 5 INJECTION, SOLUTION INTRAVENOUS at 11:56

## 2019-03-25 RX ADMIN — POTASSIUM CHLORIDE 10 MEQ: 10 INJECTION, SOLUTION INTRAVENOUS at 01:51

## 2019-03-25 RX ADMIN — Medication 0.2 MG: at 05:54

## 2019-03-25 ASSESSMENT — ACTIVITIES OF DAILY LIVING (ADL)
ADLS_ACUITY_SCORE: 13
ADLS_ACUITY_SCORE: 18

## 2019-03-25 ASSESSMENT — MIFFLIN-ST. JEOR: SCORE: 1618.61

## 2019-03-25 NOTE — PLAN OF CARE
PT/OT: Orders received and chart reviewed. Pt with high reports of knee pain, NPO pending ortho consult. Will plan to hold PT and OT evals pending further clarification of POC from ortho. Nursing in agreement.

## 2019-03-25 NOTE — PROGRESS NOTES
Patient A&Ox4. VSS on room air. Lumbar x-ray ordered - see result. Tele: ST. C/o L knee pain rated 8/10, PRN dilaudid x 1 and Oxy 5mg x 2 effective. Regular diet, poor appetite.  R PICC infusing Normal Saline@75 mL/hr. K+ 3.3, replaced and recheck 3.4. Mag 1.3, replaced and recheck 2.1. Purewick in place with adequate output (changed purewick@1700). Drain RUQ for liver abscess with low output. Flushes ordered for irrigation. Total care, turn and repo Q2H. Plan: Discharge to TCU pending.

## 2019-03-25 NOTE — PROVIDER NOTIFICATION
MD Notification    Notified Person: MD    Notified Person Name:  Juan    Notification Date/Time: 3/25 0100    Notification Interaction: Phone    Purpose of Notification: K+ 2.6  Orders Received: Potassium order 4 bags in 4 hours    Comments:

## 2019-03-25 NOTE — ED NOTES
Report received. Patient is resting in bed with family at bedside awaiting bed placement to go upstairs. Patient seems to be quite lethargic and is falling asleep mid conversation. Vitally patient continues to be quite tachycardiac but is otherwise vitally stable. Will continue to monitor

## 2019-03-25 NOTE — CONSULTS
[facility]    Orthopedic Consultation    Vanessa Huffman MRN# [id number]   Age: 68 year old YOB: 1950     Date of Admission:  3/24/2019    Reason for consult: Left knee pain with fever       Requesting physician: Dr. Preston Da Silva       Level of consult: Consult, follow and place orders           Assessment and Plan:   Assessment:   Negative left knee aspirate thus far  Rule out spine etiology  Left knee tricompartmental DJD      Plan:   At this time nothing concerning for need of surgical intervention  May have diet  Will order x-rays of lumbar spine  I have added on a Medrol dose pack            Chief Complaint:   Left knee pain         History of Present Illness:   This patient is a 68 year old female who presents with the following condition requiring a hospital admission:    Patient with recent hospitalization from 3/1- 3/19/19 in [place] to liver abscess and bacteremia.  Was admitted yesterday secondary to acute onset of left knee pain.  Knee was aspirated in the emergency department which had a low cell count for a negative joint as well as a negative crystal analysis.  At this time patient states the left knee from above the knee posteriorly down to the ankle is painful.  States that she did have right leg pain similar initially to this but this has spontaneously resolved.  She denies any past history of gout.  She is aware that she has arthritis, and about 3 months ago had received bilateral hip intra-articular cortisone injections.  These were very beneficial at relieving her pain and bilateral hip/groin.  She has not had a cortisone injection in the knee.  His point in time she denies any low back pain.  She denies any loss of bowel or bladder.  She denies any saddle anesthesias.          Past Medical History:     Past Medical History:   Diagnosis Date     Arthritis      Depressive disorder      Hypertension      Obesity      Thrombocytopenia (H) 3/24/2019             Past  Surgical History:     Past Surgical History:   Procedure Laterality Date     APPENDECTOMY       ARTHROPLASTY CARPOMETACARPAL (THUMB JOINT) Left 4/6/2018    Procedure: ARTHROPLASTY CARPOMETACARPAL (THUMB JOINT);  LEFT  THUMB CARPOMETACARPAL EXCISIONAL ARTHROPLASTY WITH FASCIA CLARE GRAFT ;  Surgeon: Kalyani King MD;  Location: [facility]     CHOLECYSTECTOMY       GI SURGERY      gastric bypass     GYN SURGERY      abdominal hysterectomy     ORTHOPEDIC SURGERY      bilateral bunionectomies             Social History:     Social History     Tobacco Use     Smoking status: Never Smoker     Smokeless tobacco: Never Used   Substance Use Topics     Alcohol use: Yes     Comment: rare use, social             Family History:     Family History   Problem Relation Age of Onset     Coronary Artery Disease Father      Diabetes Sister      Diabetes Sister      Diabetes Sister              Immunizations:     VACCINE/DOSE   Diptheria   DPT   DTAP   HBIG   Hepatitis A   Hepatitis B   HIB   Influenza   Measles   Meningococcal   MMR   Mumps   Pneumococcal   Polio   Rubella   Small Pox   TDAP   Varicella   Zoster             Allergies:     Allergies   Allergen Reactions     Contrast Dye Shortness Of Breath     Codeine      Zolpidem Other (See Comments)     Patient reports sleep walking.     Diatrizoate Itching     itchy throat that makes her want to cough             Medications:     Current Facility-Administered Medications   Medication     acetaminophen (TYLENOL) Suppository 650 mg     acetaminophen (TYLENOL) tablet 650 mg     amLODIPine (NORVASC) tablet 2.5 mg     bisacodyl (DULCOLAX) Suppository 10 mg     ferrous sulfate (FEROSUL) tablet 325 mg     HYDROmorphone (PF) (DILAUDID) injection 0.2 mg     latanoprost (XALATAN) 0.005 % ophthalmic solution 1 drop     levofloxacin (LEVAQUIN) infusion 500 mg     magnesium sulfate 4 g in 100 mL sterile water (premade)     melatonin tablet 1 mg     naloxone (NARCAN) injection 0.1-0.4  mg     ondansetron (ZOFRAN-ODT) ODT tab 4 mg    Or     ondansetron (ZOFRAN) injection 4 mg     oxyCODONE (ROXICODONE) tablet 5 mg     pantoprazole (PROTONIX) EC tablet 40 mg     polyethylene glycol (MIRALAX/GLYCOLAX) Packet 17 g     potassium chloride (KLOR-CON) Packet 20-40 mEq     potassium chloride 10 mEq in 100 mL intermittent infusion with 10 mg lidocaine     potassium chloride 10 mEq in 100 mL sterile water intermittent infusion (premix)     potassium chloride 20 mEq in 50 mL intermittent infusion     potassium chloride ER (K-DUR/KLOR-CON M) CR tablet 20-40 mEq     prochlorperazine (COMPAZINE) injection 5 mg    Or     prochlorperazine (COMPAZINE) tablet 5 mg    Or     prochlorperazine (COMPAZINE) Suppository 12.5 mg     senna-docusate (SENOKOT-S/PERICOLACE) 8.6-50 MG per tablet 1 tablet    Or     senna-docusate (SENOKOT-S/PERICOLACE) 8.6-50 MG per tablet 2 tablet     simvastatin (ZOCOR) tablet 20 mg     sodium chloride (PF) 0.9% PF flush 10 mL     sodium chloride (PF) 0.9% PF flush 10-20 mL     sucralfate (CARAFATE) suspension 1 g     temazepam (RESTORIL) capsule 15 mg             Review of Systems:   CV: NEGATIVE for chest pain, palpitations or peripheral edema  C: NEGATIVE for fever, chills, change in weight  E/M: NEGATIVE for ear, mouth and throat problems  R: NEGATIVE for significant cough or SOB          Physical Exam:   All vitals have been reviewed  Patient Vitals for the past 24 hrs:   BP Temp Temp src Pulse Heart Rate Resp SpO2 Height Weight   03/25/19 0800 141/59 101.3  F (38.5  C) Oral -- 108 18 93 % -- --   03/25/19 0401 146/64 99.6  F (37.6  C) Oral -- 93 18 96 % -- --   03/25/19 0002 140/68 -- -- -- 101 18 92 % -- 105.1 kg (231 lb 11.2 oz)   03/24/19 2300 126/75 -- -- -- 99 -- 96 % -- --   03/24/19 2130 122/70 -- -- -- -- -- 95 % -- --   03/24/19 2100 123/60 -- -- -- -- -- 96 % -- --   03/24/19 2000 128/69 -- -- 110 111 -- 97 % -- --   03/24/19 1950 -- -- -- 113 -- -- 97 % -- --   03/24/19 1945  "132/68 -- -- -- 114 -- 97 % -- --   03/24/19 1900 143/76 101.2  F (38.4  C) Oral 113 112 21 -- -- --   03/24/19 1700 146/54 -- -- -- 111 23 94 % -- --   03/24/19 1647 190/79 98.4  F (36.9  C) Oral 94 -- 18 94 % 1.702 m (5' 7\") 113.4 kg (250 lb)       Intake/Output Summary (Last 24 hours) at 3/25/2019 1211  Last data filed at 3/25/2019 0552  Gross per 24 hour   Intake --   Output 455 ml   Net -455 ml     Patient laying comfortably in bed   No ecchymosis or erythema over entirety of the left leg  Superior medial bandage from prior aspiration present  No erythema or ecchymosis present on surrounding site  Mild erythema at the medial aspect bilateral kness  1+ edema bilateral LE  ROM limited 0 to 45 degrees  TTP posterior distal thigh and popliteal fossa  No TTP over medial or lateral joint line  Hip flexes to 90 degrees  Internal rotation 30 degrees, External rotation 15 degrees  No pain with internal/external rotation while in flexion  No TTP over great trochanter  Bilateral calves are soft, non-tender.  Bilateral lower extremity is NVI.  Sensation intact bilateral lower extremities  5/5 motor with resisted dorsi and plantar flexion bilaterally  Positive stretch test  5/5 quad  5/5 EHL  +Dp pulse          Data:   All laboratory data reviewed  Results for orders placed or performed during the hospital encounter of 03/24/19   US Lower Ext Venous Duplex Limited Bilat    Narrative    ULTRASOUND VENOUS LOWER EXTREMITY BILATERAL 3/24/2019 6:17 PM     HISTORY: Left leg swelling and redness.    COMPARISON: None.    TECHNIQUE: Ultrasound gray scale, Color Doppler flow, and spectral  Doppler waveform analysis performed.    FINDINGS: The bilateral common femoral, superficial femoral, popliteal  and posterior tibial veins are patent and fully compressible and  demonstrate normal venous Doppler flow. The visualized greater  saphenous veins are negative for thrombus.      Impression    IMPRESSION: No DVT demonstrated.    VICTOR HUGO" MD ELLI   CT Abdomen pelvis - oral contrast only    Narrative    CT ABDOMEN AND PELVIS WITHOUT CONTRAST 3/24/2019 6:54 PM     HISTORY: Diffuse abdominal pain, history of recent liver abscess with  BILL drain still in place.    COMPARISON: March 18, 2019    TECHNIQUE: Volumetric helical acquisition of CT images from the lung  bases through the symphysis pubis without intravenous contrast.  Radiation dose for this scan was reduced using automated exposure  control, adjustment of the mA and/or kV according to patient size, or  iterative reconstruction technique.    FINDINGS: Liver abscess continues to shrink measuring 8.5 cm  previously 10.3 cm. There is increased air present within the abscess  cavity. Small amount of fluid around the liver is again noted and  unchanged. No free air. There are no dilated loops of small intestine  or large bowel to suggest ileus or obstruction. There is mild  diverticulosis without evidence for diverticulitis. Normal caliber  aorta. No hydronephrosis. Kidneys, adrenals, spleen, and pancreas  demonstrate no worrisome focal lesion. Moderate right pleural effusion  and associated compressive atelectasis and/or infiltrate again noted.  Survey of the visualized bony structures demonstrates no destructive  bony lesions.      Impression    IMPRESSION:  1. Continued decrease in size in the liver abscess since the  comparison study.  2. Moderate right pleural effusion and associated compressive  atelectasis and/or infiltrate again noted.  3. No definite new findings.    VICTOR HUGO CALLOWAY MD   XR Knee Left 1/2 Views    Narrative    KNEE LEFT ONE - TWO VIEW   3/24/2019 9:43 PM     HISTORY: Pain.    COMPARISON: None.    FINDINGS: No fracture or dislocation is identified. Tricompartmental  osteoarthritis is present most conspicuous at the patellofemoral  compartment. Bilateral medial and lateral compartment  chondrocalcinosis is present. Probable small volume fluid within the  suprapatellar recess.  Soft tissues are otherwise unremarkable.      Impression    IMPRESSION: No acute abnormality. Osteoarthritis.    AUDREY PARMAR MD   CBC (+ platelets, no diff)   Result Value Ref Range    WBC 2.8 (L) 4.0 - 11.0 10e9/L    RBC Count 3.34 (L) 3.8 - 5.2 10e12/L    Hemoglobin 9.9 (L) 11.7 - 15.7 g/dL    Hematocrit 29.4 (L) 35.0 - 47.0 %    MCV 88 78 - 100 fl    MCH 29.6 26.5 - 33.0 pg    MCHC 33.7 31.5 - 36.5 g/dL    RDW 15.9 (H) 10.0 - 15.0 %    Platelet Count 197 150 - 450 10e9/L   Comprehensive metabolic panel   Result Value Ref Range    Sodium 135 133 - 144 mmol/L    Potassium 2.8 (L) 3.4 - 5.3 mmol/L    Chloride 95 94 - 109 mmol/L    Carbon Dioxide 28 20 - 32 mmol/L    Anion Gap 12 3 - 14 mmol/L    Glucose 99 70 - 99 mg/dL    Urea Nitrogen 18 7 - 30 mg/dL    Creatinine 1.30 (H) 0.52 - 1.04 mg/dL    GFR Estimate 42 (L) >60 mL/min/[1.73_m2]    GFR Estimate If Black 49 (L) >60 mL/min/[1.73_m2]    Calcium 7.9 (L) 8.5 - 10.1 mg/dL    Bilirubin Total 2.4 (H) 0.2 - 1.3 mg/dL    Albumin 1.9 (L) 3.4 - 5.0 g/dL    Protein Total 6.3 (L) 6.8 - 8.8 g/dL    Alkaline Phosphatase 453 (H) 40 - 150 U/L    ALT 46 0 - 50 U/L    AST 46 (H) 0 - 45 U/L   Lactic acid   Result Value Ref Range    Lactic Acid 0.8 0.7 - 2.0 mmol/L   UA with Microscopic   Result Value Ref Range    Color Urine Yellow     Appearance Urine Clear     Glucose Urine Negative NEG^Negative mg/dL    Bilirubin Urine Negative NEG^Negative    Ketones Urine Negative NEG^Negative mg/dL    Specific Gravity Urine 1.007 1.003 - 1.035    Blood Urine Trace (A) NEG^Negative    pH Urine 5.5 5.0 - 7.0 pH    Protein Albumin Urine 30 (A) NEG^Negative mg/dL    Urobilinogen mg/dL Normal 0.0 - 2.0 mg/dL    Nitrite Urine Negative NEG^Negative    Leukocyte Esterase Urine Negative NEG^Negative    Source Midstream Urine     WBC Urine 2 0 - 5 /HPF    RBC Urine 3 (H) 0 - 2 /HPF    Squamous Epithelial /HPF Urine 1 0 - 1 /HPF   CRP inflammation   Result Value Ref Range    CRP Inflammation 263.0  (H) 0.0 - 8.0 mg/L   Erythrocyte sedimentation rate auto   Result Value Ref Range    Sed Rate 130 (H) 0 - 30 mm/h   Basic metabolic panel   Result Value Ref Range    Sodium 135 133 - 144 mmol/L    Potassium 2.6 (LL) 3.4 - 5.3 mmol/L    Chloride 99 94 - 109 mmol/L    Carbon Dioxide 27 20 - 32 mmol/L    Anion Gap 9 3 - 14 mmol/L    Glucose 83 70 - 99 mg/dL    Urea Nitrogen 17 7 - 30 mg/dL    Creatinine 1.21 (H) 0.52 - 1.04 mg/dL    GFR Estimate 46 (L) >60 mL/min/[1.73_m2]    GFR Estimate If Black 53 (L) >60 mL/min/[1.73_m2]    Calcium 7.3 (L) 8.5 - 10.1 mg/dL   Basic metabolic panel   Result Value Ref Range    Sodium 135 133 - 144 mmol/L    Potassium 3.3 (L) 3.4 - 5.3 mmol/L    Chloride 100 94 - 109 mmol/L    Carbon Dioxide 26 20 - 32 mmol/L    Anion Gap 9 3 - 14 mmol/L    Glucose 76 70 - 99 mg/dL    Urea Nitrogen 17 7 - 30 mg/dL    Creatinine 1.29 (H) 0.52 - 1.04 mg/dL    GFR Estimate 43 (L) >60 mL/min/[1.73_m2]    GFR Estimate If Black 49 (L) >60 mL/min/[1.73_m2]    Calcium 7.7 (L) 8.5 - 10.1 mg/dL   Hepatic panel   Result Value Ref Range    Bilirubin Direct 1.9 (H) 0.0 - 0.2 mg/dL    Bilirubin Total 2.4 (H) 0.2 - 1.3 mg/dL    Albumin 1.7 (L) 3.4 - 5.0 g/dL    Protein Total 5.9 (L) 6.8 - 8.8 g/dL    Alkaline Phosphatase 370 (H) 40 - 150 U/L    ALT 35 0 - 50 U/L    AST 33 0 - 45 U/L   Magnesium   Result Value Ref Range    Magnesium 1.3 (L) 1.6 - 2.3 mg/dL   Magnesium   Result Value Ref Range    Magnesium 1.2 (L) 1.6 - 2.3 mg/dL   Lactic acid level STAT for sepsis protocol   Result Value Ref Range    Lactate for Sepsis Protocol 0.7 0.7 - 2.0 mmol/L   Blood culture   Result Value Ref Range    Specimen Description Blood Right Arm     Special Requests Aerobic and anaerobic bottles received     Culture Micro No growth after 9 hours    Blood culture   Result Value Ref Range    Specimen Description Blood PICC Right     Special Requests Aerobic and anaerobic bottles received     Culture Micro No growth after 9 hours     Influenza A/B antigen   Result Value Ref Range    Influenza A/B Agn Specimen Nasopharyngeal     Influenza A Negative NEG^Negative    Influenza B Negative NEG^Negative   Crystal ID joint fluid   Result Value Ref Range    Crystal Analysis No clincally significant crystals seen.  NOCRYS^No clincally significant crystals seen.   Cell count with differential fluid   Result Value Ref Range    Body Fluid Analysis Source Left Knee     % Neutrophils Fluid 36 %    % Lymphocytes Fluid 47 %    % Eosinophils Fluid 1 %    % Other Cells Fluid 16 %    Color Fluid Yellow     Appearance Fluid Cloudy     WBC Fluid 16666 /uL   Glucose fluid   Result Value Ref Range    Glucose Fluid Source Left Knee     Glucose Fluid 6 mg/dL   Protein fluid   Result Value Ref Range    Protein Total Fluid Source Left Knee     Protein Total Fluid 3.0 g/dL   Gram stain   Result Value Ref Range    Specimen Description Left Knee Fluid     Gram Stain No organisms seen     Gram Stain Many  WBC'S seen  predominantly PMN's       Gram Stain       Preliminary Gram stain report called to and read back by  DR PRUDENCE LYLE IN ED [id number] CK      Gram Stain       No organisms seen on cytospin preparation of specimen    Gram Stain       Quantification of host cells and microbiological organisms was done on a cytocentrifuged   preparation.     Anaerobic bacterial culture   Result Value Ref Range    Specimen Description Left Knee Fluid     Special Requests Received in anaerobic tubes.     Culture Micro PENDING           Attestation:  I have reviewed today's vital signs, notes, medications, labs and imaging with Dr. Mulligan.  Amount of time performed on this consult: 30 minutes.    Belinda Owen PA-C

## 2019-03-25 NOTE — CONSULTS
[facility]    Infectious Disease Consultation     Date of Admission:  3/24/2019  Date of Consult (When I saw the patient): 03/25/19    Assessment & Plan   Vanessa Huffman is a 68 year old female who was admitted on 3/24/2019.     Impression:  1. 68 y.o female well known to  Me from prior hospitalization for Liver abscess, Klebsiella in the liver abscess cultures, Klebsiella in the blood cultures. Treated with IV zosyn while admitted and discharged on IV ceftriaxone just 4 days ago.   2. Admitted now with sudden onset left knee pain and swelling also of note she has an entire body rash and also neutropenic. ? Ceftriaxone reaction.   3. S/p aspiration on the left knee and the fluid appears to be inflammatory in nature, cultures pending.   4. S/p repeat imaging on the abdomen and the there is some decrease in the size of the abscess though still quite big.     Recommendations:   Stop vanco and zosyn, start IV levaquin.   Follow up on the cultures.   Get a diff on the wbc to see if significant eosinophilia.   IR ro see if lytics indicated since no significant output from the liver drain.     Discussed with IM    updated bedside.       Meghan Guadarrama MD    Reason for Consult   Reason for consult: I was asked by Dr. Da Silva  to evaluate this patient for new fever while being treated with IV ceftriaxone for liver abscesses and bacteremia with new left knee pain and swelling.     Primary Care Physician   Emily Najera    Chief Complaint   Acute onset left knee swelling and pain and redness     History is obtained from the patient and medical records    History of Present Illness   Vanessa Huffamn is a 68 year old female who is familiar to ,me from her prior hospitalization earlier in the month for liver abscess. She presented this occasion  with leg pain.  She was discharged on 3/19/19 then sent to TCU. The patient's daughter reports that the patient has been at TCU for the past 3 days has been  primarily independent with being able to transfer herself with a walker. Last night, the patient details that she started to experience left leg pain that has continued into today and she is no longer able to walk on her own or ambulate because of that acute pain.         Past Medical History   I have reviewed this patient's medical history and updated it with pertinent information if needed.   Past Medical History:   Diagnosis Date     Arthritis      Depressive disorder      Hypertension      Obesity      Thrombocytopenia (H) 3/24/2019       Past Surgical History   I have reviewed this patient's surgical history and updated it with pertinent information if needed.  Past Surgical History:   Procedure Laterality Date     APPENDECTOMY       ARTHROPLASTY CARPOMETACARPAL (THUMB JOINT) Left 4/6/2018    Procedure: ARTHROPLASTY CARPOMETACARPAL (THUMB JOINT);  LEFT  THUMB CARPOMETACARPAL EXCISIONAL ARTHROPLASTY WITH FASCIA CLARE GRAFT ;  Surgeon: Kalyani King MD;  Location: [facility]     CHOLECYSTECTOMY       GI SURGERY      gastric bypass     GYN SURGERY      abdominal hysterectomy     ORTHOPEDIC SURGERY      bilateral bunionectomies       Prior to Admission Medications   Prior to Admission Medications   Prescriptions Last Dose Informant Patient Reported? Taking?   CYCLOBENZAPRINE HCL PO 3/23/2019 at PM Self Yes Yes   Sig: Take 10 mg by mouth At Bedtime   MEDICATION INSTRUCTION  Self No No   Sig: Hold Lisinopril and Norvasc if your SBP is <110 and DBP<70   SIMVASTATIN PO 3/23/2019 at PM Self Yes Yes   Sig: Take 20 mg by mouth At Bedtime    SUMATRIPTAN SUCCINATE PO  at prn Self Yes No   Sig: Take 50 mg by mouth every 2 hours as needed for migraine (max of 200mg q 24hr)   TEMAZEPAM PO 3/23/2019 at 2009 Self Yes Yes   Sig: Take 15 mg by mouth nightly as needed for sleep   amLODIPine (NORVASC) 2.5 MG tablet 3/24/2019 at AM Self No Yes   Sig: Take 1 tablet (2.5 mg) by mouth daily   cefTRIAXone (ROCEPHIN) 1 GM vial  3/24/2019 at 0900 Self No Yes   Sig: Inject 2 g (2,000 mg) into the vein daily CBC with differential, creatinine, SGOT weekly while on this medication to be faxed to Dr. Israel office.   diphenoxylate-atropine (LOMOTIL) 2.5-0.025 MG tablet 3/24/2019 at 1200 Self No Yes   Sig: Take 1 tablet by mouth 4 times daily as needed for diarrhea   ferrous sulfate (FEROSUL) 325 (65 Fe) MG tablet 3/24/2019 at AM Self No Yes   Sig: Take 1 tablet (325 mg) by mouth daily (with breakfast)   furosemide (LASIX) 40 MG tablet 3/24/2019 at AM Self No Yes   Sig: Take 1 tablet (40 mg) by mouth daily   hydrOXYzine (ATARAX) 10 MG tablet 3/24/2019 at 1341 Self No Yes   Sig: Take 1 10-mg tablet every 6 hours as needed for muscle relaxation and to supplement your pain medication.   latanoprost (XALATAN) 0.005 % ophthalmic solution 3/23/2019 at PM Self Yes Yes   Sig: Place 1 drop into both eyes At Bedtime   loperamide (IMODIUM) 2 MG capsule 3/19/2019 Self Yes No   Sig: Take 2 mg by mouth 4 times daily as needed for diarrhea   nystatin (MYCOSTATIN) 677481 UNIT/GM POWD 3/24/2019 at AM Self Yes Yes   Sig: Apply topically 2 times daily as needed (affected area)   oxyCODONE (ROXICODONE) 5 MG tablet 3/24/2019 at 0932 Self No Yes   Sig: Take 1 tablet (5 mg) by mouth every 6 hours as needed for moderate to severe pain   pantoprazole (PROTONIX) 40 MG EC tablet 3/24/2019 at AM Self No Yes   Sig: Take 1 tablet (40 mg) by mouth 2 times daily (before meals)   potassium chloride ER (K-DUR/KLOR-CON M) 20 MEQ CR tablet 3/24/2019 at AM Self No Yes   Sig: Take 1 tablet (20 mEq) by mouth daily   sucralfate (CARAFATE) 1 GM/10ML suspension 3/24/2019 at 0730 Self No Yes   Sig: Take 10 mLs (1 g) by mouth 2 times daily (before meals)      Facility-Administered Medications: None     Allergies   Allergies   Allergen Reactions     Contrast Dye Shortness Of Breath     Codeine      Zolpidem Other (See Comments)     Patient reports sleep walking.     Diatrizoate Itching      itchy throat that makes her want to cough       Immunization History     There is no immunization history on file for this patient.    Social History   I have reviewed this patient's social history and updated it with pertinent information if needed. Vanessa Huffman  reports that  has never smoked. she has never used smokeless tobacco. She reports that she drinks alcohol. She reports that she does not use drugs.    Family History   I have reviewed this patient's family history and updated it with pertinent information if needed.   Family History   Problem Relation Age of Onset     Coronary Artery Disease Father      Diabetes Sister      Diabetes Sister      Diabetes Sister        Review of Systems   The 10 point Review of Systems is negative other than noted in the HPI or here.     Physical Exam   Temp: 101.3  F (38.5  C) Temp src: Oral BP: 141/59 Pulse: 110 Heart Rate: 108 Resp: 18 SpO2: 93 % O2 Device: None (Room air)    Vital Signs with Ranges  Temp:  [98.4  F (36.9  C)-101.3  F (38.5  C)] 101.3  F (38.5  C)  Pulse:  [] 110  Heart Rate:  [] 108  Resp:  [18-23] 18  BP: (122-190)/(54-79) 141/59  SpO2:  [92 %-97 %] 93 %  231 lbs 11.2 oz  Body mass index is 36.29 kg/m .    GENERAL APPEARANCE:  alert and no distress  EYES: Eyes grossly normal to inspection, PERRL and conjunctivae and sclerae normal  HENT: ear canals and TM's normal and nose and mouth without ulcers or lesions  NECK: no adenopathy, no asymmetry, masses, or scars and thyroid normal to palpation  RESP: lungs clear to auscultation - no rales, rhonchi or wheezes  CV: regular rates and rhythm, normal S1 S2, no S3 or S4 and no murmur, click or rub  LYMPHATICS: normal ant/post cervical and supraclavicular nodes  ABDOMEN: soft, nontender, without hepatosplenomegaly or masses and bowel sounds normal  MS: bilateral knee swelling though left knee more than right.   SKIN: diffuse small rash all over the body, more pronounced on the arms, neck, chin,  chest, thighs and knees and shin       Data   Lab Results   Component Value Date    WBC 2.8 (L) 03/24/2019    HGB 9.9 (L) 03/24/2019    HCT 29.4 (L) 03/24/2019     03/24/2019     03/25/2019    POTASSIUM 3.3 (L) 03/25/2019    CHLORIDE 100 03/25/2019    CO2 26 03/25/2019    BUN 17 03/25/2019    CR 1.29 (H) 03/25/2019    GLC 76 03/25/2019     (H) 03/24/2019    DD 10.8 (H) 03/04/2019    AST 33 03/25/2019    ALT 35 03/25/2019    ALKPHOS 370 (H) 03/25/2019    BILITOTAL 2.4 (H) 03/25/2019    PARMINDER 12 03/06/2019    INR 1.37 (H) 03/09/2019     Recent Labs   Lab 03/24/19  2205 03/24/19  1902 03/24/19  1651   CULT PENDING No growth after 9 hours No growth after 9 hours     Recent Labs   Lab Test 03/24/19  2205 03/24/19  1902 03/24/19  1651 03/07/19  1050 03/04/19  1410 03/04/19  1355 03/03/19  1925 03/02/19  1043 03/01/19  2148   CULT PENDING No growth after 9 hours No growth after 9 hours Culture negative after 2 weeks  No anaerobes isolated  Moderate growth  Klebsiella pneumoniae  Susceptibility testing done on previous specimen  * No anaerobes isolated  No growth Culture negative after 2 weeks  No anaerobes isolated  Light growth  Klebsiella pneumoniae  * <10,000 colonies/mL  urogenital shannon  Susceptibility testing not routinely done    Canceled, Test credited  Duplicate request   No growth No growth

## 2019-03-25 NOTE — PROGRESS NOTES
[facility]    Medicine Progress Note - Hospitalist Service       Date of Admission:  3/24/2019  Assessment & Plan   Vanessa Huffman is a 68 year-old female with a past medical history of gastric bypass, chronic diarrhea, hypertension, migraines with recent admission at [facility] from 3/1-3/19/19 for sepsis due to large liver abscess, Klebsiella bacteremia, acute renal failure, upper GI bleed secondary to anastomotic ulcer who presents with left leg pain. Admitted 3/24/2019.     Fever  Primary associated symptom of acute onset left knee pain day of admission.  Concern for septic joint versus acute crystal disease initially. Denies diarrhea. No symptoms associated with PICC line. CT shows decrease in size of liver abscess, stable right pleural effusion and possible infiltrate.  UA unremarkable.   - Blood cultures 3/24 NGTD  - Await follow-up cultures  - Orthopedic surgery consulted as below   - Infectious disease consulted on admission   - Does have pleural effusion and possible associated infiltrate on CT, but this is stable on imaging and no new cough, monitor for now; consider thoracentesis if infectious work-up otherwise negative or new respiratory symptoms     Acute pain of left knee  Presented with acute onset of left knee pain, unable to bear weight. Fever 101.2F.  No history of gout.  No trauma or falls.  History of similar pain.  No signs of cellulitis.  Lower extremity Dopplers for DVT.  No cyanosis or signs of limb ischemia.  No other joints affected. XR with osteoarthritis, probable small volume fluid in suprapatellar recess. Arthrocentesis performed in ED with 49152 WBC, 36% neutrophils. Gram stain negative for organisms.   - Crystals analysis negative  - Orthopedic surgery consulted, reviewed case on admission, formal consult pending   - Continue piperacillin-tazobactam IV and vancomycin IV pending formal orthopedic surgery consult    Acute kidney injury  Baseline  creatinine around 0.7.  Creatinine 4.27 on last admission presentation.  Improved with IV hydration.  Seen by nephrology.  Suspect component of ATN.  She was discharged on Lasix 40 mg daily with KCl.  Creatinine improved since discharge.  - Creatinine stable    Hypokalemia  Furosemide (PTA medication) likely contributing  - Potassium replacement protocol ordered    Recent bacteremia with liver abscess Klebsiella pneumoniae  Elevated LFTs secondary to liver abscess  Recent admission with sepsis with CT abdomen showing large liver mass, liver biopsy with hemorrhagic necrosis with acute inflammatory changes, no malignant cells, suspected to be primary liver abscess, treated with drain placement and IV antibiotics. Repeat liver biopsy negative for malignancy. Discharged with PICC and IV antibiotics. CT abdomen on admission with improving liver abscess. AST and ALT improving on admission, total bilirubin up slightly.   - Continue piperacillin-tazobactam IV (previous Klebsiella sensitive)   - Continue BILL drain     Essential hypertension  Prior to admission on low-dose amlodipine 2.5 mg daily.   - Blood pressure high-normal. Resume amlodipine      History of gastric bypass  Chronic diarrhea  PTA on high dose Immodium. Denies any diarrhea preceding admission.  - Monitor      Anemia due to blood loss, acute  Upper GI bleed  During last hospitalization hemoglobin dropped from 12 to 6 g/dl while inpatient, required transfusion. EGD 3/8 demonstrated ulcer with visible vessel and actively bleeding.  Treated with injection with epinephrine and Hemoclip.    - Hemoglobin stable      Thrombocytopenia  HIT and hemolysis ruled out last hospital stay, suspected to be secondary to acute infection.   - Monitor platelet count     Insomnia  - Resume prior to admission temazepam.     Diet: NPO per Anesthesia Guidelines for Procedure/Surgery Except for: Meds  NPO per Anesthesia Guidelines for Procedure/Surgery Except for: Meds    DVT  Prophylaxis: Pneumatic Compression Devices  Henry Catheter: not present  Code Status: Full Code      Disposition Plan   Expected discharge: Unclear, recommended to transitional care unit once infectious work-up complete, pain controlled.  Entered: Jose Miguel Umanzor MD 03/25/2019, 8:58 AM       The patient's care was discussed with the Bedside Nurse, Patient and Patient's Family.    Jose Miguel Umanzor MD  Hospitalist Service  [facility]    ______________________________________________________________________    Interval History   No acute events overnight    Data reviewed today: I reviewed all medications, new labs and imaging results over the last 24 hours. I personally reviewed no images or EKG's today.    Physical Exam   Vital Signs: Temp: 101.3  F (38.5  C) Temp src: Oral BP: 141/59 Pulse: 110 Heart Rate: 108 Resp: 18 SpO2: 93 % O2 Device: None (Room air)    Weight: 231 lbs 11.2 oz    Constitutional: NAD  Respiratory: Diminished breath sounds at right base, no wheezes  Cardiovascular: RRR, no m/r/g. No peripheral edema.  GI: Soft, non-tender, non-distended. BS normoactive.   Skin/Integumen: Warm, dry  MSK: Left knee without significant tenderness to palpation, increased warmth, minimal erythema    Data   Recent Labs   Lab 03/25/19  0600 03/25/19  0020 03/24/19  1651 03/19/19  0520   WBC  --   --  2.8* 7.6   HGB  --   --  9.9* 9.5*   MCV  --   --  88 89   PLT  --   --  197 243    135 135 141   POTASSIUM 3.3* 2.6* 2.8* 4.1   CHLORIDE 100 99 95 108   CO2 26 27 28 26   BUN 17 17 18 28   CR 1.29* 1.21* 1.30* 1.58*   ANIONGAP 9 9 12 7   DANNA 7.7* 7.3* 7.9* 7.7*   GLC 76 83 99 87   ALBUMIN 1.7*  --  1.9* 1.6*   PROTTOTAL 5.9*  --  6.3*  --    BILITOTAL 2.4*  --  2.4*  --    ALKPHOS 370*  --  453*  --    ALT 35  --  46  --    AST 33  --  46*  --        Recent Results (from the past 24 hour(s))   US Lower Ext Venous Duplex Limited Bilat    Narrative    ULTRASOUND VENOUS LOWER EXTREMITY BILATERAL  3/24/2019 6:17 PM     HISTORY: Left leg swelling and redness.    COMPARISON: None.    TECHNIQUE: Ultrasound gray scale, Color Doppler flow, and spectral  Doppler waveform analysis performed.    FINDINGS: The bilateral common femoral, superficial femoral, popliteal  and posterior tibial veins are patent and fully compressible and  demonstrate normal venous Doppler flow. The visualized greater  saphenous veins are negative for thrombus.      Impression    IMPRESSION: No DVT demonstrated.    VICTOR HUGO CALLOWAY MD   CT Abdomen pelvis - oral contrast only    Narrative    CT ABDOMEN AND PELVIS WITHOUT CONTRAST 3/24/2019 6:54 PM     HISTORY: Diffuse abdominal pain, history of recent liver abscess with  BILL drain still in place.    COMPARISON: March 18, 2019    TECHNIQUE: Volumetric helical acquisition of CT images from the lung  bases through the symphysis pubis without intravenous contrast.  Radiation dose for this scan was reduced using automated exposure  control, adjustment of the mA and/or kV according to patient size, or  iterative reconstruction technique.    FINDINGS: Liver abscess continues to shrink measuring 8.5 cm  previously 10.3 cm. There is increased air present within the abscess  cavity. Small amount of fluid around the liver is again noted and  unchanged. No free air. There are no dilated loops of small intestine  or large bowel to suggest ileus or obstruction. There is mild  diverticulosis without evidence for diverticulitis. Normal caliber  aorta. No hydronephrosis. Kidneys, adrenals, spleen, and pancreas  demonstrate no worrisome focal lesion. Moderate right pleural effusion  and associated compressive atelectasis and/or infiltrate again noted.  Survey of the visualized bony structures demonstrates no destructive  bony lesions.      Impression    IMPRESSION:  1. Continued decrease in size in the liver abscess since the  comparison study.  2. Moderate right pleural effusion and associated  compressive  atelectasis and/or infiltrate again noted.  3. No definite new findings.    VICTOR HUGO CALLOWAY MD   XR Knee Left 1/2 Views    Narrative    KNEE LEFT ONE - TWO VIEW   3/24/2019 9:43 PM     HISTORY: Pain.    COMPARISON: None.    FINDINGS: No fracture or dislocation is identified. Tricompartmental  osteoarthritis is present most conspicuous at the patellofemoral  compartment. Bilateral medial and lateral compartment  chondrocalcinosis is present. Probable small volume fluid within the  suprapatellar recess. Soft tissues are otherwise unremarkable.      Impression    IMPRESSION: No acute abnormality. Osteoarthritis.    AUDREY PARMAR MD     Medications       ferrous sulfate  325 mg Oral Daily with breakfast     latanoprost  1 drop Both Eyes At Bedtime     pantoprazole  40 mg Oral BID AC     piperacillin-tazobactam  3.375 g Intravenous Q6H     simvastatin  20 mg Oral At Bedtime     sucralfate  1 g Oral BID AC     vancomycin (VANCOCIN) IV  1,500 mg Intravenous Q12H

## 2019-03-25 NOTE — ED NOTES
Daughter of patient noticed increased redness and warmth in patients right leg. Patients leg was quite warm and reddened upon evaluation. MD notified and visualized patient. Will continue to monitor

## 2019-03-25 NOTE — PLAN OF CARE
Arrived to the unit around 0000.  A&Ox4. VSS on RA. Tele: ST. C/o pain 8 out of 10 gave prn dilaudid x2 effective. NPO since arrival to the unit. R PIV infusing NS at 75 ml/hr with intermittent antibiotics. K+ 2.6 replaced per doctors orders. Total care, turn and repo q2h

## 2019-03-25 NOTE — CONSULTS
MARYMA  I: MARYAM was consulted to discuss d.c planning needs. Patient was recently hospitalized and discharged to [facility]. SW received call from [facility] stating patient did not hold bed but would like to discharge there after hospitalization. MARYAM spoke with patient who requested SW call her daughter, Kerry. SW called Kerry and she confirmed that patient would like to return there and would be ok with a referral being sent to [facility]. SW will send referral. MARYAM anticipates a discharge later in the week.     P: SW will continue to follow and assist as needed.    Radha Grissom, THOM   *[phone]

## 2019-03-25 NOTE — PHARMACY-VANCOMYCIN DOSING SERVICE
Pharmacy Vancomycin Initial Note  Date of Service 2019  Patient's  1950  68 year old, female    Indication: Abscess    Current estimated CrCl = Estimated Creatinine Clearance: 54.6 mL/min (A) (based on SCr of 1.3 mg/dL (H)).    Creatinine for last 3 days  3/24/2019:  4:51 PM Creatinine 1.30 mg/dL    Recent Vancomycin Level(s) for last 3 days  No results found for requested labs within last 72 hours.      Vancomycin IV Administrations (past 72 hours)                   vancomycin (VANCOCIN) 2,500 mg in sodium chloride 0.9 % 500 mL intermittent infusion (mg) 2,500 mg New Bag 19                Nephrotoxins and other renal medications (From now, onward)    Start     Dose/Rate Route Frequency Ordered Stop    19 0900  vancomycin (VANCOCIN) 1,500 mg in sodium chloride 0.9 % 250 mL intermittent infusion      1,500 mg  over 90 Minutes Intravenous EVERY 12 HOURS 19 0002      19 0200  piperacillin-tazobactam (ZOSYN) 3.375 g vial to attach to  mL bag     Comments:  Pharmacy can adjust dose based on renal function.    3.375 g  over 30 Minutes Intravenous EVERY 6 HOURS 19 2353            Contrast Orders - past 72 hours (72h ago, onward)    None                Plan:  1.  Start vancomycin  1500 mg IV q12h.   2.  Goal Trough Level: 10-15 mg/L   3.  Pharmacy will check trough levels as appropriate in 1-3 Days.    4. Serum creatinine levels will be ordered daily for the first week of therapy and at least twice weekly for subsequent weeks.    5. Sandborn method utilized to dose vancomycin therapy: Method 2    José De Guzman

## 2019-03-25 NOTE — PHARMACY-ADMISSION MEDICATION HISTORY
Admission medication history interview status for the 3/24/2019  admission is complete. See EPIC admission navigator for prior to admission medications     Medication history source reliability:Good    Actions taken by pharmacist (provider contacted, etc): Obtained MAR from TCU     Additional medication history information not noted on PTA med list :None    Medication reconciliation/reorder completed by provider prior to medication history? No    Time spent in this activity: 15 minutes    Prior to Admission medications    Medication Sig Last Dose Taking? Auth Provider   amLODIPine (NORVASC) 2.5 MG tablet Take 1 tablet (2.5 mg) by mouth daily 3/24/2019 at AM Yes Candelario Zelaya MD   cefTRIAXone (ROCEPHIN) 1 GM vial Inject 2 g (2,000 mg) into the vein daily CBC with differential, creatinine, SGOT weekly while on this medication to be faxed to Dr. Israel office. 3/24/2019 at 0900 Yes Meghan Guadarrama MD   CYCLOBENZAPRINE HCL PO Take 10 mg by mouth At Bedtime 3/23/2019 at PM Yes Unknown, Entered By History   diphenoxylate-atropine (LOMOTIL) 2.5-0.025 MG tablet Take 1 tablet by mouth 4 times daily as needed for diarrhea 3/24/2019 at 1200 Yes Erika Meza PA-C   ferrous sulfate (FEROSUL) 325 (65 Fe) MG tablet Take 1 tablet (325 mg) by mouth daily (with breakfast) 3/24/2019 at AM Yes Ruben Barksdale MD   furosemide (LASIX) 40 MG tablet Take 1 tablet (40 mg) by mouth daily 3/24/2019 at AM Yes Ruben Barksdale MD   hydrOXYzine (ATARAX) 10 MG tablet Take 1 10-mg tablet every 6 hours as needed for muscle relaxation and to supplement your pain medication. 3/24/2019 at 1341 Yes Magda Plasencia PA-C   latanoprost (XALATAN) 0.005 % ophthalmic solution Place 1 drop into both eyes At Bedtime 3/23/2019 at PM Yes Unknown, Entered By History   nystatin (MYCOSTATIN) 885471 UNIT/GM POWD Apply topically 2 times daily as needed (affected area) 3/24/2019 at AM Yes Unknown, Entered By History   oxyCODONE  (ROXICODONE) 5 MG tablet Take 1 tablet (5 mg) by mouth every 6 hours as needed for moderate to severe pain 3/24/2019 at 0932 Yes Ruben Barksdale MD   pantoprazole (PROTONIX) 40 MG EC tablet Take 1 tablet (40 mg) by mouth 2 times daily (before meals) 3/24/2019 at AM Yes Ruben Barksdale MD   potassium chloride ER (K-DUR/KLOR-CON M) 20 MEQ CR tablet Take 1 tablet (20 mEq) by mouth daily 3/24/2019 at AM Yes Ruben Barksdale MD   SIMVASTATIN PO Take 20 mg by mouth At Bedtime  3/23/2019 at PM Yes Unknown, Entered By History   sucralfate (CARAFATE) 1 GM/10ML suspension Take 10 mLs (1 g) by mouth 2 times daily (before meals) 3/24/2019 at 0730 Yes Ruben Barksdale MD   TEMAZEPAM PO Take 15 mg by mouth nightly as needed for sleep 3/23/2019 at 2009 Yes Unknown, Entered By History   loperamide (IMODIUM) 2 MG capsule Take 2 mg by mouth 4 times daily as needed for diarrhea 3/19/2019  Unknown, Entered By History   MEDICATION INSTRUCTION Hold Lisinopril and Norvasc if your SBP is <110 and DBP<70   Candelario Zelaya MD   SUMATRIPTAN SUCCINATE PO Take 50 mg by mouth every 2 hours as needed for migraine (max of 200mg q 24hr)  at prn  Unknown, Entered By History

## 2019-03-25 NOTE — TREATMENT PLAN
[facility]    Orthopedics Preliminary Consult Note    Chart, labs and images reviewed.    Vanessa Huffman is a 68 year old female with concern for septic left knee.    Knee aspirate clear per report    Cell Count [id number] with 36% Neutrophils not suggestive of septic knee    Crystals and Gram Stain Pending    Indicated for observation with broad-spectrum antibiotics    Will follow cultures.    Will evaluate patient on 3/25/19    Full consult note to follow.    Please call with questions.    Thanks    Triston Mulligan MD  Orthopedic Trauma and Arthroplasty  [facility]  [phone]

## 2019-03-25 NOTE — H&P
[facility]    History and Physical  Hospitalist       Date of Admission:  3/24/2019    Assessment & Plan   Vanessa Huffman is a 68 year old female with a past medical history of gastric bypass, chronic diarrhea, hypertension, migraines with recent admission to [facility] from March 1 - March 19 of this year for large liver abscess, Klebsiella bacteremia, acute renal failure, acute blood loss anemia, upper GI bleed secondary to anastomotic ulcer, sepsis secondary to liver abscess, thrombocytopenia secondary to sepsis, elevated liver enzymes secondary to liver abscess who presents with left leg pain.  Examination reveals focal left knee pain acute onset.    Principal Problem:  Fever    Assessment:  In association with acute onset left knee pain earlier today.  Concern for septic joint versus acute crystal disease.  She also has a PICC line.  Rocephin for liver abscess.  CT shows decrease in size of liver abscess.  Imaging of the lungs shows no acute process.  Liver function tests are improving.  She is leukopenic.  Her white blood cell count has been steadily dropping over her last hospital stay following initiation of treatment and drainage of her liver abscess.  Exam notable for significant pain in left knee with passive range of motion.  No other focal findings.  She has minimal tenderness in the right upper quadrant.    Plan:   -Blood cultures obtained in the ER.  -She was initiated on Zosyn as an we will continue these.  Comycin added to cover for possible MRSA line infection  -We will evaluate left knee pain.      Acute pain of left knee    Assessment:   -Acute onset of left knee pain this morning.  Patient had been ambulatory and doing well up until this morning.  Unable to bear weight.  Leukopenic today.  Fever 101.2.  No history of gout.  No trauma or falls.  History of similar pain.  No signs of cellulitis.  Lower extremity Dopplers for DVT.  She also has pain in the  posterior knee.  No cyanosis or signs of limb ischemia.  No other joints affected.  Discussed with ED MD will pursue arthrocentesis of left knee and imaging.    Plan:   -Ortho consult  -Arthrocentesis in the ER before admission.  -We will discuss with Sav O.  -Zosyn and vancomycin for now.  -N.p.o. at midnight for possible need of orthopedic surgery and I&D\washout of knee.    Addendum: 2300  ED MD able to perform arthrocentesis L knee with clear/straw colored fluid.   XR L knee: No fracture or dislocation is identified. Tricompartmental osteoarthritis is present most conspicuous at the patellofemoralcompartment. Bilateral medial and lateral compartment  chondrocalcinosis is present. Probable small volume fluid within thesuprapatellar recess.   Synovial fluid:  Glucose:6  Protein: 3.0  Crystals:    Discussed with oncall Ortho; appreciate their in put. They will follow up synovial fluid labs tonight which should be back within 30 minutes. If concerning for septic arthritis will place pt on OR schedule for early morning. If not suggestive of infection but crystals positive iv solumedrol can be given in am. Per ortho will not need to go to OR tonight unless clinically deteriorate. [facility]      ELISE (acute kidney injury) (H)    Assessment: Baseline creatinine 0.7.  Creatinine 4.27 on last admission presentation.  Improved with IV hydration.  Seen by nephrology.  Suspect component of ATN.  She was discharged on Lasix 40 mg daily with KCl.  Creatinine improved since discharge.  She is hypokalemic with potassium of 2.8    Plan: Follow potassium closely, telemetry, follow-up [facility] krissy,t consider potassium protocol if kidney function continues to improve.  Fluids.        Liver abscess-likely primary liver abscess.    Bacteremia due to Klebsiella pneumoniae    Assessment:   -Patient presented with elevated pro-Delta and CRP of 485.  Hypotension.  CT abdomen showed large liver mass.  Initiated  on Vanco and Zosyn.  MRCP of the liver did not give any further information regarding liver abscess given lack of contrast.  Liver biopsy March 4 showed hemorrhagic necrosis with acute inflammatory changes.  No malignant cells.  Likely primary liver abscess.  His white blood cell count up to 55,000.  White blood cell count normal on discharge and now patient leukopenic.  She was seen by gastroenterology infectious disease.  Lower concern for cholangitis.  Status post drain placement by interventional radiology March 7.  Repeat biopsy March 7 negative for malignancy.  Repeat CT abdomen March 13 showed unchanged size of liver abscess and she continued to have clinical improvement.  She was transitioned from IV Zosyn to Rocephin.  Line placed and she was discharged on ceftriaxone.  Patient states she has done well since discharge to TCU.  No longer needing oxycodone.  Mild right upper quadrant pain.  BILL drain has been draining some but output is not known.  -CT in the ER shows decreased liver abscess.  Function tests improving.  Mild right upper quadrant tenderness on exam  -Possible that fever represents her liver abscess but unlikely given new acute left knee pain which I suspect is the source of her new fever    Plan:   -Infectious disease consult  -Hold Rocephin and continue Zosyn for now given new fever.  -Vancomycin to cover new fever.    -BILL drain care- follow output. Follow for need to have GI involved.   -PICC line  -Blood cultures obtained      Essential hypertension    Assessment: On low-dose amlodipine 2.5 mg daily.  Normotensive currently.    Plan: Hold amlodipine for now given possible sepsis    Hx gastric bypass  Chronic diarrhea  PTA on high dose Immodium--. Currently on oxycodone at time of discharge.  She has not been eating this at the TCU.  - Lomotil started TID at time of discharge., no more diarrhea discharge.  This was changed to as needed at discharge.      Anemia due to blood loss, acute    Upper GI bleed    Assessment:   During last hospitalization hemoglobin dropped from 12-6 while inpatient.  She was transfused 2 units of RBCs March 8.  EGD done March 8.  Showed greater ulcer with visible vessel and actively bleeding.  Treated with injection with epinephrine and Hemoclip.  Having normal brown stools at discharge.  Hemoglobin stable.  No bloody stools.  Hemoglobin 9 range.    Plan: No NSAIDs, follow hemoglobin      Thrombocytopenia (H)    Assessment: Hit ruled out last hospital stay.  Hemolysis also ruled out following evaluation.  Thought secondary to acute infection.  Platelet counts are normal but slightly lower than discharge    Plan: Follow platelet count     Abnormal LFTs    Assessment: [place] to liver abscess.  CT today shows decrease in liver abscess.  Liver function tests improving.  Minimal right upper quadrant pain.    Plan: Follow LFTs    Insomnia:  Old temazepam at bedtime for now given acute illness. reeval tomorrow    DVT Prophylaxis: Pneumatic Compression Devices    Code Status: Full Code    Disposition: Expected discharge in in greater than 2 days once her fever and left knee pain is been fully evaluated    Preston Da Silva MD    Primary Care Physician   Emily Najera    Chief Complaint   Leg pain    History is obtained from the patient, chart, ED MD    History of Present Illness   Vanessa Huffman is a 68 year old female with a past medical history of gastric bypass, chronic diarrhea, hypertension, migraines with recent admission to [facility] from March 1 - March 19 of this year for large liver abscess, Klebsiella bacteremia, acute renal failure, acute blood loss anemia, upper GI bleed secondary to anastomotic ulcer, sepsis secondary to liver abscess, thrombocytopenia secondary to sepsis, elevated liver enzymes secondary to liver abscess who presents with acute left leg pain.  Patient states she has done relatively well at the TCU since being discharged earlier this  past week.  According to the daughter she was up ambulating independently and doing well.  She has had some BILL drain output but it seems to have been less lately.  No nausea vomiting or diarrhea.  She has been getting her IV Rocephin for her liver abscess.  Her appetite is been reduced but stable.  She has had only some mild pain over her liver.  Early this morning at approximately 2 AM patient was awoken by acute left leg pain.  She states her leg was on fire.  Her right leg was fine.  She was unable to bear weight.  On further questioning she seems to think the focus of the pain is around her knee.  She denies a history of gout.  No trauma or falls.  No other joint complaints.  He feels slightly short of breath today.    In the emergency room she has a temperature of 101.2.  She is mildly tachycardic.  Blood pressure is normal.  Oxygenation normal.  Blood cultures obtained.  Influenza testing negative.  Potassium 2.8.  Sodium 135.  Creatinine improved at 1.3.  Alkaline phosphatase of 453, bilirubin 2.4, ALT 46, AST 46, lactic acid 0.8.  Blood sugar 99.  White blood cell count 2.8.  Hemoglobin 9.9.  Platelet count 197.  Urinalysis negative.  Imaging:   Bilateral lower extremity Dopplers negative for DVT  CT abdomen and pelvis without contrast shows decrease in size of liver abscess since comparison study.  Moderate right pleural effusion and associated compressive atelectasis or infiltrate again noted.  No new findings.    In the emergency room she was given a 1000 cc bolus of saline, Tylenol, IV morphine 2 mg, Zosyn and vancomycin.    Past Medical History    I have reviewed this patient's medical history and updated it with pertinent information if needed.   Past Medical History:   Diagnosis Date     Arthritis      Depressive disorder      Hypertension      Obesity        Past Surgical History   I have reviewed this patient's surgical history and updated it with pertinent information if needed.  Past Surgical  History:   Procedure Laterality Date     APPENDECTOMY       ARTHROPLASTY CARPOMETACARPAL (THUMB JOINT) Left 4/6/2018    Procedure: ARTHROPLASTY CARPOMETACARPAL (THUMB JOINT);  LEFT  THUMB CARPOMETACARPAL EXCISIONAL ARTHROPLASTY WITH FASCIA CLARE GRAFT ;  Surgeon: Kalyani King MD;  Location: [facility]     CHOLECYSTECTOMY       GI SURGERY      gastric bypass     GYN SURGERY      abdominal hysterectomy     ORTHOPEDIC SURGERY      bilateral bunionectomies       Prior to Admission Medications   Prior to Admission Medications   Prescriptions Last Dose Informant Patient Reported? Taking?   CYCLOBENZAPRINE HCL PO 3/23/2019 at PM Self Yes Yes   Sig: Take 10 mg by mouth At Bedtime   MEDICATION INSTRUCTION  Self No No   Sig: Hold Lisinopril and Norvasc if your SBP is <110 and DBP<70   SIMVASTATIN PO 3/23/2019 at PM Self Yes Yes   Sig: Take 20 mg by mouth At Bedtime    SUMATRIPTAN SUCCINATE PO  at prn Self Yes No   Sig: Take 50 mg by mouth every 2 hours as needed for migraine (max of 200mg q 24hr)   TEMAZEPAM PO 3/23/2019 at 2009 Self Yes Yes   Sig: Take 15 mg by mouth nightly as needed for sleep   amLODIPine (NORVASC) 2.5 MG tablet 3/24/2019 at AM Self No Yes   Sig: Take 1 tablet (2.5 mg) by mouth daily   cefTRIAXone (ROCEPHIN) 1 GM vial 3/24/2019 at 0900 Self No Yes   Sig: Inject 2 g (2,000 mg) into the vein daily CBC with differential, creatinine, SGOT weekly while on this medication to be faxed to Dr. Israel office.   diphenoxylate-atropine (LOMOTIL) 2.5-0.025 MG tablet 3/24/2019 at 1200 Self No Yes   Sig: Take 1 tablet by mouth 4 times daily as needed for diarrhea   ferrous sulfate (FEROSUL) 325 (65 Fe) MG tablet 3/24/2019 at AM Self No Yes   Sig: Take 1 tablet (325 mg) by mouth daily (with breakfast)   furosemide (LASIX) 40 MG tablet 3/24/2019 at AM Self No Yes   Sig: Take 1 tablet (40 mg) by mouth daily   hydrOXYzine (ATARAX) 10 MG tablet 3/24/2019 at 1341 Self No Yes   Sig: Take 1 10-mg tablet every 6 hours  as needed for muscle relaxation and to supplement your pain medication.   latanoprost (XALATAN) 0.005 % ophthalmic solution 3/23/2019 at PM Self Yes Yes   Sig: Place 1 drop into both eyes At Bedtime   loperamide (IMODIUM) 2 MG capsule 3/19/2019 Self Yes No   Sig: Take 2 mg by mouth 4 times daily as needed for diarrhea   nystatin (MYCOSTATIN) 291008 UNIT/GM POWD 3/24/2019 at AM Self Yes Yes   Sig: Apply topically 2 times daily as needed (affected area)   oxyCODONE (ROXICODONE) 5 MG tablet 3/24/2019 at 0932 Self No Yes   Sig: Take 1 tablet (5 mg) by mouth every 6 hours as needed for moderate to severe pain   pantoprazole (PROTONIX) 40 MG EC tablet 3/24/2019 at AM Self No Yes   Sig: Take 1 tablet (40 mg) by mouth 2 times daily (before meals)   potassium chloride ER (K-DUR/KLOR-CON M) 20 MEQ CR tablet 3/24/2019 at AM Self No Yes   Sig: Take 1 tablet (20 mEq) by mouth daily   sucralfate (CARAFATE) 1 GM/10ML suspension 3/24/2019 at 0730 Self No Yes   Sig: Take 10 mLs (1 g) by mouth 2 times daily (before meals)      Facility-Administered Medications: None     Allergies   Allergies   Allergen Reactions     Contrast Dye Shortness Of Breath     Codeine      Zolpidem Other (See Comments)     Patient reports sleep walking.     Diatrizoate Itching     itchy throat that makes her want to cough       Social History   I have reviewed this patient's social history and updated it with pertinent information if needed. Vanessa Huffman  reports that  has never smoked. she has never used smokeless tobacco. She reports that she drinks alcohol. She reports that she does not use drugs.    Family History   I have reviewed this patient's family history and updated it with pertinent information if needed.   Family History   Problem Relation Age of Onset     Coronary Artery Disease Father      Diabetes Sister      Diabetes Sister      Diabetes Sister        Review of Systems   The 10 point Review of Systems is negative other than noted in the  HPI or here.     Physical Exam   Temp: 101.2  F (38.4  C) Temp src: Oral BP: 132/68 Pulse: 113 Heart Rate: 114 Resp: 21 SpO2: 97 % O2 Device: None (Room air)    Vital Signs with Ranges  Temp:  [98.4  F (36.9  C)-101.2  F (38.4  C)] 101.2  F (38.4  C)  Pulse:  [] 113  Heart Rate:  [111-114] 114  Resp:  [18-23] 21  BP: (132-190)/(54-79) 132/68  SpO2:  [94 %-97 %] 97 %  250 lbs 0 oz    Constitutional: Mildly toxic appearing.  Slightly tachypneic.  Alert and communicative.  Eyes: Sclera, pupils equal round reactive to light and accommodating extraocular eye motions intact  HEENT: Normal oropharynx but dry mucous membrane  Respiratory: Decreased breath sounds right base but clear right upper lung.  Left lung clear, no wheezing  Cardiovascular: Regular rate and rhythm no rubs gallops or murmurs she is mildly tachycardic, normal pedal pulses.  Normal capillary refill in the lower extremities.  No cyanosis.  GI: Obese soft mild tenderness over liver.  BILL drain in place.  Drain site appears normal.  Drain is empty.  Lymph/Hematologic: No cervical or axillary lymphadenopathy  Skin: No focal rash.  She has about 1+ edema bilateral lower extremities  Musculoskeletal: No pain with internal/external rotation of bilateral hips.  No pain with movement of the ankles bilaterally.  She has acute pain with passive range of motion of left knee and minimally allows me to move her knee.  Some pain posterior knee  Neurologic: Cranial nerves II through XII grossly intact, slight confusion, neck is supple.  No nuchal rigidity, face symmetric and tongue midline, strength 5 out of 5 in extremities x4  Psychiatric: Normal affect  Data   Data reviewed today:  I personally reviewed admission labs and imaging  Recent Labs   Lab 03/24/19  1651 03/19/19  0520 03/18/19  2145 03/18/19  1620 03/18/19  0600   WBC 2.8* 7.6  --   --  7.4   HGB 9.9* 9.5*  --   --  9.4*   MCV 88 89  --   --  88    243  --   --  281    141  --  139 140    POTASSIUM 2.8* 4.1 3.2* 3.2* 3.1*   CHLORIDE 95 108  --  106 107   CO2 28 26  --  24 24   BUN 18 28  --  29 28   CR 1.30* 1.58*  --  1.59* 1.69*   ANIONGAP 12 7  --  9 9   DANNA 7.9* 7.7*  --  7.8* 7.6*   GLC 99 87  --  90 84   ALBUMIN 1.9* 1.6*  --   --  1.6*   PROTTOTAL 6.3*  --   --   --  5.5*   BILITOTAL 2.4*  --   --   --  1.5*   ALKPHOS 453*  --   --   --  401*   ALT 46  --   --   --  88*   AST 46*  --   --   --  131*       Imaging:  Recent Results (from the past 24 hour(s))   US Lower Ext Venous Duplex Limited Bilat    Narrative    ULTRASOUND VENOUS LOWER EXTREMITY BILATERAL 3/24/2019 6:17 PM     HISTORY: Left leg swelling and redness.    COMPARISON: None.    TECHNIQUE: Ultrasound gray scale, Color Doppler flow, and spectral  Doppler waveform analysis performed.    FINDINGS: The bilateral common femoral, superficial femoral, popliteal  and posterior tibial veins are patent and fully compressible and  demonstrate normal venous Doppler flow. The visualized greater  saphenous veins are negative for thrombus.      Impression    IMPRESSION: No DVT demonstrated.    VICTOR HUGO CALLOWAY MD   CT Abdomen pelvis - oral contrast only    Narrative    CT ABDOMEN AND PELVIS WITHOUT CONTRAST 3/24/2019 6:54 PM     HISTORY: Diffuse abdominal pain, history of recent liver abscess with  BILL drain still in place.    COMPARISON: March 18, 2019    TECHNIQUE: Volumetric helical acquisition of CT images from the lung  bases through the symphysis pubis without intravenous contrast.  Radiation dose for this scan was reduced using automated exposure  control, adjustment of the mA and/or kV according to patient size, or  iterative reconstruction technique.    FINDINGS: Liver abscess continues to shrink measuring 8.5 cm  previously 10.3 cm. There is increased air present within the abscess  cavity. Small amount of fluid around the liver is again noted and  unchanged. No free air. There are no dilated loops of small intestine  or large bowel to  suggest ileus or obstruction. There is mild  diverticulosis without evidence for diverticulitis. Normal caliber  aorta. No hydronephrosis. Kidneys, adrenals, spleen, and pancreas  demonstrate no worrisome focal lesion. Moderate right pleural effusion  and associated compressive atelectasis and/or infiltrate again noted.  Survey of the visualized bony structures demonstrates no destructive  bony lesions.      Impression    IMPRESSION:  1. Continued decrease in size in the liver abscess since the  comparison study.  2. Moderate right pleural effusion and associated compressive  atelectasis and/or infiltrate again noted.  3. No definite new findings.    VICTOR HUGO CALLOWAY MD

## 2019-03-25 NOTE — PROGRESS NOTES
Patient readmitted with picc line previously placed on March 7 2019.  Line assessed and there is no change in the amount of catheter out since placement so line is ok to use.  Line will be due to be redressed on 3-28.

## 2019-03-25 NOTE — PROGRESS NOTES
Orders for RUQ liver abscess drain care and NS irrigation entered. Will see her tomorrow.     Thanks [facility] CNP ([phone]) (phone [phone])

## 2019-03-25 NOTE — PROVIDER NOTIFICATION
MD Notification    Person notified: NP    Person Name: Ignacia Purvis    Date/Time: 3/25/19@1122    Interaction: Called    Purpose of Notification: Ask NP to look at IR drain and to order flushes for drain.     Orders Received: Orders for RUQ liver abscess drain care and Normal Saline irrigation entered. NP will see patient tomorrow.

## 2019-03-25 NOTE — PROVIDER NOTIFICATION
MD Notification    Person notified: PA    Person Name: Belinda Owen    Date/Time: 3/25/19@0341    Interaction: Paged    Purpose of Notification: Ask when PA will be rounding on patient to figure out if she will need surgery.    Orders Received: Order for x-ray and new order for Medrol.

## 2019-03-25 NOTE — PROVIDER NOTIFICATION
MD Notification    Person notified: MD    Person Name: Dr. Umanzor    Date/Time: 3/25/19@0830    Interaction: In person    Purpose of Notification: Inform MD that patient's temperature 101.3, ask for order for conditional K replacement order, and an order for an additional pain medication since patient still rating pain at 8 in the L knee.     Orders Received: Conditional K replacement order entered and changed pain medication to dilaudid 2mg Q2H PRN and Oxy 5mg Q4H PRN.

## 2019-03-25 NOTE — PROGRESS NOTES
RECEIVING UNIT ED HANDOFF REVIEW    ED Nurse Handoff Report was reviewed by: Kalyani Kelly on March 24, 2019 at 11:09 PM

## 2019-03-25 NOTE — ED NOTES
"[facility]  ED Nurse Handoff Report    ED Chief complaint: Leg Pain (Patient complaining of left leg pain.  Patient was in TCU after becoming septic from a liver abscess.  Patient is not able to walk, was walking find yesterday)      ED Diagnosis:   Final diagnoses:   Sepsis, due to unspecified organism (H)   Hypokalemia       Code Status: hospitalist to address    Allergies:   Allergies   Allergen Reactions     Contrast Dye Shortness Of Breath     Codeine      Zolpidem Other (See Comments)     Patient reports sleep walking.     Diatrizoate Itching     itchy throat that makes her want to cough       Activity level - Baseline/Home:  Stand with Assist    Activity Level - Current:   Total Care     Needed?: No    Isolation: No  Infection: Not Applicable  Bariatric?: No    Vital Signs:   Vitals:    03/24/19 1647 03/24/19 1700 03/24/19 1900   BP: 190/79 146/54 143/76   Pulse: 94  113   Resp: 18 23 21   Temp: 98.4  F (36.9  C)  101.2  F (38.4  C)   TempSrc: Oral  Oral   SpO2: 94% 94%    Weight: 113.4 kg (250 lb)     Height: 1.702 m (5' 7\")         Cardiac Rhythm: ,   Cardiac  Cardiac Rhythm: Sinus tachycardia    Pain level:      Is this patient confused?: No   Does this patient have a guardian?  No         If yes, is there guardianship documents in the Epic \"Code/ACP\" activity?  N/A         Guardian Notified?  N/A  [place] - Suicide Severity Rating Scale Completed?  Yes  If yes, what color did the patient score?  White    Patient Report: Initial Complaint: left leg pain  Focused Assessment: left leg pain and swelling since yesterday. She is unable to bear weight on the leg.  Patient also is running a fever today.  Patient was recently admitted here with sever sepsis and a liver abscess.  BILL drain in place.  Patient has several complications while in the hospital.  Patient was rehabbing at TCU, which is where she came from today.  PICC present to the right arm.    Tests Performed: labs, " imaging  Abnormal Results:   Labs Ordered and Resulted from Time of ED Arrival Up to the Time of Departure from the ED   CBC WITH PLATELETS - Abnormal; Notable for the following components:       Result Value    WBC 2.8 (*)     RBC Count 3.34 (*)     Hemoglobin 9.9 (*)     Hematocrit 29.4 (*)     RDW 15.9 (*)     All other components within normal limits   COMPREHENSIVE METABOLIC PANEL - Abnormal; Notable for the following components:    Potassium 2.8 (*)     Creatinine 1.30 (*)     GFR Estimate 42 (*)     GFR Estimate If Black 49 (*)     Calcium 7.9 (*)     Bilirubin Total 2.4 (*)     Albumin 1.9 (*)     Protein Total 6.3 (*)     Alkaline Phosphatase 453 (*)     AST 46 (*)     All other components within normal limits   ROUTINE UA WITH MICROSCOPIC - Abnormal; Notable for the following components:    Blood Urine Trace (*)     Protein Albumin Urine 30 (*)     RBC Urine 3 (*)     All other components within normal limits   LACTIC ACID WHOLE BLOOD   BLOOD CULTURE   BLOOD CULTURE   INFLUENZA A/B ANTIGEN       Treatments provided: IV meds    Family Comments: daughter and  at bedtime    OBS brochure/video discussed/provided to patient/family: N/A              Name of person given brochure if not patient:               Relationship to patient:     ED Medications:   Medications   0.9% sodium chloride BOLUS (500 mLs Intravenous New Bag 3/24/19 1822)   potassium chloride 10 mEq in 100 mL intermittent infusion with 10 mg lidocaine (10 mEq Intravenous New Bag 3/24/19 1821)   acetaminophen (TYLENOL) tablet 650 mg (not administered)   morphine (PF) injection 2 mg (2 mg Intravenous Given 3/24/19 1822)       Drips infusing?:  No    For the majority of the shift this patient was Green.   Interventions performed were .    Severe Sepsis OR Septic Shock Diagnosis Present: No    To be done/followed up on inpatient unit:      ED NURSE PHONE NUMBER: *[phone]

## 2019-03-26 ENCOUNTER — APPOINTMENT (OUTPATIENT)
Dept: OCCUPATIONAL THERAPY | Facility: CLINIC | Age: 69
DRG: 553 | End: 2019-03-26
Attending: INTERNAL MEDICINE
Payer: MEDICARE

## 2019-03-26 LAB
LACTATE BLD-SCNC: 0.8 MMOL/L (ref 0.7–2)
MAGNESIUM SERPL-MCNC: 2 MG/DL (ref 1.6–2.3)
POTASSIUM SERPL-SCNC: 3.7 MMOL/L (ref 3.4–5.3)

## 2019-03-26 PROCEDURE — 97165 OT EVAL LOW COMPLEX 30 MIN: CPT | Mod: GO

## 2019-03-26 PROCEDURE — 12000000 ZZH R&B MED SURG/OB

## 2019-03-26 PROCEDURE — 25000132 ZZH RX MED GY IP 250 OP 250 PS 637: Mod: GY | Performed by: INTERNAL MEDICINE

## 2019-03-26 PROCEDURE — 97530 THERAPEUTIC ACTIVITIES: CPT | Mod: GO

## 2019-03-26 PROCEDURE — 97535 SELF CARE MNGMENT TRAINING: CPT | Mod: GO

## 2019-03-26 PROCEDURE — A9270 NON-COVERED ITEM OR SERVICE: HCPCS | Mod: GY | Performed by: INTERNAL MEDICINE

## 2019-03-26 PROCEDURE — 25000131 ZZH RX MED GY IP 250 OP 636 PS 637: Mod: GY | Performed by: PHYSICIAN ASSISTANT

## 2019-03-26 PROCEDURE — 99233 SBSQ HOSP IP/OBS HIGH 50: CPT | Performed by: INTERNAL MEDICINE

## 2019-03-26 PROCEDURE — 83605 ASSAY OF LACTIC ACID: CPT | Performed by: INTERNAL MEDICINE

## 2019-03-26 PROCEDURE — 83735 ASSAY OF MAGNESIUM: CPT | Performed by: INTERNAL MEDICINE

## 2019-03-26 PROCEDURE — 84132 ASSAY OF SERUM POTASSIUM: CPT | Performed by: INTERNAL MEDICINE

## 2019-03-26 PROCEDURE — 40000239 ZZH STATISTIC VAT ROUNDS

## 2019-03-26 PROCEDURE — 40000893 ZZH STATISTIC PT IP EVAL DEFER: Performed by: PHYSICAL THERAPIST

## 2019-03-26 RX ORDER — SACCHAROMYCES BOULARDII 250 MG
250 CAPSULE ORAL 2 TIMES DAILY
Status: DISCONTINUED | OUTPATIENT
Start: 2019-03-26 | End: 2019-03-27 | Stop reason: HOSPADM

## 2019-03-26 RX ORDER — LEVOFLOXACIN 500 MG/1
500 TABLET, FILM COATED ORAL DAILY
Status: DISCONTINUED | OUTPATIENT
Start: 2019-03-26 | End: 2019-03-27 | Stop reason: HOSPADM

## 2019-03-26 RX ADMIN — PANTOPRAZOLE SODIUM 40 MG: 40 TABLET, DELAYED RELEASE ORAL at 16:10

## 2019-03-26 RX ADMIN — Medication 250 MG: at 21:28

## 2019-03-26 RX ADMIN — SUCRALFATE 1 G: 1 SUSPENSION ORAL at 06:42

## 2019-03-26 RX ADMIN — Medication 1 MG: at 21:29

## 2019-03-26 RX ADMIN — OXYCODONE HYDROCHLORIDE 5 MG: 5 TABLET ORAL at 05:16

## 2019-03-26 RX ADMIN — OXYCODONE HYDROCHLORIDE 5 MG: 5 TABLET ORAL at 11:23

## 2019-03-26 RX ADMIN — METHYLPREDNISOLONE 4 MG: 4 TABLET ORAL at 11:23

## 2019-03-26 RX ADMIN — OXYCODONE HYDROCHLORIDE 5 MG: 5 TABLET ORAL at 15:56

## 2019-03-26 RX ADMIN — OXYCODONE HYDROCHLORIDE 5 MG: 5 TABLET ORAL at 21:29

## 2019-03-26 RX ADMIN — OXYCODONE HYDROCHLORIDE 5 MG: 5 TABLET ORAL at 01:08

## 2019-03-26 RX ADMIN — SUCRALFATE 1 G: 1 SUSPENSION ORAL at 16:10

## 2019-03-26 RX ADMIN — LOPERAMIDE HYDROCHLORIDE 2 MG: 2 CAPSULE ORAL at 18:34

## 2019-03-26 RX ADMIN — LEVOFLOXACIN 500 MG: 500 TABLET, FILM COATED ORAL at 11:30

## 2019-03-26 RX ADMIN — METHYLPREDNISOLONE 4 MG: 4 TABLET ORAL at 06:43

## 2019-03-26 RX ADMIN — METHYLPREDNISOLONE 8 MG: 4 TABLET ORAL at 21:28

## 2019-03-26 RX ADMIN — SIMVASTATIN 20 MG: 20 TABLET, FILM COATED ORAL at 21:29

## 2019-03-26 RX ADMIN — TEMAZEPAM 15 MG: 15 CAPSULE ORAL at 21:29

## 2019-03-26 RX ADMIN — FERROUS SULFATE TAB 325 MG (65 MG ELEMENTAL FE) 325 MG: 325 (65 FE) TAB at 08:53

## 2019-03-26 RX ADMIN — LOPERAMIDE HYDROCHLORIDE 2 MG: 2 CAPSULE ORAL at 21:28

## 2019-03-26 RX ADMIN — Medication 250 MG: at 12:50

## 2019-03-26 RX ADMIN — LATANOPROST 1 DROP: 50 SOLUTION OPHTHALMIC at 21:33

## 2019-03-26 RX ADMIN — PANTOPRAZOLE SODIUM 40 MG: 40 TABLET, DELAYED RELEASE ORAL at 06:42

## 2019-03-26 RX ADMIN — ACETAMINOPHEN 650 MG: 325 TABLET, FILM COATED ORAL at 01:08

## 2019-03-26 RX ADMIN — AMLODIPINE BESYLATE 2.5 MG: 2.5 TABLET ORAL at 08:53

## 2019-03-26 RX ADMIN — METHYLPREDNISOLONE 4 MG: 4 TABLET ORAL at 18:17

## 2019-03-26 ASSESSMENT — ACTIVITIES OF DAILY LIVING (ADL)
ADLS_ACUITY_SCORE: 18
ADLS_ACUITY_SCORE: 26
ADLS_ACUITY_SCORE: 22
ADLS_ACUITY_SCORE: 18
PREVIOUS_RESPONSIBILITIES: MEDICATION MANAGEMENT;DRIVING;MEAL PREP
ADLS_ACUITY_SCORE: 26
ADLS_ACUITY_SCORE: 22

## 2019-03-26 ASSESSMENT — MIFFLIN-ST. JEOR: SCORE: 1656.26

## 2019-03-26 NOTE — PLAN OF CARE
Patient is A&Ox4. VSS, put on 1L oxygen per patient request for comfort. Gave melatonin for sleep. C/o left knee pain, gave oxy x1. Tolerating regular diet. Telemetry read NSR. Turned and repositioned every 2 hours. Patient has purewick in place, draining minesh urine. RUQ drain with minimal output. R PICC SL. Calls appropriately.

## 2019-03-26 NOTE — PROGRESS NOTES
[facility]    Medicine Progress Note - Hospitalist Service       Date of Admission:  3/24/2019  Assessment & Plan   Vanessa Huffman is a 68 year-old female with a past medical history of gastric bypass, chronic diarrhea, hypertension, migraines with recent admission at [facility] from 3/1-3/19/19 for sepsis due to large liver abscess, Klebsiella bacteremia, acute renal failure, upper GI bleed secondary to anastomotic ulcer who presents with left leg pain. Admitted 3/24/2019.     Fever, possible drug reaction   Fever noted to 101.2F on arrival. Primary associated symptom of acute onset left knee pain day of admission.  Concern for septic joint versus acute crystal disease initially. Denies diarrhea. No symptoms associated with PICC line. CT shows decrease in size of liver abscess, stable right pleural effusion and possible infiltrate.  UA unremarkable.  - Orthopedic surgery consulted, low suspicion for septic joint  - Infectious disease consulted, discussed with Dr. Guadarrama 3/26. Suspicion for drug reaction as associated rash following admission, now better with antibiotic change   - Ceftriaxone added to allergy list  - Blood cultures 3/24 NGTD  - Arthrocentesis cultures 3/24 NGTD    Right pleural effusion  With associated opacity likely atelectasis. Seen during previous admission and stable on imaging this admission. Likely inflammatory from liver abscess. Occasional dyspnea, but no new cough. Alternate explanation for fever as above.  - Discussed possible therapeutic thoracentesis with patient, which she declined  - Monitor symptoms.      Acute pain of left knee  Presented with acute onset of left knee pain, unable to bear weight. Fever 101.2F.  No history of gout.  No trauma or falls.  History of similar pain.  No signs of cellulitis.  Lower extremity Dopplers for DVT.  No cyanosis or signs of limb ischemia.  No other joints affected. XR with osteoarthritis, probable small volume  fluid in suprapatellar recess. Arthrocentesis performed in ED with 26611 WBC, 36% neutrophils. Gram stain negative for organisms. Crystals analysis negative  - Orthopedic surgery consulted, suspected DJD  - Medrol dose pack started 3/25/19 per orthopedic surgery; symptoms improved, but still pain primarily with movement. Will complete course.     Recent acute kidney injury  Baseline creatinine around 0.7.  Creatinine 4.27 on last admission presentation.  Improved with IV hydration.  Seen by nephrology.  Suspect component of ATN.  She was discharged on Lasix 40 mg daily with KCl.  Creatinine improved since discharge.  - Creatinine pending 3/26/19    Hypokalemia  Furosemide (PTA medication) likely contributing  - Monitor and replace per protocol     Recent bacteremia with liver abscess Klebsiella pneumoniae  Elevated LFTs secondary to liver abscess  Recent admission with sepsis with CT abdomen showing large liver mass, liver biopsy with hemorrhagic necrosis with acute inflammatory changes, no malignant cells, suspected to be primary liver abscess, treated with drain placement and IV antibiotics. Repeat liver biopsy negative for malignancy. Discharged with PICC and IV antibiotics. CT abdomen on admission with improving liver abscess. AST and ALT improving on admission, total bilirubin up slightly.   - Antibiotics changed to levofloxacin per ID due to concern for possible drug rash with ceftriaxone  - Plan for oral levofloxacin on discharge per ID  - IR to assess drain as minimal output despite still 8.5 cm liver abscess on imaging.   - LFTs pending 3/26     Essential hypertension  - Blood pressure adequately controlled, continue amlodipine      History of gastric bypass  Chronic diarrhea  PTA on high dose Immodium. Denies any diarrhea preceding admission.  - Continue loperamide   - Add probiotics     Anemia due to recent blood loss  Recent history of upper GI bleed  During last hospitalization hemoglobin dropped from  12 to 6 g/dl while inpatient, required transfusion. EGD 3/8 demonstrated ulcer with visible vessel and actively bleeding.  Treated with injection with epinephrine and Hemoclip.    - Hemoglobin pending 3/26     Thrombocytopenia  HIT and hemolysis ruled out last hospital stay, suspected to be secondary to acute infection.   - Platelet count pending 3/26     Insomnia  Continue prior to admission temazepam.     Diet: Regular Diet Adult    DVT Prophylaxis: Pneumatic Compression Devices  Henry Catheter: not present  Code Status: Full Code      Disposition Plan   Expected discharge: To TCU 3/27/19 if cultures remain negative and symptoms continue to improve  Entered: Jose Miguel Umanzor MD 03/26/2019, 10:27 AM       The patient's care was discussed with the Patient and Infectious disease Team.    Jose Miguel Umanzor MD  Hospitalist Service  [facility]    ______________________________________________________________________    Interval History   No acute events overnight. Reports her knee pain has improved today, but still present with movement. Rash seen later yesterday by ID is improving. Reports intermittent dyspnea, no significant change from yesterday. Eating/drinking well. Had two loose bowel movements.     Data reviewed today: I reviewed all medications, new labs and imaging results over the last 24 hours. I personally reviewed no images or EKG's today.    Physical Exam   Vital Signs: Temp: 96.2  F (35.7  C) Temp src: Oral BP: 135/62   Heart Rate: 110 Resp: 18 SpO2: 95 % O2 Device: None (Room air) Oxygen Delivery: 1 LPM  Weight: 240 lbs 0 oz    Constitutional: NAD  Respiratory: Diminished breath sounds at right base, no wheezes  Cardiovascular: RRR, no m/r/g. No peripheral edema.  GI: Soft, non-tender, non-distended. BS normoactive.   Skin/Integumen: Warm, dry. Improving rash.   MSK: Left knee without significant tenderness to palpation, effusion, erythema. Pain reported with movement.     Data   Recent  Labs   Lab 03/26/19  0515 03/25/19  1625 03/25/19  1257 03/25/19  0600 03/25/19  0020 03/24/19  1651   WBC  --   --  3.6*  --   --  2.8*   HGB  --   --   --   --   --  9.9*   MCV  --   --   --   --   --  88   PLT  --   --   --   --   --  197   NA  --   --   --  135 135 135   POTASSIUM 3.7 3.4  --  3.3* 2.6* 2.8*   CHLORIDE  --   --   --  100 99 95   CO2  --   --   --  26 27 28   BUN  --   --   --  17 17 18   CR  --   --   --  1.29* 1.21* 1.30*   ANIONGAP  --   --   --  9 9 12   DANNA  --   --   --  7.7* 7.3* 7.9*   GLC  --   --   --  76 83 99   ALBUMIN  --   --   --  1.7*  --  1.9*   PROTTOTAL  --   --   --  5.9*  --  6.3*   BILITOTAL  --   --   --  2.4*  --  2.4*   ALKPHOS  --   --   --  370*  --  453*   ALT  --   --   --  35  --  46   AST  --   --   --  33  --  46*       Recent Results (from the past 24 hour(s))   XR Lumbar Spine 2/3 Views    Narrative    LUMBAR SPINE TWO TO THREE VIEWS  3/25/2019 2:46 PM     HISTORY: Evaluate lumbar spine for compression fractures.    COMPARISON: None.      Impression    IMPRESSION: Alignment of the lumbar spine is within normal limits. No  loss of vertebral body height. Mild loss of intervertebral disc space  and degenerative endplate changes at L1-L2. Marked degenerative  changes and loss of intervertebral disc space in the visualized lower  lumbar spine.    DYLLAN JONES MD     Medications       amLODIPine  2.5 mg Oral Daily     ferrous sulfate  325 mg Oral Daily with breakfast     latanoprost  1 drop Both Eyes At Bedtime     levofloxacin  500 mg Intravenous Q24H     methylPREDNISolone  8 mg Oral At Bedtime    And     methylPREDNISolone  4 mg Oral QAM AC    And     methylPREDNISolone  4 mg Oral Daily with lunch    And     methylPREDNISolone  4 mg Oral Daily with supper    And     [START ON 3/27/2019] methylPREDNISolone  4 mg Oral At Bedtime     pantoprazole  40 mg Oral BID AC     simvastatin  20 mg Oral At Bedtime     sodium chloride (PF)  10 mL Intracatheter Q8H     sodium  chloride (PF)  10 mL Irrigation Q8H     sucralfate  1 g Oral BID AC

## 2019-03-26 NOTE — PLAN OF CARE
Discharge Planner OT   Patient plan for discharge: TCU  Current status: Order received, chart reviewed, eval complete and treatment initiated. Patient with recent hospitalization from 3/1- 3/19/19 in [place] to liver abscess and bacteremia.  Was admitted to [facility] secondary to acute onset of left knee pain. Patient states the left knee from above the knee posteriorly down to the ankle is painful. Was in TCU prior to this admit, and was living at home with  ind in mobility and cares prior to earlier hospitalization.     Pt supine in bed at start of session, agreeable to OT. Completed bed mobility with mod A x1 for supine>sit with increased time and rest breaks. Pt sat EOB for about 4 minutes with SOB and increased pain. Needed max A x2 for sit>supine. Pt is max A for LBD and dependent for toileting using purewick. Pt edu on ADLs, possible DME, and goals for therapy. Edu pt on progressing activity tolerance to improve ind in ADLs in order to regain ind.  Pt in bed eating breakfast, bed alarm on.   Barriers to return to prior living situation: level of asst, pain, decreased mobility  Recommendations for discharge: TCU  Rationale for recommendations: pt was admitted from TCU, she is still below her baseline and is not safe to disch home. Will require continued skilled OT in order to work on strength and activity tolerance/endurance for ADLs and functional mobility.         Entered by: Carol Higuera 03/26/2019 8:34 AM

## 2019-03-26 NOTE — PROGRESS NOTES
03/26/19 0740   Quick Adds   Type of Visit Initial Occupational Therapy Evaluation   Living Environment   Lives With spouse   Living Arrangements house   Home Accessibility stairs to enter home   Number of Stairs, Main Entrance 2   Transportation Anticipated car, drives self;family or friend will provide   Living Environment Comment has a lower level, but can stay on main level; bedroom and full bathroom on main level; laundry in basement but  can do   Self-Care   Usual Activity Tolerance moderate   Current Activity Tolerance fair   Equipment Currently Used at Home other (see comments)  (bars on bed to help get in/out)   Activity/Exercise/Self-Care Comment tub shower at home; was ind prior to first hospitalization, then dc to TCU and was [facility] back to [facility]. using walker at TCU, needing asst in LBdressing, showering, toileting   Functional Level   Ambulation 1-->assistive equipment   Transferring 1-->assistive equipment   Toileting 2-->assistive person   Bathing 2-->assistive person   Dressing 2-->assistive person   Eating 0-->independent   Communication 0-->understands/communicates without difficulty   Swallowing 0-->swallows foods/liquids without difficulty   Cognition 0 - no cognition issues reported   Fall history within last six months no   Which of the above functional risks had a recent onset or change? ambulation;transferring;toileting;bathing;dressing   Prior Functional Level Comment Pt was ind prior to initial hospitalization in beginning of march, dc to TCU where she was getting asst for ambulation and ADLs.    General Information   Onset of Illness/Injury or Date of Surgery - Date 03/26/19   Referring Physician Preston Da Silva MD   Patient/Family Goals Statement to be able to stand up and walk to bathroom   Additional Occupational Profile Info/Pertinent History of Current Problem pt recently at [facility], was ind at baseline. Patient with recent hospitalization from 3/1- 3/19/19 in  [place] to liver abscess and bacteremia.  Was admitted yesterday secondary to acute onset of left knee pain.  Knee was aspirated in the emergency department which had a low cell count for a negative joint as well as a negative crystal analysis.  At this time patient states the left knee from above the knee posteriorly down to the ankle is painful.  States that she did have right leg pain similar initially to this but this has spontaneously resolved    Precautions/Limitations fall precautions   General Observations lying supine in bed   Cognitive Status Examination   Orientation orientation to person, place and time   Visual Perception   Visual Perception Wears glasses   Pain Assessment   Patient Currently in Pain Yes, see Vital Sign flowsheet  (2 when lying still)   Bed Mobility Skill: Sit to Supine   Level of [place]ites[place]: Sit/Supine maximum assist (25% patients effort)   Physical Assist/Nonphysical Assist: Sit/Supine 2 persons;verbal cues  (2 person for boost; 1 person to get legs up into bed)   Bed Mobility Skill: Supine to Sit   Level of [place]: Supine/Sit moderate assist (50% patients effort)   Physical Assist/Nonphysical Assist: Supine/Sit 1 person assist;verbal cues   Transfer Skill: Sit to Stand   Level of [place]: Sit/Stand (deferred 2/2 pain)   Upper Body Dressing   Level of [place]ites[place]: Dress Upper Body maximum assist (25% patients effort)   Lower Body Dressing   Level of [place]: Dress Lower Body dependent (less than 25% patients effort)   Toileting   Level of [place]ites[place]: Toilet dependent (less than 25% patients effort)   Eating/Self Feeding   Level of [place]ites[place]: Eating (set up)   Instrumental Activities of Daily Living (IADL)   Previous Responsibilities medication management;driving;meal prep   IADL Comments  home 24/7 can help with IADLs as needed   Activities of Daily Living Analysis   Impairments Contributing to Impaired Activities of Daily Living pain;strength decreased    General Therapy Interventions   Planned Therapy Interventions strengthening;progressive activity/exercise   Clinical Impression   Criteria for Skilled Therapeutic Interventions Met yes, treatment indicated   OT Diagnosis decreased ind in mobility and ADLs   Influenced by the following impairments pain, weakness   Assessment of Occupational Performance 3-5 Performance Deficits   Identified Performance Deficits dressing, bathing, g/h, mobility    Clinical Decision Making (Complexity) Low complexity   Therapy Frequency 5 times/wk   Predicted Duration of Therapy Intervention (days/wks) 3 days   Anticipated Discharge Disposition Transitional Care Facility   Risks and Benefits of Treatment have been explained. Yes   Patient, Family & other staff in agreement with plan of care Yes   Total Evaluation Time   Total Evaluation Time (Minutes) 15

## 2019-03-26 NOTE — PROGRESS NOTES
Patient is A&Ox4. VSS on room air. Tele: NSR. C/o L knee pain rated 7/10, Oxy 5mg x 2 effective. Regular diet, poor appetite. Purewick in place with adequate output (changed@1830). BS active. + flatus. Drain RUQ for liver abscess with low output. Flushes ordered for irrigation. R PICC SL. Total care, turn and reposition Q2H. Plan: Discharge to [facility] via wheelchair set up with [facility] transport for tomorrow at 1430.

## 2019-03-26 NOTE — PROGRESS NOTES
Interventional Radiology    Patient name: Vanessa Huffman    MRN:[id number]    :  1950      Vanessa Huffman is a 68 year old woman with a PMH of obesity, arthritis, hypertension and depression with new onset of fever and knee pain (left) who was recently discharged with drain in place for liver abscess. Patient recently admitted on 3/1/19 with abnormal liver labs, acute renal failure and 3 days of headache, nausea and fevers. She had klebsiella bacteremia and was treated with IV antibiotics. She was found to have a large liver abscess and is s/p a US guided drain placement. During that admission there was concern for GI bleed as well that was addressed and has not recurred. Patient reports trouble with knee pain and walking, no new concerns at the tube site and no abdominal or flank pain currently.    O: B/P: 135/62, T: 96.2, P: 110, R: 18    Lab Results   Component Value Date    WBC 3.6 2019     Lab Results   Component Value Date    RBC 3.34 2019     Lab Results   Component Value Date    HGB 9.9 2019     Lab Results   Component Value Date    HCT 29.4 2019     Lab Results   Component Value Date     2019     Last Basic Metabolic Panel:  Lab Results   Component Value Date     2019      Lab Results   Component Value Date    POTASSIUM 3.7 2019     Lab Results   Component Value Date    CHLORIDE 100 2019     Lab Results   Component Value Date    DANNA 7.7 2019     Lab Results   Component Value Date    CO2 26 2019     Lab Results   Component Value Date    BUN 17 2019     Lab Results   Component Value Date    CR 1.29 2019     Lab Results   Component Value Date    GLC 76 2019         Results for orders placed or performed during the hospital encounter of 19   US Lower Ext Venous Duplex Limited Bilat    Narrative    ULTRASOUND VENOUS LOWER EXTREMITY BILATERAL 3/24/2019 6:17 PM     HISTORY: Left leg swelling and  redness.    COMPARISON: None.    TECHNIQUE: Ultrasound gray scale, Color Doppler flow, and spectral  Doppler waveform analysis performed.    FINDINGS: The bilateral common femoral, superficial femoral, popliteal  and posterior tibial veins are patent and fully compressible and  demonstrate normal venous Doppler flow. The visualized greater  saphenous veins are negative for thrombus.      Impression    IMPRESSION: No DVT demonstrated.    VICTOR HUGO CALLOWAY MD   CT Abdomen pelvis - oral contrast only    Narrative    CT ABDOMEN AND PELVIS WITHOUT CONTRAST 3/24/2019 6:54 PM     HISTORY: Diffuse abdominal pain, history of recent liver abscess with  BILL drain still in place.    COMPARISON: March 18, 2019    TECHNIQUE: Volumetric helical acquisition of CT images from the lung  bases through the symphysis pubis without intravenous contrast.  Radiation dose for this scan was reduced using automated exposure  control, adjustment of the mA and/or kV according to patient size, or  iterative reconstruction technique.    FINDINGS: Liver abscess continues to shrink measuring 8.5 cm  previously 10.3 cm. There is increased air present within the abscess  cavity. Small amount of fluid around the liver is again noted and  unchanged. No free air. There are no dilated loops of small intestine  or large bowel to suggest ileus or obstruction. There is mild  diverticulosis without evidence for diverticulitis. Normal caliber  aorta. No hydronephrosis. Kidneys, adrenals, spleen, and pancreas  demonstrate no worrisome focal lesion. Moderate right pleural effusion  and associated compressive atelectasis and/or infiltrate again noted.  Survey of the visualized bony structures demonstrates no destructive  bony lesions.      Impression    IMPRESSION:  1. Continued decrease in size in the liver abscess since the  comparison study.  2. Moderate right pleural effusion and associated compressive  atelectasis and/or infiltrate again noted.  3. No definite  new findings.    VICTOR HUGO CALLOWAY MD   XR Knee Left 1/2 Views    Narrative    KNEE LEFT ONE - TWO VIEW   3/24/2019 9:43 PM     HISTORY: Pain.    COMPARISON: None.    FINDINGS: No fracture or dislocation is identified. Tricompartmental  osteoarthritis is present most conspicuous at the patellofemoral  compartment. Bilateral medial and lateral compartment  chondrocalcinosis is present. Probable small volume fluid within the  suprapatellar recess. Soft tissues are otherwise unremarkable.      Impression    IMPRESSION: No acute abnormality. Osteoarthritis.    AUDREY PARMAR MD   XR Lumbar Spine 2/3 Views    Narrative    LUMBAR SPINE TWO TO THREE VIEWS  3/25/2019 2:46 PM     HISTORY: Evaluate lumbar spine for compression fractures.    COMPARISON: None.      Impression    IMPRESSION: Alignment of the lumbar spine is within normal limits. No  loss of vertebral body height. Mild loss of intervertebral disc space  and degenerative endplate changes at L1-L2. Marked degenerative  changes and loss of intervertebral disc space in the visualized lower  lumbar spine.    DYLLAN JONES MD         Output over last 24 hours: 10 ml very light colored serous drainage, tubing is stained to appear bilious       A/P: Liver abscess with drain placed on previous admission, drain is functioning well, flushed with saline and loosened particulate in tubing.  New dressing applied, no concern for infection or tube migration.  Abscess size is decreasing and less purulence in output.  Will continue to monitor and assess drain function this admission.    Pavel Iglesias PA-C

## 2019-03-26 NOTE — PLAN OF CARE
Discharge Planner PT   Patient plan for discharge: TCU  Current status: Pt adm with L knee pain, WBAT per ortho. ROM and exercises as able. Provided supine LE ex handout. Defer formal PT evaluation and treatment sessions to TCU. Discussed with RN, pt to discharge either later today or tomorrow, back to TCU. OT will continue to see the pt to work on transfers and ADLs, already seen today by OT. RN in agreement with plan. PT order completed at this time, defer to PT eval/treat to TCU.  Barriers to return to prior living situation: Ongoing L knee pain, weakness, fall risk  Recommendations for discharge: TCU  Rationale for recommendations: Pt will benefit from skilled rehab services to progress independence and safety with mobility prior to return to home living situation.        Entered by: Hailey Rudolph 03/26/2019 10:33 AM

## 2019-03-26 NOTE — PROGRESS NOTES
[facility]    Infectious Disease Progress Note    Date of Service (when I saw the patient): 03/26/2019     Assessment & Plan   Vanessa Huffman is a 68 year old female who was admitted on 3/24/2019.     Impression:  1. 68 y.o female well known to me from prior hospitalization for Liver abscess, Klebsiella in the liver abscess cultures, Klebsiella in the blood cultures. Treated with IV zosyn while admitted and discharged on IV ceftriaxone just 4 days ago.   2. Admitted now with sudden onset left knee pain and swelling also of note she has an entire body rash and also neutropenic. ? Ceftriaxone reaction.   3. S/p aspiration on the left knee and the fluid appears to be inflammatory in nature, cultures pending.   4. S/p repeat imaging on the abdomen and the there is some decrease in the size of the abscess though still quite big.      Recommendations:   Continue on levaquin.  Follow up on the cultures.     IR ro see if lytics indicated since no significant output from the liver drain.                Meghan Guadarrama MD    Interval History   B/l knee swelling   The diffuse rash is better   No output in the drain     Physical Exam   Temp: 96.2  F (35.7  C) Temp src: Oral BP: 135/62   Heart Rate: 110 Resp: 18 SpO2: 95 % O2 Device: None (Room air) Oxygen Delivery: 1 LPM  Vitals:    03/24/19 1647 03/25/19 0002 03/26/19 0044   Weight: 113.4 kg (250 lb) 105.1 kg (231 lb 11.2 oz) 108.9 kg (240 lb)     Vital Signs with Ranges  Temp:  [96.2  F (35.7  C)-98.2  F (36.8  C)] 96.2  F (35.7  C)  Heart Rate:  [] 110  Resp:  [18] 18  BP: (118-142)/(52-62) 135/62  SpO2:  [93 %-95 %] 95 %    Constitutional: Awake, alert, cooperative, no apparent distress  Lungs: Clear to auscultation bilaterally, no crackles or wheezing  Cardiovascular: Regular rate and rhythm, normal S1 and S2, and no murmur noted  Abdomen: Normal bowel sounds, soft, non-distended, non-tender  Skin: diffuse rash is improved, swelling and pain also  better   Other:    Medications       amLODIPine  2.5 mg Oral Daily     ferrous sulfate  325 mg Oral Daily with breakfast     latanoprost  1 drop Both Eyes At Bedtime     levofloxacin  500 mg Intravenous Q24H     methylPREDNISolone  8 mg Oral At Bedtime    And     methylPREDNISolone  4 mg Oral QAM AC    And     methylPREDNISolone  4 mg Oral Daily with lunch    And     methylPREDNISolone  4 mg Oral Daily with supper    And     [START ON 3/27/2019] methylPREDNISolone  4 mg Oral At Bedtime     pantoprazole  40 mg Oral BID AC     simvastatin  20 mg Oral At Bedtime     sodium chloride (PF)  10 mL Intracatheter Q8H     sodium chloride (PF)  10 mL Irrigation Q8H     sucralfate  1 g Oral BID AC       Data   All microbiology laboratory data reviewed.  Recent Labs   Lab Test 03/25/19  1257 03/24/19  1651 03/19/19  0520 03/18/19  0600   WBC 3.6* 2.8* 7.6 7.4   HGB  --  9.9* 9.5* 9.4*   HCT  --  29.4* 28.7* 28.1*   MCV  --  88 89 88   PLT  --  197 243 281     Recent Labs   Lab Test 03/25/19  0600 03/25/19  0020 03/24/19  1651   CR 1.29* 1.21* 1.30*     Recent Labs   Lab Test 03/24/19  1651   *     Recent Labs   Lab Test 03/24/19  2205 03/24/19  1902 03/24/19  1651 03/07/19  1050 03/04/19  1410 03/04/19  1355 03/03/19  1925 03/02/19  1043 03/01/19  2148   CULT Culture negative monitoring continues  PENDING No growth after 2 days No growth after 2 days Culture negative after 2 weeks  No anaerobes isolated  Moderate growth  Klebsiella pneumoniae  Susceptibility testing done on previous specimen  * No anaerobes isolated  No growth Culture negative after 3 weeks  No anaerobes isolated  Light growth  Klebsiella pneumoniae  * <10,000 colonies/mL  urogenital shannon  Susceptibility testing not routinely done    Canceled, Test credited  Duplicate request   No growth No growth

## 2019-03-26 NOTE — PROGRESS NOTES
Patent states she feels improved today    Able to ROM knee passively 0 to 60 degrees, actively 0 to 10 degrees  Negative log roll   5/5 strength dorsi/plantar felxion, EHL    Cultures continue to be negative from knee aspirate    A: Left knee DJD vs lumbar etiology vs combination  Continue medrol dose pack    May WBAT with walker  Encourage in bed exercises - quad contraction etc    Belinda Owen PAC

## 2019-03-26 NOTE — PLAN OF CARE
A&O. VSS on 1L o2. Tele: NSR. PRN oxy for pain. Repo q2h. Purewicke in place. RUQ drain patent, flushed. R PICC. Labs drawn at 0515. Regular diet.

## 2019-03-26 NOTE — PROGRESS NOTES
SW:  D: SW spoke with pt who informs that she would like additional TCU referrals sent to [facility] and [facility]. Pt would like a private room, she understands and agrees to these costs. Pt informing that if there are no beds available at either facility, she would be willing to go back to [facility].  SW has sent additional referrals as requested to [facility] and [facility]. Family may transport.    ADDENDUM:  SW received call from pt's  to inform that he no longer feels he could transport her and would like HE transport. [facility] has accepted pt, per pt , they would like to accept this bed private room $36 / day. SW has spoken with [facility] and confirmed this bed. Room will be ready anytime after 1100. SW has scheduled HE WC transport for 1430.       P: MARYAM will continue to follow for discharge planning.    THOM Sorenson

## 2019-03-27 VITALS
DIASTOLIC BLOOD PRESSURE: 60 MMHG | SYSTOLIC BLOOD PRESSURE: 134 MMHG | TEMPERATURE: 95.9 F | OXYGEN SATURATION: 96 % | BODY MASS INDEX: 36.78 KG/M2 | RESPIRATION RATE: 18 BRPM | WEIGHT: 234.3 LBS | HEART RATE: 94 BPM | HEIGHT: 67 IN

## 2019-03-27 LAB
ALBUMIN SERPL-MCNC: 1.8 G/DL (ref 3.4–5)
ALP SERPL-CCNC: 324 U/L (ref 40–150)
ALT SERPL W P-5'-P-CCNC: 32 U/L (ref 0–50)
ANION GAP SERPL CALCULATED.3IONS-SCNC: 7 MMOL/L (ref 3–14)
ANISOCYTOSIS BLD QL SMEAR: SLIGHT
AST SERPL W P-5'-P-CCNC: 36 U/L (ref 0–45)
BASOPHILS # BLD AUTO: 0 10E9/L (ref 0–0.2)
BASOPHILS NFR BLD AUTO: 0 %
BILIRUB SERPL-MCNC: 1 MG/DL (ref 0.2–1.3)
BUN SERPL-MCNC: 21 MG/DL (ref 7–30)
CALCIUM SERPL-MCNC: 8.4 MG/DL (ref 8.5–10.1)
CHLORIDE SERPL-SCNC: 104 MMOL/L (ref 94–109)
CO2 SERPL-SCNC: 27 MMOL/L (ref 20–32)
CREAT SERPL-MCNC: 1.08 MG/DL (ref 0.52–1.04)
DIFFERENTIAL METHOD BLD: ABNORMAL
EOSINOPHIL # BLD AUTO: 0 10E9/L (ref 0–0.7)
EOSINOPHIL NFR BLD AUTO: 0 %
ERYTHROCYTE [DISTWIDTH] IN BLOOD BY AUTOMATED COUNT: 16.1 % (ref 10–15)
GFR SERPL CREATININE-BSD FRML MDRD: 53 ML/MIN/{1.73_M2}
GLUCOSE SERPL-MCNC: 117 MG/DL (ref 70–99)
HCT VFR BLD AUTO: 31.4 % (ref 35–47)
HGB BLD-MCNC: 10.3 G/DL (ref 11.7–15.7)
INTERPRETATION ECG - MUSE: NORMAL
LYMPHOCYTES # BLD AUTO: 0.9 10E9/L (ref 0.8–5.3)
LYMPHOCYTES NFR BLD AUTO: 9 %
MCH RBC QN AUTO: 29.1 PG (ref 26.5–33)
MCHC RBC AUTO-ENTMCNC: 32.8 G/DL (ref 31.5–36.5)
MCV RBC AUTO: 89 FL (ref 78–100)
METAMYELOCYTES # BLD: 0.3 10E9/L
METAMYELOCYTES NFR BLD MANUAL: 3 %
MONOCYTES # BLD AUTO: 0.9 10E9/L (ref 0–1.3)
MONOCYTES NFR BLD AUTO: 9 %
MYELOCYTES # BLD: 0.3 10E9/L
MYELOCYTES NFR BLD MANUAL: 3 %
NEUTROPHILS # BLD AUTO: 7.5 10E9/L (ref 1.6–8.3)
NEUTROPHILS NFR BLD AUTO: 76 %
PLATELET # BLD AUTO: 328 10E9/L (ref 150–450)
PLATELET # BLD EST: ABNORMAL 10*3/UL
POTASSIUM SERPL-SCNC: 3.9 MMOL/L (ref 3.4–5.3)
PROT SERPL-MCNC: 6.2 G/DL (ref 6.8–8.8)
RBC # BLD AUTO: 3.54 10E12/L (ref 3.8–5.2)
SODIUM SERPL-SCNC: 138 MMOL/L (ref 133–144)
TOXIC GRANULES BLD QL SMEAR: PRESENT
WBC # BLD AUTO: 9.9 10E9/L (ref 4–11)

## 2019-03-27 PROCEDURE — 85025 COMPLETE CBC W/AUTO DIFF WBC: CPT | Performed by: INTERNAL MEDICINE

## 2019-03-27 PROCEDURE — A9270 NON-COVERED ITEM OR SERVICE: HCPCS | Mod: GY | Performed by: INTERNAL MEDICINE

## 2019-03-27 PROCEDURE — 25000132 ZZH RX MED GY IP 250 OP 250 PS 637: Mod: GY | Performed by: INTERNAL MEDICINE

## 2019-03-27 PROCEDURE — 40000239 ZZH STATISTIC VAT ROUNDS

## 2019-03-27 PROCEDURE — 80053 COMPREHEN METABOLIC PANEL: CPT | Performed by: INTERNAL MEDICINE

## 2019-03-27 PROCEDURE — 99239 HOSP IP/OBS DSCHRG MGMT >30: CPT | Performed by: INTERNAL MEDICINE

## 2019-03-27 PROCEDURE — 25000131 ZZH RX MED GY IP 250 OP 636 PS 637: Mod: GY | Performed by: PHYSICIAN ASSISTANT

## 2019-03-27 PROCEDURE — 40000257 ZZH STATISTIC CONSULT NO CHARGE VASC ACCESS

## 2019-03-27 RX ORDER — TEMAZEPAM 15 MG/1
15 CAPSULE ORAL
Qty: 7 CAPSULE | Refills: 0 | Status: SHIPPED | OUTPATIENT
Start: 2019-03-27 | End: 2019-03-27

## 2019-03-27 RX ORDER — METHYLPREDNISOLONE 4 MG/1
TABLET ORAL
DISCHARGE
Start: 2019-03-27 | End: 2019-04-01

## 2019-03-27 RX ORDER — OXYCODONE HYDROCHLORIDE 5 MG/1
5 TABLET ORAL EVERY 6 HOURS PRN
Qty: 10 TABLET | Refills: 0 | Status: SHIPPED | OUTPATIENT
Start: 2019-03-27 | End: 2019-03-29

## 2019-03-27 RX ORDER — TEMAZEPAM 15 MG/1
15 CAPSULE ORAL
Qty: 7 CAPSULE | Refills: 0 | Status: SHIPPED | DISCHARGE
Start: 2019-03-27 | End: 2019-04-03 | Stop reason: ALTCHOICE

## 2019-03-27 RX ORDER — SACCHAROMYCES BOULARDII 250 MG
250 CAPSULE ORAL 2 TIMES DAILY
DISCHARGE
Start: 2019-03-27

## 2019-03-27 RX ORDER — LEVOFLOXACIN 500 MG/1
500 TABLET, FILM COATED ORAL DAILY
DISCHARGE
Start: 2019-03-28

## 2019-03-27 RX ADMIN — AMLODIPINE BESYLATE 2.5 MG: 2.5 TABLET ORAL at 09:48

## 2019-03-27 RX ADMIN — Medication 250 MG: at 09:48

## 2019-03-27 RX ADMIN — METHYLPREDNISOLONE 4 MG: 4 TABLET ORAL at 12:19

## 2019-03-27 RX ADMIN — LOPERAMIDE HYDROCHLORIDE 2 MG: 2 CAPSULE ORAL at 07:54

## 2019-03-27 RX ADMIN — METHYLPREDNISOLONE 4 MG: 4 TABLET ORAL at 06:05

## 2019-03-27 RX ADMIN — OXYCODONE HYDROCHLORIDE 5 MG: 5 TABLET ORAL at 01:34

## 2019-03-27 RX ADMIN — LOPERAMIDE HYDROCHLORIDE 2 MG: 2 CAPSULE ORAL at 12:19

## 2019-03-27 RX ADMIN — LEVOFLOXACIN 500 MG: 500 TABLET, FILM COATED ORAL at 09:48

## 2019-03-27 RX ADMIN — OXYCODONE HYDROCHLORIDE 5 MG: 5 TABLET ORAL at 12:19

## 2019-03-27 RX ADMIN — FERROUS SULFATE TAB 325 MG (65 MG ELEMENTAL FE) 325 MG: 325 (65 FE) TAB at 09:48

## 2019-03-27 RX ADMIN — PANTOPRAZOLE SODIUM 40 MG: 40 TABLET, DELAYED RELEASE ORAL at 06:05

## 2019-03-27 ASSESSMENT — MIFFLIN-ST. JEOR
SCORE: 1610
SCORE: 1630.41

## 2019-03-27 ASSESSMENT — ACTIVITIES OF DAILY LIVING (ADL)
ADLS_ACUITY_SCORE: 24
ADLS_ACUITY_SCORE: 22

## 2019-03-27 NOTE — PROGRESS NOTES
Discharge drain care instructions placed on the AVS per care coordinator request.     Changed the flush from 10 mL twice daily to one time daily.     Abscess size is decreasing per recent CT which is a big improvement and overall she is doing well.     Number to call for any questions entered.     Will follow. Thanks [facility] Interventional Radiology CNP ([phone]) (phone [phone])

## 2019-03-27 NOTE — PLAN OF CARE
OT: pt discharging to TCU, will defer further OT to next level of care, GOALS NOT MET, see discharge summary

## 2019-03-27 NOTE — DISCHARGE INSTRUCTIONS
Liver abscess drain discharge cares    1) Change the gauze and tape dressing every 2-3 days. Wash the skin with soap and water and allow to air dry  before re dressing  -Please cover with plastic (saran wrap) prior to showering and change it the dressing gets wet.    2) Flush the drain with 10 mL Normal Saline (NS) once daily in the morning.    -Empty, measure and record the outputs daily. Subtract the NS flush amount from the total    Please call Lis DANG RN clinician at  or Francisca DANG NP at  for questions or concerns  about the tube. You can leave a voicemail. We work Monday through Friday [phone].

## 2019-03-27 NOTE — PROVIDER NOTIFICATION
MD Notification    Person notified: MD    Person Name: Dr. Umanzor    Date/Time: 3/27/19@1047    Interaction: Paged    Purpose of Notification: FYI, note from ID is in regarding antibiotics. Requesting discharge orders be entered.    Orders Received: Awaiting call back.

## 2019-03-27 NOTE — PROGRESS NOTES
[facility]    Infectious Disease Progress Note    Date of Service (when I saw the patient): 03/27/2019     Assessment & Plan   Vanessa Huffman is a 68 year old female who was admitted on 3/24/2019.     Impression:  1. 68 y.o female well known to me from prior hospitalization for Liver abscess, Klebsiella in the liver abscess cultures, Klebsiella in the blood cultures. Treated with IV zosyn while admitted and discharged on IV ceftriaxone just 4 days ago.   2. Admitted now with sudden onset left knee pain and swelling also of note she has an entire body rash and also neutropenic. ? Ceftriaxone reaction.   3. S/p aspiration on the left knee and the fluid appears to be inflammatory in nature, cultures pending.   4. S/p repeat imaging on the abdomen and the there is some decrease in the size of the abscess though still quite big.      Recommendations:   Continue on levaquin, oral should be ok.   Follow up with me in the clinic in 3 weeks.              Meghan Guadarrama MD    Interval History   B/l knee swelling   The diffuse rash is better   No output in the drain     Physical Exam   Temp: 96.5  F (35.8  C) Temp src: Oral BP: 129/66   Heart Rate: 95 Resp: 18 SpO2: 92 % O2 Device: None (Room air)    Vitals:    03/25/19 0002 03/26/19 0044 03/27/19 0634   Weight: 105.1 kg (231 lb 11.2 oz) 108.9 kg (240 lb) 104.2 kg (229 lb 12.8 oz)     Vital Signs with Ranges  Temp:  [95.9  F (35.5  C)-96.5  F (35.8  C)] 96.5  F (35.8  C)  Heart Rate:  [] 95  Resp:  [18-22] 18  BP: (118-133)/(66-83) 129/66  SpO2:  [91 %-96 %] 92 %    Constitutional: Awake, alert, cooperative, no apparent distress  Lungs: Clear to auscultation bilaterally, no crackles or wheezing  Cardiovascular: Regular rate and rhythm, normal S1 and S2, and no murmur noted  Abdomen: Normal bowel sounds, soft, non-distended, non-tender  Skin: diffuse rash is improved, swelling and pain also better   Other:    Medications       amLODIPine  2.5 mg Oral Daily      ferrous sulfate  325 mg Oral Daily with breakfast     latanoprost  1 drop Both Eyes At Bedtime     levofloxacin  500 mg Oral Daily     methylPREDNISolone  4 mg Oral QAM AC    And     methylPREDNISolone  4 mg Oral Daily with lunch    And     methylPREDNISolone  4 mg Oral Daily with supper    And     methylPREDNISolone  4 mg Oral At Bedtime     pantoprazole  40 mg Oral BID AC     saccharomyces boulardii  250 mg Oral BID     simvastatin  20 mg Oral At Bedtime     sodium chloride (PF)  10 mL Intracatheter Q8H     sodium chloride (PF)  10 mL Irrigation Q8H     sucralfate  1 g Oral BID AC       Data   All microbiology laboratory data reviewed.  Recent Labs   Lab Test 03/27/19  0600 03/25/19  1257 03/24/19  1651 03/19/19  0520   WBC 9.9 3.6* 2.8* 7.6   HGB 10.3*  --  9.9* 9.5*   HCT 31.4*  --  29.4* 28.7*   MCV 89  --  88 89     --  197 243     Recent Labs   Lab Test 03/27/19  0600 03/25/19  0600 03/25/19  0020   CR 1.08* 1.29* 1.21*     Recent Labs   Lab Test 03/24/19  1651   *     Recent Labs   Lab Test 03/24/19  2205 03/24/19  1902 03/24/19  1651 03/07/19  1050 03/04/19  1410 03/04/19  1355 03/03/19  1925 03/02/19  1043 03/01/19  2148   CULT Culture negative monitoring continues  Culture negative monitoring continues No growth after 3 days No growth after 3 days Culture negative after 2 weeks  No anaerobes isolated  Moderate growth  Klebsiella pneumoniae  Susceptibility testing done on previous specimen  * No anaerobes isolated  No growth Culture negative after 3 weeks  No anaerobes isolated  Light growth  Klebsiella pneumoniae  * <10,000 colonies/mL  urogenital shannon  Susceptibility testing not routinely done    Canceled, Test credited  Duplicate request   No growth No growth

## 2019-03-27 NOTE — PROGRESS NOTES
SW:  D: SW received call from pt's  informing that he feels he can transport pt and would like SW to cancel HE transportation for this afternoon. SW has cancelled HE ride as requested. PAS done. SW spoke with MLORA who informs TCU room is ready after 1300 today. Orders have been faxed.       PAS-RR     D: Per [employer] regulation, SW completed and submitted PAS-RR to [place] [employer] via the Senior [employer] Line.  PAS-RR confirmation # is  : [id number]    P: Further questions may be directed to Senior [employer] Line at #[phone], option #4 for PAS-RR staff.        P: SW will continue to follow for discharge planning.    THOM Sorenson

## 2019-03-27 NOTE — PROGRESS NOTES
Patient discharged at 2:40 PM to [facility]. PICC line removed per IV team. Pain at time of discharge was 0. Belongings returned to patient. Discharge instructions and medications reviewed with patient. Patient verbalized understanding and all questions were answered. At time of discharge, patient condition was stable and left the unit via wheelchair escorted by nursing assistant.

## 2019-03-27 NOTE — DISCHARGE SUMMARY
[facility]  Hospitalist Discharge Summary       Date of Admission:  3/24/2019  Date of Discharge:  3/27/2019  Discharging Provider: Jose Miguel Umanzor MD      Discharge Diagnoses   Fever, suspected drug reaction   Right pleural effusion  Acute pain of left knee  Recent acute kidney injury  Hypokalemia  Recent bacteremia with liver abscess, Klebsiella pneumoniae  Elevated LFTs secondary to liver abscess  Essential hypertension  History of gastric bypass  Chronic diarrhea  Anemia due to recent blood loss  Recent history of upper GI bleed  Thrombocytopenia  Insomnia  Bilateral lower extremity edema    Follow-ups Needed After Discharge   Follow-up Appointments     Follow Up and recommended labs and tests      Follow up with Nursing home physician.  No follow up labs or test are   needed.    TCU to schedule follow-up with Dr. Guadarrama in 3 weeks.  Please call   [facility] at [phone] to schedule this appointment.             Unresulted Labs Ordered in the Past 30 Days of this Admission     Date and Time Order Name Status Description    3/24/2019 2220 Anaerobic bacterial culture Preliminary     3/24/2019 2220 Fluid Culture Aerobic Bacterial Preliminary     3/24/2019 1722 Blood culture Preliminary     3/24/2019 1722 Blood culture Preliminary       These results will be followed up by hospitalist     Hospital Course   Vanessa Huffman is a 68 year-old female with a past medical history of gastric bypass, chronic diarrhea, hypertension, migraines with recent admission at [facility] from 3/1-3/19/19 for sepsis due to large liver abscess, Klebsiella bacteremia, acute renal failure, upper GI bleed secondary to anastomotic ulcer who presents with left leg pain. Admitted 3/24/2019.     Fever, suspected drug reaction   Fever noted to 101.2F on arrival. Primary associated symptom of acute onset left knee pain day of admission.  Concern for septic joint versus acute crystal disease initially.  Denies diarrhea. No symptoms associated with PICC line. CT shows decrease in size of liver abscess, stable right pleural effusion and possible infiltrate.  UA unremarkable. Influenza swab negative. Developed rash over both lower extremities following admission. Infectious disease consulted. Suspicion for drug reaction due to ceftriaxone. Antibiotics changed to levofloxacin with improvement in rash and resolution of fevers. Repeat blood cultures remained negative. Ceftriaxone added to allergy list.    Right pleural effusion  With associated opacity likely atelectasis. Seen during previous admission and stable on imaging this admission. Likely inflammatory from liver abscess. Low albumin likely contributing as well. Occasional dyspnea, but no new cough. Alternate explanation for fever as above. Discussed possible therapeutic thoracentesis with patient, which she declined. If symptoms worsen, this would be logical next step, patient understanding.      Acute pain of left knee  Presented with acute onset of left knee pain, unable to bear weight. Fever 101.2F.  No history of gout.  No trauma or falls.  History of similar pain.  No signs of cellulitis.  Lower extremity Dopplers for DVT.  No cyanosis or signs of limb ischemia.  No other joints affected. XR with osteoarthritis, probable small volume fluid in suprapatellar recess. Arthrocentesis performed in ED with 19984 WBC, 36% neutrophils. Gram stain negative for organisms. Crystals analysis negative. Orthopedic surgery consulted, suspected DJD. Started on Medrol dose pack with improvement in symptoms. Will complete course.     Recent acute kidney injury  Baseline creatinine around 0.7.  Creatinine 4.27 on last admission presentation.  Improved with IV hydration.  Seen by nephrology.  Suspect component of ATN.  She was discharged on Lasix 40 mg daily with KCl.  Creatinine improved since discharge. Given hypokalemia, furosemide discontinued.     Hypokalemia  Furosemide  (PTA medication) likely contributing. Monitored and replaced per protocol with normalization. Furosemide discontinued on discharge.     Recent bacteremia with liver abscess, Klebsiella pneumoniae  Elevated LFTs secondary to liver abscess  Recent admission with sepsis with CT abdomen showing large liver mass, liver biopsy with hemorrhagic necrosis with acute inflammatory changes, no malignant cells, suspected to be primary liver abscess, treated with drain placement and IV antibiotics. Repeat liver biopsy negative for malignancy. Discharged with PICC and IV antibiotics. CT abdomen on admission with improving liver abscess. AST and ALT improving on admission, total bilirubin up slightly.   - Antibiotics changed to levofloxacin per ID due to concern for possible drug rash with ceftriaxone as above. Continue levofloxacin until see by ID in clinic in 3 weeks  - IR assessed drain as minimal output, performed flushes and added cares to discharge  - Need to monitor for decreased output at TCU and contact IR if issues     Essential hypertension  Blood pressure adequately controlled, continue amlodipine      History of gastric bypass  Chronic diarrhea  PTA on high dose loperamide. Denies any diarrhea preceding admission. Some loose stools developed after admission, improved with loperamide restarted.  - Continue loperamide   - Add probiotics     Anemia due to recent blood loss  Recent history of upper GI bleed  During last hospitalization hemoglobin dropped from 12 to 6 g/dl while inpatient, required transfusion. EGD 3/8 demonstrated ulcer with visible vessel and actively bleeding.  Treated with injection with epinephrine and Hemoclip.    - Hemoglobin stable     Thrombocytopenia  HIT and hemolysis ruled out last hospital stay, suspected to be secondary to acute infection.   - Platelet count normalized and stable during admission      Insomnia  Continue prior to admission temazepam.     Bilateral lower extremity  edema  Hypoalbuminemia from recent prolonged illness, infection, liver abscess likely primary contributor. Reportedly had been receiving wraps at TCU, but couldn't tolerate them. Furosemide discontinued as above. Encourage low salt diet, leg elevation. Can resume wraps as tolerate at TCU.    Consultations This Hospital Stay   PHARMACY TO DOSE VANCO  ORTHOPEDIC SURGERY IP CONSULT  INFECTIOUS DISEASES IP CONSULT  PHARMACY TO DOSE VANCO  PHYSICAL THERAPY ADULT IP CONSULT  OCCUPATIONAL THERAPY ADULT IP CONSULT  SOCIAL WORK IP CONSULT  VASCULAR ACCESS ADULT IP CONSULT  OCCUPATIONAL THERAPY ADULT IP CONSULT  PHYSICAL THERAPY ADULT IP CONSULT    Code Status   Full Code    Time Spent on this Encounter   I, Jose Miguel Umanzor, personally saw the patient today and spent greater than 30 minutes discharging this patient.       Jose Miguel Umanzor MD  [facility]  ______________________________________________________________________    Physical Exam   Vital Signs: Temp: 95.9  F (35.5  C) Temp src: Oral BP: 134/60 Pulse: 94 Heart Rate: 95 Resp: 18 SpO2: 96 % O2 Device: None (Room air)    Weight: 234 lbs 4.8 oz    Constitutional: NAD  Respiratory:     Diminished breath sounds at right base, no wheezes  Cardiovascular: RRR, no m/r/g. Trace to 1+ bilateral lower extremity edema  GI: Soft, non-tender, non-distended. BS normoactive.   Skin/Integumen: Warm, dry.   MSK: Left knee without significant tenderness to palpation, effusion, erythema. Some pain still reported with movement.          Primary Care Physician   Emily Najera    Discharge Disposition   Discharged to TCU  Condition at discharge: Stable      Discharge Orders      General info for SNF    Length of Stay Estimate: Short Term Care: Estimated # of Days <30  Condition at Discharge: Improving  Level of care:skilled   Rehabilitation Potential: Good  Admission H&P remains valid and up-to-date: Yes  Recent Chemotherapy: N/A  Use Nursing Home Standing Orders: Yes      Mantoux instructions    Give two-step Mantoux (PPD) Per Facility Policy Yes     Follow Up and recommended labs and tests    Follow up with Nursing home physician.  No follow up labs or test are needed.    TCU to schedule follow-up with Dr. Guadarrama in 3 weeks.  Please call [facility] at [phone] to schedule this appointment.     Daily weights    Call Provider for weight gain of more than 2 pounds per day or 5 pounds per week.     Activity - Up with nursing assistance     Additional Discharge Instructions    Please ensure BILL drain bulb is kept to suction.     Full Code    Per previous documentation.     Occupational Therapy Adult Consult    Evaluate and treat as clinically indicated.    Reason:  Physical deconditioning     Physical Therapy Adult Consult    Evaluate and treat as clinically indicated.    Reason:  Physical deconditioning     Advance Diet as Tolerated    Follow this diet upon discharge: Low salt diet     Discharge Medications   Discharge Medication List as of 3/27/2019  2:18 PM      START taking these medications    Details   levofloxacin (LEVAQUIN) 500 MG tablet Take 1 tablet (500 mg) by mouth daily . Should continue until seen by infectious disease in clinic., Transitional      methylPREDNISolone (MEDROL) 4 MG tablet 3/27:Take 4 mg PO before dinner, and at bedtime; 3/28:Take 4 mg PO before breakfast, after lunch, and at bedtime; 3/29: Take 4 mg PO before breakfast and at bedtime; 3/30: Take 4 mg PO before breakfast, Transitional      saccharomyces boulardii (FLORASTOR) 250 MG capsule Take 1 capsule (250 mg) by mouth 2 times daily Take for 1 week past antibiotics., TransitionalMay substitute to preferred probiotic if needed.         CONTINUE these medications which have CHANGED    Details   oxyCODONE (ROXICODONE) 5 MG tablet Take 1 tablet (5 mg) by mouth every 6 hours as needed for moderate to severe pain, Disp-10 tablet, R-0, Local Print         CONTINUE these medications which have NOT  CHANGED    Details   amLODIPine (NORVASC) 2.5 MG tablet Take 1 tablet (2.5 mg) by mouth daily, Disp-30 tablet, R-1, E-Prescribe      CYCLOBENZAPRINE HCL PO Take 10 mg by mouth At Bedtime, Historical      ferrous sulfate (FEROSUL) 325 (65 Fe) MG tablet Take 1 tablet (325 mg) by mouth daily (with breakfast), Disp-30 tablet, R-0, E-Prescribe      hydrOXYzine (ATARAX) 10 MG tablet Take 1 10-mg tablet every 6 hours as needed for muscle relaxation and to supplement your pain medication., Disp-40 tablet, R-0, E-Prescribe      latanoprost (XALATAN) 0.005 % ophthalmic solution Place 1 drop into both eyes At Bedtime, Historical      nystatin (MYCOSTATIN) 663408 UNIT/GM POWD Apply topically 2 times daily as needed (affected area)Historical      pantoprazole (PROTONIX) 40 MG EC tablet Take 1 tablet (40 mg) by mouth 2 times daily (before meals), Disp-60 tablet, R-0, E-Prescribe      SIMVASTATIN PO Take 20 mg by mouth At Bedtime , Historical      sucralfate (CARAFATE) 1 GM/10ML suspension Take 10 mLs (1 g) by mouth 2 times daily (before meals), Disp-600 mL, R-0, E-Prescribe      loperamide (IMODIUM) 2 MG capsule Take 2 mg by mouth 4 times daily as needed for diarrhea, Historical      MEDICATION INSTRUCTION Hold Lisinopril and Norvasc if your SBP is <110 and DBP<70, Disp-1 each, R-0, Local Print      SUMATRIPTAN SUCCINATE PO Take 50 mg by mouth every 2 hours as needed for migraine (max of 200mg q 24hr), Historical      diphenoxylate-atropine (LOMOTIL) 2.5-0.025 MG tablet Take 1 tablet by mouth 4 times daily as needed for diarrhea, Disp-60 tablet, R-1, Local Print      TEMAZEPAM PO Take 15 mg by mouth nightly as needed for sleep, Historical         STOP taking these medications       cefTRIAXone (ROCEPHIN) 1 GM vial Comments:   Reason for Stopping:         furosemide (LASIX) 40 MG tablet Comments:   Reason for Stopping:         potassium chloride ER (K-DUR/KLOR-CON M) 20 MEQ CR tablet Comments:   Reason for Stopping:                Significant Results and Procedures   Most Recent 3 CBC's:  Recent Labs   Lab Test 03/27/19  0600 03/25/19  1257 03/24/19  1651 03/19/19  0520   WBC 9.9 3.6* 2.8* 7.6   HGB 10.3*  --  9.9* 9.5*   MCV 89  --  88 89     --  197 243     Most Recent 3 BMP's:  Recent Labs   Lab Test 03/27/19  0600 03/26/19  0515 03/25/19  1625 03/25/19  0600 03/25/19  0020     --   --  135 135   POTASSIUM 3.9 3.7 3.4 3.3* 2.6*   CHLORIDE 104  --   --  100 99   CO2 27  --   --  26 27   BUN 21  --   --  17 17   CR 1.08*  --   --  1.29* 1.21*   ANIONGAP 7  --   --  9 9   DANNA 8.4*  --   --  7.7* 7.3*   *  --   --  76 83     Most Recent 2 LFT's:  Recent Labs   Lab Test 03/27/19  0600 03/25/19  0600   AST 36 33   ALT 32 35   ALKPHOS 324* 370*   BILITOTAL 1.0 2.4*     Most Recent 3 INR's:  Recent Labs   Lab Test 03/09/19  0505 03/08/19  0830 03/03/19  1439   INR 1.37* 1.58* 1.01     Most Recent 3 Troponin's:No lab results found.  Most Recent 3 BNP's:No lab results found.  Most Recent Cholesterol Panel:No lab results found.  Most Recent 6 Bacteria Isolates From Any Culture (See EPIC Reports for Culture Details):  Recent Labs   Lab Test 03/24/19  2205 03/24/19  1902 03/24/19  1651 03/07/19  1050 03/04/19  1410 03/04/19  1355   CULT Culture negative monitoring continues  Culture negative monitoring continues No growth after 3 days No growth after 3 days Culture negative after 2 weeks  No anaerobes isolated  Moderate growth  Klebsiella pneumoniae  Susceptibility testing done on previous specimen  * No anaerobes isolated  No growth Culture negative after 3 weeks  No anaerobes isolated  Light growth  Klebsiella pneumoniae  *     Most Recent TSH and T4:No lab results found.  Most Recent Hemoglobin A1c:No lab results found.  Most Recent Urinalysis:  Recent Labs   Lab Test 03/24/19  1830   COLOR Yellow   APPEARANCE Clear   URINEGLC Negative   URINEBILI Negative   URINEKETONE Negative   SG 1.007   UBLD Trace*   URINEPH 5.5    PROTEIN 30*   NITRITE Negative   LEUKEST Negative   RBCU 3*   WBCU 2     Most Recent ABG:No lab results found.  Most Recent ESR & CRP:  Recent Labs   Lab Test 03/24/19  1651   *   .0*   ,   Results for orders placed or performed during the hospital encounter of 03/24/19   US Lower Ext Venous Duplex Limited Bilat    Narrative    ULTRASOUND VENOUS LOWER EXTREMITY BILATERAL 3/24/2019 6:17 PM     HISTORY: Left leg swelling and redness.    COMPARISON: None.    TECHNIQUE: Ultrasound gray scale, Color Doppler flow, and spectral  Doppler waveform analysis performed.    FINDINGS: The bilateral common femoral, superficial femoral, popliteal  and posterior tibial veins are patent and fully compressible and  demonstrate normal venous Doppler flow. The visualized greater  saphenous veins are negative for thrombus.      Impression    IMPRESSION: No DVT demonstrated.    VICTOR HUGO CALLOWAY MD   CT Abdomen pelvis - oral contrast only    Narrative    CT ABDOMEN AND PELVIS WITHOUT CONTRAST 3/24/2019 6:54 PM     HISTORY: Diffuse abdominal pain, history of recent liver abscess with  BILL drain still in place.    COMPARISON: March 18, 2019    TECHNIQUE: Volumetric helical acquisition of CT images from the lung  bases through the symphysis pubis without intravenous contrast.  Radiation dose for this scan was reduced using automated exposure  control, adjustment of the mA and/or kV according to patient size, or  iterative reconstruction technique.    FINDINGS: Liver abscess continues to shrink measuring 8.5 cm  previously 10.3 cm. There is increased air present within the abscess  cavity. Small amount of fluid around the liver is again noted and  unchanged. No free air. There are no dilated loops of small intestine  or large bowel to suggest ileus or obstruction. There is mild  diverticulosis without evidence for diverticulitis. Normal caliber  aorta. No hydronephrosis. Kidneys, adrenals, spleen, and pancreas  demonstrate no  worrisome focal lesion. Moderate right pleural effusion  and associated compressive atelectasis and/or infiltrate again noted.  Survey of the visualized bony structures demonstrates no destructive  bony lesions.      Impression    IMPRESSION:  1. Continued decrease in size in the liver abscess since the  comparison study.  2. Moderate right pleural effusion and associated compressive  atelectasis and/or infiltrate again noted.  3. No definite new findings.    VICTOR HUGO CALLOWAY MD   XR Knee Left 1/2 Views    Narrative    KNEE LEFT ONE - TWO VIEW   3/24/2019 9:43 PM     HISTORY: Pain.    COMPARISON: None.    FINDINGS: No fracture or dislocation is identified. Tricompartmental  osteoarthritis is present most conspicuous at the patellofemoral  compartment. Bilateral medial and lateral compartment  chondrocalcinosis is present. Probable small volume fluid within the  suprapatellar recess. Soft tissues are otherwise unremarkable.      Impression    IMPRESSION: No acute abnormality. Osteoarthritis.    AUDREY PARMAR MD   XR Lumbar Spine 2/3 Views    Narrative    LUMBAR SPINE TWO TO THREE VIEWS  3/25/2019 2:46 PM     HISTORY: Evaluate lumbar spine for compression fractures.    COMPARISON: None.      Impression    IMPRESSION: Alignment of the lumbar spine is within normal limits. No  loss of vertebral body height. Mild loss of intervertebral disc space  and degenerative endplate changes at L1-L2. Marked degenerative  changes and loss of intervertebral disc space in the visualized lower  lumbar spine.    DYLLAN JONES MD       Allergies   Allergies   Allergen Reactions     Contrast Dye Shortness Of Breath     Codeine      Zolpidem Other (See Comments)     Patient reports sleep walking.     Ceftriaxone Rash     Diatrizoate Itching     itchy throat that makes her want to cough

## 2019-03-27 NOTE — PROGRESS NOTES
Patient reports posterior knee pain improved   She was able to sit at edge of bed and also use commode with help to pivot    Aspiration culture results pending but still negative to date, low suspicion of infection secondary to physical exam and low WBC count    I would like patient to finish Medrol dose pack  If pain increases after this is completed return to clinic as outpatient for possible left knee cortisone injection     Ok to discharge to TCU once medically cleared    Belinda Owen PAC

## 2019-03-29 ENCOUNTER — NURSING HOME VISIT (OUTPATIENT)
Dept: GERIATRICS | Facility: CLINIC | Age: 69
End: 2019-03-29
Payer: MEDICARE

## 2019-03-29 VITALS
WEIGHT: 224.1 LBS | DIASTOLIC BLOOD PRESSURE: 83 MMHG | OXYGEN SATURATION: 98 % | RESPIRATION RATE: 20 BRPM | TEMPERATURE: 97.3 F | HEIGHT: 64 IN | HEART RATE: 83 BPM | SYSTOLIC BLOOD PRESSURE: 129 MMHG | BODY MASS INDEX: 38.26 KG/M2

## 2019-03-29 DIAGNOSIS — E66.01 MORBID OBESITY (H): ICD-10-CM

## 2019-03-29 DIAGNOSIS — K92.2 UPPER GI BLEED: ICD-10-CM

## 2019-03-29 DIAGNOSIS — R78.81 BACTEREMIA DUE TO KLEBSIELLA PNEUMONIAE: ICD-10-CM

## 2019-03-29 DIAGNOSIS — B96.1 BACTEREMIA DUE TO KLEBSIELLA PNEUMONIAE: ICD-10-CM

## 2019-03-29 DIAGNOSIS — K75.0 LIVER ABSCESS: Primary | ICD-10-CM

## 2019-03-29 DIAGNOSIS — M25.562 LEFT KNEE PAIN, UNSPECIFIED CHRONICITY: ICD-10-CM

## 2019-03-29 DIAGNOSIS — N17.9 AKI (ACUTE KIDNEY INJURY) (H): ICD-10-CM

## 2019-03-29 PROCEDURE — 99310 SBSQ NF CARE HIGH MDM 45: CPT | Performed by: NURSE PRACTITIONER

## 2019-03-29 RX ORDER — OXYCODONE HYDROCHLORIDE 5 MG/1
5 TABLET ORAL EVERY 6 HOURS PRN
Qty: 90 TABLET | Refills: 0 | Status: SHIPPED | OUTPATIENT
Start: 2019-03-29 | End: 2019-04-01

## 2019-03-29 ASSESSMENT — MIFFLIN-ST. JEOR: SCORE: 1536.51

## 2019-03-29 NOTE — PROGRESS NOTES
[facility]ATRIC SERVICES  PRIMARY CARE PROVIDER AND CLINIC:  [facility] [id number] / [facility] 5*  Chief Complaint   Patient presents with     Hospital F/U     [place] Medical Record Number:  [id number]  Place of Service where encounter took place:  [facility] [[id number]]    Vanessa Huffman  is a 68 year old  (1950), admitted to the above facility from  [facility]. Hospital stay 3/24/19 through 3/27/19..  Admitted to this facility for  rehab, medical management and nursing care.    HPI:    HPI information obtained from: facility chart records, facility staff and patient report.   Brief Summary of Hospital Course:   Updates on Status Since Skilled nursing Admission:     Patient Vanessa Huffman is a 68 yr old female admitted to [facility] for rehabilitation s/p hospitalization [facility] 3/24/19-3/27/19 for   fever with rash lower extremity due to suspected drug reaction to Ceftriaxone (taking for liver abscess) & acute left knee pain with impaired mobility with no recent trauma -negative aspiration for infection or crystals; xray negative, negative ultrasound DVT) & per ortho eval likely DJD & improved with steroid burst.     S/p recent hospitalization [facility] 3/1-3/19/19 for sepsis due to large liver abscess, Klebsiella bacteremia (now on Levaquin) and BILL drain remains, acute renal failure thought to be due to ATN/seen by nephrologist (Cr ~4.27 and return to baseline) -was started on lasix & KCL and then discontinue when renal function return to 1.08 & hypokalemia, upper gastrointestinal bleed (hgb trend from 12 to 6, required transfusion & improve to hgb ~10) secondary to anastomotic ulcer (treated with epinephrine & hemoclip), thrombocytopenia-resolve & thought to be due to infection & right pleural effusion with associated opacity (thought to be atelectasis) (thought to be inflammatory due to liver abscess). Patient  declined therapeutic thoracentesis -if signs and symptoms worsen may need. Complication of bilateral lower extremity (noted lasix discontinue in hospital)    PMHx gastric bypass, diarrhea (has as needed imodium/has used loperamide in past), hypertension, migraines (states she has ~[id number] & relief with Imitrex) & insomnia (takes Temazepam)       TODAY    Patient report she has pain in left knee. States pain medications Oxycodone & Tylenol help.  Did receive Tylenol in hospital. Patient has liver abscess. She is open to trial Voltaren gel.    Denies shortness of breath     Patient lives with  in house; has 2 steps to enter home. Reports  is good health    Advanced care planning  Reviewed CODE status & patient wishes to remain FULL code      CODE STATUS/ADVANCE DIRECTIVES DISCUSSION:   CPR/Full code   Patient's living condition: lives with spouse  ALLERGIES: Contrast dye; Codeine; Zolpidem; Ceftriaxone; and Diatrizoate  PAST MEDICAL HISTORY:  has a past medical history of Arthritis, Depressive disorder, Hypertension, Obesity, and Thrombocytopenia (H) (3/24/2019). She also has no past medical history of Congestive heart failure (H), COPD (chronic obstructive pulmonary disease) (H), History of blood transfusion, or Uncomplicated asthma.  PAST SURGICAL HISTORY:   has a past surgical history that includes Cholecystectomy; appendectomy; orthopedic surgery; GYN surgery; GI surgery; and Arthroplasty carpometacarpal (thumb joint) (Left, 4/6/2018).  FAMILY HISTORY: family history includes Coronary Artery Disease in her father; Diabetes in her sister, sister, and sister.  SOCIAL HISTORY:   reports that  has never smoked. she has never used smokeless tobacco. She reports that she drinks alcohol. She reports that she does not use drugs.    Post Discharge Medication Reconciliation Status: discharge medications reconciled, continue medications without change    Current Outpatient Medications   Medication Sig Dispense  Refill     ACETAMINOPHEN PO Take 650 mg by mouth every 6 hours as needed for pain       amLODIPine (NORVASC) 2.5 MG tablet Take 1 tablet (2.5 mg) by mouth daily 30 tablet 1     CYCLOBENZAPRINE HCL PO Take 10 mg by mouth At Bedtime       diclofenac (VOLTAREN) 1 % topical gel Place 2 g onto the skin 2 times daily And twice daily as needed.  Apply to L knee       ferrous sulfate (FEROSUL) 325 (65 Fe) MG tablet Take 1 tablet (325 mg) by mouth daily (with breakfast) 30 tablet 0     latanoprost (XALATAN) 0.005 % ophthalmic solution Place 1 drop into both eyes At Bedtime       levofloxacin (LEVAQUIN) 500 MG tablet Take 1 tablet (500 mg) by mouth daily . Should continue until seen by infectious disease in clinic.       loperamide (IMODIUM) 2 MG capsule Take 2 mg by mouth 4 times daily as needed for diarrhea       MEDICATION INSTRUCTION Hold Lisinopril and Norvasc if your SBP is <110 and DBP<70 1 each 0     methylPREDNISolone (MEDROL) 4 MG tablet 3/27:Take 4 mg PO before dinner, and at bedtime; 3/28:Take 4 mg PO before breakfast, after lunch, and at bedtime; 3/29: Take 4 mg PO before breakfast and at bedtime; 3/30: Take 4 mg PO before breakfast       nystatin (MYCOSTATIN) 321855 UNIT/GM POWD Apply topically 2 times daily as needed (affected area)       oxyCODONE (ROXICODONE) 5 MG tablet Take 1 tablet (5 mg) by mouth every 6 hours as needed for moderate to severe pain 90 tablet 0     pantoprazole (PROTONIX) 40 MG EC tablet Take 1 tablet (40 mg) by mouth 2 times daily (before meals) 60 tablet 0     saccharomyces boulardii (FLORASTOR) 250 MG capsule Take 1 capsule (250 mg) by mouth 2 times daily Take for 1 week past antibiotics.       SIMVASTATIN PO Take 20 mg by mouth At Bedtime        sucralfate (CARAFATE) 1 GM/10ML suspension Take 10 mLs (1 g) by mouth 2 times daily (before meals) 600 mL 0     SUMATRIPTAN SUCCINATE PO Take 50 mg by mouth every 2 hours as needed for migraine (max of 200mg q 24hr)       temazepam (RESTORIL) 15  "MG capsule Take 1 capsule (15 mg) by mouth nightly as needed for sleep 7 capsule 0       ROS:  10 point ROS of systems including Constitutional, Eyes, Respiratory, Cardiovascular, Gastroenterology, Genitourinary, Integumentary, Musculoskeletal, Psychiatric were all negative except for pertinent positives noted in my HPI.    Vitals:  /83   Pulse 83   Temp 97.3  F (36.3  C)   Resp 20   Ht 1.626 m (5' 4\")   Wt 101.7 kg (224 lb 1.6 oz)   SpO2 98%   BMI 38.47 kg/m    Exam:  GENERAL APPEARANCE:  Alert, in no distress  ENT:  Mouth and posterior oropharynx normal, moist mucous membranes  EYES:  EOM, conjunctivae, lids, pupils and irises normal  NECK:  No adenopathy,masses or thyromegaly  RESP:  respiratory effort and palpation of chest normal, lungs clear to auscultation , no respiratory distress, diminished lung sounds mid and lower right lung  CV:  Palpation and auscultation of heart done , regular rate and rhythm, no murmur, rub, or gallop  ABDOMEN:  normal bowel sounds, soft, nontender, no hepatosplenomegaly or other masses  BILL drain intact with clear yellow drainage noted in tube, BILL drain insertion site unremarkable  M/S:   sitting in WC  SKIN:  Inspection of skin and subcutaneous tissue +2-3 edema bilateral LE   NEURO:   Cranial nerves 2-12 are normal tested and grossly at patient's baseline  PSYCH:  oriented X 3    Lab/Diagnostic data:  Labs done in SNF are in [facility] EPIC. Please refer to them using [facility]/Care Everywhere.    ASSESSMENT/PLAN:  Current [facility] scheduled appointments:     fever with rash lower extremity due to suspected drug reaction to Ceftriaxone (taking for liver abscess)  Rash & fever resolved  Now on levaquin    acute left knee pain thought to DJD  Improve with prednisone burst, continue course  Add as needed tylenol. Not exceed 3000mg   Continue as needed oxycodone, goal wean off  Trial of Voltaren gel topical ,monitor   Continue therapies    Lower extremity edema  Lasix " discontinue due to hypokalemia and renal function return to baseline   Has +2-3 edema bilateral  Elevate legs, trials TG shape  Follow up labs, may need restart diuretic  Encourage adequate protein intake    sepsis due to large liver abscess, Klebsiella bacteremia and BILL drain remains  Remains on Levaquin course, infectious disease to guide  Continue BILL drain care as ordered   Follow up infectious disease Dr. Guadarrama in 3 wks    upper gastrointestinal bleed (hgb trend from 12 to 6, required transfusion & improve to hgb ~10) secondary to anastomotic ulcer (treated with epinephrine & hemoclip), thrombocytopenia-resolve & thought to be due to infection  Monitor hgb & signs and symptoms bleeding  Continue on iron & PPI & Carafate  History gastric bypass & diarrhea  Monitor stools, has as needed imodium. Patient is on oxycodone that could cause constipation   not on vit B replacement    right pleural effusion with associated opacity (thought to be atelectasis) (thought to be inflammatory due to liver abscess). Patient declined therapeutic thoracentesis -if signs and symptoms worsen may need.   Monitor vitals and lung sounds.     hypertension  blood pressure managed  Continue low dose Norvasc (consider give in evening to decreased edema)  HLD  Is on statin, monitor liver function    Migraines  states she has ~[id number] & relief with Imitrex  Continue as needed Imitrex as needed,  Consider preventative therapies -follow up primary care provider     Insomnia  Takes Temazepam.  Continue at this time  Consider alternative sleep medication and promote sleep hygiene   Consider change flexeril to as needed     Follow up CMP,CBC 4/1/19      Orders written by provider at facility      Total time spent with patient visit at the skilled nursing facility was 38 MIN including patient visit and review of past records. Greater than 50% of total time spent with counseling and coordinating care due to fever, left knee pain, liver abscess,  weakness and comorbidity     Electronically signed by:  LUIS ANTONIO Aguirre CNP

## 2019-03-29 NOTE — LETTER
3/29/2019        RE: Vanessa Huffman  [address]  [place] [address]        [facility]  PRIMARY CARE PROVIDER AND CLINIC:  [facility] [id number] / [place] 5*  Chief Complaint   Patient presents with     Hospital F/U     [place] Medical Record Number:  [id number]  Place of Service where encounter took place:  [facility] ([facility] [[id number]]    Vanessa Huffman  is a 68 year old  (1950), admitted to the above facility from  [facility]. Hospital stay 3/24/19 through 3/27/19..  Admitted to this facility for  rehab, medical management and nursing care.    HPI:    HPI information obtained from: facility chart records, facility staff and patient report.   Brief Summary of Hospital Course:   Updates on Status Since Skilled nursing Admission:     Patient Vanessa Huffman is a 68 yr old female admitted to [facility] for rehabilitation s/p hospitalization [facility] 3/24/19-3/27/19 for   fever with rash lower extremity due to suspected drug reaction to Ceftriaxone (taking for liver abscess) & acute left knee pain with impaired mobility with no recent trauma -negative aspiration for infection or crystals; xray negative, negative ultrasound DVT) & per ortho eval likely DJD & improved with steroid burst.     S/p recent hospitalization [facility] 3/1-3/19/19 for sepsis due to large liver abscess, Klebsiella bacteremia (now on Levaquin) and BILL drain remains, acute renal failure thought to be due to ATN/seen by nephrologist (Cr ~4.27 and return to baseline) -was started on lasix & KCL and then discontinue when renal function return to 1.08 & hypokalemia, upper gastrointestinal bleed (hgb trend from 12 to 6, required transfusion & improve to hgb ~10) secondary to anastomotic ulcer (treated with epinephrine & hemoclip), thrombocytopenia-resolve & thought to be due to infection & right pleural effusion with associated  opacity (thought to be atelectasis) (thought to be inflammatory due to liver abscess). Patient declined therapeutic thoracentesis -if signs and symptoms worsen may need. Complication of bilateral lower extremity (noted lasix discontinue in hospital)    PMHx gastric bypass, diarrhea (has as needed imodium/has used loperamide in past), hypertension, migraines (states she has ~[id number] & relief with Imitrex) & insomnia (takes Temazepam)       TODAY    Patient report she has pain in left knee. States pain medications Oxycodone & Tylenol help.  Did receive Tylenol in hospital. Patient has liver abscess. She is open to trial Voltaren gel.    Denies shortness of breath     Patient lives with  in house; has 2 steps to enter home. Reports  is good health    Advanced care planning  Reviewed CODE status & patient wishes to remain FULL code      CODE STATUS/ADVANCE DIRECTIVES DISCUSSION:   CPR/Full code   Patient's living condition: lives with spouse  ALLERGIES: Contrast dye; Codeine; Zolpidem; Ceftriaxone; and Diatrizoate  PAST MEDICAL HISTORY:  has a past medical history of Arthritis, Depressive disorder, Hypertension, Obesity, and Thrombocytopenia (H) (3/24/2019). She also has no past medical history of Congestive heart failure (H), COPD (chronic obstructive pulmonary disease) (H), History of blood transfusion, or Uncomplicated asthma.  PAST SURGICAL HISTORY:   has a past surgical history that includes Cholecystectomy; appendectomy; orthopedic surgery; GYN surgery; GI surgery; and Arthroplasty carpometacarpal (thumb joint) (Left, 4/6/2018).  FAMILY HISTORY: family history includes Coronary Artery Disease in her father; Diabetes in her sister, sister, and sister.  SOCIAL HISTORY:   reports that  has never smoked. she has never used smokeless tobacco. She reports that she drinks alcohol. She reports that she does not use drugs.    Post Discharge Medication Reconciliation Status: discharge medications reconciled,  continue medications without change    Current Outpatient Medications   Medication Sig Dispense Refill     ACETAMINOPHEN PO Take 650 mg by mouth every 6 hours as needed for pain       amLODIPine (NORVASC) 2.5 MG tablet Take 1 tablet (2.5 mg) by mouth daily 30 tablet 1     CYCLOBENZAPRINE HCL PO Take 10 mg by mouth At Bedtime       diclofenac (VOLTAREN) 1 % topical gel Place 2 g onto the skin 2 times daily And twice daily as needed.  Apply to L knee       ferrous sulfate (FEROSUL) 325 (65 Fe) MG tablet Take 1 tablet (325 mg) by mouth daily (with breakfast) 30 tablet 0     latanoprost (XALATAN) 0.005 % ophthalmic solution Place 1 drop into both eyes At Bedtime       levofloxacin (LEVAQUIN) 500 MG tablet Take 1 tablet (500 mg) by mouth daily . Should continue until seen by infectious disease in clinic.       loperamide (IMODIUM) 2 MG capsule Take 2 mg by mouth 4 times daily as needed for diarrhea       MEDICATION INSTRUCTION Hold Lisinopril and Norvasc if your SBP is <110 and DBP<70 1 each 0     methylPREDNISolone (MEDROL) 4 MG tablet 3/27:Take 4 mg PO before dinner, and at bedtime; 3/28:Take 4 mg PO before breakfast, after lunch, and at bedtime; 3/29: Take 4 mg PO before breakfast and at bedtime; 3/30: Take 4 mg PO before breakfast       nystatin (MYCOSTATIN) 461904 UNIT/GM POWD Apply topically 2 times daily as needed (affected area)       oxyCODONE (ROXICODONE) 5 MG tablet Take 1 tablet (5 mg) by mouth every 6 hours as needed for moderate to severe pain 90 tablet 0     pantoprazole (PROTONIX) 40 MG EC tablet Take 1 tablet (40 mg) by mouth 2 times daily (before meals) 60 tablet 0     saccharomyces boulardii (FLORASTOR) 250 MG capsule Take 1 capsule (250 mg) by mouth 2 times daily Take for 1 week past antibiotics.       SIMVASTATIN PO Take 20 mg by mouth At Bedtime        sucralfate (CARAFATE) 1 GM/10ML suspension Take 10 mLs (1 g) by mouth 2 times daily (before meals) 600 mL 0     SUMATRIPTAN SUCCINATE PO Take 50 mg  "by mouth every 2 hours as needed for migraine (max of 200mg q 24hr)       temazepam (RESTORIL) 15 MG capsule Take 1 capsule (15 mg) by mouth nightly as needed for sleep 7 capsule 0       ROS:  10 point ROS of systems including Constitutional, Eyes, Respiratory, Cardiovascular, Gastroenterology, Genitourinary, Integumentary, Musculoskeletal, Psychiatric were all negative except for pertinent positives noted in my HPI.    Vitals:  /83   Pulse 83   Temp 97.3  F (36.3  C)   Resp 20   Ht 1.626 m (5' 4\")   Wt 101.7 kg (224 lb 1.6 oz)   SpO2 98%   BMI 38.47 kg/m     Exam:  GENERAL APPEARANCE:  Alert, in no distress  ENT:  Mouth and posterior oropharynx normal, moist mucous membranes  EYES:  EOM, conjunctivae, lids, pupils and irises normal  NECK:  No adenopathy,masses or thyromegaly  RESP:  respiratory effort and palpation of chest normal, lungs clear to auscultation , no respiratory distress, diminished lung sounds mid and lower right lung  CV:  Palpation and auscultation of heart done , regular rate and rhythm, no murmur, rub, or gallop  ABDOMEN:  normal bowel sounds, soft, nontender, no hepatosplenomegaly or other masses  BILL drain intact with clear yellow drainage noted in tube, BILL drain insertion site unremarkable  M/S:   sitting in WC  SKIN:  Inspection of skin and subcutaneous tissue +2-3 edema bilateral LE   NEURO:   Cranial nerves 2-12 are normal tested and grossly at patient's baseline  PSYCH:  oriented X 3    Lab/Diagnostic data:  Labs done in SNF are in [facility] EPIC. Please refer to them using [facility]/Care Everywhere.    ASSESSMENT/PLAN:  Current [facility] scheduled appointments:     fever with rash lower extremity due to suspected drug reaction to Ceftriaxone (taking for liver abscess)  Rash & fever resolved  Now on levaquin    acute left knee pain thought to DJD  Improve with prednisone burst, continue course  Add as needed tylenol. Not exceed 3000mg   Continue as needed oxycodone, goal wean " off  Trial of Voltaren gel topical ,monitor   Continue therapies    Lower extremity edema  Lasix discontinue due to hypokalemia and renal function return to baseline   Has +2-3 edema bilateral  Elevate legs, trials TG shape  Follow up labs, may need restart diuretic  Encourage adequate protein intake    sepsis due to large liver abscess, Klebsiella bacteremia and BILL drain remains  Remains on Levaquin course, infectious disease to guide  Continue BILL drain care as ordered   Follow up infectious disease Dr. Guadarrama in 3 wks    upper gastrointestinal bleed (hgb trend from 12 to 6, required transfusion & improve to hgb ~10) secondary to anastomotic ulcer (treated with epinephrine & hemoclip), thrombocytopenia-resolve & thought to be due to infection  Monitor hgb & signs and symptoms bleeding  Continue on iron & PPI & Carafate  History gastric bypass & diarrhea  Monitor stools, has as needed imodium. Patient is on oxycodone that could cause constipation   not on vit B replacement    right pleural effusion with associated opacity (thought to be atelectasis) (thought to be inflammatory due to liver abscess). Patient declined therapeutic thoracentesis -if signs and symptoms worsen may need.   Monitor vitals and lung sounds.     hypertension  blood pressure managed  Continue low dose Norvasc (consider give in evening to decreased edema)  HLD  Is on statin, monitor liver function    Migraines  states she has ~[id number] & relief with Imitrex  Continue as needed Imitrex as needed,  Consider preventative therapies -follow up primary care provider     Insomnia  Takes Temazepam.  Continue at this time  Consider alternative sleep medication and promote sleep hygiene   Consider change flexeril to as needed     Follow up CMP,CBC 4/1/19      Orders written by provider at facility      Total time spent with patient visit at the skilled nursing facility was 38 MIN including patient visit and review of past records. Greater than 50% of total  time spent with counseling and coordinating care due to fever, left knee pain, liver abscess, weakness and comorbidity     Electronically signed by:  LUIS ANTONIO Aguirre CNP                         Sincerely,        LUIS ANTONIO Aguirre CNP

## 2019-03-30 LAB
BACTERIA SPEC CULT: NO GROWTH
Lab: NORMAL
Lab: NORMAL
SPECIMEN SOURCE: NORMAL

## 2019-04-01 ENCOUNTER — DOCUMENTATION ONLY (OUTPATIENT)
Dept: OTHER | Facility: CLINIC | Age: 69
End: 2019-04-01

## 2019-04-01 ENCOUNTER — NURSING HOME VISIT (OUTPATIENT)
Dept: GERIATRICS | Facility: CLINIC | Age: 69
End: 2019-04-01
Payer: MEDICARE

## 2019-04-01 ENCOUNTER — HOSPITAL LABORATORY (OUTPATIENT)
Dept: OTHER | Facility: CLINIC | Age: 69
End: 2019-04-01

## 2019-04-01 VITALS
BODY MASS INDEX: 37.59 KG/M2 | RESPIRATION RATE: 16 BRPM | HEART RATE: 84 BPM | DIASTOLIC BLOOD PRESSURE: 72 MMHG | SYSTOLIC BLOOD PRESSURE: 144 MMHG | WEIGHT: 220.2 LBS | HEIGHT: 64 IN | OXYGEN SATURATION: 98 % | TEMPERATURE: 96.2 F

## 2019-04-01 DIAGNOSIS — M25.562 ACUTE PAIN OF LEFT KNEE: Primary | ICD-10-CM

## 2019-04-01 DIAGNOSIS — N17.9 AKI (ACUTE KIDNEY INJURY) (H): ICD-10-CM

## 2019-04-01 DIAGNOSIS — R60.0 LEG EDEMA: ICD-10-CM

## 2019-04-01 DIAGNOSIS — K75.0 LIVER ABSCESS: ICD-10-CM

## 2019-04-01 LAB
ALBUMIN SERPL-MCNC: 2 G/DL (ref 3.4–5)
ALP SERPL-CCNC: 406 U/L (ref 40–150)
ALT SERPL W P-5'-P-CCNC: 44 U/L (ref 0–50)
ANION GAP SERPL CALCULATED.3IONS-SCNC: 8 MMOL/L (ref 3–14)
AST SERPL W P-5'-P-CCNC: 51 U/L (ref 0–45)
BASOPHILS # BLD AUTO: 0 10E9/L (ref 0–0.2)
BASOPHILS NFR BLD AUTO: 0 %
BILIRUB SERPL-MCNC: 0.9 MG/DL (ref 0.2–1.3)
BUN SERPL-MCNC: 21 MG/DL (ref 7–30)
CALCIUM SERPL-MCNC: 8.1 MG/DL (ref 8.5–10.1)
CHLORIDE SERPL-SCNC: 109 MMOL/L (ref 94–109)
CO2 SERPL-SCNC: 25 MMOL/L (ref 20–32)
CREAT SERPL-MCNC: 1.13 MG/DL (ref 0.52–1.04)
DIFFERENTIAL METHOD BLD: ABNORMAL
EOSINOPHIL # BLD AUTO: 0.2 10E9/L (ref 0–0.7)
EOSINOPHIL NFR BLD AUTO: 1 %
ERYTHROCYTE [DISTWIDTH] IN BLOOD BY AUTOMATED COUNT: 16.8 % (ref 10–15)
FUNGUS SPEC CULT: NORMAL
GFR SERPL CREATININE-BSD FRML MDRD: 50 ML/MIN/{1.73_M2}
GLUCOSE SERPL-MCNC: 79 MG/DL (ref 70–99)
HCT VFR BLD AUTO: 30.3 % (ref 35–47)
HGB BLD-MCNC: 9.8 G/DL (ref 11.7–15.7)
LYMPHOCYTES # BLD AUTO: 3.2 10E9/L (ref 0.8–5.3)
LYMPHOCYTES NFR BLD AUTO: 21 %
MCH RBC QN AUTO: 29.3 PG (ref 26.5–33)
MCHC RBC AUTO-ENTMCNC: 32.3 G/DL (ref 31.5–36.5)
MCV RBC AUTO: 90 FL (ref 78–100)
METAMYELOCYTES # BLD: 0.9 10E9/L
METAMYELOCYTES NFR BLD MANUAL: 6 %
MONOCYTES # BLD AUTO: 0.5 10E9/L (ref 0–1.3)
MONOCYTES NFR BLD AUTO: 3 %
MYELOCYTES # BLD: 0.6 10E9/L
MYELOCYTES NFR BLD MANUAL: 4 %
NEUTROPHILS # BLD AUTO: 10 10E9/L (ref 1.6–8.3)
NEUTROPHILS NFR BLD AUTO: 65 %
PLATELET # BLD AUTO: 328 10E9/L (ref 150–450)
PLATELET # BLD EST: ABNORMAL 10*3/UL
POTASSIUM SERPL-SCNC: 3.5 MMOL/L (ref 3.4–5.3)
PROT SERPL-MCNC: 5.6 G/DL (ref 6.8–8.8)
RBC # BLD AUTO: 3.35 10E12/L (ref 3.8–5.2)
RBC MORPH BLD: ABNORMAL
SODIUM SERPL-SCNC: 142 MMOL/L (ref 133–144)
SPECIMEN SOURCE: NORMAL
WBC # BLD AUTO: 15.4 10E9/L (ref 4–11)

## 2019-04-01 PROCEDURE — 99309 SBSQ NF CARE MODERATE MDM 30: CPT | Performed by: NURSE PRACTITIONER

## 2019-04-01 RX ORDER — OXYCODONE HYDROCHLORIDE 5 MG/1
5 TABLET ORAL 2 TIMES DAILY PRN
Qty: 30 TABLET | Refills: 0 | Status: SHIPPED | OUTPATIENT
Start: 2019-04-01

## 2019-04-01 ASSESSMENT — MIFFLIN-ST. JEOR: SCORE: 1518.82

## 2019-04-01 NOTE — PROGRESS NOTES
[facility]  [place] Medical Record Number:  [id number]  Place of Service where encounter took place:  [facility] [place] ([facility] [[id number]]  Chief Complaint   Patient presents with     Nursing Home Acute       HPI:    Vanessa Huffman  is a 68 year old (1950), who is being seen today for an episodic care visit.  HPI information obtained from: facility chart records, facility staff and patient report. Today's concern is:      Patient Vanessa Huffman is a 68 yr old female admitted to [facility] for rehabilitation s/p hospitalization [facility] 3/24/19-3/27/19 for   fever with rash lower extremity due to suspected drug reaction to Ceftriaxone (taking for liver abscess) & acute left knee pain with impaired mobility with no recent trauma -negative aspiration for infection or crystals; xray negative, negative ultrasound DVT) & per ortho eval likely DJD & improved with steroid burst.     S/p recent hospitalization [facility] 3/1-3/19/19 for sepsis due to large liver abscess, Klebsiella bacteremia (now on Levaquin) and BILL drain remains, acute renal failure thought to be due to ATN/seen by nephrologist (Cr ~4.27 and return to baseline) -was started on lasix & KCL and then discontinue when renal function return to 1.08 & hypokalemia, upper gastrointestinal bleed (hgb trend from 12 to 6, required transfusion & improve to hgb ~10) secondary to anastomotic ulcer (treated with epinephrine & hemoclip), thrombocytopenia-resolve & thought to be due to infection & right pleural effusion with associated opacity (thought to be atelectasis) (thought to be inflammatory due to liver abscess). Patient declined therapeutic thoracentesis -if signs and symptoms worsen may need. Complication of bilateral lower extremity (noted lasix discontinue in hospital)    PMHx gastric bypass, diarrhea (has as needed imodium/has used loperamide in past), hypertension, migraines (states she has ~[id number] & relief with  Imitrex) & insomnia (takes Temazepam)     Acute pain of left knee  Liver abscess  ELISE (acute kidney injury) (H)  Leg edema    Leukocytosis. Just completed prednisone course. Patient states she feels much better     Pain left knee patient states improved. Walking to therapy department and back  Uses oxycodone 5mg ~1-2x day, goal wean off    Cr 1.13 today, 1.08 on hospital discharge. Improve from Cr ~4    Tolerating TG shapes and reports improve breathing. Weights trend down  Vitals stable room air. Lasix was discontinue on hospital discharge     Remains on Levaquin course. infectious disease to determine stop day. BILL draining. Updated nurse manager need to record output     Did take imodium as needed the other day she states due to more frequent stools . Denies diarrhea. States she has history of frequent stools  Reports appetite slow to improve, however, that she is eating meals      Past Medical and Surgical History reviewed in Epic today.    MEDICATIONS:  Current Outpatient Medications   Medication Sig Dispense Refill     ACETAMINOPHEN PO Take 650 mg by mouth every 6 hours as needed for pain       amLODIPine (NORVASC) 2.5 MG tablet Take 1 tablet (2.5 mg) by mouth daily 30 tablet 1     CYCLOBENZAPRINE HCL PO Take 10 mg by mouth nightly as needed        diclofenac (VOLTAREN) 1 % topical gel Place 2 g onto the skin 2 times daily And twice daily as needed.  Apply to L knee       ferrous sulfate (FEROSUL) 325 (65 Fe) MG tablet Take 1 tablet (325 mg) by mouth daily (with breakfast) 30 tablet 0     latanoprost (XALATAN) 0.005 % ophthalmic solution Place 1 drop into both eyes At Bedtime       levofloxacin (LEVAQUIN) 500 MG tablet Take 1 tablet (500 mg) by mouth daily . Should continue until seen by infectious disease in clinic.       loperamide (IMODIUM) 2 MG capsule Take 2 mg by mouth 4 times daily as needed for diarrhea       nystatin (MYCOSTATIN) 231609 UNIT/GM POWD Apply topically 2 times daily as needed (affected  "area)       oxyCODONE (ROXICODONE) 5 MG tablet Take 1 tablet (5 mg) by mouth 2 times daily as needed for moderate to severe pain 30 tablet 0     pantoprazole (PROTONIX) 40 MG EC tablet Take 1 tablet (40 mg) by mouth 2 times daily (before meals) 60 tablet 0     saccharomyces boulardii (FLORASTOR) 250 MG capsule Take 1 capsule (250 mg) by mouth 2 times daily Take for 1 week past antibiotics.       SIMVASTATIN PO Take 20 mg by mouth At Bedtime        sucralfate (CARAFATE) 1 GM/10ML suspension Take 10 mLs (1 g) by mouth 2 times daily (before meals) 600 mL 0     SUMATRIPTAN SUCCINATE PO Take 50 mg by mouth every 2 hours as needed for migraine (max of 200mg q 24hr)       temazepam (RESTORIL) 15 MG capsule Take 1 capsule (15 mg) by mouth nightly as needed for sleep 7 capsule 0     MEDICATION INSTRUCTION Hold Lisinopril and Norvasc if your SBP is <110 and DBP<70 1 each 0         REVIEW OF SYSTEMS:  10 point ROS of systems including Constitutional, Eyes, Respiratory, Cardiovascular, Gastroenterology, Genitourinary, Integumentary, Musculoskeletal, Psychiatric were all negative except for pertinent positives noted in my HPI.    Objective:  /72   Pulse 84   Temp 96.2  F (35.7  C)   Resp 16   Ht 1.626 m (5' 4\")   Wt 99.9 kg (220 lb 3.2 oz)   SpO2 98%   BMI 37.80 kg/m    Exam:  GENERAL APPEARANCE:  Alert, in no distress  ENT:  Mouth and posterior oropharynx normal, moist mucous membranes  EYES:  EOM, conjunctivae, lids, pupils and irises normal  NECK:  No adenopathy,masses or thyromegaly  RESP:  respiratory effort and palpation of chest normal, lungs clear to auscultation , no respiratory distress, slight diminished right lower lobe  CV:  Palpation and auscultation of heart done , regular rate and rhythm, no murmur, rub, or gallop  ABDOMEN:  normal bowel sounds, soft, nontender, no hepatosplenomegaly or other masses  M/S:   sitting in WC  SKIN:  Inspection of skin and subcutaneous tissue, +2 edema bilateral   NEURO:  "  Cranial nerves 2-12 are normal tested and grossly at patient's baseline  PSYCH:  oriented X 3    Labs:   Labs done in SNF are in [place] EPIC. Please refer to them using EPIC/Care Everywhere.    ASSESSMENT/PLAN:     Acute pain of left knee  Liver abscess  ELISE (acute kidney injury) (H)  Leg edema     Pain in left knee improved. Completed course of Prednisone  Decrease oxycodone IR to 5mg 2 x daily as needed ( only using ~1-2 x daily). Goal wean off  Change HS flexeril to 10mg as needed   Continue therapies. Walking int herapies     Weights trend down. Improve breathing & improve air exchange in right lung  Less edema in legs. Continue TG shape, monitor   Cr stable, monitor     Did take a imodium x2 due to frequent loose stools, monitor. Since gastric bypass has had frequent stools  Monitor for diarrhea. Patient is on antibiotic and risk for cdiff    Remind staff to monitor drainage from BILL drain, monitor   Follow up Dr. Guadarrama infectious disease   Continue Levaquin    Orders written by provider at facility        Electronically signed by:  LUIS ANTONIO Aguirre CNP

## 2019-04-03 ENCOUNTER — NURSING HOME VISIT (OUTPATIENT)
Dept: GERIATRICS | Facility: CLINIC | Age: 69
End: 2019-04-03
Payer: MEDICARE

## 2019-04-03 DIAGNOSIS — Z53.9 ERRONEOUS ENCOUNTER--DISREGARD: Primary | ICD-10-CM

## 2019-04-03 DIAGNOSIS — G47.00 INSOMNIA, UNSPECIFIED TYPE: ICD-10-CM

## 2019-04-03 RX ORDER — TEMAZEPAM 7.5 MG/1
7.5 CAPSULE ORAL
Qty: 30 CAPSULE | Refills: 0 | Status: SHIPPED | OUTPATIENT
Start: 2019-04-03 | End: 2019-04-04 | Stop reason: DRUGHIGH

## 2019-04-04 ENCOUNTER — TELEPHONE (OUTPATIENT)
Dept: GERIATRICS | Facility: CLINIC | Age: 69
End: 2019-04-04

## 2019-04-04 ENCOUNTER — APPOINTMENT (OUTPATIENT)
Dept: GERIATRICS | Facility: CLINIC | Age: 69
End: 2019-04-04
Payer: MEDICARE

## 2019-04-04 ENCOUNTER — NURSING HOME VISIT (OUTPATIENT)
Dept: GERIATRICS | Facility: CLINIC | Age: 69
End: 2019-04-04
Payer: MEDICARE

## 2019-04-04 DIAGNOSIS — K75.0 LIVER ABSCESS: Primary | ICD-10-CM

## 2019-04-04 DIAGNOSIS — R78.81 BACTEREMIA DUE TO KLEBSIELLA PNEUMONIAE: ICD-10-CM

## 2019-04-04 DIAGNOSIS — B96.1 BACTEREMIA DUE TO KLEBSIELLA PNEUMONIAE: ICD-10-CM

## 2019-04-04 DIAGNOSIS — R60.0 LEG EDEMA: ICD-10-CM

## 2019-04-04 DIAGNOSIS — G47.00 INSOMNIA, UNSPECIFIED TYPE: Primary | ICD-10-CM

## 2019-04-04 DIAGNOSIS — K92.2 UPPER GI BLEED: ICD-10-CM

## 2019-04-04 LAB
FUNGUS SPEC CULT: NORMAL
SPECIMEN SOURCE: NORMAL

## 2019-04-04 PROCEDURE — 99305 1ST NF CARE MODERATE MDM 35: CPT | Performed by: INTERNAL MEDICINE

## 2019-04-04 RX ORDER — TEMAZEPAM 15 MG/1
15 CAPSULE ORAL
COMMUNITY
Start: 2019-04-04 | End: 2019-04-04

## 2019-04-04 RX ORDER — TEMAZEPAM 15 MG/1
15 CAPSULE ORAL
Qty: 15 CAPSULE | Refills: 0 | Status: SHIPPED | OUTPATIENT
Start: 2019-04-04

## 2019-04-04 NOTE — TELEPHONE ENCOUNTER
Patient c/o recent decreased dose of temazepam 7.5mg at bedtime. Reports PTA was taking temazepam 15mg at bedtime for insomnia. States last night with decreased dose, unable to sleep through the night.       New Orders:  Increase temazepam to 15mg at bedtime prn (PTA dose) for insomnia.

## 2019-04-05 ENCOUNTER — HOSPITAL LABORATORY (OUTPATIENT)
Dept: OTHER | Facility: CLINIC | Age: 69
End: 2019-04-05

## 2019-04-05 ENCOUNTER — NURSING HOME VISIT (OUTPATIENT)
Dept: GERIATRICS | Facility: CLINIC | Age: 69
End: 2019-04-05
Payer: MEDICARE

## 2019-04-05 VITALS
DIASTOLIC BLOOD PRESSURE: 82 MMHG | HEART RATE: 106 BPM | HEIGHT: 67 IN | BODY MASS INDEX: 33.12 KG/M2 | SYSTOLIC BLOOD PRESSURE: 130 MMHG | TEMPERATURE: 98.1 F | RESPIRATION RATE: 18 BRPM | OXYGEN SATURATION: 97 % | WEIGHT: 211 LBS

## 2019-04-05 DIAGNOSIS — G47.00 INSOMNIA, UNSPECIFIED TYPE: ICD-10-CM

## 2019-04-05 DIAGNOSIS — B96.1 BACTEREMIA DUE TO KLEBSIELLA PNEUMONIAE: ICD-10-CM

## 2019-04-05 DIAGNOSIS — M62.81 GENERALIZED MUSCLE WEAKNESS: ICD-10-CM

## 2019-04-05 DIAGNOSIS — R78.81 BACTEREMIA DUE TO KLEBSIELLA PNEUMONIAE: ICD-10-CM

## 2019-04-05 DIAGNOSIS — R60.0 LEG EDEMA: Primary | ICD-10-CM

## 2019-04-05 LAB
ALBUMIN SERPL-MCNC: 2.6 G/DL (ref 3.4–5)
ALP SERPL-CCNC: 450 U/L (ref 40–150)
ALT SERPL W P-5'-P-CCNC: 34 U/L (ref 0–50)
ANION GAP SERPL CALCULATED.3IONS-SCNC: 9 MMOL/L (ref 3–14)
AST SERPL W P-5'-P-CCNC: 36 U/L (ref 0–45)
BASOPHILS # BLD AUTO: 0.1 10E9/L (ref 0–0.2)
BASOPHILS NFR BLD AUTO: 0.9 %
BILIRUB SERPL-MCNC: 1 MG/DL (ref 0.2–1.3)
BUN SERPL-MCNC: 17 MG/DL (ref 7–30)
CALCIUM SERPL-MCNC: 8.7 MG/DL (ref 8.5–10.1)
CHLORIDE SERPL-SCNC: 107 MMOL/L (ref 94–109)
CO2 SERPL-SCNC: 25 MMOL/L (ref 20–32)
CREAT SERPL-MCNC: 1.18 MG/DL (ref 0.52–1.04)
DIFFERENTIAL METHOD BLD: ABNORMAL
EOSINOPHIL # BLD AUTO: 0.1 10E9/L (ref 0–0.7)
EOSINOPHIL NFR BLD AUTO: 0.9 %
ERYTHROCYTE [DISTWIDTH] IN BLOOD BY AUTOMATED COUNT: 17 % (ref 10–15)
GFR SERPL CREATININE-BSD FRML MDRD: 48 ML/MIN/{1.73_M2}
GLUCOSE SERPL-MCNC: 83 MG/DL (ref 70–99)
HCT VFR BLD AUTO: 34.7 % (ref 35–47)
HGB BLD-MCNC: 11.4 G/DL (ref 11.7–15.7)
IMM GRANULOCYTES # BLD: 0.2 10E9/L (ref 0–0.4)
IMM GRANULOCYTES NFR BLD: 1.5 %
LYMPHOCYTES # BLD AUTO: 2 10E9/L (ref 0.8–5.3)
LYMPHOCYTES NFR BLD AUTO: 19 %
MCH RBC QN AUTO: 29.5 PG (ref 26.5–33)
MCHC RBC AUTO-ENTMCNC: 32.9 G/DL (ref 31.5–36.5)
MCV RBC AUTO: 90 FL (ref 78–100)
MONOCYTES # BLD AUTO: 0.6 10E9/L (ref 0–1.3)
MONOCYTES NFR BLD AUTO: 5.8 %
NEUTROPHILS # BLD AUTO: 7.4 10E9/L (ref 1.6–8.3)
NEUTROPHILS NFR BLD AUTO: 71.9 %
NRBC # BLD AUTO: 0 10*3/UL
NRBC BLD AUTO-RTO: 0 /100
PLATELET # BLD AUTO: 260 10E9/L (ref 150–450)
POTASSIUM SERPL-SCNC: 3.4 MMOL/L (ref 3.4–5.3)
PROT SERPL-MCNC: 6.4 G/DL (ref 6.8–8.8)
RBC # BLD AUTO: 3.86 10E12/L (ref 3.8–5.2)
SODIUM SERPL-SCNC: 141 MMOL/L (ref 133–144)
WBC # BLD AUTO: 10.3 10E9/L (ref 4–11)

## 2019-04-05 PROCEDURE — 99309 SBSQ NF CARE MODERATE MDM 30: CPT | Performed by: NURSE PRACTITIONER

## 2019-04-05 ASSESSMENT — MIFFLIN-ST. JEOR: SCORE: 1524.72

## 2019-04-05 NOTE — PROGRESS NOTES
[facility]  [place] Medical Record Number:  [id number]  Place of Service where encounter took place:  [facility] [place] ([facility] [[id number]]  Chief Complaint   Patient presents with     Nursing Home Acute       HPI:    Vanessa Huffman  is a 68 year old (1950), who is being seen today for an episodic care visit.  HPI information obtained from: facility chart records, facility staff and patient report. Today's concern is:    Patient Vanessa Huffman is a 68 yr old female admitted to [facility] for rehabilitation s/p hospitalization [facility] 3/24/19-3/27/19 for   fever with rash lower extremity due to suspected drug reaction to Ceftriaxone (taking for liver abscess) & acute left knee pain with impaired mobility with no recent trauma -negative aspiration for infection or crystals; xray negative, negative ultrasound DVT) & per ortho eval likely DJD & improved with steroid burst.     S/p recent hospitalization [facility] 3/1-3/19/19 for sepsis due to large liver abscess, Klebsiella bacteremia (now on Levaquin) and BILL drain remains, acute renal failure thought to be due to ATN/seen by nephrologist (Cr ~4.27 and return to baseline) -was started on lasix & KCL and then discontinue when renal function return to 1.08 & hypokalemia, upper gastrointestinal bleed (hgb trend from 12 to 6, required transfusion & improve to hgb ~10) secondary to anastomotic ulcer (treated with epinephrine & hemoclip), thrombocytopenia-resolve & thought to be due to infection & right pleural effusion with associated opacity (thought to be atelectasis) (thought to be inflammatory due to liver abscess). Patient declined therapeutic thoracentesis -if signs and symptoms worsen may need. Complication of bilateral lower extremity (noted lasix discontinue in hospital)    PMHx gastric bypass, diarrhea (has as needed imodium/has used loperamide in past), hypertension, migraines (states she has ~[id number] & relief with  Imitrex) & insomnia (takes Temazepam)       Leg edema  Bacteremia due to Klebsiella pneumoniae  Generalized muscle weakness  Insomnia, unspecified type     Patient interested in starting low dose diuretic due to edema in legs. States breathing and edema in legs improve, however, is looking to have more reduction in swelling in legs. She is wearing TG shapes and reminded to keep legs elevated  patient walking in therapies and states she feels like she is getting stronger    Continues with BILL drain due to liver abscess and oral Levaquin  Has occasional diarrhea-states this is her baseline since s/p gastric bypass. She states she typically has to use restroom after breakfast and has therapies come ~1hr after eating.    Restoril at 15mg daily & flagged by pharmacy for trial dose reduction. Restoril reduced to 7.5 mg daily & patient request increase back to 15 mg daily due to insomnia      Past Medical and Surgical History reviewed in Epic today.    MEDICATIONS:  Current Outpatient Medications   Medication Sig Dispense Refill     ACETAMINOPHEN PO Take 650 mg by mouth every 6 hours as needed for pain       amLODIPine (NORVASC) 2.5 MG tablet Take 1 tablet (2.5 mg) by mouth daily 30 tablet 1     CYCLOBENZAPRINE HCL PO Take 10 mg by mouth nightly as needed        diclofenac (VOLTAREN) 1 % topical gel Place 2 g onto the skin 2 times daily And twice daily as needed.  Apply to L knee       ferrous sulfate (FEROSUL) 325 (65 Fe) MG tablet Take 1 tablet (325 mg) by mouth daily (with breakfast) 30 tablet 0     latanoprost (XALATAN) 0.005 % ophthalmic solution Place 1 drop into both eyes At Bedtime       levofloxacin (LEVAQUIN) 500 MG tablet Take 1 tablet (500 mg) by mouth daily . Should continue until seen by infectious disease in clinic.       loperamide (IMODIUM) 2 MG capsule Take 2 mg by mouth 4 times daily as needed for diarrhea       MEDICATION INSTRUCTION Hold Lisinopril and Norvasc if your SBP is <110 and DBP<70 1 each 0      "nystatin (MYCOSTATIN) 421470 UNIT/GM POWD Apply topically 2 times daily And bid prn.       oxyCODONE (ROXICODONE) 5 MG tablet Take 1 tablet (5 mg) by mouth 2 times daily as needed for moderate to severe pain 30 tablet 0     pantoprazole (PROTONIX) 40 MG EC tablet Take 1 tablet (40 mg) by mouth 2 times daily (before meals) 60 tablet 0     saccharomyces boulardii (FLORASTOR) 250 MG capsule Take 1 capsule (250 mg) by mouth 2 times daily Take for 1 week past antibiotics.       SIMVASTATIN PO Take 20 mg by mouth At Bedtime        sucralfate (CARAFATE) 1 GM/10ML suspension Take 10 mLs (1 g) by mouth 2 times daily (before meals) 600 mL 0     SUMATRIPTAN SUCCINATE PO Take 50 mg by mouth every 2 hours as needed for migraine (max of 200mg q 24hr)       temazepam (RESTORIL) 15 MG capsule Take 1 capsule (15 mg) by mouth nightly as needed for sleep 15 capsule 0         REVIEW OF SYSTEMS:  10 point ROS of systems including Constitutional, Eyes, Respiratory, Cardiovascular, Gastroenterology, Genitourinary, Integumentary, Musculoskeletal, Psychiatric were all negative except for pertinent positives noted in my HPI.    Objective:  /82   Pulse 106   Temp 98.1  F (36.7  C)   Resp 18   Ht 1.702 m (5' 7\")   Wt 95.7 kg (211 lb)   SpO2 97%   BMI 33.05 kg/m    Exam:  GENERAL APPEARANCE:  Alert, in no distress  ENT:  Mouth and posterior oropharynx normal, moist mucous membranes  EYES:  EOM, conjunctivae, lids, pupils and irises normal  NECK:  No adenopathy,masses or thyromegaly  RESP:  respiratory effort and palpation of chest normal, lungs clear to auscultation, no respiratory distress, diminished lung sounds in right lower base  CV:  Palpation and auscultation of heart done, regular rate and rhythm, no murmur, rub, or gallop  ABDOMEN:  normal bowel sounds, soft, nontender, no hepatosplenomegaly or other masses  M/S:   sitting in WC  SKIN:  Inspection of skin and subcutaneous tissue +2 edema bilateral lower extremities  NEURO: "   Cranial nerves 2-12 are normal tested and grossly at patient's baseline  PSYCH:  oriented X 3    Labs:   Labs done in SNF are in [place] EPIC. Please refer to them using EPIC/Care Everywhere.    ASSESSMENT/PLAN:     Leg edema  Bacteremia due to Klebsiella pneumoniae  Generalized muscle weakness  Insomnia, unspecified type     Start lasix 20mg & KCL 10meq for fluid retention, monitor   Continue TG shape and elevate legs    Continue Levaquin & BILL drain cares, follow up infectious disease  Labs stable, patient eating meals and reports no acute issues  Continue therapies    Has insomnia  Failed trial dose reduction due to c/o insomnia worsen  Increase Restoril back to 15 mg daily     Orders written by provider at facility        Electronically signed by:  LUIS ANTONIO Aguirre CNP

## 2019-04-06 NOTE — PROGRESS NOTES
[facility]  PRIMARY CARE PROVIDER AND CLINIC:  [facility] [id number] / [facility]*    Patient was seen by Dr. Miller at the [facility] on April 4, 2019 for initial TCU visit.    Patient is a 68 year old  (1950), admitted to the above facility from  [facility]. Hospital stay 3/24/19 through 3/27/19..  Hospitalization was for the evaluation of acute left knee pain fever and rash in the setting of ceftriaxone use for the treatment of liver abscess with Klebsiella bacteremia.    Hospital course was reviewed by me.    Etiology of acute left knee pain unclear as aspirate was negative as was ultrasound of leg.  Rash and knee pain improved with steroid burst.    Patient was hospitalized at [facility] March 1 - March 19, 2019 for treatment of sepsis secondary to a large liver abscess with Klebsiella bacteremia.  Course complicated by acute renal failure, resolved with treatment of volume depletion, upper GI bleed secondary to anastomotic ulcer, thrombocytopenia, right pleural effusion felt secondary to atelectasis related to liver abscess.  Etiology of liver abscess unclear.  Patient status post drain placement by interventional radiology on March 7, 2019.  Biopsies negative for malignancy.  Patient developed significant bilateral lower extremity edema secondary to treatment of sepsis, low albumin, abnormal liver function for which she has been started on diuretics.  She remains on Levaquin for an indefinite period of time, following discontinuation of ceftriaxone.    History of gastric bypass surgery, chronic diarrhea, hypertension and migraine headaches.      Patient reports feeling improved.  Her main complaint is of continued bilateral lower extremity edema which is new since her acute illness 1 month ago.  She denies cough, chest pain, shortness of breath, fevers, chills, abdominal pain, nausea, vomiting,,  diarrhea.  Left knee pain is improved.      CODE STATUS/ADVANCE DIRECTIVES DISCUSSION:   CPR/Full code   Patient's living condition: lives with spouse  ALLERGIES: Contrast dye; Codeine; Zolpidem; Ceftriaxone; and Diatrizoate  PAST MEDICAL HISTORY:  has a past medical history of Arthritis, Depressive disorder, Hypertension, Obesity, and Thrombocytopenia (H) (3/24/2019). She also has no past medical history of Congestive heart failure (H), COPD (chronic obstructive pulmonary disease) (H), History of blood transfusion, or Uncomplicated asthma.  PAST SURGICAL HISTORY:   has a past surgical history that includes Cholecystectomy; appendectomy; orthopedic surgery; GYN surgery; GI surgery; and Arthroplasty carpometacarpal (thumb joint) (Left, 4/6/2018).  FAMILY HISTORY: family history includes Coronary Artery Disease in her father; Diabetes in her sister, sister, and sister.  SOCIAL HISTORY:   reports that  has never smoked. she has never used smokeless tobacco. She reports that she drinks alcohol. She reports that she does not use drugs.      Current Outpatient Medications   Medication Sig Dispense Refill     ACETAMINOPHEN PO Take 650 mg by mouth every 6 hours as needed for pain       amLODIPine (NORVASC) 2.5 MG tablet Take 1 tablet (2.5 mg) by mouth daily 30 tablet 1     CYCLOBENZAPRINE HCL PO Take 10 mg by mouth nightly as needed        diclofenac (VOLTAREN) 1 % topical gel Place 2 g onto the skin 2 times daily And twice daily as needed.  Apply to L knee       ferrous sulfate (FEROSUL) 325 (65 Fe) MG tablet Take 1 tablet (325 mg) by mouth daily (with breakfast) 30 tablet 0     latanoprost (XALATAN) 0.005 % ophthalmic solution Place 1 drop into both eyes At Bedtime       levofloxacin (LEVAQUIN) 500 MG tablet Take 1 tablet (500 mg) by mouth daily . Should continue until seen by infectious disease in clinic.       loperamide (IMODIUM) 2 MG capsule Take 2 mg by mouth 4 times daily as needed for diarrhea       MEDICATION  INSTRUCTION Hold Lisinopril and Norvasc if your SBP is <110 and DBP<70 1 each 0     nystatin (MYCOSTATIN) 125236 UNIT/GM POWD Apply topically 2 times daily And bid prn.       oxyCODONE (ROXICODONE) 5 MG tablet Take 1 tablet (5 mg) by mouth 2 times daily as needed for moderate to severe pain 30 tablet 0     pantoprazole (PROTONIX) 40 MG EC tablet Take 1 tablet (40 mg) by mouth 2 times daily (before meals) 60 tablet 0     saccharomyces boulardii (FLORASTOR) 250 MG capsule Take 1 capsule (250 mg) by mouth 2 times daily Take for 1 week past antibiotics.       SIMVASTATIN PO Take 20 mg by mouth At Bedtime        sucralfate (CARAFATE) 1 GM/10ML suspension Take 10 mLs (1 g) by mouth 2 times daily (before meals) 600 mL 0     SUMATRIPTAN SUCCINATE PO Take 50 mg by mouth every 2 hours as needed for migraine (max of 200mg q 24hr)       temazepam (RESTORIL) 15 MG capsule Take 1 capsule (15 mg) by mouth nightly as needed for sleep 15 capsule 0       ROS:  10 point ROS negative except as noted above      Exam:  GENERAL APPEARANCE:  Alert, in no distress, appears pale but overall, well  ENT: Moist mucous membranes  EYES: No scleral icterus.  No redness or drainage  NECK: Supple  RESP: Lungs clear, bronchial breath sounds right base, decreased breath sounds right base  CV: Regular rate and rhythm without murmurs  ABDOMEN: Soft, nondistended, mild better quadrant tenderness.  BILL drain in place in the right mid abdominal area   M/S: 3+ lower extremity edema bilaterally.  No calf tenderness.  Left knee without warmth or swelling or pain with range of motion  SKIN: No rash noted  NEURO:   Cranial nerves 2-12 are normal tested and grossly at patient's baseline  PSYCH:  oriented X 3, pleasant, in good spirits    Labs from April 1 included a creatinine 1.13, hemoglobin 9.8      ASSESSMENT/PLAN:    Fever, rash, left hip pain, improved with steroid burst.  Etiology not clear but possibly representing an allergic reaction to  ceftriaxone  Rash & fever resolved  Now on levaquin      Lower extremity edema  Secondary to sepsis with fluid resuscitation, hypoalbuminemia.  Persistent.  Plan continue diuresis, lower extremity elevation, monitor electrolytes and renal function.  Monitor nutritional status    sepsis due to large liver abscess, Klebsiella bacteremia and BILL drain remains in place with decreased size per follow-up CAT scan  Source of abscess not clear  Plan monitor drain output.  Follow-up with interventional radiology and infectious disease.  Continue Levaquin indefinitely       upper gastrointestinal bleed  Hemoglobin stable.   No sign of ongoing bleeding.   Plan monitor hemoglobin, continue PPI therapy and Carafate      History gastric bypass & diarrhea  Chronic  Plan as needed Imodium.  Monitor for symptoms and signs suggestive of C. Difficile    Right pleural effusion   Likely secondary to liver abscess  Clinically stable  Suspect will resolve with improvement of abscess  Plan monitor respiratory status, exam, repeat chest x-ray as needed      Adonis Miller MD

## 2019-04-08 ENCOUNTER — HOSPITAL LABORATORY (OUTPATIENT)
Dept: OTHER | Facility: CLINIC | Age: 69
End: 2019-04-08

## 2019-04-08 LAB
ALBUMIN SERPL-MCNC: 2.6 G/DL (ref 3.4–5)
ALP SERPL-CCNC: 484 U/L (ref 40–150)
ALT SERPL W P-5'-P-CCNC: 35 U/L (ref 0–50)
ANION GAP SERPL CALCULATED.3IONS-SCNC: 6 MMOL/L (ref 3–14)
AST SERPL W P-5'-P-CCNC: 48 U/L (ref 0–45)
BACTERIA SPEC CULT: NORMAL
BASOPHILS # BLD AUTO: 0.1 10E9/L (ref 0–0.2)
BASOPHILS NFR BLD AUTO: 1.5 %
BILIRUB SERPL-MCNC: 0.8 MG/DL (ref 0.2–1.3)
BUN SERPL-MCNC: 15 MG/DL (ref 7–30)
CALCIUM SERPL-MCNC: 8.8 MG/DL (ref 8.5–10.1)
CHLORIDE SERPL-SCNC: 105 MMOL/L (ref 94–109)
CO2 SERPL-SCNC: 29 MMOL/L (ref 20–32)
CREAT SERPL-MCNC: 1.23 MG/DL (ref 0.52–1.04)
DIFFERENTIAL METHOD BLD: ABNORMAL
EOSINOPHIL # BLD AUTO: 0.1 10E9/L (ref 0–0.7)
EOSINOPHIL NFR BLD AUTO: 1.5 %
ERYTHROCYTE [DISTWIDTH] IN BLOOD BY AUTOMATED COUNT: 17.1 % (ref 10–15)
GFR SERPL CREATININE-BSD FRML MDRD: 45 ML/MIN/{1.73_M2}
GLUCOSE SERPL-MCNC: 74 MG/DL (ref 70–99)
HCT VFR BLD AUTO: 32.7 % (ref 35–47)
HGB BLD-MCNC: 10.6 G/DL (ref 11.7–15.7)
IMM GRANULOCYTES # BLD: 0.1 10E9/L (ref 0–0.4)
IMM GRANULOCYTES NFR BLD: 0.6 %
LYMPHOCYTES # BLD AUTO: 1.8 10E9/L (ref 0.8–5.3)
LYMPHOCYTES NFR BLD AUTO: 22.2 %
Lab: NORMAL
MCH RBC QN AUTO: 29.4 PG (ref 26.5–33)
MCHC RBC AUTO-ENTMCNC: 32.4 G/DL (ref 31.5–36.5)
MCV RBC AUTO: 91 FL (ref 78–100)
MONOCYTES # BLD AUTO: 0.7 10E9/L (ref 0–1.3)
MONOCYTES NFR BLD AUTO: 8.7 %
NEUTROPHILS # BLD AUTO: 5.3 10E9/L (ref 1.6–8.3)
NEUTROPHILS NFR BLD AUTO: 65.5 %
NRBC # BLD AUTO: 0 10*3/UL
NRBC BLD AUTO-RTO: 0 /100
PLATELET # BLD AUTO: 237 10E9/L (ref 150–450)
POTASSIUM SERPL-SCNC: 3.4 MMOL/L (ref 3.4–5.3)
PROT SERPL-MCNC: 6.4 G/DL (ref 6.8–8.8)
RBC # BLD AUTO: 3.61 10E12/L (ref 3.8–5.2)
SODIUM SERPL-SCNC: 140 MMOL/L (ref 133–144)
SPECIMEN SOURCE: NORMAL
WBC # BLD AUTO: 8.1 10E9/L (ref 4–11)

## 2019-04-10 ENCOUNTER — DISCHARGE SUMMARY NURSING HOME (OUTPATIENT)
Dept: GERIATRICS | Facility: CLINIC | Age: 69
End: 2019-04-10
Payer: MEDICARE

## 2019-04-10 VITALS
HEIGHT: 67 IN | HEART RATE: 98 BPM | SYSTOLIC BLOOD PRESSURE: 118 MMHG | RESPIRATION RATE: 18 BRPM | BODY MASS INDEX: 32.31 KG/M2 | OXYGEN SATURATION: 99 % | TEMPERATURE: 98.3 F | DIASTOLIC BLOOD PRESSURE: 80 MMHG | WEIGHT: 205.9 LBS

## 2019-04-10 DIAGNOSIS — R78.81 BACTEREMIA DUE TO KLEBSIELLA PNEUMONIAE: ICD-10-CM

## 2019-04-10 DIAGNOSIS — K75.0 LIVER ABSCESS: Primary | ICD-10-CM

## 2019-04-10 DIAGNOSIS — B96.1 BACTEREMIA DUE TO KLEBSIELLA PNEUMONIAE: ICD-10-CM

## 2019-04-10 DIAGNOSIS — M62.81 GENERALIZED MUSCLE WEAKNESS: ICD-10-CM

## 2019-04-10 DIAGNOSIS — M25.562 LEFT KNEE PAIN, UNSPECIFIED CHRONICITY: ICD-10-CM

## 2019-04-10 DIAGNOSIS — R60.0 LEG EDEMA: ICD-10-CM

## 2019-04-10 PROCEDURE — 99316 NF DSCHRG MGMT 30 MIN+: CPT | Performed by: NURSE PRACTITIONER

## 2019-04-10 RX ORDER — FUROSEMIDE 20 MG
20 TABLET ORAL DAILY
Status: ON HOLD | COMMUNITY
Start: 2019-04-06 | End: 2019-04-26

## 2019-04-10 ASSESSMENT — MIFFLIN-ST. JEOR: SCORE: 1501.59

## 2019-04-10 NOTE — LETTER
4/10/2019        RE: Vanessa Huffman  [address]  [facility] [address]        [place] [facility] DISCHARGE SUMMARY  PATIENT'S NAME: Vanessa Huffman  YOB: 1950  MEDICAL RECORD NUMBER:  [id number]  Place of Service where encounter took place:  [facility] [place] ([facility]) [[id number]]    PRIMARY CARE PROVIDER AND CLINIC RESPONSIBLE AFTER TRANSFER:   [facility] [id number] / [facility]    Non-FMG Provider     Transferring providers: LUIS ANTONIO Aguirre CNP; Adonis Miller MD  Recent Hospitalization/ED:  [facility] Hospital stay 3/24/19 to 3/27/19.  Date of SNF Admission: March / 24 / 2019  Date of SNF (anticipated) Discharge: April / 11 / 2019  Discharged to: previous independent home  Cognitive Scores/Physical Function/DME:     Walking with therapies, independent with activities of daily living  Plan to discharge home with homecare.  Insurance no longer cover rehabilitation stay and patient/family instructed how to manage drain prior to discharge. Patient states she feels ready to go home    CODE STATUS/ADVANCE DIRECTIVES DISCUSSION:  Full Code   ALLERGIES: Contrast dye; Codeine; Zolpidem; Ceftriaxone; and Diatrizoate    DISCHARGE DIAGNOSIS/NURSING FACILITY COURSE:     Patient Vanessa Huffman is a 68 yr old female admitted to [facility] for rehabilitation s/p hospitalization [facility] 3/24/19-3/27/19 for   fever with rash lower extremity due to suspected drug reaction to Ceftriaxone (taking for liver abscess) & acute left knee pain with impaired mobility with no recent trauma -negative aspiration for infection or crystals; xray negative, negative ultrasound DVT) & per ortho eval likely DJD & improved with steroid burst.     S/p recent hospitalization [facility] 3/1-3/19/19 for sepsis due to large liver abscess, Klebsiella bacteremia (now on Levaquin) and BILL drain remains, acute renal failure thought to be due  to ATN/seen by nephrologist (Cr ~4.27 and return to baseline) -was started on lasix & KCL and then discontinue when renal function return to 1.08 & hypokalemia, upper gastrointestinal bleed (hgb trend from 12 to 6, required transfusion & improve to hgb ~10) secondary to anastomotic ulcer (treated with epinephrine & hemoclip), thrombocytopenia-resolve & thought to be due to infection & right pleural effusion with associated opacity (thought to be atelectasis) (thought to be inflammatory due to liver abscess). Patient declined therapeutic thoracentesis -if signs and symptoms worsen may need. Complication of bilateral lower extremity (noted lasix discontinue in hospital)    PMHx gastric bypass, diarrhea (has as needed imodium/has used loperamide in past), hypertension, migraines (states she has ~[id number] & relief with Imitrex) & insomnia (takes Temazepam)    Current [place] scheduled appointments:     sepsis due to large liver abscess, Klebsiella bacteremia and BILL drain remains  Remains on Levaquin course, infectious disease to guide stop date  Continue probiotic until complete antibiotic   Follow up infectious disease Dr. Guadarrama April 17th at 10am. Dr. Guadarrama to guide further CT imaging.   IR nurse clinician (for imaging)[phone]   Patient to instructed to update Dr. Guadarrama office if have questions regarding drain   Follow up CMP,CBC 4/15/19  Continue BILL drain care as ordered . drain ~80cc, yellow/gr  Family and patient to be instructed how to managed drain prior to discharge    upper gastrointestinal bleed (hgb trend from 12 to 6, required transfusion & improve to hgb ~10 & stable) secondary to anastomotic ulcer (treated with epinephrine & hemoclip), thrombocytopenia-resolve & thought to be due to infection  Monitor hgb & signs and symptoms bleeding  Continue on iron & PPI & Carafate (goal wean off Carafate as able-follow up primary care provider regarding)  History gastric bypass & diarrhea  Monitor stools, has  as needed imodium. Patient is on oxycodone that could cause constipation   not on vit B replacement    left knee pain thought to DJD  Improve with prednisone course  Add as needed tylenol. Not exceed 3000mg   Continue as needed oxycodone (reduced to oxycodone 5mg 2 x daily as needed), goal wean off  Trial of Voltaren gel topical ,monitor   Continue flexeril as needed, monitor   Continue therapies    Lower extremity edema  Has +2 edema bilateral  Improve with elevate legs and TG shape. Plans to discharge home with lymphedema therapies   Lasix started due to edema, labs stable.  Plan to continue Lasix & KCL x1 week then discontinue. follow up primary care provider   Encourage adequate protein intake    fever with rash lower extremity due to suspected drug reaction to Ceftriaxone (taking for liver abscess)  Rash & fever resolved  Remains on Levaquin  Does have mild redness left lower extremity, patient states she has dry sensitive skin-does not appear infected. Plan to monitor & update provider if signs and symptoms worsen. Treat edema and moisturize & avoid irritants    right pleural effusion   Lung sounds clear, vitals stable room air. Denies shortness of breath  Continue lasix x1 week then discontinue, follow up primary care provider   Monitor vitals and lung sounds.     hypertension  blood pressure managed  Continue low dose Norvasc (consider give in evening to decreased edema)  HLD  Is on statin, monitor liver function    Migraines  states she has ~[id number] & relief with Imitrex  Continue as needed Imitrex as needed,  Consider preventative therapies -follow up primary care provider     Insomnia  Takes Temazepam.  Continue at this time  Patient not tolerate does reduction Temazepam. Consider trial dose reduction at home        Past Medical History:  has a past medical history of Arthritis, Depressive disorder, Hypertension, Obesity, and Thrombocytopenia (H) (3/24/2019). She also has no past medical history of  Congestive heart failure (H), COPD (chronic obstructive pulmonary disease) (H), History of blood transfusion, or Uncomplicated asthma.    Discharge Medications:  Current Outpatient Medications   Medication Sig Dispense Refill     ACETAMINOPHEN PO Take 650 mg by mouth every 6 hours as needed for pain       amLODIPine (NORVASC) 2.5 MG tablet Take 1 tablet (2.5 mg) by mouth daily 30 tablet 1     CYCLOBENZAPRINE HCL PO Take 10 mg by mouth nightly as needed        diclofenac (VOLTAREN) 1 % topical gel Place 2 g onto the skin 2 times daily And twice daily as needed.  Apply to L knee       ferrous sulfate (FEROSUL) 325 (65 Fe) MG tablet Take 1 tablet (325 mg) by mouth daily (with breakfast) 30 tablet 0     furosemide (LASIX) 20 MG tablet Take 20 mg by mouth daily  Stop date 4/18/19       latanoprost (XALATAN) 0.005 % ophthalmic solution Place 1 drop into both eyes At Bedtime       levofloxacin (LEVAQUIN) 500 MG tablet Take 1 tablet (500 mg) by mouth daily . Should continue until seen by infectious disease in clinic.       loperamide (IMODIUM) 2 MG capsule Take 2 mg by mouth 4 times daily as needed for diarrhea       MEDICATION INSTRUCTION Hold Lisinopril and Norvasc if your SBP is <110 and DBP<70 1 each 0     oxyCODONE (ROXICODONE) 5 MG tablet Take 1 tablet (5 mg) by mouth 2 times daily as needed for moderate to severe pain 30 tablet 0     pantoprazole (PROTONIX) 40 MG EC tablet Take 1 tablet (40 mg) by mouth 2 times daily (before meals) 60 tablet 0     POTASSIUM CHLORIDE ER PO Take 10 mEq by mouth daily  Stop date 4/18/19       saccharomyces boulardii (FLORASTOR) 250 MG capsule Take 1 capsule (250 mg) by mouth 2 times daily Take for 1 week past antibiotics.       SIMVASTATIN PO Take 20 mg by mouth At Bedtime        sucralfate (CARAFATE) 1 GM/10ML suspension Take 10 mLs (1 g) by mouth 2 times daily (before meals) 600 mL 0     SUMATRIPTAN SUCCINATE PO Take 50 mg by mouth every 2 hours as needed for migraine (max of 200mg q  "24hr)       temazepam (RESTORIL) 15 MG capsule Take 1 capsule (15 mg) by mouth nightly as needed for sleep 15 capsule 0     nystatin (MYCOSTATIN) 082535 UNIT/GM POWD Apply topically 2 times daily And bid prn.         Medication Changes/Rationale:     Lasix & KCL started for fluid retention    Controlled medications sent with patient:    no more than 2 wk supply oxycodone     ROS:   10 point ROS of systems including Constitutional, Eyes, Respiratory, Cardiovascular, Gastroenterology, Genitourinary, Integumentary, Musculoskeletal, Psychiatric were all negative except for pertinent positives noted in my HPI.    Physical Exam:   Vitals: /80   Pulse 98   Temp 98.3  F (36.8  C)   Resp 18   Ht 1.702 m (5' 7\")   Wt 93.4 kg (205 lb 14.4 oz)   SpO2 99%   BMI 32.25 kg/m     BMI= Body mass index is 32.25 kg/m .  GENERAL APPEARANCE:  Alert, in no distress  ENT:  Mouth and posterior oropharynx normal, moist mucous membranes  EYES:  EOM, conjunctivae, lids, pupils and irises normal  NECK:  No adenopathy,masses or thyromegaly  RESP:  respiratory effort and palpation of chest normal, lungs clear to auscultation , no respiratory distress  CV:  Palpation and auscultation of heart done , regular rate and rhythm, no murmur, rub, or gallop  ABDOMEN:  normal bowel sounds, soft, nontender, no hepatosplenomegaly or other masses  M/S:   lying in bed  SKIN:  Inspection of skin and subcutaneous tissue drain intact drain gr/yellow drainage, mild pink at insertion site; mild redness left lower leg (skin marker); +1-2 edema bilateral LE   NEURO:   Cranial nerves 2-12 are normal tested and grossly at patient's baseline  PSYCH:  oriented X 3     SNF labs: Labs done in SNF are in [facility] EPIC. Please refer to them using EPIC/Care Everywhere.  DISCHARGE PLAN:    Follow up labs: 4/15/19 CMP,CBC    Medical Follow Up:      Follow up with primary care provider in 1-2 weeks    MTM referral needed and placed by this provider: No    Current " [place] scheduled appointments:       Discharge Services: Home Care:  Occupational Therapy, Physical Therapy, Registered Nurse, Home Health Aide and From:  [facility]    Discharge Instructions Verbalized to Patient at Discharge:     TOTAL DISCHARGE TIME:   Greater than 30 minutes  Electronically signed by:  LUIS ANTONIO Aguirre CNP     Home care Face to Face documentation done                   Documentation of Face to Face and Certification for Home Health Services    I certify that patient: Vanessa Huffman is under my care and that I, or a nurse practitioner or physician's assistant working with me, had a face-to-face encounter that meets the physician face-to-face encounter requirements with this patient on: 4/10/2019.    This encounter with the patient was in whole, or in part, for the following medical condition, which is the primary reason for home health care: liver abscess, lower extremity edema, weakness.    I certify that, based on my findings, the following services are medically necessary home health services: Nursing, Occupational Therapy, Physical Therapy and HHA, lymphedema therapies.    My clinical findings support the need for the above services because: Nurse is needed: To assess BILL drain, insertion site, LE edema, redness LE, pain & vitals after changes in medications or other medical regimen.., Occupational Therapy Services are needed to assess and treat cognitive ability and address ADL safety due to impairment in weakness., Physical Therapy Services are needed to assess and treat the following functional impairments: weakness. and HHA & lymph treatment    Further, I certify that my clinical findings support that this patient is homebound (i.e. absences from home require considerable and taxing effort and are for medical reasons or [employer] services or infrequently or of short duration when for other reasons) because: Requires assistance of another person or specialized equipment to access  medical services because patient: Is unable to walk greater than 500 feet without rest...    Based on the above findings. I certify that this patient is confined to the home and needs intermittent skilled nursing care, physical therapy and/or speech therapy.  The patient is under my care, and I have initiated the establishment of the plan of care.  This patient will be followed by a physician who will periodically review the plan of care.  Physician/Provider to provide follow up care: Emily Najera    Attending hospital physician (the Medicare certified [facility] provider): Aniya Putnam  Electronically signed by Dr. Gonzales Miller MD, and only signing for initial order. Please send all follow up questions and concerns or needed follow up signatures to the PCP Emily Najera.    Physician Signature: See electronic signature associated with these discharge orders.  Date: 4/10/2019        Sincerely,        LUIS ANTONIO Aguirre CNP

## 2019-04-10 NOTE — PROGRESS NOTES
[place] [facility] DISCHARGE SUMMARY  PATIENT'S NAME: Vanessa Huffman  YOB: 1950  MEDICAL RECORD NUMBER:  [id number]  Place of Service where encounter took place:  [facility] [place] ([facility] [[id number]]    PRIMARY CARE PROVIDER AND CLINIC RESPONSIBLE AFTER TRANSFER:   [facility] [id number] / [facility]    Non-[facility] Provider     Transferring providers: LUIS ANTONIO Aguirre CNP; Adonis Miller MD  Recent Hospitalization/ED:  [facility] stay 3/24/19 to 3/27/19.  Date of SNF Admission: March / 24 / 2019  Date of SNF (anticipated) Discharge: April / 11 / 2019  Discharged to: previous independent home  Cognitive Scores/Physical Function/DME:     Walking with therapies, independent with activities of daily living  Plan to discharge home with homecare.  Insurance no longer cover rehabilitation stay and patient/family instructed how to manage drain prior to discharge. Patient states she feels ready to go home    CODE STATUS/ADVANCE DIRECTIVES DISCUSSION:  Full Code   ALLERGIES: Contrast dye; Codeine; Zolpidem; Ceftriaxone; and Diatrizoate    DISCHARGE DIAGNOSIS/NURSING FACILITY COURSE:     Patient Vanessa Huffman is a 68 yr old female admitted to [facility] for rehabilitation s/p hospitalization [facility] 3/24/19-3/27/19 for   fever with rash lower extremity due to suspected drug reaction to Ceftriaxone (taking for liver abscess) & acute left knee pain with impaired mobility with no recent trauma -negative aspiration for infection or crystals; xray negative, negative ultrasound DVT) & per ortho eval likely DJD & improved with steroid burst.     S/p recent hospitalization [facility] 3/1-3/19/19 for sepsis due to large liver abscess, Klebsiella bacteremia (now on Levaquin) and BILL drain remains, acute renal failure thought to be due to ATN/seen by nephrologist (Cr ~4.27 and return to baseline) -was started on lasix & KCL  and then discontinue when renal function return to 1.08 & hypokalemia, upper gastrointestinal bleed (hgb trend from 12 to 6, required transfusion & improve to hgb ~10) secondary to anastomotic ulcer (treated with epinephrine & hemoclip), thrombocytopenia-resolve & thought to be due to infection & right pleural effusion with associated opacity (thought to be atelectasis) (thought to be inflammatory due to liver abscess). Patient declined therapeutic thoracentesis -if signs and symptoms worsen may need. Complication of bilateral lower extremity (noted lasix discontinue in hospital)    PMHx gastric bypass, diarrhea (has as needed imodium/has used loperamide in past), hypertension, migraines (states she has ~[id number] & relief with Imitrex) & insomnia (takes Temazepam)    Current [place] scheduled appointments:     sepsis due to large liver abscess, Klebsiella bacteremia and BILL drain remains  Remains on Levaquin course, infectious disease to guide stop date  Continue probiotic until complete antibiotic   Follow up infectious disease Dr. Guadarrama April 17th at 10am. Dr. Guadarrama to guide further CT imaging.   IR nurse clinician (for imaging)[phone]   Patient to instructed to update Dr. Guadarrama office if have questions regarding drain   Follow up CMP,CBC 4/15/19  Continue BILL drain care as ordered . drain ~80cc, yellow/gr  Family and patient to be instructed how to managed drain prior to discharge    upper gastrointestinal bleed (hgb trend from 12 to 6, required transfusion & improve to hgb ~10 & stable) secondary to anastomotic ulcer (treated with epinephrine & hemoclip), thrombocytopenia-resolve & thought to be due to infection  Monitor hgb & signs and symptoms bleeding  Continue on iron & PPI & Carafate (goal wean off Carafate as able-follow up primary care provider regarding)  History gastric bypass & diarrhea  Monitor stools, has as needed imodium. Patient is on oxycodone that could cause constipation   not on vit B  replacement    left knee pain thought to DJD  Improve with prednisone course  Add as needed tylenol. Not exceed 3000mg   Continue as needed oxycodone (reduced to oxycodone 5mg 2 x daily as needed), goal wean off  Trial of Voltaren gel topical ,monitor   Continue flexeril as needed, monitor   Continue therapies    Lower extremity edema  Has +2 edema bilateral  Improve with elevate legs and TG shape. Plans to discharge home with lymphedema therapies   Lasix started due to edema, labs stable.  Plan to continue Lasix & KCL x1 week then discontinue. follow up primary care provider   Encourage adequate protein intake    fever with rash lower extremity due to suspected drug reaction to Ceftriaxone (taking for liver abscess)  Rash & fever resolved  Remains on Levaquin  Does have mild redness left lower extremity, patient states she has dry sensitive skin-does not appear infected. Plan to monitor & update provider if signs and symptoms worsen. Treat edema and moisturize & avoid irritants    right pleural effusion   Lung sounds clear, vitals stable room air. Denies shortness of breath  Continue lasix x1 week then discontinue, follow up primary care provider   Monitor vitals and lung sounds.     hypertension  blood pressure managed  Continue low dose Norvasc (consider give in evening to decreased edema)  HLD  Is on statin, monitor liver function    Migraines  states she has ~[id number] & relief with Imitrex  Continue as needed Imitrex as needed,  Consider preventative therapies -follow up primary care provider     Insomnia  Takes Temazepam.  Continue at this time  Patient not tolerate does reduction Temazepam. Consider trial dose reduction at home        Past Medical History:  has a past medical history of Arthritis, Depressive disorder, Hypertension, Obesity, and Thrombocytopenia (H) (3/24/2019). She also has no past medical history of Congestive heart failure (H), COPD (chronic obstructive pulmonary disease) (H), History of  blood transfusion, or Uncomplicated asthma.    Discharge Medications:  Current Outpatient Medications   Medication Sig Dispense Refill     ACETAMINOPHEN PO Take 650 mg by mouth every 6 hours as needed for pain       amLODIPine (NORVASC) 2.5 MG tablet Take 1 tablet (2.5 mg) by mouth daily 30 tablet 1     CYCLOBENZAPRINE HCL PO Take 10 mg by mouth nightly as needed        diclofenac (VOLTAREN) 1 % topical gel Place 2 g onto the skin 2 times daily And twice daily as needed.  Apply to L knee       ferrous sulfate (FEROSUL) 325 (65 Fe) MG tablet Take 1 tablet (325 mg) by mouth daily (with breakfast) 30 tablet 0     furosemide (LASIX) 20 MG tablet Take 20 mg by mouth daily  Stop date 4/18/19       latanoprost (XALATAN) 0.005 % ophthalmic solution Place 1 drop into both eyes At Bedtime       levofloxacin (LEVAQUIN) 500 MG tablet Take 1 tablet (500 mg) by mouth daily . Should continue until seen by infectious disease in clinic.       loperamide (IMODIUM) 2 MG capsule Take 2 mg by mouth 4 times daily as needed for diarrhea       MEDICATION INSTRUCTION Hold Lisinopril and Norvasc if your SBP is <110 and DBP<70 1 each 0     oxyCODONE (ROXICODONE) 5 MG tablet Take 1 tablet (5 mg) by mouth 2 times daily as needed for moderate to severe pain 30 tablet 0     pantoprazole (PROTONIX) 40 MG EC tablet Take 1 tablet (40 mg) by mouth 2 times daily (before meals) 60 tablet 0     POTASSIUM CHLORIDE ER PO Take 10 mEq by mouth daily  Stop date 4/18/19       saccharomyces boulardii (FLORASTOR) 250 MG capsule Take 1 capsule (250 mg) by mouth 2 times daily Take for 1 week past antibiotics.       SIMVASTATIN PO Take 20 mg by mouth At Bedtime        sucralfate (CARAFATE) 1 GM/10ML suspension Take 10 mLs (1 g) by mouth 2 times daily (before meals) 600 mL 0     SUMATRIPTAN SUCCINATE PO Take 50 mg by mouth every 2 hours as needed for migraine (max of 200mg q 24hr)       temazepam (RESTORIL) 15 MG capsule Take 1 capsule (15 mg) by mouth nightly as  "needed for sleep 15 capsule 0     nystatin (MYCOSTATIN) 626095 UNIT/GM POWD Apply topically 2 times daily And bid prn.         Medication Changes/Rationale:     Lasix & KCL started for fluid retention    Controlled medications sent with patient:    no more than 2 wk supply oxycodone     ROS:   10 point ROS of systems including Constitutional, Eyes, Respiratory, Cardiovascular, Gastroenterology, Genitourinary, Integumentary, Musculoskeletal, Psychiatric were all negative except for pertinent positives noted in my HPI.    Physical Exam:   Vitals: /80   Pulse 98   Temp 98.3  F (36.8  C)   Resp 18   Ht 1.702 m (5' 7\")   Wt 93.4 kg (205 lb 14.4 oz)   SpO2 99%   BMI 32.25 kg/m    BMI= Body mass index is 32.25 kg/m .  GENERAL APPEARANCE:  Alert, in no distress  ENT:  Mouth and posterior oropharynx normal, moist mucous membranes  EYES:  EOM, conjunctivae, lids, pupils and irises normal  NECK:  No adenopathy,masses or thyromegaly  RESP:  respiratory effort and palpation of chest normal, lungs clear to auscultation , no respiratory distress  CV:  Palpation and auscultation of heart done , regular rate and rhythm, no murmur, rub, or gallop  ABDOMEN:  normal bowel sounds, soft, nontender, no hepatosplenomegaly or other masses  M/S:   lying in bed  SKIN:  Inspection of skin and subcutaneous tissue drain intact drain gr/yellow drainage, mild pink at insertion site; mild redness left lower leg (skin marker); +1-2 edema bilateral LE   NEURO:   Cranial nerves 2-12 are normal tested and grossly at patient's baseline  PSYCH:  oriented X 3     SNF labs: Labs done in SNF are in [facility] EPIC. Please refer to them using EPIC/Care Everywhere.  DISCHARGE PLAN:    Follow up labs: 4/15/19 CMP,CBC    Medical Follow Up:      Follow up with primary care provider in 1-2 weeks    MTM referral needed and placed by this provider: No    Current [place] scheduled appointments:       Discharge Services: Home Care:  Occupational Therapy, " Physical Therapy, Registered Nurse, Home Health Aide and From:  [facility]    Discharge Instructions Verbalized to Patient at Discharge:     TOTAL DISCHARGE TIME:   Greater than 30 minutes  Electronically signed by:  LUIS ANTONIO Aguirre CNP     Home care Face to Face documentation done

## 2019-04-11 DIAGNOSIS — K29.01 GASTROINTESTINAL HEMORRHAGE ASSOCIATED WITH ACUTE GASTRITIS: ICD-10-CM

## 2019-04-11 DIAGNOSIS — I10 BENIGN ESSENTIAL HYPERTENSION: ICD-10-CM

## 2019-04-11 DIAGNOSIS — E78.5 HYPERLIPIDEMIA, UNSPECIFIED HYPERLIPIDEMIA TYPE: Primary | ICD-10-CM

## 2019-04-11 NOTE — PROGRESS NOTES
Documentation of Face to Face and Certification for Home Health Services    I certify that patient: Vanessa Huffman is under my care and that I, or a nurse practitioner or physician's assistant working with me, had a face-to-face encounter that meets the physician face-to-face encounter requirements with this patient on: 4/10/2019.    This encounter with the patient was in whole, or in part, for the following medical condition, which is the primary reason for home health care: liver abscess, lower extremity edema, weakness.    I certify that, based on my findings, the following services are medically necessary home health services: Nursing, Occupational Therapy, Physical Therapy and HHA, lymphedema therapies.    My clinical findings support the need for the above services because: Nurse is needed: To assess BILL drain, insertion site, LE edema, redness LE, pain & vitals after changes in medications or other medical regimen.., Occupational Therapy Services are needed to assess and treat cognitive ability and address ADL safety due to impairment in weakness., Physical Therapy Services are needed to assess and treat the following functional impairments: weakness. and HHA & lymph treatment    Further, I certify that my clinical findings support that this patient is homebound (i.e. absences from home require considerable and taxing effort and are for medical reasons or [facility] services or infrequently or of short duration when for other reasons) because: Requires assistance of another person or specialized equipment to access medical services because patient: Is unable to walk greater than 500 feet without rest...    Based on the above findings. I certify that this patient is confined to the home and needs intermittent skilled nursing care, physical therapy and/or speech therapy.  The patient is under my care, and I have initiated the establishment of the plan of care.  This patient will be followed by a physician who  will periodically review the plan of care.  Physician/Provider to provide follow up care: Emily Najera    Attending hospital physician (the Medicare certified [facility] provider): Aniya Putnam  Electronically signed by Dr. Gonzales Miller MD, and only signing for initial order. Please send all follow up questions and concerns or needed follow up signatures to the PCP Emily Najera.    Physician Signature: See electronic signature associated with these discharge orders.  Date: 4/10/2019

## 2019-04-12 RX ORDER — AMLODIPINE BESYLATE 2.5 MG/1
TABLET ORAL
Qty: 90 TABLET | Refills: 0 | Status: SHIPPED | OUTPATIENT
Start: 2019-04-12

## 2019-04-12 RX ORDER — PANTOPRAZOLE SODIUM 40 MG/1
TABLET, DELAYED RELEASE ORAL
Qty: 180 TABLET | Refills: 0 | Status: SHIPPED | OUTPATIENT
Start: 2019-04-12

## 2019-04-12 RX ORDER — POTASSIUM CHLORIDE 750 MG/1
TABLET, EXTENDED RELEASE ORAL
Qty: 90 TABLET | Refills: 0 | Status: SHIPPED | OUTPATIENT
Start: 2019-04-12

## 2019-04-12 RX ORDER — SIMVASTATIN 20 MG
TABLET ORAL
Qty: 90 TABLET | Refills: 0 | Status: SHIPPED | OUTPATIENT
Start: 2019-04-12

## 2019-04-18 ENCOUNTER — HOSPITAL ENCOUNTER (OUTPATIENT)
Dept: CT IMAGING | Facility: CLINIC | Age: 69
Discharge: HOME | End: 2019-04-18
Attending: INTERNAL MEDICINE | Admitting: INTERNAL MEDICINE
Payer: MEDICARE

## 2019-04-18 DIAGNOSIS — K75.0 LIVER ABSCESS: ICD-10-CM

## 2019-04-18 PROCEDURE — 74176 CT ABD & PELVIS W/O CONTRAST: CPT

## 2019-04-25 ENCOUNTER — TELEPHONE (OUTPATIENT)
Dept: INTERVENTIONAL RADIOLOGY/VASCULAR | Facility: CLINIC | Age: 69
End: 2019-04-25

## 2019-04-25 NOTE — TELEPHONE ENCOUNTER
Interventional Radiology   Radiology at [facility] has been requested to perform a possible hepatic aspiration and drain placement if appears infected plus original hepatic drain removal procedure from Dr Guadarrama.    Vanessa Huffman is a 68 year old woman with a PMH of obesity, arthritis, hypertension and depression who was admitted to the hospital on 3/1/19 with abnormal liver labs, acute renal failure and 3 days of headache, nausea and fevers. She had klebsiella bacteremia and was treated with IV antibiotics. She was found to have a large liver abscess and is s/p a US guided drain placement on 3/7/19. She also had a drop in hemoglobin from 12 to 6, was transfused and an endoscopy was done and active bleeding was noted at the GJ junction and clip placed over a bleeding vessel.  She  continued to improve and WBC went from in the 50s to 7.4  on 3/18/19, renal function slowly normalizing. Hemoglobin stable. She was discharged to a TCU on 3/19/19.   She has continued to improve since discharge and is at home now. Outputs have been minimal for awhile. She had a follow up CT scan 4/18/19 which showed resolved area of fluid where drain was and a small new fluid collection adjacent to the liver.   Reviewed imaging and clinical information with IR staff Dr Amor who approved the hepatic drain removal and possible aspiration and drain placement if fluid appears infected.   Central scheduling has contacted the patient and scheduled the possible drain placement and hepatic drain removal for Friday 4/26/19.     Thanks Francisca [facility] Interventional Radiology CNP ([phone]) (phone [phone])

## 2019-04-26 ENCOUNTER — HOSPITAL ENCOUNTER (OUTPATIENT)
Dept: ULTRASOUND IMAGING | Facility: CLINIC | Age: 69
End: 2019-04-26
Attending: INTERNAL MEDICINE
Payer: MEDICARE

## 2019-04-26 ENCOUNTER — HOSPITAL ENCOUNTER (OUTPATIENT)
Facility: CLINIC | Age: 69
Discharge: HOME | End: 2019-04-26
Admitting: INTERNAL MEDICINE
Payer: MEDICARE

## 2019-04-26 VITALS
DIASTOLIC BLOOD PRESSURE: 49 MMHG | OXYGEN SATURATION: 98 % | SYSTOLIC BLOOD PRESSURE: 117 MMHG | RESPIRATION RATE: 16 BRPM | TEMPERATURE: 97.4 F | HEART RATE: 100 BPM

## 2019-04-26 VITALS
SYSTOLIC BLOOD PRESSURE: 133 MMHG | RESPIRATION RATE: 18 BRPM | DIASTOLIC BLOOD PRESSURE: 55 MMHG | OXYGEN SATURATION: 96 % | HEART RATE: 97 BPM

## 2019-04-26 DIAGNOSIS — K65.1 ABDOMINAL VISCERAL ABSCESS (H): ICD-10-CM

## 2019-04-26 LAB
GRAM STN SPEC: NORMAL
GRAM STN SPEC: NORMAL
SPECIMEN SOURCE: NORMAL

## 2019-04-26 PROCEDURE — 87205 SMEAR GRAM STAIN: CPT | Performed by: RADIOLOGY

## 2019-04-26 PROCEDURE — 40000257 ZZH STATISTIC CONSULT NO CHARGE VASC ACCESS

## 2019-04-26 PROCEDURE — 76942 ECHO GUIDE FOR BIOPSY: CPT

## 2019-04-26 PROCEDURE — 25000128 H RX IP 250 OP 636: Performed by: RADIOLOGY

## 2019-04-26 PROCEDURE — 87070 CULTURE OTHR SPECIMN AEROBIC: CPT | Performed by: RADIOLOGY

## 2019-04-26 PROCEDURE — 40000863 ZZH STATISTIC RADIOLOGY XRAY, US, CT, MAR, NM

## 2019-04-26 PROCEDURE — 40000141 ZZH STATISTIC PERIPHERAL IV START W/O US GUIDANCE

## 2019-04-26 PROCEDURE — 25000125 ZZHC RX 250: Performed by: RADIOLOGY

## 2019-04-26 RX ORDER — DEXTROSE MONOHYDRATE 25 G/50ML
25-50 INJECTION, SOLUTION INTRAVENOUS
Status: DISCONTINUED | OUTPATIENT
Start: 2019-04-26 | End: 2019-04-26 | Stop reason: HOSPADM

## 2019-04-26 RX ORDER — LIDOCAINE 40 MG/G
CREAM TOPICAL
Status: DISCONTINUED | OUTPATIENT
Start: 2019-04-26 | End: 2019-04-26 | Stop reason: HOSPADM

## 2019-04-26 RX ORDER — FLUMAZENIL 0.1 MG/ML
0.2 INJECTION, SOLUTION INTRAVENOUS
Status: DISCONTINUED | OUTPATIENT
Start: 2019-04-26 | End: 2019-04-26 | Stop reason: HOSPADM

## 2019-04-26 RX ORDER — NICOTINE POLACRILEX 4 MG
15-30 LOZENGE BUCCAL
Status: DISCONTINUED | OUTPATIENT
Start: 2019-04-26 | End: 2019-04-26 | Stop reason: HOSPADM

## 2019-04-26 RX ORDER — LIDOCAINE HYDROCHLORIDE 10 MG/ML
10 INJECTION, SOLUTION EPIDURAL; INFILTRATION; INTRACAUDAL; PERINEURAL ONCE
Status: COMPLETED | OUTPATIENT
Start: 2019-04-26 | End: 2019-04-26

## 2019-04-26 RX ORDER — NALOXONE HYDROCHLORIDE 0.4 MG/ML
.1-.4 INJECTION, SOLUTION INTRAMUSCULAR; INTRAVENOUS; SUBCUTANEOUS
Status: DISCONTINUED | OUTPATIENT
Start: 2019-04-26 | End: 2019-04-26 | Stop reason: HOSPADM

## 2019-04-26 RX ORDER — LIDOCAINE HYDROCHLORIDE 10 MG/ML
5 INJECTION, SOLUTION EPIDURAL; INFILTRATION; INTRACAUDAL; PERINEURAL ONCE
Status: COMPLETED | OUTPATIENT
Start: 2019-04-26 | End: 2019-04-26

## 2019-04-26 RX ORDER — FENTANYL CITRATE 50 UG/ML
25-50 INJECTION, SOLUTION INTRAMUSCULAR; INTRAVENOUS EVERY 5 MIN PRN
Status: DISCONTINUED | OUTPATIENT
Start: 2019-04-26 | End: 2019-04-26 | Stop reason: HOSPADM

## 2019-04-26 RX ADMIN — FENTANYL CITRATE 75 MCG: 50 INJECTION, SOLUTION INTRAMUSCULAR; INTRAVENOUS at 13:10

## 2019-04-26 RX ADMIN — LIDOCAINE HYDROCHLORIDE 5 ML: 10 INJECTION, SOLUTION EPIDURAL; INFILTRATION; INTRACAUDAL; PERINEURAL at 13:12

## 2019-04-26 RX ADMIN — MIDAZOLAM 1.5 MG: 1 INJECTION INTRAMUSCULAR; INTRAVENOUS at 13:10

## 2019-04-26 RX ADMIN — LIDOCAINE HYDROCHLORIDE 10 ML: 10 INJECTION, SOLUTION EPIDURAL; INFILTRATION; INTRACAUDAL; PERINEURAL at 13:12

## 2019-04-26 NOTE — DISCHARGE INSTRUCTIONS
BILL Drain Tube Removal Discharge Instructions     After you go home:      You may resume your normal diet    Care of Insertion Site:      For the first 48 hrs, check your puncture site every couple hours while you are awake     You may change the dressing daily, but more often if dressing becomes wet or dirty. It is not unusual to have drainage from the opening until the site is completely healed.     You may remove the dressing when the site is completely healed     You may shower tomorrow    No tub baths, whirlpools or swimming until your puncture site has fully healed    Activity:      You may go back to normal activity in 24 hours    Wait 48 hours before lifting, straining, exercise or other strenuous activity    Medicines:      You may resume all medications    For minor pain, you may take Acetaminophen (Tylenol) or Ibuprofen (Advil)               Call the provider who ordered this procedure if:      The site is red, swollen, hot or tender    There is foul-smelling drainage from the tube site    You have pain that is getting worse or that does not improve with pain medication    You have chills or a fever greater than 101 F (38 C)    If the leaking continues for more than 3 days    Any questions or concerns    Call  911 or go to the Emergency Room if you have:      Severe pain or trouble breathing    Bleeding that you cannot control      If you have questions call:          [facility] Radiology Dept @ [phone]      The provider who performed your procedure was ____Dr Amor____________    Sedation Discharge Instructions     After you go home:      You may resume your normal diet    Have an adult stay with you for 6 hours if you received sedation       For 24 hours - due to the sedation you received:    Relax and take it easy    Do NOT make any important or legal decisions    Do NOT drive or operate machines at home or at work    Do NOT drink alcohol    Activity       You may go back to normal  activity in 24 hours    Medicines:      You may resume all medications                   If you have questions call:          [facility] @ [phone]

## 2019-04-26 NOTE — PROGRESS NOTES
discharge instructions given to pt and spouse.  Both state understanding.  Site remains CDI.  Up to bathroom to void  Report to Erika FERNANDEZ

## 2019-04-26 NOTE — PROGRESS NOTES
Dressing sites D/I.  Getting dressed, denies c/o, see flow sheet.  Care Suites Discharge Summary    Discharge Criteria:   Discharge Criteria met per MD orders: Yes.   Vital signs stable.     Pt demonstrates ability to ambulate safely: Yes.  (See discharge questionnaire for additional information)    Discharge instructions & education:   Discharge instructions reviewed with patient and  by XIN Torres RN   Patient verbalizes  understanding.        Medications:   Patient will be discharging on new medications- No. Patient verbalizes reason for use, start date, and side effects NA.    Items returned to patient:   Home and hospital acquired medications returned to patient NA   Listed belongings gathered and returned to patient: Yes    Patient discharged to home via w/c with .    Erika Frances

## 2019-04-26 NOTE — PROGRESS NOTES
Drainage of abscess, 30 ml removed and sent to pathology, purulent brown in color.  Drain removed that was in place.  Dressing to site CDI.  Sedation given with some relaxation, mostly tearful and fearful throughout.  Reporting no pain now, VSS, afebrile.  Juice and crackers given.  Call light in reach.

## 2019-05-01 LAB
BACTERIA SPEC CULT: NO GROWTH
SPECIMEN SOURCE: NORMAL

## (undated) DEVICE — SU ETHIBOND 3-0 RB-1 30" X872H

## (undated) DEVICE — SU ETHIBOND 3-0 V-5 30" X916H

## (undated) DEVICE — DRSG STERI STRIP 1/2X4" R1547

## (undated) DEVICE — PACK HAND WRIST SOP15HWFSP

## (undated) DEVICE — SOL ADH LIQUID BENZOIN SWAB 0.6ML C1544

## (undated) DEVICE — GLOVE PROTEXIS MICRO 6.5  2D73PM65

## (undated) DEVICE — BNDG ELASTIC 4"X5YDS UNSTERILE 6611-40

## (undated) DEVICE — SUCTION CANISTER MEDIVAC LINER 3000ML W/LID 65651-530

## (undated) DEVICE — SU VICRYL 3-0 RB-1 27" UND J215H

## (undated) DEVICE — TOURNIQUET CUFF 18" REPRO RED 60-7070-103

## (undated) DEVICE — LINEN TOWEL PACK X5 5464

## (undated) DEVICE — SU MONOCRYL 4-0 PC-3 18" UND Y845G

## (undated) DEVICE — DRSG KERLIX 4 1/2"X4YDS ROLL 6715

## (undated) DEVICE — SOL NACL 0.9% IRRIG 1000ML BOTTLE 07138-09

## (undated) DEVICE — GLOVE PROTEXIS BLUE W/NEU-THERA 6.5  2D73EB65

## (undated) DEVICE — CAST PLASTER SPLINT 4X15" 7394

## (undated) RX ORDER — LIDOCAINE HYDROCHLORIDE 20 MG/ML
INJECTION, SOLUTION EPIDURAL; INFILTRATION; INTRACAUDAL; PERINEURAL
Status: DISPENSED
Start: 2018-04-06

## (undated) RX ORDER — FLUMAZENIL 0.1 MG/ML
INJECTION, SOLUTION INTRAVENOUS
Status: DISPENSED
Start: 2019-03-07

## (undated) RX ORDER — FENTANYL CITRATE 50 UG/ML
INJECTION, SOLUTION INTRAMUSCULAR; INTRAVENOUS
Status: DISPENSED
Start: 2018-04-06

## (undated) RX ORDER — CEFAZOLIN SODIUM 2 G/100ML
INJECTION, SOLUTION INTRAVENOUS
Status: DISPENSED
Start: 2018-04-06

## (undated) RX ORDER — NALOXONE HYDROCHLORIDE 0.4 MG/ML
INJECTION, SOLUTION INTRAMUSCULAR; INTRAVENOUS; SUBCUTANEOUS
Status: DISPENSED
Start: 2019-03-07

## (undated) RX ORDER — FLUMAZENIL 0.1 MG/ML
INJECTION, SOLUTION INTRAVENOUS
Status: DISPENSED
Start: 2019-03-04

## (undated) RX ORDER — NALOXONE HYDROCHLORIDE 0.4 MG/ML
INJECTION, SOLUTION INTRAMUSCULAR; INTRAVENOUS; SUBCUTANEOUS
Status: DISPENSED
Start: 2019-03-04

## (undated) RX ORDER — FENTANYL CITRATE 50 UG/ML
INJECTION, SOLUTION INTRAMUSCULAR; INTRAVENOUS
Status: DISPENSED
Start: 2019-04-26

## (undated) RX ORDER — DEXAMETHASONE SODIUM PHOSPHATE 4 MG/ML
INJECTION, SOLUTION INTRA-ARTICULAR; INTRALESIONAL; INTRAMUSCULAR; INTRAVENOUS; SOFT TISSUE
Status: DISPENSED
Start: 2018-04-06

## (undated) RX ORDER — FENTANYL CITRATE 50 UG/ML
INJECTION, SOLUTION INTRAMUSCULAR; INTRAVENOUS
Status: DISPENSED
Start: 2019-03-04

## (undated) RX ORDER — ONDANSETRON 2 MG/ML
INJECTION INTRAMUSCULAR; INTRAVENOUS
Status: DISPENSED
Start: 2018-04-06

## (undated) RX ORDER — PROPOFOL 10 MG/ML
INJECTION, EMULSION INTRAVENOUS
Status: DISPENSED
Start: 2018-04-06

## (undated) RX ORDER — FENTANYL CITRATE 50 UG/ML
INJECTION, SOLUTION INTRAMUSCULAR; INTRAVENOUS
Status: DISPENSED
Start: 2018-03-20

## (undated) RX ORDER — FLUMAZENIL 0.1 MG/ML
INJECTION, SOLUTION INTRAVENOUS
Status: DISPENSED
Start: 2019-04-26

## (undated) RX ORDER — FENTANYL CITRATE 50 UG/ML
INJECTION, SOLUTION INTRAMUSCULAR; INTRAVENOUS
Status: DISPENSED
Start: 2019-03-07

## (undated) RX ORDER — NALOXONE HYDROCHLORIDE 0.4 MG/ML
INJECTION, SOLUTION INTRAMUSCULAR; INTRAVENOUS; SUBCUTANEOUS
Status: DISPENSED
Start: 2019-04-26